# Patient Record
Sex: MALE | Race: WHITE | NOT HISPANIC OR LATINO | Employment: FULL TIME | URBAN - METROPOLITAN AREA
[De-identification: names, ages, dates, MRNs, and addresses within clinical notes are randomized per-mention and may not be internally consistent; named-entity substitution may affect disease eponyms.]

---

## 2016-09-20 LAB
CREAT SERPL-MCNC: 0.9 MG/DL (ref 0.7–1.2)
GFR SERPL CREATININE-BSD FRML MDRD: >60 ML/MIN/1.73M2
GLUCOSE SERPL-MCNC: 80 MG/DL (ref 74–106)
POTASSIUM SERPL-SCNC: 4.1 MMOL/L (ref 3.5–5)

## 2016-10-31 LAB
CREAT SERPL-MCNC: 1.2 MG/DL (ref 0.7–1.2)
GFR SERPL CREATININE-BSD FRML MDRD: >60 ML/MIN/1.73M2
GLUCOSE SERPL-MCNC: 85 MG/DL (ref 74–106)
POTASSIUM SERPL-SCNC: 4.2 MMOL/L (ref 3.5–5)

## 2017-01-29 ENCOUNTER — MYC MEDICAL ADVICE (OUTPATIENT)
Dept: FAMILY MEDICINE | Facility: CLINIC | Age: 67
End: 2017-01-29

## 2017-02-02 ENCOUNTER — MYC MEDICAL ADVICE (OUTPATIENT)
Dept: FAMILY MEDICINE | Facility: CLINIC | Age: 67
End: 2017-02-02

## 2017-02-02 NOTE — TELEPHONE ENCOUNTER
"Routing to Dr. Agustin to please advise.  Patient taking furosemide 20mg, take 1/2 tab once daily and using compression stockings which helps \"a little\"  Patient wondering what else he can do    Ghislaine Foreman RN    "

## 2017-04-07 ENCOUNTER — OFFICE VISIT (OUTPATIENT)
Dept: FAMILY MEDICINE | Facility: CLINIC | Age: 67
End: 2017-04-07
Payer: COMMERCIAL

## 2017-04-07 VITALS
DIASTOLIC BLOOD PRESSURE: 86 MMHG | HEIGHT: 69 IN | TEMPERATURE: 96.9 F | HEART RATE: 76 BPM | BODY MASS INDEX: 46.06 KG/M2 | WEIGHT: 311 LBS | SYSTOLIC BLOOD PRESSURE: 147 MMHG | OXYGEN SATURATION: 88 %

## 2017-04-07 DIAGNOSIS — I10 HYPERTENSION GOAL BP (BLOOD PRESSURE) < 140/90: ICD-10-CM

## 2017-04-07 DIAGNOSIS — J98.4 RESTRICTIVE LUNG DISEASE: ICD-10-CM

## 2017-04-07 DIAGNOSIS — Z23 NEED FOR PROPHYLACTIC VACCINATION AGAINST STREPTOCOCCUS PNEUMONIAE (PNEUMOCOCCUS): Primary | ICD-10-CM

## 2017-04-07 DIAGNOSIS — J44.9 CHRONIC OBSTRUCTIVE PULMONARY DISEASE, UNSPECIFIED COPD TYPE (H): ICD-10-CM

## 2017-04-07 PROCEDURE — 99214 OFFICE O/P EST MOD 30 MIN: CPT | Performed by: INTERNAL MEDICINE

## 2017-04-07 RX ORDER — DILTIAZEM HYDROCHLORIDE 120 MG/1
120 CAPSULE, EXTENDED RELEASE ORAL DAILY
Qty: 90 CAPSULE | Refills: 0 | Status: SHIPPED | OUTPATIENT
Start: 2017-04-07 | End: 2017-04-13

## 2017-04-07 ASSESSMENT — PAIN SCALES - GENERAL: PAINLEVEL: NO PAIN (0)

## 2017-04-07 NOTE — Clinical Note
Do you think weight loss surgery holds any possible role for this tony ? Thought I would bounce this off you !

## 2017-04-07 NOTE — PATIENT INSTRUCTIONS
At 88% O2 Saturation, I would recommend supplemental oxygen therapy during exertion and with sleep -- call if you are interested in this    Follow up with Pulmonary Rehabilitation, [facility]    You can buy a finger O2 Saturation reader to check your oxygen levels    Call if interested in meeting with a Pulmonologist.       [facility]    If you have any questions regarding to your visit please contact your care team:     Team Pink:   Clinic Hours Telephone Number   Internal Medicine:  Dr. Kelly Bahena NP       7am-7pm  Monday - Thursday   7am-5pm  Fridays  [phone]  (Appointment scheduling available 24/7)    Questions about your visit?  Team Line  [phone]   Urgent Care - [facility] - 11am-9pm Monday-Friday Saturday-Sunday- 9am-5pm   [place] - 5pm-9pm Monday-Friday Saturday-Sunday- 9am-5pm  [phone] - [place]   [phone] - [place]       What options do I have for visits at the clinic other than the traditional office visit?  To expand how we care for you, many of our providers are utilizing electronic visits (e-visits) and telephone visits, when medically appropriate, for interactions with their patients rather than a visit in the clinic.   We also offer nurse visits for many medical concerns. Just like any other service, we will bill your insurance company for this type of visit based on time spent on the phone with your provider. Not all insurance companies cover these visits. Please check with your medical insurance if this type of visit is covered. You will be responsible for any charges that are not paid by your insurance.      E-visits via [employer]:  generally incur a $35.00 fee.  Telephone visits:  Time spent on the phone: *charged based on time that is spent on the phone in increments of 10 minutes. Estimated cost:   5-10 mins $30.00   11-20 mins. $59.00   21-30 mins. $85.00   Use  MyChart (secure email communication and access to your chart) to send your primary care provider a message or make an appointment. Ask someone on your Team how to sign up for TeamLINKShart.    For a Price Quote for your services, please call our [employer] Price Line at [phone].    As always, Thank you for trusting us with your health care needs!    Discharged by Stormy SWENSON CMA ([facility])

## 2017-04-07 NOTE — PROGRESS NOTES
SUBJECTIVE:                                                    Boogie Clark is a 66 year old male who presents to clinic today with his wife for the following health issues:    COPD -- Albuterol (2 puffs as needed every 6 hours), Singulair 10 mg, and Symbicort (2 puffs, twice daily). Also Flonase, Claritin, Guaifenesin. Boogie stated that he is not able to walk for longer than ~20 feet without feeling out of breath (Ambulatory SpO2 at 88% today).     He reported that he has quit smoking -- no smoking for 6 months at this time.     Boogie reported that he [facility], and was seen by technicians, who ran him through pulmonary function tests.     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     Nosebleeds -- Boogie reported that he often wakes up in the morning with nosebleeds, and is concerned that his high BP may be causing this.     Sleep Apnea -- Boogie reported that he often will take OTC sleep medication, go to bed at 10 pm, and wakes up around midnight and he can't fall back asleep. He reported that he uses CPAP machine nightly.     HTN -- Metoprolol 100 mg, Diltiazem 120 mg, Lasix. He reported that his peripheral edema is greatly improved. He reported that he watches his diet and has been eating some weight watchers/slim fit meals, decreased salt/sugar, decreased portions, decreased alcohol.     Boogie reported that he has been having lots of headaches. He tried to obtain a home [facility] the [facility], but could not. He checked at the [facility] pharmacy and had 160/??, and felt that he should come in.     He and his wife both reported significant mood swings the las 2-3 months, and believes it might be due to these symptoms. He stated that he is in the process of selling his house and moving to [place], but doesn't feel stressed about this.     BP Readings from Last 3 Encounters:   04/07/17 147/86    12/08/16 148/82   08/22/16 (!) 158/95     Wt Readings from Last 5 Encounters:   04/07/17 (!) 141.1 kg (311 lb)   12/08/16 (!) 143.9 kg (317 lb 3.2 oz)   12/03/15 (!) 137 kg (302 lb)   11/17/15 (!) 136.5 kg (301 lb)   10/20/15 135.6 kg (299 lb)        Problem list and histories reviewed & adjusted, as indicated.  Additional history: as documented    Patient Active Problem List   Diagnosis     Advanced directives, counseling/discussion     Hypertension goal BP (blood pressure) < 140/90     Peripheral vascular disease (H)     Colon polyp     Anal fissure     Kidney stone     Bladder tumor     COCO (obstructive sleep apnea)     Hyperlipidemia with target LDL less than 100     Malignant neoplasm of bladder (H)     Personal history of bladder cancer     Sigmoid diverticulosis     Body mass index (BMI) of 40.0-44.9 in adult (H)     Chronic obstructive pulmonary disease, unspecified COPD type (H)     Benign essential hypertension     History of smoking     Morbid obesity due to excess calories (H)     Past Surgical History:   Procedure Laterality Date     ANGIOPLASTY  2009    angioplasty at abdominla aortic bifurcation in common [facility] bilaterally     ANKLE SURGERY       CYSTOSCOPY      multiple times secondary to dx. of bladder cancer     VASECTOMY         Social History   Substance Use Topics     Smoking status: Former Smoker     Types: Cigarettes     Quit date: 6/1/2016     Smokeless tobacco: Never Used     Alcohol use 0.0 oz/week     0 Standard drinks or equivalent per week      Comment: rarely     Family History   Problem Relation Age of Onset     Breast Cancer Mother      CANCER Maternal Grandmother          Current Outpatient Prescriptions   Medication Sig Dispense Refill     diltiazem (TIAZAC) 120 MG 24 hr ER beaded capsule Take 1 capsule (120 mg) by mouth daily 90 capsule 0     budesonide-formoterol (SYMBICORT) 160-4.5 MCG/ACT Inhaler Inhale 2 puffs into the lungs 2 times daily       guaiFENesin-codeine  (GUAIFENESIN AC) 100-10 MG/5ML SYRP syrup Take 5 mLs by mouth every 4 hours as needed for cough       montelukast (SINGULAIR) 10 MG tablet Take 10 mg by mouth At Bedtime       loratadine (CLARITIN) 10 MG tablet Take 10 mg by mouth daily       FUROSEMIDE PO Take 20 mg by mouth       doxycycline (VIBRAMYCIN) 100 MG capsule Take 100 mg by mouth 2 times daily       METOPROLOL SUCCINATE ER PO Take 100 mg by mouth       lovastatin (MEVACOR) 40 MG tablet Take 1 tablet (40 mg) by mouth At Bedtime 90 tablet 3     albuterol (2.5 MG/3ML) 0.083% nebulizer solution Take 1 vial (2.5 mg) by nebulization daily as needed for shortness of breath / dyspnea or wheezing 30 vial 11     albuterol (VENTOLIN HFA) 108 (90 BASE) MCG/ACT inhaler Inhale 2 puffs into the lungs every 6 hours 1 Inhaler 2     fluticasone (FLONASE) 50 MCG/ACT nasal spray Spray 1-2 sprays into both nostrils daily 1 g 11     IBUPROFEN PO Take 600 mg by mouth       Ginkgo Biloba (GINKOBA) 40 MG TABS Take 2 tablets by mouth daily.       aspirin 81 MG tablet Take  by mouth daily.       Multiple Vitamin (MULTI-VITAMIN) per tablet Take  by mouth daily.       [DISCONTINUED] diltiazem (TIAZAC) 120 MG 24 hr ER beaded capsule Take 1 capsule (120 mg) by mouth daily 90 capsule 0       Reviewed and updated as needed this visit by clinical staff  Tobacco  Allergies  Meds  Med Hx  Surg Hx  Fam Hx  Soc Hx      Reviewed and updated as needed this visit by Provider         ROS:  Constitutional, HEENT, cardiovascular, pulmonary, GI, , musculoskeletal, neuro, skin, endocrine and psych systems are negative, except as otherwise noted.    This document serves as a record of the services and decisions personally performed and made by Shabbir Agustin MD. It was created on their behalf by Dariel Murphy, a trained medical scribe. The creation of this document is based the provider's statements to the medical scribe.  Dariel Murphy April 7, 2017 12:00 PM      OBJECTIVE:                           "                          /86  Pulse 76  Temp 96.9  F (36.1  C) (Oral)  Ht 1.759 m (5' 9.25\")  Wt (!) 141.1 kg (311 lb)  SpO2 (!) 88%  BMI 45.6 kg/m2  Body mass index is 45.6 kg/(m^2).  GENERAL: healthy, alert and no distress, morbidly obese   EYES: Eyes grossly normal to inspection, PERRL and conjunctivae and sclerae normal  RESP: lungs clear to auscultation - no rales, rhonchi or wheezes, some prolongation of the expiratory phase of respiration   CV: regular rate and rhythm, normal S1 S2, no S3 or S4, no murmur, click or rub, no peripheral edema and peripheral pulses strong  SKIN: no suspicious lesions or rashes to visible skin  NEURO: mentation intact and speech normal  PSYCH: mentation appears normal, affect normal/bright    Diagnostic Test Results:  Results for orders placed or performed in visit on 12/08/16   XR Chest 2 Views    Narrative    XR CHEST 2 VW 12/8/2016 11:26 AM    COMPARISON: 6/10/2013    HISTORY: Shortness of breath      Impression    IMPRESSION: Cardiac silhouette and pulmonary vasculature are within  normal limits. No focal airspace disease, pleural effusion or  pneumothorax.    NICKI VELÁSQUEZ        ASSESSMENT/PLAN:                                                      (Z23) Need for prophylactic vaccination against Streptococcus pneumoniae (pneumococcus)  (primary encounter diagnosis)  Comment: already current   Plan: imaging section of Epic electronic medical records updated    (I10) Hypertension goal BP (blood pressure) < 140/90  Comment: controlled within acceptable limits, continue current plan of care      Plan: diltiazem (TIAZAC) 120 MG 24 hr ER beaded         capsule            (J44.9) Chronic obstructive pulmonary disease, unspecified COPD type (H)  Comment: by far we spent the most time talking about this of course., he has easily demonstrated the need for home oxygen.. This would apparently cause him possibly to lose his job as a   Plan: PULMONARY REHAB REFERRAL      "   I share his concerns and yet my job is to recommend that he be started on oxygen therapy as he desaturates with little effort and there's little doubt he is living with substantial chronic hypoxia. I agree with family, that pulmonary rehabilitation should be tried [ at the [facility] as he gets all hospital care there. He does not need oxygen essentially all of the time , just with exercise / prn / and nocturnal oxygen , but he has to accept this need. So far he has not. Also we discussed gastric bypass / weight loss surgeries. At his age the pay off in terms of weight loss amounts, it is far less effective and is generally not done in patients older then about 60 but I will follow up with patient on this point       Patient Instructions   At 88% O2 Saturation, I would recommend supplemental oxygen therapy during exertion and with sleep -- call if you are interested in this    Follow up with Pulmonary Rehabilitation, [facility]    You can buy a finger O2 Saturation reader to check your oxygen levels    Call if interested in meeting with a Pulmonologist.       The information in this document, created by the medical scribe for me, accurately reflects the services I personally performed and the decisions made by me. I have reviewed and approved this document for accuracy.   MD Shabbir Arvizu MD  [facility]

## 2017-04-07 NOTE — NURSING NOTE
Walking O2 stats on Patient done.   Resting O2 was 91 without oxygen  Walking O2 went down to 88-87 at times after 400 feet walking in clinic, Resting after walking dropped to 85 and slowly back up to 90 after resting 2 mins.     Stormy SWENSON CMA ([facility])

## 2017-04-07 NOTE — MR AVS SNAPSHOT
After Visit Summary   4/7/2017    Boogie Clark    MRN: [id number]           Patient Information     Date Of Birth          1950        Visit Information        Provider Department      4/7/2017 11:50 AM Shabbir Agustin MD [facility]        Today's Diagnoses     Need for prophylactic vaccination against Streptococcus pneumoniae (pneumococcus)    -  1    Hypertension goal BP (blood pressure) < 140/90        Chronic obstructive pulmonary disease, unspecified COPD type (H)          Care Instructions    At 88% O2 Saturation, I would recommend supplemental oxygen therapy during exertion and with sleep -- call if you are interested in this    Follow up with Pulmonary Rehabilitation, [facility]    You can buy a finger O2 Saturation reader to check your oxygen levels    Call if interested in meeting with a Pulmonologist.       [facility]    If you have any questions regarding to your visit please contact your care team:     Team Pink:   Clinic Hours Telephone Number   Internal Medicine:  Dr. Kelly Bahena NP       7am-7pm  Monday - Thursday   7am-5pm  Fridays  [phone]  (Appointment scheduling available 24/7)    Questions about your visit?  Team Line  [phone]   Urgent Care - [place] - 11am-9pm Monday-Friday Saturday-Sunday- 9am-5pm   [place] - 5pm-9pm Monday-Friday Saturday-Sunday- 9am-5pm  [phone] - [place]   [phone] - [place]       What options do I have for visits at the clinic other than the traditional office visit?  To expand how we care for you, many of our providers are utilizing electronic visits (e-visits) and telephone visits, when medically appropriate, for interactions with their patients rather than a visit in the clinic.   We also offer nurse visits for many medical concerns. Just like any other service, we will bill your insurance company for this  type of visit based on time spent on the phone with your provider. Not all insurance companies cover these visits. Please check with your medical insurance if this type of visit is covered. You will be responsible for any charges that are not paid by your insurance.      E-visits via [employer]:  generally incur a $35.00 fee.  Telephone visits:  Time spent on the phone: *charged based on time that is spent on the phone in increments of 10 minutes. Estimated cost:   5-10 mins $30.00   11-20 mins. $59.00   21-30 mins. $85.00   Use [employer] (secure email communication and access to your chart) to send your primary care provider a message or make an appointment. Ask someone on your Team how to sign up for [employer].    For a Price Quote for your services, please call our [employer] Price Line at [phone].    As always, Thank you for trusting us with your health care needs!    Discharged by Stormy SWENSON CMA ([facility])          Follow-ups after your visit        Additional Services     PULMONARY REHAB REFERRAL                 Your next 10 appointments already scheduled     Aug 21, 2017  8:00 AM CDT   Return Visit with Hansel Casillas MD, HANNA CYSTO PROC ROOM   [facility]    [address] [place] [id number] [phone]              Future tests that were ordered for you today     Open Future Orders        Priority Expected Expires Ordered    PULMONARY REHAB REFERRAL Routine  4/7/2018 4/7/2017            Who to contact     If you have questions or need follow up information about today's clinic visit or your schedule please contact [facility] directly at [phone].  Normal or non-critical lab and imaging results will be communicated to you by MyChart, letter or phone within 4 business days after the clinic has received the results. If you do not hear from us within 7 days, please contact the clinic through MyChart or phone. If you have a critical or  "abnormal lab result, we will notify you by phone as soon as possible.  Submit refill requests through [employer] or call your pharmacy and they will forward the refill request to us. Please allow 3 business days for your refill to be completed.          Additional Information About Your Visit        N[employer]ohart Information     [employer] gives you secure access to your electronic health record. If you see a primary care provider, you can also send messages to your care team and make appointments. If you have questions, please call your primary care clinic.  If you do not have a primary care provider, please call [phone] and they will assist you.        Care EveryWhere ID     This is your Care EveryWhere ID. This could be used by other organizations to access your [place] medical records  [id number]        Your Vitals Were     Pulse Temperature Height Pulse Oximetry BMI (Body Mass Index)       76 96.9  F (36.1  C) (Oral) 5' 9.25\" (1.759 m) 88% 45.6 kg/m2        Blood Pressure from Last 3 Encounters:   04/07/17 147/86   12/08/16 148/82   08/22/16 (!) 158/95    Weight from Last 3 Encounters:   04/07/17 (!) 311 lb (141.1 kg)   12/08/16 (!) 317 lb 3.2 oz (143.9 kg)   12/03/15 (!) 302 lb (137 kg)                 Where to get your medicines      Some of these will need a paper prescription and others can be bought over the counter.  Ask your nurse if you have questions.     Bring a paper prescription for each of these medications     diltiazem 120 MG 24 hr ER beaded capsule          Primary Care Provider Office Phone # Fax #    Arthur Landis PA-C [phone]       [facility] [address] [facility] [address]        Thank you!     Thank you for choosing [facility]  for your care. Our goal is always to provide you with excellent care. Hearing back from our patients is one way we can continue to improve our services. Please take a few minutes to complete the written survey that " you may receive in the mail after your visit with us. Thank you!             Your Updated Medication List - Protect others around you: Learn how to safely use, store and throw away your medicines at www.Cold Genesysemymeds.org.          This list is accurate as of: 4/7/17 12:46 PM.  Always use your most recent med list.                   Brand Name Dispense Instructions for use    * albuterol 108 (90 BASE) MCG/ACT Inhaler    VENTOLIN HFA    1 Inhaler    Inhale 2 puffs into the lungs every 6 hours       * albuterol (2.5 MG/3ML) 0.083% neb solution     30 vial    Take 1 vial (2.5 mg) by nebulization daily as needed for shortness of breath / dyspnea or wheezing       aspirin 81 MG tablet      Take  by mouth daily.       budesonide-formoterol 160-4.5 MCG/ACT Inhaler    SYMBICORT     Inhale 2 puffs into the lungs 2 times daily       diltiazem 120 MG 24 hr ER beaded capsule    TIAZAC    90 capsule    Take 1 capsule (120 mg) by mouth daily       doxycycline 100 MG capsule    VIBRAMYCIN     Take 100 mg by mouth 2 times daily       fluticasone 50 MCG/ACT spray    FLONASE    1 g    Spray 1-2 sprays into both nostrils daily       FUROSEMIDE PO      Take 20 mg by mouth       GINKOBA 40 MG Tabs   Generic drug:  Ginkgo Biloba      Take 2 tablets by mouth daily.       guaiFENesin-codeine 100-10 MG/5ML Syrp syrup    guaiFENesin AC     Take 5 mLs by mouth every 4 hours as needed for cough       IBUPROFEN PO      Take 600 mg by mouth       loratadine 10 MG tablet    CLARITIN     Take 10 mg by mouth daily       lovastatin 40 MG tablet    MEVACOR    90 tablet    Take 1 tablet (40 mg) by mouth At Bedtime       METOPROLOL SUCCINATE ER PO      Take 100 mg by mouth       montelukast 10 MG tablet    SINGULAIR     Take 10 mg by mouth At Bedtime       Multi-vitamin Tabs tablet   Generic drug:  multivitamin, therapeutic with minerals      Take  by mouth daily.       * Notice:  This list has 2 medication(s) that are the same as other medications  prescribed for you. Read the directions carefully, and ask your doctor or other care provider to review them with you.

## 2017-04-10 ENCOUNTER — TELEPHONE (OUTPATIENT)
Dept: FAMILY MEDICINE | Facility: CLINIC | Age: 67
End: 2017-04-10

## 2017-04-10 DIAGNOSIS — R09.02 HYPOXIA: ICD-10-CM

## 2017-04-10 DIAGNOSIS — I10 HYPERTENSION GOAL BP (BLOOD PRESSURE) < 140/90: ICD-10-CM

## 2017-04-10 DIAGNOSIS — J44.9 CHRONIC OBSTRUCTIVE PULMONARY DISEASE, UNSPECIFIED COPD TYPE (H): Primary | ICD-10-CM

## 2017-04-10 NOTE — TELEPHONE ENCOUNTER
[facility]  Has he had a spirometry tests completed recently?  Can we get the records?  If not, he will need this completed here (ancillary)  This is needed to determine if he qualifies for the Pulmonary Rehab Program      RE: pulmonary rehab referral  Received: Today       Shabbir Agustin MD [place], Breanna K Cc: [facility] Rn Triage Pool                   His testing was done at the [facility] I thought. If this can't be found then patient needs to be scheduled for a repeat set of spirometry here at [facility].     Nir can you help with some follow through on this one ? Thank you so very much !   [ by the way , Can you turn this into a telephone message for the chart ? Thank you ! ]     Shabbir Agustin MD              Previous Messages       ----- Message -----      From: Ella Chiang      Sent: 4/10/2017  10:17 AM        To: Shabbir Agustin MD   Subject: pulmonary rehab referral                         Dr. Agustin     Our Pulmonary Rehab supervisor has reviewed this patient for scheduling, and noticed the patient hasn't had a spirometry done in a couple years. We do need this completed before we can determine if the patient qualifies for our Pulmonary Rehab Program. Once he has a new spirometry done, we can review again and determine scheduling from there     Thank you!   Abigail   Rehab Central Scheduling          Ghislaine Foreman RN

## 2017-04-11 NOTE — TELEPHONE ENCOUNTER
Spoke with Abigail (scheduling).  She will forward this information to her supervisor to review.  Michelle Mcneal,

## 2017-04-11 NOTE — TELEPHONE ENCOUNTER
See [facility] set of records [facility] in [facility].  Patient had PFT done 11-21-16.  Michelle Mcneal,

## 2017-04-12 NOTE — TELEPHONE ENCOUNTER
"Please advise if ok to change Dx on referral to Restrictive Lung Disease or other      RE: pulmonary rehab referral  Received: Today       Ella Chiang Reuben, MD Cc: [facility] Dr. Agustin     I spoke with Charlotte yesterday and she helped me locate the most recent pfts. I sent this to the supervisor for review and this is the response I received:     Oh shoot I missed them since it was the progress note vs the results tab. Is still show a more restrictive type disease (which you can see they highlighted in the PFTs) so we would have to get them to change it to a different diagnosis, even something general like \"Restrictive Lung Disease\".     Would you please enter a new Pulmonary Rehab Referral for him with the updated diagnosis?     Thanks!   [facility] Scheduling       Ghislaine Foreman RN    "

## 2017-04-12 NOTE — TELEPHONE ENCOUNTER
RN checked previous Pulmonary Rehab referral placed by Dr. Agustin and noted that [facility] was to [facility].  Dx of Restrictive Lung Disease added to referral as requested by [facility] scheduler    Dr. Agustin: please clarify, did you want the referral sent to [facility] or ?    Ghislaine Foreman RN

## 2017-04-13 RX ORDER — DILTIAZEM HYDROCHLORIDE 120 MG/1
120 CAPSULE, EXTENDED RELEASE ORAL DAILY
Qty: 90 CAPSULE | Refills: 3 | Status: SHIPPED | OUTPATIENT
Start: 2017-04-13 | End: 2018-06-27

## 2017-04-13 NOTE — TELEPHONE ENCOUNTER
I am fine with all of this but we run into some technical difficulties with implementing these orders at least to some extent .    The prescription is simple and signed    The other orders I am slightly confused; he already saw a specialist with the [facility] but he wants to see further specialists at the [facility] ?  I do understand pulmonary rehabilitation is to be sent to the [facility]     But does he also wish for us to try to send the spirometry and referrals to pulmonologist and/ or allergy specialist  To the [facility] ?     I would prefer he see someone with [place] as I can easily do this and can get him an appointment within a short time.    I will place these orders accordingly for [place] but you have my permission to change all these orders over to the [facility] if patient prefers.    Shabbir Agustin MD

## 2017-04-13 NOTE — TELEPHONE ENCOUNTER
Spoke with patient.  [facility]    Also, he stated that he did not receive the prescription for Diltiazem at [facility].  [facility] is [facility]    Dr. Agustin, please sign orders. Patient is willing to see an allergist or pulmonologist. Whichever specialty Dr. Agustin would refer him to is fine with him.    - [facility] to the [facility]  1. Diltiazem prescription  2. Spirometry order  3. Pulmonary Rehab referral  4. Allergy or Pulmonary medicine referral    Ghislaine Foreman RN

## 2017-04-13 NOTE — TELEPHONE ENCOUNTER
Patient unavailable.  Female voice will have patient return call to RN hotline  Need to discuss having another Spirometry test (ancillary appointment) as Dr. Agustin thinks this would be a good idea.  Will recommend a 2nd opinion with either an allergist OR a Pulmonologist to help determine cause of hypoxia       RE: pulmonary rehab referral  Received: Yesterday       Shabbir Agustin MD  P Fz Rn Triage Pool                   There are several clinical issues here we need help with and by the way, Can you turn this into a telephone message for the chart ? Thank you !     1. The pulmonary rehabilitation was planned to be with the [facility]   2. The fact that this patient has profound oxygen desaturation is not in question and so we will continue to push for him to get oxygen for home therapy , as per the most recent previous office visit with me   3. We can repeat his spirometry which is a good idea   4. It remains not entirely clear to me if the primary disease is pure emphysema or possibility there's a component of obesity hypoventilation syndrome [ he has known obstructive sleep apnea ] . Lets offer a second opinion / follow up with our allergy / asthma specialists or with one of our pulmonologists to further delineate the nature of his hypoxia. As I say I suspect chronic obstructive pulmonary disease / emphysema but there are other diseases to be excluded including possibility pulmonary hypertension and other cardiopulmonary disease processes.     Shabbir Foreman, ROYA

## 2017-04-14 NOTE — TELEPHONE ENCOUNTER
Per patient's [facility] message-    April 13, 2017   Boogie Clark   to Shabbir Agustin MD          10:01 PM   I will check with my insurance tomorrow to make sure both are covered. If so I will let you know and you can set up appointments with [place] doctors.      Ghislaine Foreman RN

## 2017-04-18 ENCOUNTER — MYC MEDICAL ADVICE (OUTPATIENT)
Dept: FAMILY MEDICINE | Facility: CLINIC | Age: 67
End: 2017-04-18

## 2017-04-18 NOTE — TELEPHONE ENCOUNTER
Patient has not responded.    Detailed message left on patient's VM.  Has he checked with his insurance?  Would he like see allergy and pulmonary, and have Spirometry test completed thru [facility] or the [facility]?  (FYI: [facility] have already [facility] for [facility])  Ghislaine Foreman RN

## 2017-04-19 NOTE — TELEPHONE ENCOUNTER
See 4/18/17 [employer] message.  Orders corrected.  Patient will schedule with BRUCE Foreman RN

## 2017-04-20 ENCOUNTER — MYC MEDICAL ADVICE (OUTPATIENT)
Dept: FAMILY MEDICINE | Facility: CLINIC | Age: 67
End: 2017-04-20

## 2017-04-21 NOTE — TELEPHONE ENCOUNTER
Called [facility] ([phone]) and spoke to the switchboard who referred me to [facility] in primary care, (I think).  The switchboard transferred my call to [facility] and I left her a voice mail message to find out where the referrals need to be faxed. Michelle Mcneal,

## 2017-04-21 NOTE — TELEPHONE ENCOUNTER
Please fax previous Spirometry order, allergy referral, and pulmonary referral that were [facility] to [facility]    Then notify patient.  [facility] can [facility] with the [facility] to see if he can get in sooner.    Ghislaine Foreman RN

## 2017-04-21 NOTE — TELEPHONE ENCOUNTER
[facility] returned call.  Referrals and 4-7-17 office note faxed to Jory Dyer NP at [phone].  Patient called and informed. Michelle Mcneal,

## 2017-06-17 ENCOUNTER — RADIANT APPOINTMENT (OUTPATIENT)
Dept: GENERAL RADIOLOGY | Facility: CLINIC | Age: 67
End: 2017-06-17
Attending: FAMILY MEDICINE
Payer: COMMERCIAL

## 2017-06-17 ENCOUNTER — OFFICE VISIT (OUTPATIENT)
Dept: URGENT CARE | Facility: URGENT CARE | Age: 67
End: 2017-06-17
Payer: COMMERCIAL

## 2017-06-17 VITALS
WEIGHT: 315 LBS | DIASTOLIC BLOOD PRESSURE: 85 MMHG | SYSTOLIC BLOOD PRESSURE: 146 MMHG | BODY MASS INDEX: 46.99 KG/M2 | TEMPERATURE: 97.7 F | HEART RATE: 83 BPM | OXYGEN SATURATION: 91 %

## 2017-06-17 DIAGNOSIS — J44.1 COPD EXACERBATION (H): ICD-10-CM

## 2017-06-17 DIAGNOSIS — R05.9 COUGH: ICD-10-CM

## 2017-06-17 DIAGNOSIS — R05.9 COUGH: Primary | ICD-10-CM

## 2017-06-17 PROCEDURE — 99214 OFFICE O/P EST MOD 30 MIN: CPT | Performed by: FAMILY MEDICINE

## 2017-06-17 PROCEDURE — 71020 XR CHEST 2 VW: CPT

## 2017-06-17 RX ORDER — AZITHROMYCIN 250 MG/1
TABLET, FILM COATED ORAL
Qty: 6 TABLET | Refills: 0 | Status: SHIPPED | OUTPATIENT
Start: 2017-06-17 | End: 2017-06-17

## 2017-06-17 RX ORDER — PREDNISONE 20 MG/1
40 TABLET ORAL DAILY
Qty: 10 TABLET | Refills: 0 | Status: SHIPPED | OUTPATIENT
Start: 2017-06-17 | End: 2017-06-22

## 2017-06-17 RX ORDER — AZITHROMYCIN 250 MG/1
TABLET, FILM COATED ORAL
Qty: 6 TABLET | Refills: 0 | Status: SHIPPED | OUTPATIENT
Start: 2017-06-17 | End: 2017-06-22

## 2017-06-17 RX ORDER — PREDNISONE 20 MG/1
40 TABLET ORAL DAILY
Qty: 10 TABLET | Refills: 0 | Status: SHIPPED | OUTPATIENT
Start: 2017-06-17 | End: 2017-06-17

## 2017-06-17 NOTE — NURSING NOTE
"Chief Complaint   Patient presents with     Sick       Initial /85  Pulse 83  Temp 97.7  F (36.5  C) (Tympanic)  Wt (!) 320 lb 8 oz (145.4 kg)  SpO2 91%  BMI 46.99 kg/m2 Estimated body mass index is 46.99 kg/(m^2) as calculated from the following:    Height as of 4/7/17: 5' 9.25\" (1.759 m).    Weight as of this encounter: 320 lb 8 oz (145.4 kg).  Medication Reconciliation: complete       STEVEN Dickerson      "

## 2017-06-17 NOTE — MR AVS SNAPSHOT
After Visit Summary   6/17/2017    Boogie Clark    MRN: [id number]           Patient Information     Date Of Birth          1950        Visit Information        Provider Department      6/17/2017 9:35 AM Parmjit Paul MD [facility]        Today's Diagnoses     Cough    -  1    COPD exacerbation (H)          Care Instructions      COPD Flare    You have had a flare-up of your COPD.  COPD, or chronic obstructive pulmonary disease, is a common lung disease. It causes your airways to become irritated and narrower. This makes it harder for you to breathe. Emphysema and chronic bronchitis are both types of COPD. This is a chronic condition, which means you always have it. Sometimes it gets worse. When this happens, it is called a flare-up.  Symptoms of COPD  People with COPD may have symptoms most of the time. In a flare-up, your symptoms get worse. These symptoms may mean you are having a flare-up:    Shortness of breath, shallow or rapid breathing, or wheezing that gets worse    Lung infection    Cough that gets worse    More mucus, thicker mucus or mucus of a different color    Tiredness, decreased energy, or trouble doing your usual activities    Fever    Chest tightness    Your symptoms don t get better even when you use your usual medicines, inhalers, and nebulizer    Trouble talking    You feel confused  Causes of flare-ups  Unfortunately, a flare-up can happen even though you did everything right, and you followed your doctor s instructions. Some causes of flare-ups are:    Smoking or secondhand smoke    Colds, the flu, or respiratory infections    Air pollution    Sudden change in the weather    Dust, irritating chemicals, or strong fumes    Not taking your medicines as prescribed  Home care  Here are some things you can do at home to treat a flare-up:    Try not to panic. This makes it harder to breathe, and keeps you from doing the right things.    Don t smoke or be around  others who are smoking.    Try to drink more fluids than usual during a flare-up, unless your doctor has told you not to because of heart and kidney problems. More fluids can help loosen the mucus.    Use your inhalers and nebulizer, if you have one, as you have been told to.    If you were given antibiotics, take them until they are used up or your doctor tells you to stop. It s important to finish the antibiotics, even though you feel better. This will make sure the infection has cleared.    If you were given prednisone or another steroid, finish it even if you feel better.  Preventing a flare-up  Even though flare-ups happen, the best way to treat one is to prevent it before it starts. Here are some pointers:    Don t smoke or be around others who are smoking.    Take your medicines as you have been told.    Talk with your doctor about getting a flu shot every year. Also find out if you need a pneumonia shot.    If there is a weather advisory warning to stay indoors, try to stay inside when possible.    Try to eat healthy and get plenty of sleep.    Try to avoid things that usually set you off, like dust, chemical fumes, hairsprays, or strong perfumes.  Follow-up care  Follow up with your healthcare provider, or as advised.  If a culture was done, you will be told if your treatment needs to be changed. You can call as directed for the results.  If X-rays were done, you will be notified of any new findings that may affect your care.  Call 911  Call 911 if any of these occur:    You have trouble breathing    You feel confused or it s difficult to wake you up    You faint or lose consciousness    You have a rapid heart rate    You have new pain in your chest, arm, shoulder, neck or upper back  When to seek medical advice  Call your healthcare provider right away if any of these occur:    Wheezing or shortness of breath gets worse    You need to use your inhalers more often than usual without relief    Fever of  100.4 F (38 C) or higher, or as directed by your healthcare provider    Coughing up lots of dark-colored or bloody mucus (sputum)    Chest pain with each breath    You do not start to get better within 24 hours    Swelling of your ankles gets worse    Dizziness or weakness  Date Last Reviewed: 9/1/2016 2000-2017 [employer]. [address] [place] [address]. All rights reserved. This information is not intended as a substitute for professional medical care. Always follow your healthcare professional's instructions.        Patient Education    Azithromycin Ophthalmic drops, solution    Azithromycin Oral suspension    Azithromycin Oral suspension, extended release    Azithromycin Oral tablet    Azithromycin Solution for injection  Azithromycin Oral tablet  What is this medicine?  AZITHROMYCIN (az ith alphonso MYE sin) is a macrolide antibiotic. It is used to treat or prevent certain kinds of bacterial infections. It will not work for colds, flu, or other viral infections.  This medicine may be used for other purposes; ask your health care provider or pharmacist if you have questions.  What should I tell my health care provider before I take this medicine?  They need to know if you have any of these conditions:    kidney disease    liver disease    irregular heartbeat or heart disease    an unusual or allergic reaction to azithromycin, erythromycin, other macrolide antibiotics, foods, dyes, or preservatives    pregnant or trying to get pregnant    breast-feeding  How should I use this medicine?  Take this medicine by mouth with a full glass of water. Follow the directions on the prescription label. The tablets can be taken with food or on an empty stomach. If the medicine upsets your stomach, take it with food. Take your medicine at regular intervals. Do not take your medicine more often than directed. Take all of your medicine as directed even if you think your are better. Do not skip doses or  stop your medicine early.  Talk to your pediatrician regarding the use of this medicine in children. Special care may be needed.  Overdosage: If you think you have taken too much of this medicine contact a poison control center or emergency room at once.  NOTE: This medicine is only for you. Do not share this medicine with others.  What if I miss a dose?  If you miss a dose, take it as soon as you can. If it is almost time for your next dose, take only that dose. Do not take double or extra doses.  What may interact with this medicine?  Do not take this medicine with any of the following medications:    lincomycin  This medicine may also interact with the following medications:    amiodarone    antacids    birth control pills    cyclosporine    digoxin    magnesium    nelfinavir    phenytoin    warfarin  This list may not describe all possible interactions. Give your health care provider a list of all the medicines, herbs, non-prescription drugs, or dietary supplements you use. Also tell them if you smoke, drink alcohol, or use illegal drugs. Some items may interact with your medicine.  What should I watch for while using this medicine?  Tell your doctor or health care professional if your symptoms do not improve.  Do not treat diarrhea with over the counter products. Contact your doctor if you have diarrhea that lasts more than 2 days or if it is severe and watery.  This medicine can make you more sensitive to the sun. Keep out of the sun. If you cannot avoid being in the sun, wear protective clothing and use sunscreen. Do not use sun lamps or tanning beds/booths.  What side effects may I notice from receiving this medicine?  Side effects that you should report to your doctor or health care professional as soon as possible:    allergic reactions like skin rash, itching or hives, swelling of the face, lips, or tongue    confusion, nightmares or hallucinations    dark urine    difficulty breathing    hearing  loss    irregular heartbeat or chest pain    pain or difficulty passing urine    redness, blistering, peeling or loosening of the skin, including inside the mouth    white patches or sores in the mouth    yellowing of the eyes or skin  Side effects that usually do not require medical attention (report to your doctor or health care professional if they continue or are bothersome):    diarrhea    dizziness, drowsiness    headache    stomach upset or vomiting    tooth discoloration    vaginal irritation  This list may not describe all possible side effects. Call your doctor for medical advice about side effects. You may report side effects to FDA at 0-145-OUS-7603.  Where should I keep my medicine?  Keep out of the reach of children.  Store at room temperature between 15 and 30 degrees C (59 and 86 degrees F). Throw away any unused medicine after the expiration date.  NOTE:This sheet is a summary. It may not cover all possible information. If you have questions about this medicine, talk to your doctor, pharmacist, or health care provider. Copyright  2016 [employer]        Patient Education    Prednisolone Acetate Ophthalmic drops, suspension    Prednisolone Oral solution    Prednisolone Oral syrup    Prednisolone Oral tablet    Prednisolone Sodium Phosphate Ophthalmic drops, solution    Prednisolone Sodium Phosphate Oral solution  Prednisolone Oral tablet  What is this medicine?  PREDNISOLONE (pred NISS oh lone) is a corticosteroid. It is commonly used to treat inflammation of the skin, joints, lungs, and other organs. Common conditions treated include asthma, allergies, and arthritis. It is also used for other conditions, such as blood disorders and diseases of the adrenal glands.  This medicine may be used for other purposes; ask your health care provider or pharmacist if you have questions.  What should I tell my health care provider before I take this medicine?  They need to know if you have any of these  conditions:    Cushing's syndrome    diabetes    glaucoma    heart problems or disease    high blood pressure    infection such as herpes, measles, tuberculosis, or chickenpox    kidney disease    liver disease    mental problems    myasthenia gravis    osteoporosis    seizures    stomach ulcer or intestine disease including colitis and diverticulitis    thyroid problem    an unusual or allergic reaction to lactose, prednisolone, other medicines, foods, dyes, or preservatives    pregnant or trying to get pregnant    breast-feeding  How should I use this medicine?  Take this medicine by mouth with a glass of water. Follow the directions on the prescription label. Take it with food or milk to avoid stomach upset. If you are taking this medicine once a day, take it in the morning. Do not take more medicine than you are told to take. Do not suddenly stop taking your medicine because you may develop a severe reaction. Your doctor will tell you how much medicine to take. If your doctor wants you to stop the medicine, the dose may be slowly lowered over time to avoid any side effects.  Talk to your pediatrician regarding the use of this medicine in children. Special care may be needed.  Overdosage: If you think you have taken too much of this medicine contact a poison control center or emergency room at once.  NOTE: This medicine is only for you. Do not share this medicine with others.  What if I miss a dose?  If you miss a dose, take it as soon as you can. If it is almost time for your next dose, take only that dose. Do not take double or extra doses.  What may interact with this medicine?  Do not take this medicine with any of the following medications:    metyrapone    mifepristone  This medicine may also interact with the following medications:    aminoglutethimide    amphotericin B    aspirin and aspirin-like medicines    barbiturates    certain medicines for diabetes, like glipizide or  glyburide    cholestyramine    cholinesterase inhibitors    cyclosporine    digoxin    diuretics    ephedrine    female hormones, like estrogens and birth control pills    isoniazid    ketoconazole    NSAIDS, medicines for pain and inflammation, like ibuprofen or naproxen    phenytoin    rifampin    toxoids    vaccines    warfarin  This list may not describe all possible interactions. Give your health care provider a list of all the medicines, herbs, non-prescription drugs, or dietary supplements you use. Also tell them if you smoke, drink alcohol, or use illegal drugs. Some items may interact with your medicine.  What should I watch for while using this medicine?  Visit your doctor or health care professional for regular checks on your progress. If you are taking this medicine over a prolonged period, carry an identification card with your name and address, the type and dose of your medicine, and your doctor's name and address.  This medicine may increase your risk of getting an infection. Tell your doctor or health care professional if you are around anyone with measles or chickenpox, or if you develop sores or blisters that do not heal properly.  If you are going to have surgery, tell your doctor or health care professional that you have taken this medicine within the last twelve months.  Ask your doctor or health care professional about your diet. You may need to lower the amount of salt you eat.  This medicine may affect blood sugar levels. If you have diabetes, check with your doctor or health care professional before you change your diet or the dose of your diabetic medicine.  What side effects may I notice from receiving this medicine?  Side effects that you should report to your doctor or health care professional as soon as possible:    allergic reactions like skin rash, itching or hives, swelling of the face, lips, or tongue    changes in emotions or moods    changes in vision    eye pain    signs and  symptoms of high blood sugar such as dizziness; dry mouth; dry skin; fruity breath; nausea; stomach pain; increased hunger or thirst; increased urination    signs and symptoms of infection like fever or chills; cough; sore throat; pain or trouble passing urine    slow growth in children (if used for longer periods of time)    swelling of ankles, feet    trouble sleeping    unusually weak or tired    weak bones (if used for longer periods of time)  Side effects that usually do not require medical attention (Report these to your doctor or health care professional if they continue or are bothersome.):    increased hunger    nausea    skin problems, acne, thin and shiny skin    upset stomach    weight gain  This list may not describe all possible side effects. Call your doctor for medical advice about side effects. You may report side effects to FDA at 2-559-IKO-0723.  Where should I keep my medicine?  Keep out of the reach of children.  Store at room temperature between 15 and 30 degrees C (59 and 86 degrees F). Keep container tightly closed. Throw away any unused medicine after the expiration date.  NOTE:This sheet is a summary. It may not cover all possible information. If you have questions about this medicine, talk to your doctor, pharmacist, or health care provider. Copyright  2016 [employer]                Follow-ups after your visit        Your next 10 appointments already scheduled     Jul 18, 2017  3:00 PM CDT   Office Visit with PFT LAB   [facility])    [address] [place] [id number] [phone]           Bring a current list of meds and any records pertaining to this visit.  For Physicals, please bring immunization records and any forms needing to be filled out.  Please arrive 10 minutes early to complete paperwork.            Jul 18, 2017  4:00 PM CDT   New Visit with June Higginbotham MD   [facility]    [address] [place] [id number]   [phone]            Aug 21, 2017  8:00 AM CDT   Return Visit with Hansel Casillas MD, [facility]   [facility] [place] ([address]  [place] [id number] [phone]              Who to contact     If you have questions or need follow up information about today's clinic visit or your schedule please contact [facility] directly at [phone].  Normal or non-critical lab and imaging results will be communicated to you by [employer], letter or phone within 4 business days after the clinic has received the results. If you do not hear from us within 7 days, please contact the clinic through [employer] or phone. If you have a critical or abnormal lab result, we will notify you by phone as soon as possible.  Submit refill requests through [employer] or call your pharmacy and they will forward the refill request to us. Please allow 3 business days for your refill to be completed.          Additional Information About Your Visit        MyChart Information     [employer] gives you secure access to your electronic health record. If you see a primary care provider, you can also send messages to your care team and make appointments. If you have questions, please call your primary care clinic.  If you do not have a primary care provider, please call [phone] and they will assist you.        Care EveryWhere ID     This is your Care EveryWhere ID. This could be used by other organizations to access your [facility] medical records  [id number]        Your Vitals Were     Pulse Temperature Pulse Oximetry BMI (Body Mass Index)          83 97.7  F (36.5  C) (Tympanic) 91% 46.99 kg/m2         Blood Pressure from Last 3 Encounters:   06/17/17 146/85   04/07/17 147/86   12/08/16 148/82    Weight from Last 3 Encounters:   06/17/17 (!) 320 lb 8 oz (145.4 kg)   04/07/17 (!) 311 lb (141.1 kg)    12/08/16 (!) 317 lb 3.2 oz (143.9 kg)                 Today's Medication Changes          These changes are accurate as of: 6/17/17 10:21 AM.  If you have any questions, ask your nurse or doctor.               Start taking these medicines.        Dose/Directions    azithromycin 250 MG tablet   Commonly known as:  ZITHROMAX   Used for:  COPD exacerbation (H)   Started by:  Parmjit Paul MD        Two tablets first day, then one tablet daily for four days.   Quantity:  6 tablet   Refills:  0       predniSONE 20 MG tablet   Commonly known as:  DELTASONE   Used for:  COPD exacerbation (H)   Started by:  Parmjit Paul MD        Dose:  40 mg   Take 2 tablets (40 mg) by mouth daily for 5 days   Quantity:  10 tablet   Refills:  0         Stop taking these medicines if you haven't already. Please contact your care team if you have questions.     FUROSEMIDE PO   Stopped by:  Parmjit Paul MD                Where to get your medicines      These medications were sent to [address] [address]     Phone:  [phone]     azithromycin 250 MG tablet    predniSONE 20 MG tablet                Primary Care Provider Office Phone # Fax #    Arthur Landis PA-C [phone]       [address] [address]        Thank you!     Thank you for choosing [facility]  for your care. Our goal is always to provide you with excellent care. Hearing back from our patients is one way we can continue to improve our services. Please take a few minutes to complete the written survey that you may receive in the mail after your visit with us. Thank you!             Your Updated Medication List - Protect others around you: Learn how to safely use, store and throw away your medicines at www.disposemymeds.org.          This list is accurate as of: 6/17/17 10:21 AM.  Always use  your most recent med list.                   Brand Name Dispense Instructions for use    * albuterol 108 (90 BASE) MCG/ACT Inhaler    VENTOLIN HFA    1 Inhaler    Inhale 2 puffs into the lungs every 6 hours       * albuterol (2.5 MG/3ML) 0.083% neb solution     30 vial    Take 1 vial (2.5 mg) by nebulization daily as needed for shortness of breath / dyspnea or wheezing       aspirin 81 MG tablet      Take  by mouth daily.       azithromycin 250 MG tablet    ZITHROMAX    6 tablet    Two tablets first day, then one tablet daily for four days.       budesonide-formoterol 160-4.5 MCG/ACT Inhaler    SYMBICORT     Inhale 2 puffs into the lungs 2 times daily       diltiazem 120 MG 24 hr ER beaded capsule    TIAZAC    90 capsule    Take 1 capsule (120 mg) by mouth daily       fluticasone 50 MCG/ACT spray    FLONASE    1 g    Spray 1-2 sprays into both nostrils daily       GINKOBA 40 MG Tabs   Generic drug:  Ginkgo Biloba      Take 2 tablets by mouth daily.       IBUPROFEN PO      Take 600 mg by mouth       loratadine 10 MG tablet    CLARITIN     Take 10 mg by mouth daily       lovastatin 40 MG tablet    MEVACOR    90 tablet    Take 1 tablet (40 mg) by mouth At Bedtime       METOPROLOL SUCCINATE ER PO      Take 100 mg by mouth       montelukast 10 MG tablet    SINGULAIR     Take 10 mg by mouth At Bedtime       Multi-vitamin Tabs tablet   Generic drug:  multivitamin, therapeutic with minerals      Take  by mouth daily.       predniSONE 20 MG tablet    DELTASONE    10 tablet    Take 2 tablets (40 mg) by mouth daily for 5 days       * Notice:  This list has 2 medication(s) that are the same as other medications prescribed for you. Read the directions carefully, and ask your doctor or other care provider to review them with you.

## 2017-06-17 NOTE — PATIENT INSTRUCTIONS
COPD Flare    You have had a flare-up of your COPD.  COPD, or chronic obstructive pulmonary disease, is a common lung disease. It causes your airways to become irritated and narrower. This makes it harder for you to breathe. Emphysema and chronic bronchitis are both types of COPD. This is a chronic condition, which means you always have it. Sometimes it gets worse. When this happens, it is called a flare-up.  Symptoms of COPD  People with COPD may have symptoms most of the time. In a flare-up, your symptoms get worse. These symptoms may mean you are having a flare-up:    Shortness of breath, shallow or rapid breathing, or wheezing that gets worse    Lung infection    Cough that gets worse    More mucus, thicker mucus or mucus of a different color    Tiredness, decreased energy, or trouble doing your usual activities    Fever    Chest tightness    Your symptoms don t get better even when you use your usual medicines, inhalers, and nebulizer    Trouble talking    You feel confused  Causes of flare-ups  Unfortunately, a flare-up can happen even though you did everything right, and you followed your doctor s instructions. Some causes of flare-ups are:    Smoking or secondhand smoke    Colds, the flu, or respiratory infections    Air pollution    Sudden change in the weather    Dust, irritating chemicals, or strong fumes    Not taking your medicines as prescribed  Home care  Here are some things you can do at home to treat a flare-up:    Try not to panic. This makes it harder to breathe, and keeps you from doing the right things.    Don t smoke or be around others who are smoking.    Try to drink more fluids than usual during a flare-up, unless your doctor has told you not to because of heart and kidney problems. More fluids can help loosen the mucus.    Use your inhalers and nebulizer, if you have one, as you have been told to.    If you were given antibiotics, take them until they are used up or your doctor tells you  to stop. It s important to finish the antibiotics, even though you feel better. This will make sure the infection has cleared.    If you were given prednisone or another steroid, finish it even if you feel better.  Preventing a flare-up  Even though flare-ups happen, the best way to treat one is to prevent it before it starts. Here are some pointers:    Don t smoke or be around others who are smoking.    Take your medicines as you have been told.    Talk with your doctor about getting a flu shot every year. Also find out if you need a pneumonia shot.    If there is a weather advisory warning to stay indoors, try to stay inside when possible.    Try to eat healthy and get plenty of sleep.    Try to avoid things that usually set you off, like dust, chemical fumes, hairsprays, or strong perfumes.  Follow-up care  Follow up with your healthcare provider, or as advised.  If a culture was done, you will be told if your treatment needs to be changed. You can call as directed for the results.  If X-rays were done, you will be notified of any new findings that may affect your care.  Call 911  Call 911 if any of these occur:    You have trouble breathing    You feel confused or it s difficult to wake you up    You faint or lose consciousness    You have a rapid heart rate    You have new pain in your chest, arm, shoulder, neck or upper back  When to seek medical advice  Call your healthcare provider right away if any of these occur:    Wheezing or shortness of breath gets worse    You need to use your inhalers more often than usual without relief    Fever of 100.4 F (38 C) or higher, or as directed by your healthcare provider    Coughing up lots of dark-colored or bloody mucus (sputum)    Chest pain with each breath    You do not start to get better within 24 hours    Swelling of your ankles gets worse    Dizziness or weakness  Date Last Reviewed: 9/1/2016 2000-2017 [employer] [address] [facility],  [place] [address]. All rights reserved. This information is not intended as a substitute for professional medical care. Always follow your healthcare professional's instructions.        Patient Education    Azithromycin Ophthalmic drops, solution    Azithromycin Oral suspension    Azithromycin Oral suspension, extended release    Azithromycin Oral tablet    Azithromycin Solution for injection  Azithromycin Oral tablet  What is this medicine?  AZITHROMYCIN (az lacy JO sin) is a macrolide antibiotic. It is used to treat or prevent certain kinds of bacterial infections. It will not work for colds, flu, or other viral infections.  This medicine may be used for other purposes; ask your health care provider or pharmacist if you have questions.  What should I tell my health care provider before I take this medicine?  They need to know if you have any of these conditions:    kidney disease    liver disease    irregular heartbeat or heart disease    an unusual or allergic reaction to azithromycin, erythromycin, other macrolide antibiotics, foods, dyes, or preservatives    pregnant or trying to get pregnant    breast-feeding  How should I use this medicine?  Take this medicine by mouth with a full glass of water. Follow the directions on the prescription label. The tablets can be taken with food or on an empty stomach. If the medicine upsets your stomach, take it with food. Take your medicine at regular intervals. Do not take your medicine more often than directed. Take all of your medicine as directed even if you think your are better. Do not skip doses or stop your medicine early.  Talk to your pediatrician regarding the use of this medicine in children. Special care may be needed.  Overdosage: If you think you have taken too much of this medicine contact a poison control center or emergency room at once.  NOTE: This medicine is only for you. Do not share this medicine with others.  What if I miss a dose?  If you miss a  dose, take it as soon as you can. If it is almost time for your next dose, take only that dose. Do not take double or extra doses.  What may interact with this medicine?  Do not take this medicine with any of the following medications:    lincomycin  This medicine may also interact with the following medications:    amiodarone    antacids    birth control pills    cyclosporine    digoxin    magnesium    nelfinavir    phenytoin    warfarin  This list may not describe all possible interactions. Give your health care provider a list of all the medicines, herbs, non-prescription drugs, or dietary supplements you use. Also tell them if you smoke, drink alcohol, or use illegal drugs. Some items may interact with your medicine.  What should I watch for while using this medicine?  Tell your doctor or health care professional if your symptoms do not improve.  Do not treat diarrhea with over the counter products. Contact your doctor if you have diarrhea that lasts more than 2 days or if it is severe and watery.  This medicine can make you more sensitive to the sun. Keep out of the sun. If you cannot avoid being in the sun, wear protective clothing and use sunscreen. Do not use sun lamps or tanning beds/booths.  What side effects may I notice from receiving this medicine?  Side effects that you should report to your doctor or health care professional as soon as possible:    allergic reactions like skin rash, itching or hives, swelling of the face, lips, or tongue    confusion, nightmares or hallucinations    dark urine    difficulty breathing    hearing loss    irregular heartbeat or chest pain    pain or difficulty passing urine    redness, blistering, peeling or loosening of the skin, including inside the mouth    white patches or sores in the mouth    yellowing of the eyes or skin  Side effects that usually do not require medical attention (report to your doctor or health care professional if they continue or are  bothersome):    diarrhea    dizziness, drowsiness    headache    stomach upset or vomiting    tooth discoloration    vaginal irritation  This list may not describe all possible side effects. Call your doctor for medical advice about side effects. You may report side effects to FDA at 9-798-LXP-6989.  Where should I keep my medicine?  Keep out of the reach of children.  Store at room temperature between 15 and 30 degrees C (59 and 86 degrees F). Throw away any unused medicine after the expiration date.  NOTE:This sheet is a summary. It may not cover all possible information. If you have questions about this medicine, talk to your doctor, pharmacist, or health care provider. Copyright  2016 [employer]        Patient Education    Prednisolone Acetate Ophthalmic drops, suspension    Prednisolone Oral solution    Prednisolone Oral syrup    Prednisolone Oral tablet    Prednisolone Sodium Phosphate Ophthalmic drops, solution    Prednisolone Sodium Phosphate Oral solution  Prednisolone Oral tablet  What is this medicine?  PREDNISOLONE (pred NISS oh lone) is a corticosteroid. It is commonly used to treat inflammation of the skin, joints, lungs, and other organs. Common conditions treated include asthma, allergies, and arthritis. It is also used for other conditions, such as blood disorders and diseases of the adrenal glands.  This medicine may be used for other purposes; ask your health care provider or pharmacist if you have questions.  What should I tell my health care provider before I take this medicine?  They need to know if you have any of these conditions:    Cushing's syndrome    diabetes    glaucoma    heart problems or disease    high blood pressure    infection such as herpes, measles, tuberculosis, or chickenpox    kidney disease    liver disease    mental problems    myasthenia gravis    osteoporosis    seizures    stomach ulcer or intestine disease including colitis and diverticulitis    thyroid  problem    an unusual or allergic reaction to lactose, prednisolone, other medicines, foods, dyes, or preservatives    pregnant or trying to get pregnant    breast-feeding  How should I use this medicine?  Take this medicine by mouth with a glass of water. Follow the directions on the prescription label. Take it with food or milk to avoid stomach upset. If you are taking this medicine once a day, take it in the morning. Do not take more medicine than you are told to take. Do not suddenly stop taking your medicine because you may develop a severe reaction. Your doctor will tell you how much medicine to take. If your doctor wants you to stop the medicine, the dose may be slowly lowered over time to avoid any side effects.  Talk to your pediatrician regarding the use of this medicine in children. Special care may be needed.  Overdosage: If you think you have taken too much of this medicine contact a poison control center or emergency room at once.  NOTE: This medicine is only for you. Do not share this medicine with others.  What if I miss a dose?  If you miss a dose, take it as soon as you can. If it is almost time for your next dose, take only that dose. Do not take double or extra doses.  What may interact with this medicine?  Do not take this medicine with any of the following medications:    metyrapone    mifepristone  This medicine may also interact with the following medications:    aminoglutethimide    amphotericin B    aspirin and aspirin-like medicines    barbiturates    certain medicines for diabetes, like glipizide or glyburide    cholestyramine    cholinesterase inhibitors    cyclosporine    digoxin    diuretics    ephedrine    female hormones, like estrogens and birth control pills    isoniazid    ketoconazole    NSAIDS, medicines for pain and inflammation, like ibuprofen or naproxen    phenytoin    rifampin    toxoids    vaccines    warfarin  This list may not describe all possible interactions. Give  your health care provider a list of all the medicines, herbs, non-prescription drugs, or dietary supplements you use. Also tell them if you smoke, drink alcohol, or use illegal drugs. Some items may interact with your medicine.  What should I watch for while using this medicine?  Visit your doctor or health care professional for regular checks on your progress. If you are taking this medicine over a prolonged period, carry an identification card with your name and address, the type and dose of your medicine, and your doctor's name and address.  This medicine may increase your risk of getting an infection. Tell your doctor or health care professional if you are around anyone with measles or chickenpox, or if you develop sores or blisters that do not heal properly.  If you are going to have surgery, tell your doctor or health care professional that you have taken this medicine within the last twelve months.  Ask your doctor or health care professional about your diet. You may need to lower the amount of salt you eat.  This medicine may affect blood sugar levels. If you have diabetes, check with your doctor or health care professional before you change your diet or the dose of your diabetic medicine.  What side effects may I notice from receiving this medicine?  Side effects that you should report to your doctor or health care professional as soon as possible:    allergic reactions like skin rash, itching or hives, swelling of the face, lips, or tongue    changes in emotions or moods    changes in vision    eye pain    signs and symptoms of high blood sugar such as dizziness; dry mouth; dry skin; fruity breath; nausea; stomach pain; increased hunger or thirst; increased urination    signs and symptoms of infection like fever or chills; cough; sore throat; pain or trouble passing urine    slow growth in children (if used for longer periods of time)    swelling of ankles, feet    trouble sleeping    unusually weak or  tired    weak bones (if used for longer periods of time)  Side effects that usually do not require medical attention (Report these to your doctor or health care professional if they continue or are bothersome.):    increased hunger    nausea    skin problems, acne, thin and shiny skin    upset stomach    weight gain  This list may not describe all possible side effects. Call your doctor for medical advice about side effects. You may report side effects to FDA at 2-564-XMO-7369.  Where should I keep my medicine?  Keep out of the reach of children.  Store at room temperature between 15 and 30 degrees C (59 and 86 degrees F). Keep container tightly closed. Throw away any unused medicine after the expiration date.  NOTE:This sheet is a summary. It may not cover all possible information. If you have questions about this medicine, talk to your doctor, pharmacist, or health care provider. Copyright  2016 [employer]

## 2017-06-17 NOTE — PROGRESS NOTES
SUBJECTIVE:                                                    Boogie Clark is a 66 year old male who presents to clinic today for the following health issues:      RESPIRATORY SYMPTOMS      Duration: 4 days     Description  Deep cough, SOB, coughing up phlegm (clear), congestion     Severity: moderate    Accompanying signs and symptoms: No fever, chills    History (predisposing factors):  none    Precipitating or alleviating factors: worse at night     Therapies tried and outcome:  flonase with little relief     Known to have COPD, CPAP, quit smoking in September last year        Problem list and histories reviewed & adjusted, as indicated.  Additional history: as documented    Patient Active Problem List   Diagnosis     Advanced directives, counseling/discussion     Hypertension goal BP (blood pressure) < 140/90     Peripheral vascular disease (H)     Colon polyp     Anal fissure     Kidney stone     Bladder tumor     COCO (obstructive sleep apnea)     Hyperlipidemia with target LDL less than 100     Malignant neoplasm of bladder (H)     Personal history of bladder cancer     Sigmoid diverticulosis     Body mass index (BMI) of 40.0-44.9 in adult (H)     Chronic obstructive pulmonary disease, unspecified COPD type (H)     Benign essential hypertension     History of smoking     Morbid obesity due to excess calories (H)     Past Surgical History:   Procedure Laterality Date     ANGIOPLASTY  2009    angioplasty at abdominla aortic bifurcation in common illiac bilaterally     ANKLE SURGERY       CYSTOSCOPY      multiple times secondary to dx. of bladder cancer     VASECTOMY         Social History   Substance Use Topics     Smoking status: Former Smoker     Types: Cigarettes     Quit date: 6/1/2016     Smokeless tobacco: Never Used     Alcohol use 0.0 oz/week     0 Standard drinks or equivalent per week      Comment: rarely     Family History   Problem Relation Age of Onset     Breast Cancer Mother      CANCER  Maternal Grandmother          Current Outpatient Prescriptions   Medication Sig Dispense Refill     diltiazem (TIAZAC) 120 MG 24 hr ER beaded capsule Take 1 capsule (120 mg) by mouth daily 90 capsule 3     budesonide-formoterol (SYMBICORT) 160-4.5 MCG/ACT Inhaler Inhale 2 puffs into the lungs 2 times daily       montelukast (SINGULAIR) 10 MG tablet Take 10 mg by mouth At Bedtime       loratadine (CLARITIN) 10 MG tablet Take 10 mg by mouth daily       METOPROLOL SUCCINATE ER PO Take 100 mg by mouth       lovastatin (MEVACOR) 40 MG tablet Take 1 tablet (40 mg) by mouth At Bedtime 90 tablet 3     albuterol (2.5 MG/3ML) 0.083% nebulizer solution Take 1 vial (2.5 mg) by nebulization daily as needed for shortness of breath / dyspnea or wheezing 30 vial 11     albuterol (VENTOLIN HFA) 108 (90 BASE) MCG/ACT inhaler Inhale 2 puffs into the lungs every 6 hours 1 Inhaler 2     fluticasone (FLONASE) 50 MCG/ACT nasal spray Spray 1-2 sprays into both nostrils daily 1 g 11     IBUPROFEN PO Take 600 mg by mouth       Ginkgo Biloba (GINKOBA) 40 MG TABS Take 2 tablets by mouth daily.       aspirin 81 MG tablet Take  by mouth daily.       Multiple Vitamin (MULTI-VITAMIN) per tablet Take  by mouth daily.       Allergies   Allergen Reactions     Penicillins      Recent Labs   Lab Test  09/17/15   1121  01/14/15   1051  08/28/13   0842   LDL  81  94  92   HDL  48  45  37*   TRIG  65  115  75   ALT  34   --   29   CR  1.00  0.96  1.11   GFRESTIMATED  75  79  67   GFRESTBLACK  >90   GFR Calc    >90   GFR Calc    81   POTASSIUM  4.0  4.4  4.5      BP Readings from Last 3 Encounters:   06/17/17 146/85   04/07/17 147/86   12/08/16 148/82    Wt Readings from Last 3 Encounters:   06/17/17 (!) 320 lb 8 oz (145.4 kg)   04/07/17 (!) 311 lb (141.1 kg)   12/08/16 (!) 317 lb 3.2 oz (143.9 kg)                  Labs reviewed in EPIC    ROS:  Constitutional, HEENT, cardiovascular, pulmonary, gi and gu systems are  negative, except as otherwise noted.    OBJECTIVE:                                                    /85  Pulse 83  Temp 97.7  F (36.5  C) (Tympanic)  Wt (!) 320 lb 8 oz (145.4 kg)  SpO2 91%  BMI 46.99 kg/m2  Body mass index is 46.99 kg/(m^2).  GENERAL: alert, no distress and obese  NECK: no adenopathy, no asymmetry, masses, or scars and thyroid normal to palpation  RESP: expiratory wheezes throughout  CV: regular rates and rhythm, normal S1 S2, no S3 or S4 and no murmur, click or rub  ABDOMEN: soft, nontender, no hepatosplenomegaly, no masses and bowel sounds normal  MS: no gross musculoskeletal defects noted, no edema  NEURO: Normal strength and tone, mentation intact and speech normal    XR CHEST 2 VW 6/17/2017 10:24 AM     COMPARISON: 12/8/2016     HISTORY: Shortness of breath and cough.         IMPRESSION: Cardiac silhouette and pulmonary vasculature are within  normal limits. No focal airspace disease, pleural effusion or  pneumothorax.     ASSESSMENT/PLAN:                                                          ICD-10-CM    1. Cough R05 XR Chest 2 Views   2. COPD exacerbation (H) J44.1 azithromycin (ZITHROMAX) 250 MG tablet     predniSONE (DELTASONE) 20 MG tablet     DISCONTINUED: azithromycin (ZITHROMAX) 250 MG tablet     DISCONTINUED: predniSONE (DELTASONE) 20 MG tablet     Discussed in detail differentials and further management. Symptoms are likely secondary to COPD exacerbation. Chest x-ray normal, findings explained. Azithromycin and prednisone prescribed, suggested to continue albuterol and symbicort. Follow up with PCP next week or go ER if symptoms worsen. Recommended well hydration, over-the-counter analgesia and warm fluids. Written instructions/information provided. Patient understood and in agreement with the above plan. All questions are answered.         MEDICATIONS:   Orders Placed This Encounter   Medications     DISCONTD: azithromycin (ZITHROMAX) 250 MG tablet     Sig: Two  tablets first day, then one tablet daily for four days.     Dispense:  6 tablet     Refill:  0     DISCONTD: predniSONE (DELTASONE) 20 MG tablet     Sig: Take 2 tablets (40 mg) by mouth daily for 5 days     Dispense:  10 tablet     Refill:  0     azithromycin (ZITHROMAX) 250 MG tablet     Sig: Two tablets first day, then one tablet daily for four days.     Dispense:  6 tablet     Refill:  0     predniSONE (DELTASONE) 20 MG tablet     Sig: Take 2 tablets (40 mg) by mouth daily     Dispense:  10 tablet     Refill:  0     Patient Instructions     COPD Flare    You have had a flare-up of your COPD.  COPD, or chronic obstructive pulmonary disease, is a common lung disease. It causes your airways to become irritated and narrower. This makes it harder for you to breathe. Emphysema and chronic bronchitis are both types of COPD. This is a chronic condition, which means you always have it. Sometimes it gets worse. When this happens, it is called a flare-up.  Symptoms of COPD  People with COPD may have symptoms most of the time. In a flare-up, your symptoms get worse. These symptoms may mean you are having a flare-up:    Shortness of breath, shallow or rapid breathing, or wheezing that gets worse    Lung infection    Cough that gets worse    More mucus, thicker mucus or mucus of a different color    Tiredness, decreased energy, or trouble doing your usual activities    Fever    Chest tightness    Your symptoms don t get better even when you use your usual medicines, inhalers, and nebulizer    Trouble talking    You feel confused  Causes of flare-ups  Unfortunately, a flare-up can happen even though you did everything right, and you followed your doctor s instructions. Some causes of flare-ups are:    Smoking or secondhand smoke    Colds, the flu, or respiratory infections    Air pollution    Sudden change in the weather    Dust, irritating chemicals, or strong fumes    Not taking your medicines as prescribed  Home care  Here  are some things you can do at home to treat a flare-up:    Try not to panic. This makes it harder to breathe, and keeps you from doing the right things.    Don t smoke or be around others who are smoking.    Try to drink more fluids than usual during a flare-up, unless your doctor has told you not to because of heart and kidney problems. More fluids can help loosen the mucus.    Use your inhalers and nebulizer, if you have one, as you have been told to.    If you were given antibiotics, take them until they are used up or your doctor tells you to stop. It s important to finish the antibiotics, even though you feel better. This will make sure the infection has cleared.    If you were given prednisone or another steroid, finish it even if you feel better.  Preventing a flare-up  Even though flare-ups happen, the best way to treat one is to prevent it before it starts. Here are some pointers:    Don t smoke or be around others who are smoking.    Take your medicines as you have been told.    Talk with your doctor about getting a flu shot every year. Also find out if you need a pneumonia shot.    If there is a weather advisory warning to stay indoors, try to stay inside when possible.    Try to eat healthy and get plenty of sleep.    Try to avoid things that usually set you off, like dust, chemical fumes, hairsprays, or strong perfumes.  Follow-up care  Follow up with your healthcare provider, or as advised.  If a culture was done, you will be told if your treatment needs to be changed. You can call as directed for the results.  If X-rays were done, you will be notified of any new findings that may affect your care.  Call 911  Call 911 if any of these occur:    You have trouble breathing    You feel confused or it s difficult to wake you up    You faint or lose consciousness    You have a rapid heart rate    You have new pain in your chest, arm, shoulder, neck or upper back  When to seek medical advice  Call your  healthcare provider right away if any of these occur:    Wheezing or shortness of breath gets worse    You need to use your inhalers more often than usual without relief    Fever of 100.4 F (38 C) or higher, or as directed by your healthcare provider    Coughing up lots of dark-colored or bloody mucus (sputum)    Chest pain with each breath    You do not start to get better within 24 hours    Swelling of your ankles gets worse    Dizziness or weakness  Date Last Reviewed: 9/1/2016 2000-2017 [employer]. [address] [place] [address]. All rights reserved. This information is not intended as a substitute for professional medical care. Always follow your healthcare professional's instructions.        Patient Education    Azithromycin Ophthalmic drops, solution    Azithromycin Oral suspension    Azithromycin Oral suspension, extended release    Azithromycin Oral tablet    Azithromycin Solution for injection  Azithromycin Oral tablet  What is this medicine?  AZITHROMYCIN (az ith alphonso MYE sin) is a macrolide antibiotic. It is used to treat or prevent certain kinds of bacterial infections. It will not work for colds, flu, or other viral infections.  This medicine may be used for other purposes; ask your health care provider or pharmacist if you have questions.  What should I tell my health care provider before I take this medicine?  They need to know if you have any of these conditions:    kidney disease    liver disease    irregular heartbeat or heart disease    an unusual or allergic reaction to azithromycin, erythromycin, other macrolide antibiotics, foods, dyes, or preservatives    pregnant or trying to get pregnant    breast-feeding  How should I use this medicine?  Take this medicine by mouth with a full glass of water. Follow the directions on the prescription label. The tablets can be taken with food or on an empty stomach. If the medicine upsets your stomach, take it with food. Take  your medicine at regular intervals. Do not take your medicine more often than directed. Take all of your medicine as directed even if you think your are better. Do not skip doses or stop your medicine early.  Talk to your pediatrician regarding the use of this medicine in children. Special care may be needed.  Overdosage: If you think you have taken too much of this medicine contact a poison control center or emergency room at once.  NOTE: This medicine is only for you. Do not share this medicine with others.  What if I miss a dose?  If you miss a dose, take it as soon as you can. If it is almost time for your next dose, take only that dose. Do not take double or extra doses.  What may interact with this medicine?  Do not take this medicine with any of the following medications:    lincomycin  This medicine may also interact with the following medications:    amiodarone    antacids    birth control pills    cyclosporine    digoxin    magnesium    nelfinavir    phenytoin    warfarin  This list may not describe all possible interactions. Give your health care provider a list of all the medicines, herbs, non-prescription drugs, or dietary supplements you use. Also tell them if you smoke, drink alcohol, or use illegal drugs. Some items may interact with your medicine.  What should I watch for while using this medicine?  Tell your doctor or health care professional if your symptoms do not improve.  Do not treat diarrhea with over the counter products. Contact your doctor if you have diarrhea that lasts more than 2 days or if it is severe and watery.  This medicine can make you more sensitive to the sun. Keep out of the sun. If you cannot avoid being in the sun, wear protective clothing and use sunscreen. Do not use sun lamps or tanning beds/booths.  What side effects may I notice from receiving this medicine?  Side effects that you should report to your doctor or health care professional as soon as possible:    allergic  reactions like skin rash, itching or hives, swelling of the face, lips, or tongue    confusion, nightmares or hallucinations    dark urine    difficulty breathing    hearing loss    irregular heartbeat or chest pain    pain or difficulty passing urine    redness, blistering, peeling or loosening of the skin, including inside the mouth    white patches or sores in the mouth    yellowing of the eyes or skin  Side effects that usually do not require medical attention (report to your doctor or health care professional if they continue or are bothersome):    diarrhea    dizziness, drowsiness    headache    stomach upset or vomiting    tooth discoloration    vaginal irritation  This list may not describe all possible side effects. Call your doctor for medical advice about side effects. You may report side effects to FDA at 3-934-FDA-8939.  Where should I keep my medicine?  Keep out of the reach of children.  Store at room temperature between 15 and 30 degrees C (59 and 86 degrees F). Throw away any unused medicine after the expiration date.  NOTE:This sheet is a summary. It may not cover all possible information. If you have questions about this medicine, talk to your doctor, pharmacist, or health care provider. Copyright  2016 [employer]        Patient Education    Prednisolone Acetate Ophthalmic drops, suspension    Prednisolone Oral solution    Prednisolone Oral syrup    Prednisolone Oral tablet    Prednisolone Sodium Phosphate Ophthalmic drops, solution    Prednisolone Sodium Phosphate Oral solution  Prednisolone Oral tablet  What is this medicine?  PREDNISOLONE (pred NISS oh lone) is a corticosteroid. It is commonly used to treat inflammation of the skin, joints, lungs, and other organs. Common conditions treated include asthma, allergies, and arthritis. It is also used for other conditions, such as blood disorders and diseases of the adrenal glands.  This medicine may be used for other purposes; ask your health  care provider or pharmacist if you have questions.  What should I tell my health care provider before I take this medicine?  They need to know if you have any of these conditions:    Cushing's syndrome    diabetes    glaucoma    heart problems or disease    high blood pressure    infection such as herpes, measles, tuberculosis, or chickenpox    kidney disease    liver disease    mental problems    myasthenia gravis    osteoporosis    seizures    stomach ulcer or intestine disease including colitis and diverticulitis    thyroid problem    an unusual or allergic reaction to lactose, prednisolone, other medicines, foods, dyes, or preservatives    pregnant or trying to get pregnant    breast-feeding  How should I use this medicine?  Take this medicine by mouth with a glass of water. Follow the directions on the prescription label. Take it with food or milk to avoid stomach upset. If you are taking this medicine once a day, take it in the morning. Do not take more medicine than you are told to take. Do not suddenly stop taking your medicine because you may develop a severe reaction. Your doctor will tell you how much medicine to take. If your doctor wants you to stop the medicine, the dose may be slowly lowered over time to avoid any side effects.  Talk to your pediatrician regarding the use of this medicine in children. Special care may be needed.  Overdosage: If you think you have taken too much of this medicine contact a poison control center or emergency room at once.  NOTE: This medicine is only for you. Do not share this medicine with others.  What if I miss a dose?  If you miss a dose, take it as soon as you can. If it is almost time for your next dose, take only that dose. Do not take double or extra doses.  What may interact with this medicine?  Do not take this medicine with any of the following medications:    metyrapone    mifepristone  This medicine may also interact with the following  medications:    aminoglutethimide    amphotericin B    aspirin and aspirin-like medicines    barbiturates    certain medicines for diabetes, like glipizide or glyburide    cholestyramine    cholinesterase inhibitors    cyclosporine    digoxin    diuretics    ephedrine    female hormones, like estrogens and birth control pills    isoniazid    ketoconazole    NSAIDS, medicines for pain and inflammation, like ibuprofen or naproxen    phenytoin    rifampin    toxoids    vaccines    warfarin  This list may not describe all possible interactions. Give your health care provider a list of all the medicines, herbs, non-prescription drugs, or dietary supplements you use. Also tell them if you smoke, drink alcohol, or use illegal drugs. Some items may interact with your medicine.  What should I watch for while using this medicine?  Visit your doctor or health care professional for regular checks on your progress. If you are taking this medicine over a prolonged period, carry an identification card with your name and address, the type and dose of your medicine, and your doctor's name and address.  This medicine may increase your risk of getting an infection. Tell your doctor or health care professional if you are around anyone with measles or chickenpox, or if you develop sores or blisters that do not heal properly.  If you are going to have surgery, tell your doctor or health care professional that you have taken this medicine within the last twelve months.  Ask your doctor or health care professional about your diet. You may need to lower the amount of salt you eat.  This medicine may affect blood sugar levels. If you have diabetes, check with your doctor or health care professional before you change your diet or the dose of your diabetic medicine.  What side effects may I notice from receiving this medicine?  Side effects that you should report to your doctor or health care professional as soon as possible:    allergic  reactions like skin rash, itching or hives, swelling of the face, lips, or tongue    changes in emotions or moods    changes in vision    eye pain    signs and symptoms of high blood sugar such as dizziness; dry mouth; dry skin; fruity breath; nausea; stomach pain; increased hunger or thirst; increased urination    signs and symptoms of infection like fever or chills; cough; sore throat; pain or trouble passing urine    slow growth in children (if used for longer periods of time)    swelling of ankles, feet    trouble sleeping    unusually weak or tired    weak bones (if used for longer periods of time)  Side effects that usually do not require medical attention (Report these to your doctor or health care professional if they continue or are bothersome.):    increased hunger    nausea    skin problems, acne, thin and shiny skin    upset stomach    weight gain  This list may not describe all possible side effects. Call your doctor for medical advice about side effects. You may report side effects to FDA at 1-718-JWF-9511.  Where should I keep my medicine?  Keep out of the reach of children.  Store at room temperature between 15 and 30 degrees C (59 and 86 degrees F). Keep container tightly closed. Throw away any unused medicine after the expiration date.  NOTE:This sheet is a summary. It may not cover all possible information. If you have questions about this medicine, talk to your doctor, pharmacist, or health care provider. Copyright  2016 [employer]            Parmjit Paul MD  [facility]

## 2017-06-19 ENCOUNTER — TELEPHONE (OUTPATIENT)
Dept: FAMILY MEDICINE | Facility: CLINIC | Age: 67
End: 2017-06-19

## 2017-06-19 NOTE — TELEPHONE ENCOUNTER
Per patient, his symptoms are the same. He is still on the abx and prednisone.  Advised him to f/u with DR. Agustin and have his symptoms reassessed in clinic this week before further orders are placed  Patient agreed. Appointment made for 6/22/17 with Dr. Agustin    Per  OV notes:    Discussed in detail differentials and further management. Symptoms are likely secondary to COPD exacerbation. Chest x-ray normal, findings explained. Azithromycin and prednisone prescribed, suggested to continue albuterol and symbicort. Follow up with PCP next week or go ER if symptoms worsen. Recommended well hydration, over-the-counter analgesia and warm fluids. Written instructions/information provided. Patient understood and in agreement with the above plan. All questions are answered.     Ghislaine Foreman RN

## 2017-06-19 NOTE — TELEPHONE ENCOUNTER
Reason for Call: Request for an order or referral:    Order or referral being requested: CT scan    Date needed: as soon as possible    Has the patient been seen by the PCP for this problem? NO    Additional comments: Patient was seen at the [facility] in Urgent care by Dr. Kilgore and he wanted him to get a CT scan done and then see his PCP after.    Phone number Patient can be reached at:  Cell number on file:    Telephone Information:   Mobile [phone]       Best Time:  Any    Can we leave a detailed message on this number?  YES    Call taken on 6/19/2017 at 5:34 PM by Shannan Kebede

## 2017-06-20 NOTE — PROGRESS NOTES
SUBJECTIVE:                                                    Boogie Clark is a 66 year old male who presents to clinic today with his wife for the following health issues:       Chronic obstructive pulmonary disease, unspecified COPD type (H)  Hypoxia  Right ventricular hypertrophy  Need for prophylactic vaccination against Streptococcus pneumoniae (pneumococcus)     Note 6/22/2017 --  ED/UC Followup:    Facility: [facility]  Date of visit: 6/17/17  Reason for visit: Cough, COPD  Current Status: Fluctuating course     COPD -- I am following up with this patient regarding his continued significant pulmonary symptoms . he has documented hypoxia to the extent that he needs supplemental oxygen . He has not as of yet had follow through with getting set up for home oxygen. He clearly acknowledges that things are not going well at this point and he can scarcely expend much energy at all. Things aren't really worse but neither are things going well at all.    He is seen today right on the heels of a recent urgent care clinic evaluation for similar symptoms. Patient states he is compliant with Symbicort, Albuterol, and nebulizers. He reports neither prednisone use nor nebulizer treatments have been of benefit. He notes he had a X-ray on 6/17 which showed no significant findings though it was suggested he had possibly pulmonary vascular congestion on chest xray. He notes he has angioplasties in both of his legs. He is curious if that is causing his hypoxia ?. Denies chronic coughing, productive cough, chest pain, or wheezing.    BP Readings from Last 3 Encounters:   06/22/17 160/70   06/17/17 146/85   04/07/17 147/86     Imagining Note 6/17/2017 --  XR CHEST 2 VW 6/17/2017 10:24 AM     COMPARISON: 12/8/2016     HISTORY: Shortness of breath and cough.         IMPRESSION: Cardiac silhouette and pulmonary vasculature are within  normal limits. No focal airspace disease, pleural effusion  or  pneumothorax.     NICKI VELÁSQUEZ    Problem list and histories reviewed & adjusted, as indicated.  Additional history: as documented    Patient Active Problem List   Diagnosis     Advanced directives, counseling/discussion     Hypertension goal BP (blood pressure) < 140/90     Peripheral vascular disease (H)     Colon polyp     Anal fissure     Kidney stone     Bladder tumor     COCO (obstructive sleep apnea)     Hyperlipidemia with target LDL less than 100     Malignant neoplasm of bladder (H)     Personal history of bladder cancer     Sigmoid diverticulosis     Body mass index (BMI) of 40.0-44.9 in adult (H)     Chronic obstructive pulmonary disease, unspecified COPD type (H)     Benign essential hypertension     History of smoking     Morbid obesity due to excess calories (H)     Past Surgical History:   Procedure Laterality Date     ANGIOPLASTY  2009    angioplasty at abdominla aortic bifurcation in common illiac bilaterally     ANKLE SURGERY       BIOPSY  june 2013     COLONOSCOPY  2013     CYSTOSCOPY      multiple times secondary to dx. of bladder cancer     ORTHOPEDIC SURGERY  2012    surgery on right ankle to repair break     VASCULAR SURGERY  2011    angoplasty for pad     VASECTOMY         Social History   Substance Use Topics     Smoking status: Former Smoker     Types: Cigarettes     Start date: 1/1/1964     Quit date: 10/1/2016     Smokeless tobacco: Never Used      Comment: already did     Alcohol use 0.0 oz/week      Comment: moderate once a week couple of beers     Family History   Problem Relation Age of Onset     Breast Cancer Mother      Other Cancer Father      CANCER Maternal Grandmother          Current Outpatient Prescriptions   Medication Sig Dispense Refill     fluticasone (FLONASE) 50 MCG/ACT nasal spray Spray 1-2 sprays into both nostrils daily 1 g 11     Multiple Vitamin (MULTI-VITAMIN) per tablet Take  by mouth daily.       diltiazem (TIAZAC) 120 MG 24 hr ER beaded capsule Take 1  capsule (120 mg) by mouth daily 90 capsule 3     budesonide-formoterol (SYMBICORT) 160-4.5 MCG/ACT Inhaler Inhale 2 puffs into the lungs 2 times daily       montelukast (SINGULAIR) 10 MG tablet Take 10 mg by mouth At Bedtime       loratadine (CLARITIN) 10 MG tablet Take 10 mg by mouth daily       METOPROLOL SUCCINATE ER PO Take 100 mg by mouth       lovastatin (MEVACOR) 40 MG tablet Take 1 tablet (40 mg) by mouth At Bedtime 90 tablet 3     albuterol (2.5 MG/3ML) 0.083% nebulizer solution Take 1 vial (2.5 mg) by nebulization daily as needed for shortness of breath / dyspnea or wheezing 30 vial 11     albuterol (VENTOLIN HFA) 108 (90 BASE) MCG/ACT inhaler Inhale 2 puffs into the lungs every 6 hours 1 Inhaler 2     IBUPROFEN PO Take 600 mg by mouth       Ginkgo Biloba (GINKOBA) 40 MG TABS Take 2 tablets by mouth daily.       aspirin 81 MG tablet Take  by mouth daily.       Labs reviewed in EPIC    Reviewed and updated as needed this visit by clinical staff  Tobacco  Allergies  Meds  Med Hx  Surg Hx  Fam Hx  Soc Hx      Reviewed and updated as needed this visit by Provider         ROS:  Constitutional, HEENT, cardiovascular, pulmonary, GI, , musculoskeletal, neuro, skin, endocrine and psych systems are negative, except as otherwise noted.    This document serves as a record of the services and decisions personally performed and made by Shabbir Agustin MD. It was created on their behalf by Hansel Harris, a trained medical scribe. The creation of this document is based the provider's statements to the medical scribe.  Hansel Harris June 22, 2017 3:37 PM    OBJECTIVE:                                                    /70 (BP Location: Left arm, Cuff Size: Adult Large)  Pulse 76  Temp 96.7  F (35.9  C) (Oral)  Wt (!) 146.1 kg (322 lb)  SpO2 91%  BMI 47.21 kg/m2  Body mass index is 47.21 kg/(m^2).  GENERAL: healthy, alert and no distress. Significant morbid obesity.  EYES: Eyes grossly normal to  inspection, PERRL and conjunctivae and sclerae normal  SKIN: no suspicious lesions or rashes to visible skin  NEURO: mentation intact and speech normal  PSYCH: mentation appears normal, affect normal/bright  Lungs clear to auscultation bilateral ,   Heart -  regular rates and rhythm, normal S1 S2, no S3 or S4 and no cardiac murmur     Diagnostic Test Results:  Results for orders placed or performed in visit on 06/17/17   XR Chest 2 Views    Narrative    XR CHEST 2 VW 6/17/2017 10:24 AM    COMPARISON: 12/8/2016    HISTORY: Shortness of breath and cough.      Impression    IMPRESSION: Cardiac silhouette and pulmonary vasculature are within  normal limits. No focal airspace disease, pleural effusion or  pneumothorax.    NICKI VELÁSQUEZ      Echocardiogram from the [facility] [ see all relevant medical records scanned into Epic electronic medical records  ] do document right ventricular hypertrophy; pulmonary artery pressures could not be assessed from the echocardiogram    ASSESSMENT/PLAN:                                                      (J44.9) Chronic obstructive pulmonary disease, unspecified COPD type (H)  (primary encounter diagnosis)  Comment: I certainly understand this patients complaints ; he acknowledged that his hypoxia is significant and will go along with setting up oxygen but he is absolutely concerned that he has possibly not got the right diagnosis and I am on board with him to absolutely exclude any other possible diagnosis such as Pulmonary artery hypertension or idiopathic interstitial pulmonary fibrosis or other lung disease . He has a pending pulmonary consultation and we are obtaining a chest CT scan   Plan: CARDIOLOGY EVAL ADULT REFERRAL, CT Chest w         Contrast            (R09.02) Hypoxia  Comment: regarding the possibilities of Pulmonary artery hypertension , this is a complicated diagnosis and most of the time if a cardiologist has a serious clinical concern about  excluding this diagnosis, a right sided heart catheterization  Is necessary   Plan: I am not a all sure this is necessary but agree this question needs  Consideration and to be pursued if felt appropriate by cardiology      (I51.7) Right ventricular hypertrophy  Comment: as above   Plan: as above     (Z23) Need for prophylactic vaccination against Streptococcus pneumoniae (pneumococcus)  Comment: need to review [facility] documentation   Plan: as above     Patient Instructions   - Follow up with a cardiologist.    - I will follow up with imaging results.    The information in this document, created by the medical scribe for me, accurately reflects the services I personally performed and the decisions made by me. I have reviewed and approved this document for accuracy.   MD Shabbir Arvizu MD  [facility]

## 2017-06-22 ENCOUNTER — OFFICE VISIT (OUTPATIENT)
Dept: FAMILY MEDICINE | Facility: CLINIC | Age: 67
End: 2017-06-22
Payer: COMMERCIAL

## 2017-06-22 VITALS
SYSTOLIC BLOOD PRESSURE: 160 MMHG | WEIGHT: 315 LBS | TEMPERATURE: 96.7 F | HEART RATE: 76 BPM | DIASTOLIC BLOOD PRESSURE: 70 MMHG | BODY MASS INDEX: 47.21 KG/M2 | OXYGEN SATURATION: 91 %

## 2017-06-22 DIAGNOSIS — R09.02 HYPOXIA: ICD-10-CM

## 2017-06-22 DIAGNOSIS — I51.7 RIGHT VENTRICULAR HYPERTROPHY: ICD-10-CM

## 2017-06-22 DIAGNOSIS — J44.9 CHRONIC OBSTRUCTIVE PULMONARY DISEASE, UNSPECIFIED COPD TYPE (H): Primary | ICD-10-CM

## 2017-06-22 DIAGNOSIS — Z23 NEED FOR PROPHYLACTIC VACCINATION AGAINST STREPTOCOCCUS PNEUMONIAE (PNEUMOCOCCUS): ICD-10-CM

## 2017-06-22 PROCEDURE — 99214 OFFICE O/P EST MOD 30 MIN: CPT | Performed by: INTERNAL MEDICINE

## 2017-06-22 ASSESSMENT — PAIN SCALES - GENERAL: PAINLEVEL: NO PAIN (0)

## 2017-06-22 NOTE — MR AVS SNAPSHOT
After Visit Summary   6/22/2017    Boogie Clark    MRN: [id number]           Patient Information     Date Of Birth          1950        Visit Information        Provider Department      6/22/2017 3:30 PM Shabbir Agustin MD [facility]        Today's Diagnoses     Chronic obstructive pulmonary disease, unspecified COPD type (H)    -  1    Hypoxia        Right ventricular hypertrophy        Need for prophylactic vaccination against Streptococcus pneumoniae (pneumococcus)          Care Instructions    - Follow up with a cardiologist.    - I will follow up with imaging results.          Follow-ups after your visit        Additional Services     CARDIOLOGY EVAL ADULT REFERRAL       Your provider has referred you to:  FM[facility]: [facility] [phone]   [email or web address]    Please be aware that coverage of these services is subject to the terms and limitations of your health insurance plan.  Call member services at your health plan with any benefit or coverage questions.      Type of Referral:  New Cardiology Consult    Timeframe requested:  Within 2 weeks    Please bring the following to your appointment:  >>   Any x-rays, CTs or MRIs which have been performed.  Contact the facility where they were done to arrange for  prior to your scheduled appointment.    >>   List of current medications  >>   This referral request   >>   Any documents/labs given to you for this referral                  Your next 10 appointments already scheduled     Jul 18, 2017  3:00 PM CDT   Office Visit with PFT LAB   [facility] ([facility])    [address] [place] [id number] [phone]           Bring a current list of meds and any records pertaining to this visit.  For Physicals, please bring immunization records and any forms needing to be filled out.  Please arrive 10 minutes early  to complete paperwork.            Jul 18, 2017  4:00 PM CDT   New Visit with June Higginbotham MD   [facility] ([facility]    [address] [place] [id number] [phone]            Aug 21, 2017  8:00 AM CDT   Return Visit with Hansel Casillas MD, IRIS CYSTO PROC ROOM   [facility] ([address] [id number] [phone]              Future tests that were ordered for you today     Open Future Orders        Priority Expected Expires Ordered    CT Chest w Contrast Routine  6/22/2018 6/22/2017            Who to contact     If you have questions or need follow up information about today's clinic visit or your schedule please contact [facility] directly at [phone].  Normal or non-critical lab and imaging results will be communicated to you by MyChart, letter or phone within 4 business days after the clinic has received the results. If you do not hear from us within 7 days, please contact the clinic through [facility] or phone. If you have a critical or abnormal lab result, we will notify you by phone as soon as possible.  Submit refill requests through [employer] or call your pharmacy and they will forward the refill request to us. Please allow 3 business days for your refill to be completed.          Additional Information About Your Visit        [email or web address] Information     [employer] gives you secure access to your electronic health record. If you see a primary care provider, you can also send messages to your care team and make appointments. If you have questions, please call your primary care clinic.  If you do not have a primary care provider, please call [phone] and they will assist you.        Care EveryWhere ID     This is your Care EveryWhere ID. This could be used by other organizations to access your [facility] medical records  [id number]        Your Vitals Were     Pulse  Temperature Pulse Oximetry BMI (Body Mass Index)          76 96.7  F (35.9  C) (Oral) 91% 47.21 kg/m2         Blood Pressure from Last 3 Encounters:   06/22/17 160/70   06/17/17 146/85   04/07/17 147/86    Weight from Last 3 Encounters:   06/22/17 (!) 322 lb (146.1 kg)   06/17/17 (!) 320 lb 8 oz (145.4 kg)   04/07/17 (!) 311 lb (141.1 kg)              We Performed the Following     CARDIOLOGY EVAL ADULT REFERRAL        Primary Care Provider Office Phone # Fax #    Arthur Landis PA-C [phone]       [facility] [address] [facility] [address]        Equal Access to Services     TILA CORONA : Hadii vinod garland hadasho Soomaali, waaxda luqadaha, qaybta kaalmada adeegyada, waxmichael jhain hayashly tuttle . So [facility] [phone].    ATENCIÓN: Si habla español, tiene a landaverde disposición servicios gratuitos de asistencia lingüística. Llame al [phone].    We comply with applicable federal civil rights laws and [place] laws. We do not discriminate on the basis of race, color, national origin, age, disability sex, sexual orientation or gender identity.            Thank you!     Thank you for choosing [facility]  for your care. Our goal is always to provide you with excellent care. Hearing back from our patients is one way we can continue to improve our services. Please take a few minutes to complete the written survey that you may receive in the mail after your visit with us. Thank you!             Your Updated Medication List - Protect others around you: Learn how to safely use, store and throw away your medicines at www.disposemymeds.org.          This list is accurate as of: 6/22/17 11:31 PM.  Always use your most recent med list.                   Brand Name Dispense Instructions for use Diagnosis    * albuterol 108 (90 BASE) MCG/ACT Inhaler    VENTOLIN HFA    1 Inhaler    Inhale 2 puffs into the lungs every 6 hours    Chronic obstructive pulmonary disease,  unspecified COPD type (H)       * albuterol (2.5 MG/3ML) 0.083% neb solution     30 vial    Take 1 vial (2.5 mg) by nebulization daily as needed for shortness of breath / dyspnea or wheezing    Chronic obstructive pulmonary disease, unspecified COPD type (H)       aspirin 81 MG tablet      Take  by mouth daily.    Peripheral vascular disease, unspecified (H)       budesonide-formoterol 160-4.5 MCG/ACT Inhaler    SYMBICORT     Inhale 2 puffs into the lungs 2 times daily        diltiazem 120 MG 24 hr ER beaded capsule    TIAZAC    90 capsule    Take 1 capsule (120 mg) by mouth daily    Hypertension goal BP (blood pressure) < 140/90       fluticasone 50 MCG/ACT spray    FLONASE    1 g    Spray 1-2 sprays into both nostrils daily    Nasal congestion       GINKOBA 40 MG Tabs   Generic drug:  Ginkgo Biloba      Take 2 tablets by mouth daily.        IBUPROFEN PO      Take 600 mg by mouth        loratadine 10 MG tablet    CLARITIN     Take 10 mg by mouth daily        lovastatin 40 MG tablet    MEVACOR    90 tablet    Take 1 tablet (40 mg) by mouth At Bedtime    Hypertension goal BP (blood pressure) < 140/90       METOPROLOL SUCCINATE ER PO      Take 100 mg by mouth        montelukast 10 MG tablet    SINGULAIR     Take 10 mg by mouth At Bedtime        Multi-vitamin Tabs tablet   Generic drug:  multivitamin, therapeutic with minerals      Take  by mouth daily.    Hypertension goal BP (blood pressure) < 140/90       * Notice:  This list has 2 medication(s) that are the same as other medications prescribed for you. Read the directions carefully, and ask your doctor or other care provider to review them with you.

## 2017-06-22 NOTE — NURSING NOTE
"Chief Complaint   Patient presents with     Urgent Care     follow-up. advised to be seen in order to get CT scan.       Initial /70 (BP Location: Left arm, Cuff Size: Adult Large)  Pulse 76  Temp 96.7  F (35.9  C) (Oral)  Wt (!) 322 lb (146.1 kg)  SpO2 91%  BMI 47.21 kg/m2 Estimated body mass index is 47.21 kg/(m^2) as calculated from the following:    Height as of 4/7/17: 5' 9.25\" (1.759 m).    Weight as of this encounter: 322 lb (146.1 kg).  Medication Reconciliation: complete         Dania Salvador CMA      "

## 2017-06-29 ENCOUNTER — RADIANT APPOINTMENT (OUTPATIENT)
Dept: CT IMAGING | Facility: CLINIC | Age: 67
End: 2017-06-29
Attending: INTERNAL MEDICINE
Payer: COMMERCIAL

## 2017-06-29 ENCOUNTER — PRE VISIT (OUTPATIENT)
Dept: PULMONOLOGY | Facility: CLINIC | Age: 67
End: 2017-06-29

## 2017-06-29 DIAGNOSIS — J44.9 CHRONIC OBSTRUCTIVE PULMONARY DISEASE, UNSPECIFIED COPD TYPE (H): ICD-10-CM

## 2017-06-29 PROCEDURE — 71260 CT THORAX DX C+: CPT | Mod: TC

## 2017-06-29 RX ORDER — IOPAMIDOL 755 MG/ML
80 INJECTION, SOLUTION INTRAVASCULAR ONCE
Status: COMPLETED | OUTPATIENT
Start: 2017-06-29 | End: 2017-06-29

## 2017-06-29 RX ADMIN — IOPAMIDOL 80 ML: 755 INJECTION, SOLUTION INTRAVASCULAR at 14:48

## 2017-07-03 NOTE — TELEPHONE ENCOUNTER
PULMONARY NEW PATIENT PRE-VISIT ASSESSMENT    Date Patient Contacted:     Confirmed appointment times and location OR  Message left confirming appointment times and location. Requested patient return call to review medical history and outside records.      1. Patient is scheduled to see Dr Higginbotham on 7/18/2017  2. Reason for visit:   Chronic obstructive pulmonary disease, unspecified COPD type (H) [J44.9]  - Primary       Hypoxia [R09.02]           3. Referring provider Shabbir Agustin MD    4. Has patient seen previous specialist? ???      5. Previous Imaging: In [facility]: Last CXR done: 6/17/2017, Last Chest CT done: 6/29/2017      Outside Imaging: Location/Date/Type/Status (Requested, Received, Patient will hand carry disc, Pushed to i[employer], Disc will be mailed) ???        6.  Pulmonary Function Tests: Scheduled at  7/18/2017       Outside PFT Lab:  Location/Date/Status (Requested, Received, Patient will hand carry) ???     7.  Sleep History (sleep concerns, diagnosed sleep disorders): ???    Prior Sleep Study: ???    CPAP/BIPAP? ???    Oxygen? ???    Will route to care team for follow-up.    Patient Reminders Given:  Please, make sure you bring an updated list of your medications.   If you need to cancel or reschedule, please call [phone].

## 2017-07-10 ENCOUNTER — TRANSFERRED RECORDS (OUTPATIENT)
Dept: HEALTH INFORMATION MANAGEMENT | Facility: CLINIC | Age: 67
End: 2017-07-10

## 2017-07-10 LAB
ALT SERPL-CCNC: 32 U/L (ref 13–61)
AST SERPL-CCNC: 27 U/L (ref 15–37)
CHOLEST SERPL-MCNC: 142 MG/DL (ref 0–200)
CREAT SERPL-MCNC: 1 MG/DL (ref 0.7–1.2)
GFR SERPL CREATININE-BSD FRML MDRD: >60 ML/MIN/1.73M2
GLUCOSE SERPL-MCNC: 80 MG/DL (ref 74–106)
HBA1C MFR BLD: 5.9 % (ref 4–6)
HDLC SERPL-MCNC: 42 MG/DL
NONHDLC SERPL-MCNC: 100 MG/DL
POTASSIUM SERPL-SCNC: 3.8 MMOL/L (ref 3.5–5)
TRIGL SERPL-MCNC: 99 MG/DL (ref 0–150)
TSH SERPL-ACNC: 0.9 UIU/ML (ref 0.3–5)

## 2017-07-11 DIAGNOSIS — E04.1 THYROID NODULE: Primary | ICD-10-CM

## 2017-07-13 ENCOUNTER — PRE VISIT (OUTPATIENT)
Dept: CARDIOLOGY | Facility: CLINIC | Age: 67
End: 2017-07-13

## 2017-07-13 ENCOUNTER — TELEPHONE (OUTPATIENT)
Dept: UROLOGY | Facility: CLINIC | Age: 67
End: 2017-07-13

## 2017-07-13 NOTE — TELEPHONE ENCOUNTER
Left message for patient to call. Need to reschedule his cysto on 8/21 as MD will not be in clinic.  Rosalva Juárez, CMA

## 2017-07-17 ENCOUNTER — RADIANT APPOINTMENT (OUTPATIENT)
Dept: ULTRASOUND IMAGING | Facility: CLINIC | Age: 67
End: 2017-07-17
Attending: INTERNAL MEDICINE
Payer: COMMERCIAL

## 2017-07-17 DIAGNOSIS — E04.1 THYROID NODULE: ICD-10-CM

## 2017-07-17 PROCEDURE — 76536 US EXAM OF HEAD AND NECK: CPT

## 2017-07-18 ENCOUNTER — OFFICE VISIT (OUTPATIENT)
Dept: PULMONOLOGY | Facility: CLINIC | Age: 67
End: 2017-07-18
Attending: INTERNAL MEDICINE
Payer: COMMERCIAL

## 2017-07-18 ENCOUNTER — OFFICE VISIT (OUTPATIENT)
Dept: NURSING | Facility: CLINIC | Age: 67
End: 2017-07-18
Payer: COMMERCIAL

## 2017-07-18 VITALS
SYSTOLIC BLOOD PRESSURE: 153 MMHG | TEMPERATURE: 97.9 F | BODY MASS INDEX: 45.1 KG/M2 | HEART RATE: 79 BPM | OXYGEN SATURATION: 94 % | HEIGHT: 70 IN | DIASTOLIC BLOOD PRESSURE: 78 MMHG | WEIGHT: 315 LBS

## 2017-07-18 DIAGNOSIS — J44.9 COPD (CHRONIC OBSTRUCTIVE PULMONARY DISEASE) (H): Primary | ICD-10-CM

## 2017-07-18 DIAGNOSIS — R60.9 EDEMA, UNSPECIFIED TYPE: ICD-10-CM

## 2017-07-18 DIAGNOSIS — J98.4 RESTRICTIVE LUNG DISEASE: Primary | ICD-10-CM

## 2017-07-18 DIAGNOSIS — G47.33 OSA (OBSTRUCTIVE SLEEP APNEA): ICD-10-CM

## 2017-07-18 PROCEDURE — 94060 EVALUATION OF WHEEZING: CPT | Performed by: INTERNAL MEDICINE

## 2017-07-18 PROCEDURE — 94729 DIFFUSING CAPACITY: CPT | Performed by: INTERNAL MEDICINE

## 2017-07-18 PROCEDURE — 94726 PLETHYSMOGRAPHY LUNG VOLUMES: CPT | Performed by: INTERNAL MEDICINE

## 2017-07-18 PROCEDURE — 99214 OFFICE O/P EST MOD 30 MIN: CPT | Mod: 25 | Performed by: INTERNAL MEDICINE

## 2017-07-18 NOTE — PROGRESS NOTES
Chief complaint: Consultation requested by Dr. Skip Cm further evaluation of shortness of breath    History of present illness: 66-year-old gentleman with history of bladder cancer, and peripheral arterial disease, reports increased shortness of breath in the last year. He recalls the symptoms developing last summer and they were associated with lower extremity edema. He also has had nasal congestion with a lot of drainage that causes him to have increased cough particularly in the morning. He is a long-term CPAP user and continues to use CPAP nightly. He denies any orthopnea or PND. He says sometimes using his CPAP during the day with as well. He had an office spirometry performed at an outside clinic and was thought to have COPD and was initiated on inhalers. He did not have significant improvement in symptoms. He also is given a trial of prednisone on 2 occasions I which did help with his nasal symptoms. He's had other workup including cardiac echo which is significantly abnormal with left ventricular hypertrophy as well as suggestion of right ventricular dilation and thickening. He is a former smoker quit in September of last year.    He denies vision changes, PND, or orthopnea, and no palpitations. The edema has been very bothersome and his legs can be tender. The cough is usually dry but occasionally productive in the morning, no hemoptysis, mucus is usually clear. He on occasion has some wheezing that clears with cough. He's also had a raspy voice.     Past Medical History:   Diagnosis Date     Bladder cancer (H)      COPD (chronic obstructive pulmonary disease) (H)      Hyperlipidemia      Hypertension      COCO (obstructive sleep apnea)    peripheral vascular disease       Allergies   Allergen Reactions     Penicillins      Current Outpatient Prescriptions   Medication     diltiazem (TIAZAC) 120 MG 24 hr ER beaded capsule     budesonide-formoterol (SYMBICORT) 160-4.5 MCG/ACT Inhaler     montelukast  (SINGULAIR) 10 MG tablet     loratadine (CLARITIN) 10 MG tablet     METOPROLOL SUCCINATE ER PO     lovastatin (MEVACOR) 40 MG tablet     albuterol (2.5 MG/3ML) 0.083% nebulizer solution     albuterol (VENTOLIN HFA) 108 (90 BASE) MCG/ACT inhaler     fluticasone (FLONASE) 50 MCG/ACT nasal spray     IBUPROFEN PO     Ginkgo Biloba (GINKOBA) 40 MG TABS     aspirin 81 MG tablet     Multiple Vitamin (MULTI-VITAMIN) per tablet     No current facility-administered medications for this visit.      Social History     Social History     Marital status:      Spouse name: N/A     Number of children: N/A     Years of education: N/A     Occupational History     Not on file.     Social History Main Topics     Smoking status: Former Smoker     Types: Cigarettes     Start date: 1/1/1964     Quit date: 10/1/2016     Smokeless tobacco: Never Used      Comment: already did     Alcohol use 0.0 oz/week      Comment: moderate once a week couple of beers     Drug use: No     Sexual activity: Yes     Partners: Female     Birth control/ protection: Surgical     Other Topics Concern     Parent/Sibling W/ Cabg, Mi Or Angioplasty Before 65f 55m? No      Service No     Blood Transfusions No     Caffeine Concern No     Occupational Exposure No     Hobby Hazards No     Sleep Concern No     Stress Concern No     Weight Concern No     Special Diet No     Back Care No     Exercise No     Bike Helmet No     Seat Belt Yes     Self-Exams Yes     Social History Narrative     Family History   Problem Relation Age of Onset     Breast Cancer Mother      Vascular Disease Father      brain anurysm     CANCER Maternal Grandmother      Breast Cancer Sister      Review of systems: Comprehensive review of systems is obtained and is notable for the items in the history of present illness otherwise negative.He review of systems is also notable for constipation and urinary frequency.    Exam: Pleasant obese gentleman wearing supplemental oxygen here  "with his wife  /78 (BP Location: Left arm, Patient Position: Chair, Cuff Size: Adult Large)  Pulse 79  Temp 97.9  F (36.6  C) (Oral)  Ht 1.778 m (5' 10\")  Wt (!) 145.2 kg (320 lb)  SpO2 94%  BMI 45.92 kg/m2   HEENT: Normocephalic/atraumatic, pupils are equal, reactive to light, no scleral icterus  Oropharynx: Mallampati 4, no ulcers, or thrush  Neck supple no lymphadenopathy,   Chest: Clear with decreased breath sounds, no rales or wheezes  Cardiac: Regular rate and rhythm, S1-S2 normal  Abdomen: Obese, soft and nontender, bowel sounds present  Extremities: Warm, well perfused, tender, with 1+ edema  Psychiatric: Mood and affect appear normal  Neurologic: No gross focal deficits    Pulmonary function testing performed today reveals forced vital capacity of 2.44 L which is 56% predicted  FEV1 of 2 L which is 60% predicted ratio increased at 82 total lung capacity decreased at 64% predicted  Diffusion capacity decreased at 53% predicted after bronchodilator there is no significant response  Findings are consistent with moderate restrictive ventilatory defect and moderate diffusion defect not corrected for hemoglobin    [facility] a [facility] vital capacity of 2.25 L and an FEV1 of 1.72 L. So those values have improved today.    Recent outside labs show normal platelets at 339 reduced hemoglobin at 13 slightly increased white blood cell count 11.4 creatinine 1.0 normal TSH at 0.9 urinary analysis showed no blood protein of 30    Cardiac echo performed August 2016 shows normal left ventricular size and function with ejection fraction estimated at 55%, no regional wall motion abnormalities  Moderate concentric left ventricular hypertrophy  Mild to moderate aortic valve sclerosis without evidence for stenosis  Right ventricle appears mildly dilated per report. Right ventricular hypertrophy present unable to estimate pulmonary artery pressures    CT of the chest " from 6/29/2017 was personally reviewed and reviewed in detail with the patient. No parenchymal and parenchymal abnormalities were identified. No evidence for emphysema or interstitial lung disease. Per my read pulmonary artery does look enlarged. This could suggest pulmonary hypertension.    Assessment: 66-year-old gentleman former smoker, with rapid onset dyspnea and lower extremity edema also associated with upper respiratory tract congestion particularly sinus with mild cough. Pulmonary function testing  suggests ventilatory defect that is restrictive rather than obstructive. He is not getting any reported benefit from the inhalers that he is aware of. PFTs may have improved from last fall. Cardiac echo was concerning for biventricular hypertrophy and chest CT suggests pulmonary hypertension per my read. Obstructive sleep apnea seems well treated per the patient's history. Likely is not contributing to his symptoms.     Plan: At this point I do not recommend a change in his inhaled therapy. We will do recommend strongly that he does complete the cardiology consultation. Ideally he would have cardiac catheterization to further evaluate cardiac function as well as possible pulmonary hypertension. Encouraged him to be regular with his diuretics as this seems to improve his lower extremity edema.    The patient to get a download from his CPAP to me for review to confirm adequacy of treatment. If it looks like the apneas indeed optimally treated, we'll consider getting overnight oximetry on his current settings to confirm oxygenation is adequate. I'll follow the patient in 3 months with repeat testing and a 6 minute walk test consider repeat CT scanning with high resolution cuts in future if indicated.    June Higginbotham M.D.  Pulmonary/Critical Care/Sleep Medicine    The above note was dictated using voice recognition software and may include typographical errors. Please contact the author for any  clarifications.

## 2017-07-18 NOTE — PROGRESS NOTES
PFT Note: Completed SVC, FVC, Pleth and DLCO along with 4 puffs of Albuterol per Dr. Higginbotham.

## 2017-07-18 NOTE — MR AVS SNAPSHOT
After Visit Summary   7/18/2017    Boogie Clark    MRN: [id number]           Patient Information     Date Of Birth          1950        Visit Information        Provider Department      7/18/2017 4:00 PM June Higginbotham MD [facility]        Today's Diagnoses     Restrictive lung disease    -  1    COCO (obstructive sleep apnea)          Care Instructions    Patient to follow up with Primary Care provider regarding elevated blood pressure.            Follow-ups after your visit        Your next 10 appointments already scheduled     Jul 26, 2017  3:00 PM CDT   New Visit with MARISA Garnett MD   [facility] PHYSICIANS HEART AT [facility] ([facility])    [address] [place] [address] 2nd Floor  [facility] [address]   [phone]            Sep 11, 2017  8:30 AM CDT   Return Visit with Hansel Casillas MD, [facility] ROOM   [facility] ([facility])    [address] [place] [id number]   [phone]            Oct 24, 2017  7:30 AM CDT   Office Visit with PFT LAB   [facility] ([facility])    [id number] [place] [id number] [phone]           Bring a current list of meds and any records pertaining to this visit.  For Physicals, please bring immunization records and any forms needing to be filled out.  Please arrive 10 minutes early to complete paperwork.            Oct 24, 2017  9:00 AM CDT   Return Visit with June Higginbotham MD   [facility] ([facility])    [id number] [place] [id number] [phone]              Future tests that were ordered for you today     Open Future Orders        Priority Expected Expires Ordered    General PFT Lab (Please always keep checked) Routine  7/18/2018 7/18/2017    Pulmonary Function Test Routine  7/18/2018 7/18/2017    6 minute walk test Routine  7/18/2018  "7/18/2017            Who to contact     If you have questions or need follow up information about today's clinic visit or your schedule please contact [facility] directly at [phone].  Normal or non-critical lab and imaging results will be communicated to you by MyChart, letter or phone within 4 business days after the clinic has received the results. If you do not hear from us within 7 days, please contact the clinic through MyChart or phone. If you have a critical or abnormal lab result, we will notify you by phone as soon as possible.  Submit refill requests through [employer] or call your pharmacy and they will forward the refill request to us. Please allow 3 business days for your refill to be completed.          Additional Information About Your Visit        [employer] Information     [employer] gives you secure access to your electronic health record. If you see a primary care provider, you can also send messages to your care team and make appointments. If you have questions, please call your primary care clinic.  If you do not have a primary care provider, please call [phone] and they will assist you.      [employer] is an electronic gateway that provides easy, online access to your medical records. With [employer], you can request a clinic appointment, read your test results, renew a prescription or communicate with your care team.     To access your existing account, please contact your [facility] or call [phone] for assistance.        Care EveryWhere ID     This is your Care EveryWhere ID. This could be used by other organizations to access your [place] medical records  [id number]        Your Vitals Were     Pulse Temperature Height Pulse Oximetry BMI (Body Mass Index)       79 97.9  F (36.6  C) (Oral) 1.778 m (5' 10\") 94% 45.92 kg/m2        Blood Pressure from Last 3 Encounters:   07/18/17 153/78   06/22/17 160/70   06/17/17 146/85    Weight from Last 3 " Encounters:   07/18/17 (!) 145.2 kg (320 lb)   06/22/17 (!) 146.1 kg (322 lb)   06/17/17 (!) 145.4 kg (320 lb 8 oz)               Primary Care Provider Office Phone # Fax #    Arthur Luis Daniel Landis PA-C [phone]       [facility] [address] [facility] [address]        Equal Access to Services     TILA CORONA : Hadii aad ku hadasho Soomaali, waaxda luqadaha, qaybta kaalmada adeegyada, waxay idiin hayaan adeeg kharash la'aguston ah. So [facility] [phone].    ATENCIÓN: Si habla español, tiene a landaverde disposición servicios gratuitos de asistencia lingüística. [facility] [phone].    We comply with applicable federal civil rights laws and [place] laws. We do not discriminate on the basis of race, color, national origin, age, disability sex, sexual orientation or gender identity.            Thank you!     Thank you for choosing [facility]  for your care. Our goal is always to provide you with excellent care. Hearing back from our patients is one way we can continue to improve our services. Please take a few minutes to complete the written survey that you may receive in the mail after your visit with us. Thank you!             Your Updated Medication List - Protect others around you: Learn how to safely use, store and throw away your medicines at www.disposemymeds.org.          This list is accurate as of: 7/18/17  4:58 PM.  Always use your most recent med list.                   Brand Name Dispense Instructions for use Diagnosis    * albuterol 108 (90 BASE) MCG/ACT Inhaler    VENTOLIN HFA    1 Inhaler    Inhale 2 puffs into the lungs every 6 hours    Chronic obstructive pulmonary disease, unspecified COPD type (H)       * albuterol (2.5 MG/3ML) 0.083% neb solution     30 vial    Take 1 vial (2.5 mg) by nebulization daily as needed for shortness of breath / dyspnea or wheezing    Chronic obstructive pulmonary disease, unspecified COPD type (H)       aspirin 81 MG tablet       Take  by mouth daily.    Peripheral vascular disease, unspecified (H)       budesonide-formoterol 160-4.5 MCG/ACT Inhaler    SYMBICORT     Inhale 2 puffs into the lungs 2 times daily        diltiazem 120 MG 24 hr ER beaded capsule    TIAZAC    90 capsule    Take 1 capsule (120 mg) by mouth daily    Hypertension goal BP (blood pressure) < 140/90       fluticasone 50 MCG/ACT spray    FLONASE    1 g    Spray 1-2 sprays into both nostrils daily    Nasal congestion       GINKOBA 40 MG Tabs   Generic drug:  Ginkgo Biloba      Take 2 tablets by mouth daily.        IBUPROFEN PO      Take 600 mg by mouth        loratadine 10 MG tablet    CLARITIN     Take 10 mg by mouth daily        lovastatin 40 MG tablet    MEVACOR    90 tablet    Take 1 tablet (40 mg) by mouth At Bedtime    Hypertension goal BP (blood pressure) < 140/90       METOPROLOL SUCCINATE ER PO      Take 100 mg by mouth        montelukast 10 MG tablet    SINGULAIR     Take 10 mg by mouth At Bedtime        Multi-vitamin Tabs tablet   Generic drug:  multivitamin, therapeutic with minerals      Take  by mouth daily.    Hypertension goal BP (blood pressure) < 140/90       * Notice:  This list has 2 medication(s) that are the same as other medications prescribed for you. Read the directions carefully, and ask your doctor or other care provider to review them with you.

## 2017-07-18 NOTE — NURSING NOTE
"Boogie Clark's goals for this visit include: COPD  He requests these members of his care team be copied on today's visit information: yes    PCP: Arthur Landis    Referring Provider:  Shabbir Agustin MD  [address] [place] [address]    Chief Complaint   Patient presents with     Consult       Initial /78 (BP Location: Left arm, Patient Position: Chair, Cuff Size: Adult Large)  Pulse 79  Temp 97.9  F (36.6  C) (Oral)  Ht 1.778 m (5' 10\")  Wt (!) 145.2 kg (320 lb)  SpO2 94%  BMI 45.92 kg/m2 Estimated body mass index is 45.92 kg/(m^2) as calculated from the following:    Height as of this encounter: 1.778 m (5' 10\").    Weight as of this encounter: 145.2 kg (320 lb).  Medication Reconciliation: complete    Do you need any medication refills at today's visit? no    "

## 2017-07-18 NOTE — MR AVS SNAPSHOT
After Visit Summary   7/18/2017    Boogie Clark    MRN: [id number]           Patient Information     Date Of Birth          1950        Visit Information        Provider Department      7/18/2017 3:00 PM PFT LAB [facility]        Today's Diagnoses     COPD (chronic obstructive pulmonary disease) (H)    -  1       Follow-ups after your visit        Your next 10 appointments already scheduled     Jul 18, 2017  4:00 PM CDT   New Visit with June Higginbotham MD   [facility] ([facility])    [address] [address] [place] [address]   [phone]            Jul 26, 2017  3:00 PM CDT   New Visit with MARISA Garnett MD   [facility] PHYSICIANS HEART AT [facility] ([facility])    [address] [place] [address] 2nd Floor  [facility] [address]   [phone]            Sep 11, 2017  8:30 AM CDT   Return Visit with Hansel Casillas MD, [facility] [address] CYSTO PROC ROOM   [facility]    [address] [place] [id number] [phone]              Who to contact     If you have questions or need follow up information about today's clinic visit or your schedule please contact [facility] directly at [phone].  Normal or non-critical lab and imaging results will be communicated to you by MyChart, letter or phone within 4 business days after the clinic has received the results. If you do not hear from us within 7 days, please contact the clinic through MyChart or phone. If you have a critical or abnormal lab result, we will notify you by phone as soon as possible.  Submit refill requests through [employer] or call your pharmacy and they will forward the refill request to us. Please allow 3 business days for your refill to be completed.          Additional Information About Your Visit        [employer] Information     [employer] gives you secure access to your electronic  health record. If you see a primary care provider, you can also send messages to your care team and make appointments. If you have questions, please call your primary care clinic.  If you do not have a primary care provider, please call [phone] and they will assist you.      [employer] is an electronic gateway that provides easy, online access to your medical records. With [employer], you can request a clinic appointment, read your test results, renew a prescription or communicate with your care team.     To access your existing account, please contact your [facility] or call [phone] for assistance.        Care EveryWhere ID     This is your Care EveryWhere ID. This could be used by other organizations to access your [place] medical records  [id number]         Blood Pressure from Last 3 Encounters:   06/22/17 160/70   06/17/17 146/85   04/07/17 147/86    Weight from Last 3 Encounters:   06/22/17 (!) 146.1 kg (322 lb)   06/17/17 (!) 145.4 kg (320 lb 8 oz)   04/07/17 (!) 141.1 kg (311 lb)              We Performed the Following     General PFT Lab (Please always keep checked)     HC DIFFUSING CAPACITY     HC PLETHYSMOGRAPHY LUNG VOLUMES W/WO AIRWAY RESIST     RESPIRATORY FLOW VOLUME LOOP        Primary Care Provider Office Phone # Fax #    Arthur Landis PA-C [phone]       [facility] [address] [facility] [address]        Equal Access to Services     TILA CORONA AH: Hadii vinod ku yarelyo Soalekali, waaxda luqadaha, qaybta kaalmada adeegyada, doroteo knott. So [facility] [phone].    ATENCIÓN: Si habla español, tiene a landaverde disposición servicios gratuitos de asistencia lingüística. Llame al [phone].    We comply with applicable federal civil rights laws and [place] laws. We do not discriminate on the basis of race, color, national origin, age, disability sex, sexual orientation or gender identity.             Thank you!     Thank you for choosing [facility]  for your care. Our goal is always to provide you with excellent care. Hearing back from our patients is one way we can continue to improve our services. Please take a few minutes to complete the written survey that you may receive in the mail after your visit with us. Thank you!             Your Updated Medication List - Protect others around you: Learn how to safely use, store and throw away your medicines at www.disposemymeds.org.          This list is accurate as of: 7/18/17  3:44 PM.  Always use your most recent med list.                   Brand Name Dispense Instructions for use Diagnosis    * albuterol 108 (90 BASE) MCG/ACT Inhaler    VENTOLIN HFA    1 Inhaler    Inhale 2 puffs into the lungs every 6 hours    Chronic obstructive pulmonary disease, unspecified COPD type (H)       * albuterol (2.5 MG/3ML) 0.083% neb solution     30 vial    Take 1 vial (2.5 mg) by nebulization daily as needed for shortness of breath / dyspnea or wheezing    Chronic obstructive pulmonary disease, unspecified COPD type (H)       aspirin 81 MG tablet      Take  by mouth daily.    Peripheral vascular disease, unspecified (H)       budesonide-formoterol 160-4.5 MCG/ACT Inhaler    SYMBICORT     Inhale 2 puffs into the lungs 2 times daily        diltiazem 120 MG 24 hr ER beaded capsule    TIAZAC    90 capsule    Take 1 capsule (120 mg) by mouth daily    Hypertension goal BP (blood pressure) < 140/90       fluticasone 50 MCG/ACT spray    FLONASE    1 g    Spray 1-2 sprays into both nostrils daily    Nasal congestion       GINKOBA 40 MG Tabs   Generic drug:  Ginkgo Biloba      Take 2 tablets by mouth daily.        IBUPROFEN PO      Take 600 mg by mouth        loratadine 10 MG tablet    CLARITIN     Take 10 mg by mouth daily        lovastatin 40 MG tablet    MEVACOR    90 tablet    Take 1 tablet (40 mg) by mouth At Bedtime    Hypertension goal BP (blood pressure) <  140/90       METOPROLOL SUCCINATE ER PO      Take 100 mg by mouth        montelukast 10 MG tablet    SINGULAIR     Take 10 mg by mouth At Bedtime        Multi-vitamin Tabs tablet   Generic drug:  multivitamin, therapeutic with minerals      Take  by mouth daily.    Hypertension goal BP (blood pressure) < 140/90       * Notice:  This list has 2 medication(s) that are the same as other medications prescribed for you. Read the directions carefully, and ask your doctor or other care provider to review them with you.

## 2017-07-21 ENCOUNTER — TELEPHONE (OUTPATIENT)
Dept: FAMILY MEDICINE | Facility: CLINIC | Age: 67
End: 2017-07-21

## 2017-07-21 LAB
DLCOUNC-%PRED-PRE: 53 %
DLCOUNC-PRE: 14.33 ML/MIN/MMHG
DLCOUNC-PRED: 26.69 ML/MIN/MMHG
ERV-%PRED-PRE: 21 %
ERV-PRE: 0.06 L
ERV-PRED: 0.28 L
EXPTIME-PRE: 6.19 SEC
FEF2575-%PRED-POST: 102 %
FEF2575-%PRED-PRE: 78 %
FEF2575-POST: 2.62 L/SEC
FEF2575-PRE: 2.02 L/SEC
FEF2575-PRED: 2.57 L/SEC
FEFMAX-%PRED-PRE: 81 %
FEFMAX-PRE: 6.96 L/SEC
FEFMAX-PRED: 8.59 L/SEC
FEV1-%PRED-PRE: 60 %
FEV1-PRE: 2 L
FEV1FEV6-PRE: 82 %
FEV1FEV6-PRED: 78 %
FEV1FVC-PRE: 82 %
FEV1FVC-PRED: 76 %
FEV1SVC-PRE: 71 %
FEV1SVC-PRED: 68 %
FIFMAX-PRE: 5.96 L/SEC
FRCPLETH-%PRED-PRE: 51 %
FRCPLETH-PRE: 1.89 L
FRCPLETH-PRED: 3.68 L
FVC-%PRED-PRE: 56 %
FVC-PRE: 2.44 L
FVC-PRED: 4.34 L
IC-%PRED-PRE: 60 %
IC-PRE: 2.73 L
IC-PRED: 4.54 L
RVPLETH-%PRED-PRE: 70 %
RVPLETH-PRE: 1.83 L
RVPLETH-PRED: 2.59 L
TLCPLETH-%PRED-PRE: 64 %
TLCPLETH-PRE: 4.62 L
TLCPLETH-PRED: 7.13 L
VA-%PRED-PRE: 60 %
VA-PRE: 4.16 L
VC-%PRED-PRE: 57 %
VC-PRE: 2.79 L
VC-PRED: 4.82 L

## 2017-07-21 NOTE — TELEPHONE ENCOUNTER
Panel Management Review      Patient has the following on his problem list:     Hypertension   Last three blood pressure readings:  BP Readings from Last 3 Encounters:   07/18/17 153/78   06/22/17 160/70   06/17/17 146/85     Blood pressure: FAILED    HTN Guidelines:  Age 18-59 BP range:  Less than 140/90  Age 60-85 with Diabetes:  Less than 140/90  Age 60-85 without Diabetes:  less than 150/90        Composite cancer screening  Chart review shows that this patient is due/due soon for the following Colonoscopy  Summary:    Patient is due/failing the following:   BP CHECK    Action needed:   Patient needs office visit for BP check.    Type of outreach:    None, routed to provider for review.    Questions for provider review:    Patient on HTN fail list. When leah you like patient to come back to have BP checked? Do you want him to have an office visit with you or an ancillary visit? Please advise and route back.                                                                                                                                    Dania Salvador, [facility]       Chart routed to Provider .

## 2017-07-26 ENCOUNTER — OFFICE VISIT (OUTPATIENT)
Dept: CARDIOLOGY | Facility: CLINIC | Age: 67
End: 2017-07-26
Payer: COMMERCIAL

## 2017-07-26 ENCOUNTER — ALLIED HEALTH/NURSE VISIT (OUTPATIENT)
Dept: NURSING | Facility: CLINIC | Age: 67
End: 2017-07-26

## 2017-07-26 VITALS
WEIGHT: 313 LBS | BODY MASS INDEX: 44.91 KG/M2 | SYSTOLIC BLOOD PRESSURE: 111 MMHG | HEART RATE: 70 BPM | OXYGEN SATURATION: 93 % | DIASTOLIC BLOOD PRESSURE: 69 MMHG

## 2017-07-26 VITALS — DIASTOLIC BLOOD PRESSURE: 74 MMHG | HEART RATE: 75 BPM | OXYGEN SATURATION: 92 % | SYSTOLIC BLOOD PRESSURE: 142 MMHG

## 2017-07-26 DIAGNOSIS — I73.9 PAD (PERIPHERAL ARTERY DISEASE) (H): Primary | ICD-10-CM

## 2017-07-26 DIAGNOSIS — I27.20 PULMONARY HYPERTENSION (H): ICD-10-CM

## 2017-07-26 DIAGNOSIS — I10 HYPERTENSION GOAL BP (BLOOD PRESSURE) < 140/90: Primary | ICD-10-CM

## 2017-07-26 PROCEDURE — 99205 OFFICE O/P NEW HI 60 MIN: CPT | Performed by: INTERNAL MEDICINE

## 2017-07-26 RX ORDER — SODIUM CHLORIDE 9 MG/ML
INJECTION, SOLUTION INTRAVENOUS CONTINUOUS
Status: CANCELLED | OUTPATIENT
Start: 2017-07-26

## 2017-07-26 RX ORDER — METOLAZONE 5 MG/1
TABLET ORAL
Qty: 10 TABLET | Refills: 0 | Status: SHIPPED | OUTPATIENT
Start: 2017-07-26 | End: 2018-06-27

## 2017-07-26 RX ORDER — ASPIRIN 81 MG/1
81 TABLET ORAL DAILY
Status: CANCELLED | OUTPATIENT
Start: 2017-07-26

## 2017-07-26 RX ORDER — LIDOCAINE 40 MG/G
CREAM TOPICAL
Status: CANCELLED | OUTPATIENT
Start: 2017-07-26

## 2017-07-26 ASSESSMENT — PAIN SCALES - GENERAL: PAINLEVEL: NO PAIN (0)

## 2017-07-26 NOTE — NURSING NOTE
Cardiac Testing: Patient given instructions regarding  echocardiogram . Discussed purpose, preparation, procedure and when to expect results reported back to the patient. Patient demonstrated understanding of this information and agreed to call with further questions or concerns.  Patient has a diagnosis of PAD-legs are swollen Dr. Hale ordered an MRI of the leg extremities  Med Reconcile: Reviewed and verified all current medications with the patient. The updated medication list was printed and given to the patient.  New Medication: Patient was educated regarding newly prescribed medication, including discussion of  the indication, administration, side effects, and when to report to MD or RN. Patient demonstrated understanding of this information and agreed to call with further questions or concerns.  metolazone 2.5 mg daily as needed (use a 5 mg tablet per Dr. Hale request)  Right Heart Catheterization: Patient was instructed regarding right heart catheterization, including discussion of the procedure, preparation, intra-procedural steps, and recovery at home. Patient demonstrated understanding of this information and agreed to call with further questions or concerns.  Patient stated he understood all health information given and agreed to call with further questions or concerns.    Rocio Jones, ROYA  Care Coordinator  [facility]  [phone]

## 2017-07-26 NOTE — MR AVS SNAPSHOT
After Visit Summary   7/26/2017    Boogie Clark    MRN: [id number]           Patient Information     Date Of Birth          1950        Visit Information        Provider Department      7/26/2017 3:00 PM MARISA Garnett MD [facility] PHYSICIANS HEART AT [facility]        Today's Diagnoses     PAD (peripheral artery disease) (H)    -  1    Pulmonary hypertension (H)          Care Instructions    1.  Dr. Hale has ordered an echo-ultrasound of the heart.  This is scheduled for tomorrow at 3:00 pm at [address] [facility].  Please check in at 2:45 pm 1st floor.  PLEASE BRING YOUR LAB RESULTS.    2. We have scheduled you for a RIGHT HEART CATHETERIZATION at the [facility] on Wednesday, August 2nd at 12:00 PM (please arrive to the [facility] waiting area for check in) with A  as you will have sedation.  Please see your Patient's Guide to Coronary Angiogram and Angioplasty for more details. If you should have any questions or concerns meantime, please contact us.      3.  MRA of the legs to determine blood flow. This is scheduled for Tuesday, August 1st, 2017 at 7:00am. Please arrive [facility] on Wednesday, August 2nd, 2017 at 9:30 AM (please arrive to the [facility] area for check in at 9:15am) with A .      4.  Take metolazone 1/2 tablet as needed for increased water weight.  You have been prescribed a 5 mg tablet of metolazone-this is a strong water diuretic and should only be taken as needed and to only take 1/2 tablet.                [facility] Cardiology - [place] Location    If you have any questions regarding your visit please contact your care team:     Cardiology  Telephone Number   Rocio Jones,   Cardiology RN   Tamika Suarez MA [phone]    After hours: [phone]   For scheduling appts:     [phone] or  [phone]    After hours: [phone]   For the Device Clinic  (Pacemakers and ICD's)  RN's :  Germaine Hickey   During business hours: [phone]  After business hours:  [phone]- select option 4 and ask for job code [id number].     Normal test result notifications will be released via FatSkunkt or mailed within 7 business days.  All other test results, will be communicated via telephone once reviewed by your cardiologist.    If you need a medication refill please contact your pharmacy.  Please allow 3 business days for your refill to be completed.    As always, thank you for trusting us with your health care needs!                Follow-ups after your visit        Your next 10 appointments already scheduled     Jul 27, 2017  2:30 PM CDT   Nurse Only with FK ANCILLARY CARDS   [facility] PHYSICIANS HEART AT [facility] ([facility])    [address] [place] [address]   [phone]            Jul 27, 2017  3:00 PM CDT   Ech Complete with [id number]   [facility] ([facility])    [address] [place] [address]   [phone]           1. Please bring or wear a comfortable two-piece outfit. 2. You may eat, drink and take your normal medicines. 3. For any questions that cannot be answered, please contact the ordering physician            Aug 02, 2017  9:30 AM CDT   MR LEG RUN OFF ANGIOGRAM with UUMR4   [facility] ([facility])    [address] [facility] [address] [phone]           Take your medicines as usual, unless your doctor tells you not to. Bring a list of your current medicines to your exam (including vitamins, minerals and over-the-counter drugs).  You will be given intravenous contrast for this exam. To prepare:   The day before your exam, drink extra fluids at least six 8-ounce glasses (unless your doctor tells you to restrict your fluids).   Have a blood test (creatinine test)  within 30 days of your exam. Go to your clinic or Diagnostic Imaging Department for this test.  The MRI machine uses a strong magnet. Please wear clothes without metal (snaps, zippers). A sweatsuit works well, or we may give you a hospital gown.  Please remove any body piercings and hair extensions before you arrive. You will also remove watches, jewelry, hairpins, wallets, dentures, partial dental plates and hearing aids. You may wear contact lenses, and you may be able to wear your rings. We have a safe place to keep your personal items, but it is safer to leave them at home.   **IMPORTANT** THE INSTRUCTIONS BELOW ARE ONLY FOR THOSE PATIENTS WHO HAVE BEEN TOLD THEY WILL RECEIVE SEDATION OR GENERAL ANESTHESIA DURING THEIR MRI PROCEDURE:  IF YOU WILL RECEIVE SEDATION (take medicine to help you relax during your exam):   You must get the medicine from your doctor before you arrive. Bring the medicine to the exam. Do not take it at home.   Arrive one hour early. Bring someone who can take you home after the test. Your medicine will make you sleepy. After the exam, you may not drive, take a bus or take a taxi by yourself.   No eating 8 hours before your exam. You may have clear liquids up until 4 hours before your exam. (Clear liquids include water, clear tea, black coffee and fruit juice without pulp.)  IF YOU WILL RECEIVE ANESTHESIA (be asleep for your exam):   Arrive 1 1/2 hours early. Bring someone who can take you home after the test. You may not drive, take a bus or take a taxi by yourself.   No eating 8 hours before your exam. You may have clear liquids up until 4 hours before your exam. (Clear liquids include water, clear tea, black coffee and fruit juice without pulp.)  Please call the Imaging Department at your exam site with any questions.            Aug 02, 2017 12:00 PM CDT   Procedure - 2.5 hour with U2A ROOM 3   [address] [facility] ([facility])    [address]  [facility] [address]               Aug 02, 2017  1:00 PM CDT   Heart Cath Right Heart Cath with [id number]   [facility] ([facility])    [address] [facility] [address]   [phone]            Sep 11, 2017  8:30 AM CDT   Return Visit with Hansel Casillas MD, [facility] CYSTO PROC ROOM   [facility]    [address] [place] [address]   [phone]            Oct 24, 2017  7:30 AM CDT   Office Visit with PFT LAB   [facility] ([facility])    [address] [place] [id number] [phone]           Bring a current list of meds and any records pertaining to this visit. For Physicals, please bring immunization records and any forms needing to be filled out. Please arrive 10 minutes early to complete paperwork.            Oct 24, 2017  9:00 AM CDT   Return Visit with June Higginbotham MD   [facility])    [address] [place] [id number] [phone]              Future tests that were ordered for you today     Open Future Orders        Priority Expected Expires Ordered    Outpatient Coronary Angiography Adult Order Routine  10/14/2017 7/26/2017    MRA Leg run off (Abdominal Aorta & LE Run off Evaluation) Routine  7/26/2018 7/26/2017    Heart Cath Right heart cath Routine  7/27/2018 7/26/2017    Echocardiogram Complete Routine  7/26/2018 7/26/2017            Who to contact     If you have questions or need follow up information about today's clinic visit or your schedule please contact [facility] PHYSICIANS HEART AT [facility] directly at [phone].  Normal or non-critical lab and imaging results will be communicated to you by MyChart, letter or phone within 4 business days after the clinic has received the results. If you do not hear from us within 7  days, please contact the clinic through Ph.Creative or phone. If you have a critical or abnormal lab result, we will notify you by phone as soon as possible.  Submit refill requests through Ph.Creative or call your pharmacy and they will forward the refill request to us. Please allow 3 business days for your refill to be completed.          Additional Information About Your Visit        Pin or PegharBioscale Information     Ph.Creative gives you secure access to your electronic health record. If you see a primary care provider, you can also send messages to your care team and make appointments. If you have questions, please call your primary care clinic.  If you do not have a primary care provider, please call [phone] and they will assist you.        Care EveryWhere ID     This is your Care EveryWhere ID. This could be used by other organizations to access your [facility] medical records  [id number]        Your Vitals Were     Pulse Pulse Oximetry BMI (Body Mass Index)             70 93% 44.91 kg/m2          Blood Pressure from Last 3 Encounters:   07/26/17 111/69   07/26/17 142/74   07/18/17 153/78    Weight from Last 3 Encounters:   07/26/17 (!) 142 kg (313 lb)   07/18/17 (!) 145.2 kg (320 lb)   06/22/17 (!) 146.1 kg (322 lb)                 Today's Medication Changes          These changes are accurate as of: 7/26/17 11:59 PM.  If you have any questions, ask your nurse or doctor.               Start taking these medicines.        Dose/Directions    metolazone 5 MG tablet   Commonly known as:  ZAROXOLYN   Used for:  Pulmonary hypertension (H)   Started by:  MARISA Garnett MD        Take 1/2 tablet as needed   Quantity:  10 tablet   Refills:  0            Where to get your medicines      These medications were sent to [facility] [place] - [address]  [address], [place] [address]     Phone:  [phone]     metolazone 5 MG tablet                Primary Care Provider Office  Phone # Fax #    Arthur Landis PA-C [phone]       [facility] [place] [facility] [address] [facility] [address]        Equal Access to Services     TILA CORONA : Hadii vinod ku yarelyo Soomaali, waaxda luqadaha, qaybta kaalmada adeegyada, doroteo chadwick laAdrianashly knott. So [facility] [phone].    ATENCIÓN: Si habla español, tiene a landaverde disposición servicios gratuitos de asistencia lingüística. Llame al [phone].    We comply with applicable federal civil rights laws and [place] laws. We do not discriminate on the basis of race, color, national origin, age, disability sex, sexual orientation or gender identity.            Thank you!     Thank you for choosing [facility] PHYSICIANS HEART AT [facility]  for your care. Our goal is always to provide you with excellent care. Hearing back from our patients is one way we can continue to improve our services. Please take a few minutes to complete the written survey that you may receive in the mail after your visit with us. Thank you!             Your Updated Medication List - Protect others around you: Learn how to safely use, store and throw away your medicines at www.disposemymeds.org.          This list is accurate as of: 7/26/17 11:59 PM.  Always use your most recent med list.                   Brand Name Dispense Instructions for use Diagnosis    * albuterol 108 (90 BASE) MCG/ACT Inhaler    VENTOLIN HFA    1 Inhaler    Inhale 2 puffs into the lungs every 6 hours    Chronic obstructive pulmonary disease, unspecified COPD type (H)       * albuterol (2.5 MG/3ML) 0.083% neb solution     30 vial    Take 1 vial (2.5 mg) by nebulization daily as needed for shortness of breath / dyspnea or wheezing    Chronic obstructive pulmonary disease, unspecified COPD type (H)       aspirin 81 MG tablet      Take  by mouth daily.    Peripheral vascular disease, unspecified (H)       diltiazem 120 MG 24 hr ER beaded capsule    TIAZAC     90 capsule    Take 1 capsule (120 mg) by mouth daily    Hypertension goal BP (blood pressure) < 140/90       fluticasone 50 MCG/ACT spray    FLONASE    1 g    Spray 1-2 sprays into both nostrils daily    Nasal congestion       FUROSEMIDE PO      Take 40 mg by mouth daily        GINKOBA 40 MG Tabs   Generic drug:  Ginkgo Biloba      Take 2 tablets by mouth daily.        IBUPROFEN PO      Take 600 mg by mouth        loratadine 10 MG tablet    CLARITIN     Take 10 mg by mouth daily        lovastatin 40 MG tablet    MEVACOR    90 tablet    Take 1 tablet (40 mg) by mouth At Bedtime    Hypertension goal BP (blood pressure) < 140/90       metolazone 5 MG tablet    ZAROXOLYN    10 tablet    Take 1/2 tablet as needed    Pulmonary hypertension (H)       METOPROLOL SUCCINATE ER PO      Take 100 mg by mouth        montelukast 10 MG tablet    SINGULAIR     Take 10 mg by mouth At Bedtime        Multi-vitamin Tabs tablet   Generic drug:  multivitamin, therapeutic with minerals      Take  by mouth daily.    Hypertension goal BP (blood pressure) < 140/90       * Notice:  This list has 2 medication(s) that are the same as other medications prescribed for you. Read the directions carefully, and ask your doctor or other care provider to review them with you.

## 2017-07-26 NOTE — NURSING NOTE
"Chief Complaint   Patient presents with     Hypertension     BP Check       Initial /74 (Cuff Size: Adult Large)  Pulse 75  SpO2 92% Estimated body mass index is 45.92 kg/(m^2) as calculated from the following:    Height as of 7/18/17: 5' 10\" (1.778 m).    Weight as of 7/18/17: 320 lb (145.2 kg).  Medication Reconciliation: unable or not appropriate to perform   Boogie Clark is a 66 year old male who comes in today for a Blood Pressure check because of elevated BP reading at last appointment.    *Document pulse and BP  *Use new set of vitals button for multiple readings.  *Use extended vitals for orthostatic    Vitals as recorded, a large cuff was used.    Patient is taking medication as prescribed  Patient is tolerating medications well.  Patient is not monitoring Blood Pressure at home.  Average readings if yes are unknown      Current complaints: none    Disposition: results routed to MD/BARBIE PURCELL CMA ([facility])        "

## 2017-07-26 NOTE — MR AVS SNAPSHOT
After Visit Summary   7/26/2017    Boogie Clark    MRN: [id number]           Patient Information     Date Of Birth          1950        Visit Information        Provider Department      7/26/2017 2:00 PM FZ ANCILLARY [facility]        Today's Diagnoses     Hypertension goal BP (blood pressure) < 140/90    -  1       Follow-ups after your visit        Your next 10 appointments already scheduled     Jul 26, 2017  3:00 PM CDT   New Visit with MARISA Garnett MD   [facility] PHYSICIANS HEART AT [facility] ([facility])    [address] [place] [address] 2nd Floor  [facility] [address]   [phone]            Sep 11, 2017  8:30 AM CDT   Return Visit with Hansel Casillas MD, [facility] CYSTO PROC ROOM   [facility])    [address] [place] [address]   [phone]            Oct 24, 2017  7:30 AM CDT   Office Visit with PFT LAB   [facility] ([facility])    [address] [place] [address] [phone]           Bring a current list of meds and any records pertaining to this visit. For Physicals, please bring immunization records and any forms needing to be filled out. Please arrive 10 minutes early to complete paperwork.            Oct 24, 2017  9:00 AM CDT   Return Visit with June Higginbotham MD   [facility] ([facility])    [address] [place] [address] [phone]              Who to contact     If you have questions or need follow up information about today's clinic visit or your schedule please contact [facility] directly at [phone].  Normal or non-critical lab and imaging results will be communicated to you by MyChart, letter or phone within 4 business days after the clinic has received the results. If you do not hear from us within 7 days, please contact the clinic through Cloudpic Globalt  or phone. If you have a critical or abnormal lab result, we will notify you by phone as soon as possible.  Submit refill requests through TerraSky or call your pharmacy and they will forward the refill request to us. Please allow 3 business days for your refill to be completed.          Additional Information About Your Visit        Second Windhart Information     TerraSky gives you secure access to your electronic health record. If you see a primary care provider, you can also send messages to your care team and make appointments. If you have questions, please call your primary care clinic.  If you do not have a primary care provider, please call [phone] and they will assist you.        Care EveryWhere ID     This is your Care EveryWhere ID. This could be used by other organizations to access your [facility] medical records  [id number]        Your Vitals Were     Pulse Pulse Oximetry                75 92%           Blood Pressure from Last 3 Encounters:   07/26/17 142/74   07/18/17 153/78   06/22/17 160/70    Weight from Last 3 Encounters:   07/18/17 (!) 320 lb (145.2 kg)   06/22/17 (!) 322 lb (146.1 kg)   06/17/17 (!) 320 lb 8 oz (145.4 kg)              Today, you had the following     No orders found for display       Primary Care Provider Office Phone # Fax #    Arthur Landis PA-C [phone]       [facility] [address] [facility] [address]        Equal Access to Services     TILA CORONA : Hadii aad ku hadasho Soomaali, waaxda luqadaha, qaybta kaalmada adeegyada, doroteo tuttle . So [facility] [phone].    ATENCIÓN: Si habla español, tiene a landaverde disposición servicios gratuitos de asistencia lingüística. Llame al [phone].    We comply with applicable federal civil rights laws and [place] laws. We do not discriminate on the basis of race, color, national origin, age, disability sex, sexual orientation or gender identity.            Thank you!      Thank you for choosing [facility]  for your care. Our goal is always to provide you with excellent care. Hearing back from our patients is one way we can continue to improve our services. Please take a few minutes to complete the written survey that you may receive in the mail after your visit with us. Thank you!             Your Updated Medication List - Protect others around you: Learn how to safely use, store and throw away your medicines at www.disposemymeds.org.          This list is accurate as of: 7/26/17  2:37 PM.  Always use your most recent med list.                   Brand Name Dispense Instructions for use Diagnosis    * albuterol 108 (90 BASE) MCG/ACT Inhaler    VENTOLIN HFA    1 Inhaler    Inhale 2 puffs into the lungs every 6 hours    Chronic obstructive pulmonary disease, unspecified COPD type (H)       * albuterol (2.5 MG/3ML) 0.083% neb solution     30 vial    Take 1 vial (2.5 mg) by nebulization daily as needed for shortness of breath / dyspnea or wheezing    Chronic obstructive pulmonary disease, unspecified COPD type (H)       aspirin 81 MG tablet      Take  by mouth daily.    Peripheral vascular disease, unspecified (H)       budesonide-formoterol 160-4.5 MCG/ACT Inhaler    SYMBICORT     Inhale 2 puffs into the lungs 2 times daily        diltiazem 120 MG 24 hr ER beaded capsule    TIAZAC    90 capsule    Take 1 capsule (120 mg) by mouth daily    Hypertension goal BP (blood pressure) < 140/90       fluticasone 50 MCG/ACT spray    FLONASE    1 g    Spray 1-2 sprays into both nostrils daily    Nasal congestion       GINKOBA 40 MG Tabs   Generic drug:  Ginkgo Biloba      Take 2 tablets by mouth daily.        IBUPROFEN PO      Take 600 mg by mouth        loratadine 10 MG tablet    CLARITIN     Take 10 mg by mouth daily        lovastatin 40 MG tablet    MEVACOR    90 tablet    Take 1 tablet (40 mg) by mouth At Bedtime    Hypertension goal BP (blood pressure) < 140/90       METOPROLOL  SUCCINATE ER PO      Take 100 mg by mouth        montelukast 10 MG tablet    SINGULAIR     Take 10 mg by mouth At Bedtime        Multi-vitamin Tabs tablet   Generic drug:  multivitamin, therapeutic with minerals      Take  by mouth daily.    Hypertension goal BP (blood pressure) < 140/90       * Notice:  This list has 2 medication(s) that are the same as other medications prescribed for you. Read the directions carefully, and ask your doctor or other care provider to review them with you.

## 2017-07-26 NOTE — LETTER
7/26/2017      RE: Boogie Clark  [address] [address]       Dear Colleague,    Thank you for the opportunity to participate in the care of your patient, Boogie Clark, at the [facility] HEART AT [facility]. Please see a copy of my visit note below.       SUBJECTIVE:  Boogie Clark is a 66 year old male who presents for evaluation of SOB/desaturation and gross leg edema.  Retired . Ex-smoker.Morbid obesity. Sleep apnea for 25 yrs,using CPAP.HTN+. HLD.No DM.  Doagnosis of Bladder tumor.   PAD. S/p POBA twice of lower extremity for PAD.  Now progressive SOB and gross swelling of legs.Now has claudication.Intermittent blue color of toes.  [facility] care is fragmented as [facility].No documents available.    Patient Active Problem List    Diagnosis Date Noted     History of smoking 12/08/2016     Priority: Medium     Morbid obesity due to excess calories (H) 12/08/2016     Priority: Medium     Benign essential hypertension 12/03/2015     Priority: Medium     Chronic obstructive pulmonary disease, unspecified COPD type (H) 11/12/2015     Priority: Medium     Body mass index (BMI) of 40.0-44.9 in adult (H) 10/20/2015     Priority: Medium     Sigmoid diverticulosis 08/05/2014     Priority: Medium     Due again in 2022 for colonoscopy        Personal history of bladder cancer 06/30/2014     Priority: Medium     Malignant neoplasm of bladder (H) 05/20/2013     Priority: Medium     Problem list name updated by automated process. Provider to review       Bladder tumor 05/10/2013     Priority: Medium     COCO (obstructive sleep apnea) 05/10/2013     Priority: Medium     Uses cpap machine and mask religiously        Hyperlipidemia with target LDL less than 100 05/10/2013     Priority: Medium     Diagnosis updated by automated process. Provider to review and confirm.       Kidney stone 04/17/2013     Priority: Medium      Anal fissure 08/17/2012     Priority: Medium     Causing rectal bleeding       Advanced directives, counseling/discussion 07/19/2012     Priority: Medium     Patient states has Advance Directive and will bring in a copy to clinic. 7/19/2012          Hypertension goal BP (blood pressure) < 140/90 07/19/2012     Priority: Medium     Peripheral vascular disease (H) 07/19/2012     Priority: Medium     Had angioplasty  Done at [facility]   Problem list name updated by automated process. Provider to review       Colon polyp 07/19/2012     Priority: Medium     Colonoscopy at 50 yo  Repeat at 65 yo  Had at 55yo      .  Current Outpatient Prescriptions   Medication Sig     FUROSEMIDE PO Take 40 mg by mouth daily     metolazone (ZAROXOLYN) 5 MG tablet Take 1/2 tablet as needed     diltiazem (TIAZAC) 120 MG 24 hr ER beaded capsule Take 1 capsule (120 mg) by mouth daily     montelukast (SINGULAIR) 10 MG tablet Take 10 mg by mouth At Bedtime     loratadine (CLARITIN) 10 MG tablet Take 10 mg by mouth daily     METOPROLOL SUCCINATE ER PO Take 100 mg by mouth     lovastatin (MEVACOR) 40 MG tablet Take 1 tablet (40 mg) by mouth At Bedtime     albuterol (2.5 MG/3ML) 0.083% nebulizer solution Take 1 vial (2.5 mg) by nebulization daily as needed for shortness of breath / dyspnea or wheezing     albuterol (VENTOLIN HFA) 108 (90 BASE) MCG/ACT inhaler Inhale 2 puffs into the lungs every 6 hours     fluticasone (FLONASE) 50 MCG/ACT nasal spray Spray 1-2 sprays into both nostrils daily     IBUPROFEN PO Take 600 mg by mouth     Ginkgo Biloba (GINKOBA) 40 MG TABS Take 2 tablets by mouth daily.     aspirin 81 MG tablet Take  by mouth daily.     Multiple Vitamin (MULTI-VITAMIN) per tablet Take  by mouth daily.     No current facility-administered medications for this visit.      Past Medical History:   Diagnosis Date     Bladder cancer (H)      COPD (chronic obstructive pulmonary disease) (H)      Hyperlipidemia      Hypertension       COCO (obstructive sleep apnea)      Past Surgical History:   Procedure Laterality Date     ANGIOPLASTY  2009    angioplasty at abdominla aortic bifurcation in common [facility] bilaterally     ANKLE SURGERY       BIOPSY  june 2013     COLONOSCOPY  2013     CYSTOSCOPY      multiple times secondary to dx. of bladder cancer     ORTHOPEDIC SURGERY  2012    surgery on right ankle to repair break     VASCULAR SURGERY  2011    angoplasty for pad     VASECTOMY       Allergies   Allergen Reactions     Penicillins      Social History     Social History     Marital status:      Spouse name: N/A     Number of children: N/A     Years of education: N/A     Occupational History     Not on file.     Social History Main Topics     Smoking status: Former Smoker     Types: Cigarettes     Start date: 1/1/1964     Quit date: 10/1/2016     Smokeless tobacco: Never Used      Comment: already did     Alcohol use 0.0 oz/week      Comment: moderate once a week couple of beers     Drug use: No     Sexual activity: Yes     Partners: Female     Birth control/ protection: Surgical     Other Topics Concern     Parent/Sibling W/ Cabg, Mi Or Angioplasty Before 65f 55m? No      Service No     Blood Transfusions No     Caffeine Concern No     Occupational Exposure No     Hobby Hazards No     Sleep Concern No     Stress Concern No     Weight Concern No     Special Diet No     Back Care No     Exercise No     Bike Helmet No     Seat Belt Yes     Self-Exams Yes     Social History Narrative     Family History   Problem Relation Age of Onset     Breast Cancer Mother      Vascular Disease Father      brain anurysm     CANCER Maternal Grandmother      Breast Cancer Sister           REVIEW OF SYSTEMS:  General: negative, fever, chills, night sweats  Skin: negative, acne, rash and scaling  Eyes: negative, double vision, eye pain and photophobia  Ears/Nose/Throat: negative, nasal congestion and purulent rhinorrhea  Respiratory: No cough, No  hemoptysis and negative  Cardiovascular: negative, palpitations, tachycardia, irregular heart beat, chest pain, exertional chest pain or pressure, paroxysmal nocturnal dyspnea and orthopnea  Gastrointestinal: negative, dysphagia, nausea and vomiting  Genitourinary: negative, nocturia, dysuria and frequency  Musculoskeletal: negative, fracture, back pain and neck pain  Neurologic: negative, headaches, syncope, stroke, seizures and paralysis  Psychiatric: negative, nervous breakdown, thoughts of self-harm and thoughts of hurting someone else  Hematologic/Lymphatic/Immunologic: negative, bleeding disorder, chills and fever  Endocrine: negative, cold intolerance, heat intolerance and hot flashes       OBJECTIVE:  Blood pressure 111/69, pulse 70, weight (!) 142 kg (313 lb), SpO2 93 %.  General Appearance: alert and no distress  Head: Normocephalic. No masses, lesions, tenderness or abnormalities  Eyes: conjuctiva clear, PERRL, EOM intact  Ears: External ears normal. Canals clear. TM's normal.  Nose: Nares normal  Mouth: normal  Neck: Supple, no cervical adenopathy, no thyromegaly  Lungs: clear to auscultation  Cardiac: regular rate and rhythm, normal S1 and S2, no murmur  Abdomen: Soft, nontender.  Normal bowel sounds.  No hepatosplenomegaly or abnormal masses  Extremities: gross edema.  Musculoskeletal: negative  Neurological: Cranial nerves 2-12 intact, motor strength intact       ASSESSMENT/PLAN:     yr old male with SOB/edema/PAD symptoms.  Patient very frustrated as nobody is helping him and symptoms are worseneng.  As per patient,pulmonary evaluation is normal and he is on O2 now.  Need extensive evaluation.  Need re-assessment of COCO.  Will check an echo  Will check a Rt heart cath as Pulmonary HTN is a reason for referrel and couldnot estimate with echo  Will check a BPM.  Also will check an MRA of legs for PAD as non invasive tests cannot be done due to gross edema.  If bardiac evaluation is negative, will plan  for a CT Abdomen and Pelvis to R/O Lymphatic/Venous obstruction with H/O Bladder tumor.  For edema will add Metolozone 2.5mg prior to lasix for few days.  Per orders.   Return to Clinic PRN.    Please do not hesitate to contact me if you have any questions/concerns.     Sincerely,     MARISA Garnett MD

## 2017-07-26 NOTE — NURSING NOTE
"Chief Complaint   Patient presents with     Heart Problem     Right ventricular hypertrophy, hypoxia.  look at catheterization to see if pulmonary artery hypertension is an issue, PFTs completed. States pulmonologist said he does not have COPD. States he is having a hard time breathing, o2  doesn't help.  Needs two naps a day. Bilat foot/and leg ankle swelling.  No chest pain. NO syncope.        Initial /69 (BP Location: Right arm, Patient Position: Chair, Cuff Size: Adult Large)  Pulse 70  Wt (!) 313 lb (142 kg)  SpO2 93%  BMI 44.91 kg/m2 Estimated body mass index is 44.91 kg/(m^2) as calculated from the following:    Height as of 7/18/17: 5' 10\" (1.778 m).    Weight as of this encounter: 313 lb (142 kg)..  BP completed using cuff size: large    Brandi Smith CMA    "

## 2017-07-26 NOTE — PATIENT INSTRUCTIONS
1.  Dr. Hale has ordered an echo-ultrasound of the heart.  This is scheduled for tomorrow at 3:00 pm at [address] [facility].  Please check in at 2:45 pm 1st floor.  PLEASE BRING YOUR LAB RESULTS.    2. We have scheduled you for a RIGHT HEART CATHETERIZATION at the [facility] on Wednesday, August 2nd at 12:00 PM (please arrive to the [facility] waiting area for check in) with A  as you will have sedation.  Please see your Patient's Guide to Coronary Angiogram and Angioplasty for more details. If you should have any questions or concerns meantime, please contact us.      3.  MRA of the legs to determine blood flow. This is scheduled for Tuesday, August 1st, 2017 at 7:00am. Please arrive [facility] on Wednesday, August 2nd, 2017 at 9:30 AM (please arrive to the [facility] area for check in at 9:15am) with A .      4.  Take metolazone 1/2 tablet as needed for increased water weight.  You have been prescribed a 5 mg tablet of metolazone-this is a strong water diuretic and should only be taken as needed and to only take 1/2 tablet.                [facility] Card[facility] - [place] Location    If you have any questions regarding your visit please contact your care team:     Cardiology  Telephone Number   Rocio Jones,   Cardiology RN   Tamika Suarez MA [phone]    After hours: [phone]   For scheduling appts:     [phone] or  [phone]    After hours: [phone]   For the Device Clinic (Pacemakers and ICD's)  RN's :  Germaine Hickey   During business hours: [phone]  After business hours:  [phone]- select option 4 and ask for job code [id number].     Normal test result notifications will be released via [employer] or mailed within 7 business days.  All other test results, will be communicated via telephone once reviewed by your cardiologist.    If you need a medication refill please contact your pharmacy.  Please allow 3  business days for your refill to be completed.    As always, thank you for trusting us with your health care needs!

## 2017-07-27 ENCOUNTER — RADIANT APPOINTMENT (OUTPATIENT)
Dept: CARDIOLOGY | Facility: CLINIC | Age: 67
End: 2017-07-27
Attending: INTERNAL MEDICINE
Payer: COMMERCIAL

## 2017-07-27 ENCOUNTER — ALLIED HEALTH/NURSE VISIT (OUTPATIENT)
Dept: CARDIOLOGY | Facility: CLINIC | Age: 67
End: 2017-07-27

## 2017-07-27 DIAGNOSIS — I27.20 PULMONARY HYPERTENSION (H): ICD-10-CM

## 2017-07-27 DIAGNOSIS — I73.9 PERIPHERAL VASCULAR DISEASE (H): Primary | ICD-10-CM

## 2017-07-27 PROCEDURE — 93306 TTE W/DOPPLER COMPLETE: CPT | Performed by: INTERNAL MEDICINE

## 2017-07-27 PROCEDURE — 40000264 ZZHC STATISTIC IV PUSH SINGLE INITIAL SUBSTANCE: Performed by: INTERNAL MEDICINE

## 2017-07-27 RX ADMIN — Medication 3 ML: at 15:45

## 2017-07-27 NOTE — MR AVS SNAPSHOT
After Visit Summary   7/27/2017    Boogie Clark    MRN: [id number]           Patient Information     Date Of Birth          1950        Visit Information        Provider Department      7/27/2017 2:30 PM FK ANCILLARY CARDS [facility] PHYSICIANS HEART AT [facility]        Today's Diagnoses     Peripheral vascular disease (H)    -  1       Follow-ups after your visit        Your next 10 appointments already scheduled     Aug 02, 2017  9:30 AM CDT   MR LEG RUN OFF ANGIOGRAM with UUMR4   [facility], [place], MRI ([facility], [facility])    [address] [facility] [address]   [phone]           Take your medicines as usual, unless your doctor tells you not to. Bring a list of your current medicines to your exam (including vitamins, minerals and over-the-counter drugs).  You will be given intravenous contrast for this exam. To prepare:   The day before your exam, drink extra fluids at least six 8-ounce glasses (unless your doctor tells you to restrict your fluids).   Have a blood test (creatinine test) within 30 days of your exam. Go to your clinic or Diagnostic Imaging Department for this test.  The MRI machine uses a strong magnet. Please wear clothes without metal (snaps, zippers). A sweatsuit works well, or we may give you a hospital gown.  Please remove any body piercings and hair extensions before you arrive. You will also remove watches, jewelry, hairpins, wallets, dentures, partial dental plates and hearing aids. You may wear contact lenses, and you may be able to wear your rings. We have a safe place to keep your personal items, but it is safer to leave them at home.   **IMPORTANT** THE INSTRUCTIONS BELOW ARE ONLY FOR THOSE PATIENTS WHO HAVE BEEN TOLD THEY WILL RECEIVE SEDATION OR GENERAL ANESTHESIA DURING THEIR MRI PROCEDURE:  IF YOU WILL RECEIVE SEDATION (take medicine to help you relax during your exam):   You must get the  medicine from your doctor before you arrive. Bring the medicine to the exam. Do not take it at home.   Arrive one hour early. Bring someone who can take you home after the test. Your medicine will make you sleepy. After the exam, you may not drive, take a bus or take a taxi by yourself.   No eating 8 hours before your exam. You may have clear liquids up until 4 hours before your exam. (Clear liquids include water, clear tea, black coffee and fruit juice without pulp.)  IF YOU WILL RECEIVE ANESTHESIA (be asleep for your exam):   Arrive 1 1/2 hours early. Bring someone who can take you home after the test. You may not drive, take a bus or take a taxi by yourself.   No eating 8 hours before your exam. You may have clear liquids up until 4 hours before your exam. (Clear liquids include water, clear tea, black coffee and fruit juice without pulp.)  Please call the Imaging Department at your exam site with any questions.            Aug 02, 2017 12:00 PM CDT   Procedure - 2.5 hour with U2A ROOM 3   [address] [facility] ([facility])    [address] [facility] [address]               Aug 02, 2017  1:00 PM CDT   Heart Cath Right Heart Cath with [id number]   [facility])    [address] [address]   [phone]            Sep 11, 2017  8:30 AM CDT   Return Visit with Hansel Casillas MD, IRIS CYSTO PROC ROOM   [facility]    [address] [place] [address]   [phone]            Oct 24, 2017  7:30 AM CDT   Office Visit with PFT LAB   [facility]    [address] [place] [address] [phone]           Bring a current list of meds and any records pertaining to this visit. For Physicals, please bring immunization records and any forms needing to be filled out. Please  arrive 10 minutes early to complete paperwork.            Oct 24, 2017  9:00 AM CDT   Return Visit with June Higginbotham MD   [facility] ([facility])    [address] [place] [address] [phone]              Future tests that were ordered for you today     Open Future Orders        Priority Expected Expires Ordered    Outpatient Coronary Angiography Adult Order Routine  10/14/2017 7/26/2017    MRA Leg run off (Abdominal Aorta & LE Run off Evaluation) Routine  7/26/2018 7/26/2017    Heart Cath Right heart cath Routine  7/27/2018 7/26/2017            Who to contact     If you have questions or need follow up information about today's clinic visit or your schedule please contact [facility] AT [facility] directly at [phone].  Normal or non-critical lab and imaging results will be communicated to you by MyChart, letter or phone within 4 business days after the clinic has received the results. If you do not hear from us within 7 days, please contact the clinic through [employer] or phone. If you have a critical or abnormal lab result, we will notify you by phone as soon as possible.  Submit refill requests through [email or web address] or call your pharmacy and they will forward the refill request to us. Please allow 3 business days for your refill to be completed.          Additional Information About Your Visit        MyChart Information     [email or web address] gives you secure access to your electronic health record. If you see a primary care provider, you can also send messages to your care team and make appointments. If you have questions, please call your primary care clinic.  If you do not have a primary care provider, please call [phone] and they will assist you.        Care EveryWhere ID     This is your Care EveryWhere ID. This could be used by other organizations to access your [place] medical records  [id number]         Blood  Pressure from Last 3 Encounters:   07/26/17 111/69   07/26/17 142/74   07/18/17 153/78    Weight from Last 3 Encounters:   07/26/17 (!) 142 kg (313 lb)   07/18/17 (!) 145.2 kg (320 lb)   06/22/17 (!) 146.1 kg (322 lb)              Today, you had the following     No orders found for display       Primary Care Provider Office Phone # Fax #    Arthur Landis PA-C [phone]       [facility] [address] [facility] [address]        Equal Access to Services     [facility] CenterRENETTA : Hadii aad ku hadasho Soamina, waaxda luqadaha, qaybta kaalmada adeegyada, doroteo tuttle . So [facility] [phone].    ATENCIÓN: Si habla español, tiene a landaverde disposición servicios gratuitos de asistencia lingüística. Llame al [phone].    We comply with applicable federal civil rights laws and [place] laws. We do not discriminate on the basis of race, color, national origin, age, disability sex, sexual orientation or gender identity.            Thank you!     Thank you for choosing [facility] PHYSICIANS HEART AT [facility]  for your care. Our goal is always to provide you with excellent care. Hearing back from our patients is one way we can continue to improve our services. Please take a few minutes to complete the written survey that you may receive in the mail after your visit with us. Thank you!             Your Updated Medication List - Protect others around you: Learn how to safely use, store and throw away your medicines at www.disposemymeds.org.          This list is accurate as of: 7/27/17  3:08 PM.  Always use your most recent med list.                   Brand Name Dispense Instructions for use Diagnosis    * albuterol 108 (90 BASE) MCG/ACT Inhaler    VENTOLIN HFA    1 Inhaler    Inhale 2 puffs into the lungs every 6 hours    Chronic obstructive pulmonary disease, unspecified COPD type (H)       * albuterol (2.5 MG/3ML) 0.083% neb solution     30 vial     Take 1 vial (2.5 mg) by nebulization daily as needed for shortness of breath / dyspnea or wheezing    Chronic obstructive pulmonary disease, unspecified COPD type (H)       aspirin 81 MG tablet      Take  by mouth daily.    Peripheral vascular disease, unspecified (H)       ATORVASTATIN CALCIUM PO      Take 40 mg by mouth daily        diltiazem 120 MG 24 hr ER beaded capsule    TIAZAC    90 capsule    Take 1 capsule (120 mg) by mouth daily    Hypertension goal BP (blood pressure) < 140/90       fluticasone 50 MCG/ACT spray    FLONASE    1 g    Spray 1-2 sprays into both nostrils daily    Nasal congestion       FUROSEMIDE PO      Take 40 mg by mouth daily        GINKOBA 40 MG Tabs   Generic drug:  Ginkgo Biloba      Take 2 tablets by mouth daily.        IBUPROFEN PO      Take 600 mg by mouth        loratadine 10 MG tablet    CLARITIN     Take 10 mg by mouth daily        lovastatin 40 MG tablet    MEVACOR    90 tablet    Take 1 tablet (40 mg) by mouth At Bedtime    Hypertension goal BP (blood pressure) < 140/90       metolazone 5 MG tablet    ZAROXOLYN    10 tablet    Take 1/2 tablet as needed    Pulmonary hypertension (H)       METOPROLOL SUCCINATE ER PO      Take 50 mg by mouth daily        montelukast 10 MG tablet    SINGULAIR     Take 10 mg by mouth At Bedtime        Multi-vitamin Tabs tablet   Generic drug:  multivitamin, therapeutic with minerals      Take  by mouth daily.    Hypertension goal BP (blood pressure) < 140/90       * Notice:  This list has 2 medication(s) that are the same as other medications prescribed for you. Read the directions carefully, and ask your doctor or other care provider to review them with you.

## 2017-07-27 NOTE — NURSING NOTE
Patient presents today for resting echo ordered by MD.     Echo Tech provided patient education about resting echo. IV started in left hand.   IV start documented in  [employer].  Echo technician completed resting echo. Patient monitored according to Optison protocol. Data transferred to [facility] for final interpretation through [employer].     Optison medication:  Amount used 3ml Optison mixed with 6ml Saline - [id number].  6ml Wasted.   After completion of resting echo, IV removed.    Patient education provided about cardiology interpretation and primary provider will be notified of results.    Rebecca Calixto RDCS

## 2017-07-27 NOTE — PROGRESS NOTES
SUBJECTIVE:  Boogie Clark is a 66 year old male who presents for evaluation of SOB/desaturation and gross leg edema.  Retired . Ex-smoker.Morbid obesity. Sleep apnea for 25 yrs,using CPAP.HTN+. HLD.No DM.  Doagnosis of Bladder tumor.   PAD. S/p POBA twice of lower extremity for PAD.  Now progressive SOB and gross swelling of legs.Now has claudication.Intermittent blue color of toes.  His care is fragmented as [facility].No documents available.    Patient Active Problem List    Diagnosis Date Noted     History of smoking 12/08/2016     Priority: Medium     Morbid obesity due to excess calories (H) 12/08/2016     Priority: Medium     Benign essential hypertension 12/03/2015     Priority: Medium     Chronic obstructive pulmonary disease, unspecified COPD type (H) 11/12/2015     Priority: Medium     Body mass index (BMI) of 40.0-44.9 in adult (H) 10/20/2015     Priority: Medium     Sigmoid diverticulosis 08/05/2014     Priority: Medium     Due again in 2022 for colonoscopy        Personal history of bladder cancer 06/30/2014     Priority: Medium     Malignant neoplasm of bladder (H) 05/20/2013     Priority: Medium     Problem list name updated by automated process. Provider to review       Bladder tumor 05/10/2013     Priority: Medium     COCO (obstructive sleep apnea) 05/10/2013     Priority: Medium     Uses cpap machine and mask religiously        Hyperlipidemia with target LDL less than 100 05/10/2013     Priority: Medium     Diagnosis updated by automated process. Provider to review and confirm.       Kidney stone 04/17/2013     Priority: Medium     Anal fissure 08/17/2012     Priority: Medium     Causing rectal bleeding       Advanced directives, counseling/discussion 07/19/2012     Priority: Medium     Patient states has Advance Directive and will bring in a copy to clinic. 7/19/2012          Hypertension goal BP (blood pressure) < 140/90 07/19/2012     Priority: Medium      Peripheral vascular disease (H) 07/19/2012     Priority: Medium     Had angioplasty  Done at [facility]   Problem list name updated by automated process. Provider to review       Colon polyp 07/19/2012     Priority: Medium     Colonoscopy at 50 yo  Repeat at 63 yo  Had at 55yo      .  Current Outpatient Prescriptions   Medication Sig     FUROSEMIDE PO Take 40 mg by mouth daily     metolazone (ZAROXOLYN) 5 MG tablet Take 1/2 tablet as needed     diltiazem (TIAZAC) 120 MG 24 hr ER beaded capsule Take 1 capsule (120 mg) by mouth daily     montelukast (SINGULAIR) 10 MG tablet Take 10 mg by mouth At Bedtime     loratadine (CLARITIN) 10 MG tablet Take 10 mg by mouth daily     METOPROLOL SUCCINATE ER PO Take 100 mg by mouth     lovastatin (MEVACOR) 40 MG tablet Take 1 tablet (40 mg) by mouth At Bedtime     albuterol (2.5 MG/3ML) 0.083% nebulizer solution Take 1 vial (2.5 mg) by nebulization daily as needed for shortness of breath / dyspnea or wheezing     albuterol (VENTOLIN HFA) 108 (90 BASE) MCG/ACT inhaler Inhale 2 puffs into the lungs every 6 hours     fluticasone (FLONASE) 50 MCG/ACT nasal spray Spray 1-2 sprays into both nostrils daily     IBUPROFEN PO Take 600 mg by mouth     Ginkgo Biloba (GINKOBA) 40 MG TABS Take 2 tablets by mouth daily.     aspirin 81 MG tablet Take  by mouth daily.     Multiple Vitamin (MULTI-VITAMIN) per tablet Take  by mouth daily.     No current facility-administered medications for this visit.      Past Medical History:   Diagnosis Date     Bladder cancer (H)      COPD (chronic obstructive pulmonary disease) (H)      Hyperlipidemia      Hypertension      COCO (obstructive sleep apnea)      Past Surgical History:   Procedure Laterality Date     ANGIOPLASTY  2009    angioplasty at abdominla aortic bifurcation in common illiac bilaterally     ANKLE SURGERY       BIOPSY  june 2013     COLONOSCOPY  2013     CYSTOSCOPY      multiple times secondary to dx. of bladder cancer     ORTHOPEDIC  SURGERY  2012    surgery on right ankle to repair break     VASCULAR SURGERY  2011    angoplasty for pad     VASECTOMY       Allergies   Allergen Reactions     Penicillins      Social History     Social History     Marital status:      Spouse name: N/A     Number of children: N/A     Years of education: N/A     Occupational History     Not on file.     Social History Main Topics     Smoking status: Former Smoker     Types: Cigarettes     Start date: 1/1/1964     Quit date: 10/1/2016     Smokeless tobacco: Never Used      Comment: already did     Alcohol use 0.0 oz/week      Comment: moderate once a week couple of beers     Drug use: No     Sexual activity: Yes     Partners: Female     Birth control/ protection: Surgical     Other Topics Concern     Parent/Sibling W/ Cabg, Mi Or Angioplasty Before 65f 55m? No      Service No     Blood Transfusions No     Caffeine Concern No     Occupational Exposure No     Hobby Hazards No     Sleep Concern No     Stress Concern No     Weight Concern No     Special Diet No     Back Care No     Exercise No     Bike Helmet No     Seat Belt Yes     Self-Exams Yes     Social History Narrative     Family History   Problem Relation Age of Onset     Breast Cancer Mother      Vascular Disease Father      brain anurysm     CANCER Maternal Grandmother      Breast Cancer Sister           REVIEW OF SYSTEMS:  General: negative, fever, chills, night sweats  Skin: negative, acne, rash and scaling  Eyes: negative, double vision, eye pain and photophobia  Ears/Nose/Throat: negative, nasal congestion and purulent rhinorrhea  Respiratory: No cough, No hemoptysis and negative  Cardiovascular: negative, palpitations, tachycardia, irregular heart beat, chest pain, exertional chest pain or pressure, paroxysmal nocturnal dyspnea and orthopnea  Gastrointestinal: negative, dysphagia, nausea and vomiting  Genitourinary: negative, nocturia, dysuria and frequency  Musculoskeletal: negative,  fracture, back pain and neck pain  Neurologic: negative, headaches, syncope, stroke, seizures and paralysis  Psychiatric: negative, nervous breakdown, thoughts of self-harm and thoughts of hurting someone else  Hematologic/Lymphatic/Immunologic: negative, bleeding disorder, chills and fever  Endocrine: negative, cold intolerance, heat intolerance and hot flashes       OBJECTIVE:  Blood pressure 111/69, pulse 70, weight (!) 142 kg (313 lb), SpO2 93 %.  General Appearance: alert and no distress  Head: Normocephalic. No masses, lesions, tenderness or abnormalities  Eyes: conjuctiva clear, PERRL, EOM intact  Ears: External ears normal. Canals clear. TM's normal.  Nose: Nares normal  Mouth: normal  Neck: Supple, no cervical adenopathy, no thyromegaly  Lungs: clear to auscultation  Cardiac: regular rate and rhythm, normal S1 and S2, no murmur  Abdomen: Soft, nontender.  Normal bowel sounds.  No hepatosplenomegaly or abnormal masses  Extremities: gross edema.  Musculoskeletal: negative  Neurological: Cranial nerves 2-12 intact, motor strength intact       ASSESSMENT/PLAN:     yr old male with SOB/edema/PAD symptoms.  Patient very frustrated as nobody is helping him and symptoms are worseneng.  As per patient,pulmonary evaluation is normal and he is on O2 now.  Need extensive evaluation.  Need re-assessment of COCO.  Will check an echo  Will check a Rt heart cath as Pulmonary HTN is a reason for referrel and couldnot estimate with echo  Will check a BPM.  Also will check an MRA of legs for PAD as non invasive tests cannot be done due to gross edema.  If bardiac evaluation is negative, will plan for a CT Abdomen and Pelvis to R/O Lymphatic/Venous obstruction with H/O Bladder tumor.  For edema will add Metolozone 2.5mg prior to lasix for few days.  Per orders.   Return to Clinic PRN.

## 2017-07-28 ENCOUNTER — MYC MEDICAL ADVICE (OUTPATIENT)
Dept: FAMILY MEDICINE | Facility: CLINIC | Age: 67
End: 2017-07-28

## 2017-07-28 RX ORDER — ASPIRIN 81 MG/1
81 TABLET ORAL DAILY
Status: CANCELLED | OUTPATIENT
Start: 2017-07-28

## 2017-07-28 RX ORDER — SODIUM CHLORIDE 9 MG/ML
INJECTION, SOLUTION INTRAVENOUS CONTINUOUS
Status: CANCELLED | OUTPATIENT
Start: 2017-07-28

## 2017-07-28 RX ORDER — LIDOCAINE 40 MG/G
CREAM TOPICAL
Status: CANCELLED | OUTPATIENT
Start: 2017-07-28

## 2017-07-28 NOTE — PATIENT INSTRUCTIONS
1.  Dr. Hale has ordered an echo-ultrasound of the heart.  This is scheduled for tomorrow at 3:00 pm at [address] [facility].  Please check in at 2:45 pm 1st floor.  PLEASE BRING YOUR LAB RESULTS.     2. We have scheduled you for a RIGHT HEART CATHETERIZATION at the [facility] on Wednesday, August 2nd at 12:00 PM (please arrive to the [facility] waiting area for check in) with A  as you will have sedation.  Please see your Patient's Guide to Coronary Angiogram and Angioplasty for more details. If you should have any questions or concerns meantime, please contact us.        3.  MRA of the legs to determine blood flow. This is scheduled for Tuesday, August 1st, 2017 at 7:00am. Please arrive [facility] on Wednesday, August 2nd, 2017 at 9:30 AM (please arrive to the [facility] area for check in at 9:15am) with A .       4.  Take metolazone 1/2 tablet as needed for increased water weight.  You have been prescribed a 5 mg tablet of metolazone-this is a strong water diuretic and should only be taken as needed and to only take 1/2 tablet.                     [facility] - [place] Location     If you have any questions regarding your visit please contact your care team:      Cardiology                        Telephone Number   Rocio Jones,   Cardiology RN   Tamika Suarez MA [phone]     After hours: [phone]   For scheduling appts:    [phone] or  [phone]     After hours: [phone]   For the Device Clinic (Pacemakers and ICD's)  RN's :  Germaine Hickey During business hours: [phone]  After business hours:  [phone]- select option 4 and ask for job code [id number].      Normal test result notifications will be released via "[employer]" or mailed within 7 business days.  All other test results, will be communicated via telephone once reviewed by your cardiologist.     If you need a medication refill please  contact your pharmacy.  Please allow 3 business days for your refill to be completed.     As always, thank you for trusting us with your health care needs!

## 2017-07-28 NOTE — NURSING NOTE
Reviewed and updated medication list.    [facility], reviewed with pt and sent to abstracting for upload into chart.    Reviewed schedule for leg MRA and Right Heart Cath including sedation, prep, recovery, and check in. Pt verbalized understanding.    Main Lucio RN

## 2017-07-28 NOTE — TELEPHONE ENCOUNTER
Left detailed message for pt explaining that the echo, per Dr. Hale, is normal and what he expected. Pt to continue with plan of care to have MRA of legs and Right Heart Cath completed Wednesday. Pt to call clinic back if he has further questions.    Main Lucio RN

## 2017-08-02 ENCOUNTER — APPOINTMENT (OUTPATIENT)
Dept: MEDSURG UNIT | Facility: CLINIC | Age: 67
End: 2017-08-02
Attending: INTERNAL MEDICINE
Payer: MEDICARE

## 2017-08-02 ENCOUNTER — APPOINTMENT (OUTPATIENT)
Dept: CARDIOLOGY | Facility: CLINIC | Age: 67
End: 2017-08-02
Attending: INTERNAL MEDICINE
Payer: MEDICARE

## 2017-08-02 ENCOUNTER — HOSPITAL ENCOUNTER (OUTPATIENT)
Dept: MRI IMAGING | Facility: CLINIC | Age: 67
End: 2017-08-02
Attending: INTERNAL MEDICINE
Payer: MEDICARE

## 2017-08-02 ENCOUNTER — HOSPITAL ENCOUNTER (OUTPATIENT)
Facility: CLINIC | Age: 67
Discharge: HOME | End: 2017-08-02
Attending: INTERNAL MEDICINE | Admitting: INTERNAL MEDICINE
Payer: MEDICARE

## 2017-08-02 VITALS
WEIGHT: 305 LBS | HEIGHT: 71 IN | BODY MASS INDEX: 42.7 KG/M2 | HEART RATE: 82 BPM | TEMPERATURE: 97.8 F | DIASTOLIC BLOOD PRESSURE: 86 MMHG | RESPIRATION RATE: 20 BRPM | SYSTOLIC BLOOD PRESSURE: 164 MMHG | OXYGEN SATURATION: 96 %

## 2017-08-02 DIAGNOSIS — I27.20 PULMONARY HYPERTENSION (H): ICD-10-CM

## 2017-08-02 DIAGNOSIS — I73.9 PAD (PERIPHERAL ARTERY DISEASE) (H): ICD-10-CM

## 2017-08-02 DIAGNOSIS — I10 HYPERTENSION GOAL BP (BLOOD PRESSURE) < 140/90: Primary | ICD-10-CM

## 2017-08-02 DIAGNOSIS — I10 BENIGN ESSENTIAL HYPERTENSION: ICD-10-CM

## 2017-08-02 PROCEDURE — 27210982 ZZH KIT RT HC TOTES DISP CR7

## 2017-08-02 PROCEDURE — 93451 RIGHT HEART CATH: CPT | Mod: 26 | Performed by: INTERNAL MEDICINE

## 2017-08-02 PROCEDURE — 40000166 ZZH STATISTIC PP CARE STAGE 1

## 2017-08-02 PROCEDURE — 25000128 H RX IP 250 OP 636: Performed by: INTERNAL MEDICINE

## 2017-08-02 PROCEDURE — 93463 DRUG ADMIN & HEMODYNMIC MEAS: CPT

## 2017-08-02 PROCEDURE — 27210788 ZZH MANIFOLD CR3

## 2017-08-02 PROCEDURE — 74185 MRA ABD W OR W/O CNTRST: CPT

## 2017-08-02 PROCEDURE — 3E083KZ INTRODUCTION OF OTHER DIAGNOSTIC SUBSTANCE INTO HEART, PERCUTANEOUS APPROACH: ICD-10-PCS | Performed by: INTERNAL MEDICINE

## 2017-08-02 PROCEDURE — 4A023N6 MEASUREMENT OF CARDIAC SAMPLING AND PRESSURE, RIGHT HEART, PERCUTANEOUS APPROACH: ICD-10-PCS | Performed by: INTERNAL MEDICINE

## 2017-08-02 PROCEDURE — 25500064 ZZH RX 255 OP 636: Performed by: INTERNAL MEDICINE

## 2017-08-02 PROCEDURE — 27210806 ZZH SHEATH CR5

## 2017-08-02 PROCEDURE — 99153 MOD SED SAME PHYS/QHP EA: CPT

## 2017-08-02 PROCEDURE — 93451 RIGHT HEART CATH: CPT

## 2017-08-02 PROCEDURE — A9577 INJ MULTIHANCE: HCPCS | Performed by: INTERNAL MEDICINE

## 2017-08-02 PROCEDURE — 99152 MOD SED SAME PHYS/QHP 5/>YRS: CPT

## 2017-08-02 PROCEDURE — 27211181 ZZH BALLOON TIP PRESSURE CR5

## 2017-08-02 RX ORDER — PROMETHAZINE HYDROCHLORIDE 25 MG/ML
6.25-25 INJECTION, SOLUTION INTRAMUSCULAR; INTRAVENOUS EVERY 4 HOURS PRN
Status: DISCONTINUED | OUTPATIENT
Start: 2017-08-02 | End: 2017-08-02 | Stop reason: HOSPADM

## 2017-08-02 RX ORDER — VERAPAMIL HYDROCHLORIDE 2.5 MG/ML
1-5 INJECTION, SOLUTION INTRAVENOUS
Status: DISCONTINUED | OUTPATIENT
Start: 2017-08-02 | End: 2017-08-02 | Stop reason: HOSPADM

## 2017-08-02 RX ORDER — LORAZEPAM 2 MG/ML
.5-2 INJECTION INTRAMUSCULAR EVERY 4 HOURS PRN
Status: DISCONTINUED | OUTPATIENT
Start: 2017-08-02 | End: 2017-08-02 | Stop reason: HOSPADM

## 2017-08-02 RX ORDER — NIFEDIPINE 10 MG/1
10 CAPSULE ORAL
Status: DISCONTINUED | OUTPATIENT
Start: 2017-08-02 | End: 2017-08-02 | Stop reason: HOSPADM

## 2017-08-02 RX ORDER — MAGNESIUM HYDROXIDE 1200 MG/15ML
1000 LIQUID ORAL CONTINUOUS PRN
Status: DISCONTINUED | OUTPATIENT
Start: 2017-08-02 | End: 2017-08-02 | Stop reason: HOSPADM

## 2017-08-02 RX ORDER — POTASSIUM CHLORIDE 29.8 MG/ML
20 INJECTION INTRAVENOUS
Status: DISCONTINUED | OUTPATIENT
Start: 2017-08-02 | End: 2017-08-02 | Stop reason: HOSPADM

## 2017-08-02 RX ORDER — PRASUGREL 10 MG/1
10-60 TABLET, FILM COATED ORAL
Status: DISCONTINUED | OUTPATIENT
Start: 2017-08-02 | End: 2017-08-02 | Stop reason: HOSPADM

## 2017-08-02 RX ORDER — NITROGLYCERIN 20 MG/100ML
.07-1.45 INJECTION INTRAVENOUS CONTINUOUS PRN
Status: DISCONTINUED | OUTPATIENT
Start: 2017-08-02 | End: 2017-08-02 | Stop reason: HOSPADM

## 2017-08-02 RX ORDER — POTASSIUM CHLORIDE 7.45 MG/ML
10 INJECTION INTRAVENOUS
Status: DISCONTINUED | OUTPATIENT
Start: 2017-08-02 | End: 2017-08-02 | Stop reason: HOSPADM

## 2017-08-02 RX ORDER — CLOPIDOGREL BISULFATE 75 MG/1
75 TABLET ORAL
Status: DISCONTINUED | OUTPATIENT
Start: 2017-08-02 | End: 2017-08-02 | Stop reason: HOSPADM

## 2017-08-02 RX ORDER — ONDANSETRON 2 MG/ML
4 INJECTION INTRAMUSCULAR; INTRAVENOUS EVERY 4 HOURS PRN
Status: DISCONTINUED | OUTPATIENT
Start: 2017-08-02 | End: 2017-08-02 | Stop reason: HOSPADM

## 2017-08-02 RX ORDER — EPTIFIBATIDE 2 MG/ML
1 INJECTION, SOLUTION INTRAVENOUS CONTINUOUS PRN
Status: DISCONTINUED | OUTPATIENT
Start: 2017-08-02 | End: 2017-08-02 | Stop reason: HOSPADM

## 2017-08-02 RX ORDER — EPTIFIBATIDE 2 MG/ML
180 INJECTION, SOLUTION INTRAVENOUS EVERY 10 MIN PRN
Status: DISCONTINUED | OUTPATIENT
Start: 2017-08-02 | End: 2017-08-02 | Stop reason: HOSPADM

## 2017-08-02 RX ORDER — NALOXONE HYDROCHLORIDE 0.4 MG/ML
0.4 INJECTION, SOLUTION INTRAMUSCULAR; INTRAVENOUS; SUBCUTANEOUS EVERY 5 MIN PRN
Status: DISCONTINUED | OUTPATIENT
Start: 2017-08-02 | End: 2017-08-02 | Stop reason: HOSPADM

## 2017-08-02 RX ORDER — NICARDIPINE HYDROCHLORIDE 2.5 MG/ML
100 INJECTION INTRAVENOUS
Status: DISCONTINUED | OUTPATIENT
Start: 2017-08-02 | End: 2017-08-02 | Stop reason: HOSPADM

## 2017-08-02 RX ORDER — PHENYLEPHRINE HCL IN 0.9% NACL 1 MG/10 ML
20-100 SYRINGE (ML) INTRAVENOUS
Status: DISCONTINUED | OUTPATIENT
Start: 2017-08-02 | End: 2017-08-02 | Stop reason: HOSPADM

## 2017-08-02 RX ORDER — ATROPINE SULFATE 0.1 MG/ML
.5-1 INJECTION INTRAVENOUS
Status: DISCONTINUED | OUTPATIENT
Start: 2017-08-02 | End: 2017-08-02 | Stop reason: HOSPADM

## 2017-08-02 RX ORDER — NITROGLYCERIN 5 MG/ML
100-500 VIAL (ML) INTRAVENOUS
Status: DISCONTINUED | OUTPATIENT
Start: 2017-08-02 | End: 2017-08-02 | Stop reason: HOSPADM

## 2017-08-02 RX ORDER — DOBUTAMINE HYDROCHLORIDE 200 MG/100ML
2-20 INJECTION INTRAVENOUS CONTINUOUS PRN
Status: DISCONTINUED | OUTPATIENT
Start: 2017-08-02 | End: 2017-08-02 | Stop reason: HOSPADM

## 2017-08-02 RX ORDER — DEXTROSE MONOHYDRATE 25 G/50ML
12.5-5 INJECTION, SOLUTION INTRAVENOUS EVERY 30 MIN PRN
Status: DISCONTINUED | OUTPATIENT
Start: 2017-08-02 | End: 2017-08-02 | Stop reason: HOSPADM

## 2017-08-02 RX ORDER — LIDOCAINE HYDROCHLORIDE 10 MG/ML
30 INJECTION, SOLUTION EPIDURAL; INFILTRATION; INTRACAUDAL; PERINEURAL
Status: DISCONTINUED | OUTPATIENT
Start: 2017-08-02 | End: 2017-08-02 | Stop reason: HOSPADM

## 2017-08-02 RX ORDER — FENTANYL CITRATE 50 UG/ML
25-50 INJECTION, SOLUTION INTRAMUSCULAR; INTRAVENOUS
Status: DISCONTINUED | OUTPATIENT
Start: 2017-08-02 | End: 2017-08-02 | Stop reason: HOSPADM

## 2017-08-02 RX ORDER — LIDOCAINE 40 MG/G
CREAM TOPICAL
Status: DISCONTINUED | OUTPATIENT
Start: 2017-08-02 | End: 2017-08-02 | Stop reason: HOSPADM

## 2017-08-02 RX ORDER — PROTAMINE SULFATE 10 MG/ML
1-5 INJECTION, SOLUTION INTRAVENOUS
Status: DISCONTINUED | OUTPATIENT
Start: 2017-08-02 | End: 2017-08-02 | Stop reason: HOSPADM

## 2017-08-02 RX ORDER — HYDRALAZINE HYDROCHLORIDE 20 MG/ML
10-20 INJECTION INTRAMUSCULAR; INTRAVENOUS
Status: DISCONTINUED | OUTPATIENT
Start: 2017-08-02 | End: 2017-08-02 | Stop reason: HOSPADM

## 2017-08-02 RX ORDER — NITROGLYCERIN 5 MG/ML
100-200 VIAL (ML) INTRAVENOUS
Status: DISCONTINUED | OUTPATIENT
Start: 2017-08-02 | End: 2017-08-02 | Stop reason: HOSPADM

## 2017-08-02 RX ORDER — HEPARIN SODIUM 1000 [USP'U]/ML
1000-10000 INJECTION, SOLUTION INTRAVENOUS; SUBCUTANEOUS EVERY 5 MIN PRN
Status: DISCONTINUED | OUTPATIENT
Start: 2017-08-02 | End: 2017-08-02 | Stop reason: HOSPADM

## 2017-08-02 RX ORDER — SODIUM NITROPRUSSIDE 25 MG/ML
100-200 INJECTION INTRAVENOUS
Status: DISCONTINUED | OUTPATIENT
Start: 2017-08-02 | End: 2017-08-02 | Stop reason: HOSPADM

## 2017-08-02 RX ORDER — ADENOSINE 3 MG/ML
12-12000 INJECTION, SOLUTION INTRAVENOUS
Status: DISCONTINUED | OUTPATIENT
Start: 2017-08-02 | End: 2017-08-02 | Stop reason: HOSPADM

## 2017-08-02 RX ORDER — SODIUM CHLORIDE 9 MG/ML
INJECTION, SOLUTION INTRAVENOUS CONTINUOUS
Status: DISCONTINUED | OUTPATIENT
Start: 2017-08-02 | End: 2017-08-02 | Stop reason: HOSPADM

## 2017-08-02 RX ORDER — NITROGLYCERIN 0.4 MG/1
0.4 TABLET SUBLINGUAL EVERY 5 MIN PRN
Status: DISCONTINUED | OUTPATIENT
Start: 2017-08-02 | End: 2017-08-02 | Stop reason: HOSPADM

## 2017-08-02 RX ORDER — ARGATROBAN 1 MG/ML
150 INJECTION, SOLUTION INTRAVENOUS
Status: DISCONTINUED | OUTPATIENT
Start: 2017-08-02 | End: 2017-08-02 | Stop reason: HOSPADM

## 2017-08-02 RX ORDER — FLUMAZENIL 0.1 MG/ML
0.2 INJECTION, SOLUTION INTRAVENOUS
Status: DISCONTINUED | OUTPATIENT
Start: 2017-08-02 | End: 2017-08-02 | Stop reason: HOSPADM

## 2017-08-02 RX ORDER — METOPROLOL TARTRATE 1 MG/ML
5 INJECTION, SOLUTION INTRAVENOUS EVERY 5 MIN PRN
Status: DISCONTINUED | OUTPATIENT
Start: 2017-08-02 | End: 2017-08-02 | Stop reason: HOSPADM

## 2017-08-02 RX ORDER — DIPHENHYDRAMINE HYDROCHLORIDE 50 MG/ML
25-50 INJECTION INTRAMUSCULAR; INTRAVENOUS
Status: DISCONTINUED | OUTPATIENT
Start: 2017-08-02 | End: 2017-08-02 | Stop reason: HOSPADM

## 2017-08-02 RX ORDER — CLOPIDOGREL BISULFATE 75 MG/1
300-600 TABLET ORAL
Status: DISCONTINUED | OUTPATIENT
Start: 2017-08-02 | End: 2017-08-02 | Stop reason: HOSPADM

## 2017-08-02 RX ORDER — ASPIRIN 81 MG/1
81-324 TABLET, CHEWABLE ORAL
Status: DISCONTINUED | OUTPATIENT
Start: 2017-08-02 | End: 2017-08-02 | Stop reason: HOSPADM

## 2017-08-02 RX ORDER — DOPAMINE HYDROCHLORIDE 160 MG/100ML
2-20 INJECTION, SOLUTION INTRAVENOUS CONTINUOUS PRN
Status: DISCONTINUED | OUTPATIENT
Start: 2017-08-02 | End: 2017-08-02 | Stop reason: HOSPADM

## 2017-08-02 RX ORDER — FUROSEMIDE 10 MG/ML
20-100 INJECTION INTRAMUSCULAR; INTRAVENOUS
Status: DISCONTINUED | OUTPATIENT
Start: 2017-08-02 | End: 2017-08-02 | Stop reason: HOSPADM

## 2017-08-02 RX ORDER — PROTAMINE SULFATE 10 MG/ML
25-100 INJECTION, SOLUTION INTRAVENOUS EVERY 5 MIN PRN
Status: DISCONTINUED | OUTPATIENT
Start: 2017-08-02 | End: 2017-08-02 | Stop reason: HOSPADM

## 2017-08-02 RX ORDER — ARGATROBAN 1 MG/ML
350 INJECTION, SOLUTION INTRAVENOUS
Status: DISCONTINUED | OUTPATIENT
Start: 2017-08-02 | End: 2017-08-02 | Stop reason: HOSPADM

## 2017-08-02 RX ORDER — ENALAPRILAT 1.25 MG/ML
1.25-2.5 INJECTION INTRAVENOUS
Status: DISCONTINUED | OUTPATIENT
Start: 2017-08-02 | End: 2017-08-02 | Stop reason: HOSPADM

## 2017-08-02 RX ORDER — METHYLPREDNISOLONE SODIUM SUCCINATE 125 MG/2ML
125 INJECTION, POWDER, LYOPHILIZED, FOR SOLUTION INTRAMUSCULAR; INTRAVENOUS
Status: DISCONTINUED | OUTPATIENT
Start: 2017-08-02 | End: 2017-08-02 | Stop reason: HOSPADM

## 2017-08-02 RX ADMIN — SODIUM CHLORIDE: 9 INJECTION, SOLUTION INTRAVENOUS at 11:22

## 2017-08-02 RX ADMIN — GADOBENATE DIMEGLUMINE 15 ML: 529 INJECTION, SOLUTION INTRAVENOUS at 10:54

## 2017-08-02 RX ADMIN — MIDAZOLAM HYDROCHLORIDE 0.5 MG: 1 INJECTION, SOLUTION INTRAMUSCULAR; INTRAVENOUS at 13:04

## 2017-08-02 RX ADMIN — LIDOCAINE: 40 CREAM TOPICAL at 11:22

## 2017-08-02 NOTE — PROGRESS NOTES
1355  Returned to 2A from CCL per WC accompanied by staff.  S/P Right heart cath.  Site is FDI.  No hematoma noted.  Denies any pain.  1410  Drinking juice and waiting to speak to Dr. Mix.  Wife is here at bedside.  1445  Dr. Mix here to speak to patient.  1500  Discharge instructions reviewed with pt.  Verbally demonstrates understanding of instructions.  DC to care of wife per ambulation.  CTRN

## 2017-08-02 NOTE — DISCHARGE INSTRUCTIONS
[facility]                        Interventional Cardiology  Discharge Instructions   Post Right Heart Cath      AFTER YOU GO HOME:    DO drink plenty of fluids    DO resume your regular diet and medications unless otherwise instructed by your Primary Physician    Do Not scrub the procedure site vigorously    No lotion or powder to the puncture site for 3 days    CALL YOUR PRIMARY PHYSICIAN IF: You may resume all normal activity.  Monitor neck site for bleeding, swelling, or voice changes. If you notice bleeding or swelling immediately apply pressure to the site and call number below to speak with Cardiology Fellow.  If you experience any changes in your breathing you should call your doctor immediately or come to the closest Emergency Department.  Do not drive yourself.    ADDITIONAL INSTRUCTIONS: Medications: You are to resume all home medications including anticoagulation therapy unless otherwise advised by your primary cardiologist or nurse coordinator.    Follow Up: Per your primary cardiology team    If you have any questions or concerns regarding your procedure site please call [phone] at anytime and ask for Cardiology Fellow on call.  They are available 24 hours a day.  You may also contact the Cardiology Clinic after hours number at [phone].                                                       Telephone Numbers [phone] Monday-Friday 8:00 am to 4:30 pm    [phone] After 4:30 pm Monday-Friday, Weekends & Holidays  Ask for Interventional Cardiologist on call. Someone is on call 24 hours/day   [facility] toll free number [phone] Monday-Friday 8:00 am to 4:30 pm   [facility] Emergency Dept [phone]

## 2017-08-02 NOTE — IP AVS SNAPSHOT
[address] [address]                                       After Visit Summary   8/2/2017    Boogie Clark    MRN: [id number]           After Visit Summary Signature Page     I have received my discharge instructions, and my questions have been answered. I have discussed any challenges I see with this plan with the nurse or doctor.    ..........................................................................................................................................  Patient/Patient Representative Signature      ..........................................................................................................................................  Patient Representative Print Name and Relationship to Patient    ..................................................               ................................................  Date                                            Time    ..........................................................................................................................................  Reviewed by Signature/Title    ...................................................              ..............................................  Date                                                            Time

## 2017-08-02 NOTE — PROCEDURES
PRELIMINARY CARDIAC CATH REPORT:     PROCEDURES PERFORMED:   Right Heart Catheterization    PHYSICIANS:  Attending Physician: Dominguez Mix MD  Cardiology Fellow: Rowdy Hernández MD, PhD    INDICATION:  Boogie Clark is a 66 year old male with history of COPD, COCO, and chronic respiratory failure who presents for evaluation for possible pulmonary hypertension.    DESCRIPTION:  1. Consent obtained with discussion of risks.  All questions were answered.  2. Sterile prep and procedure.  3. Location with Sheaths:   Rt IJ  7 Fr 10 cm [short]  4. Access: Local anesthetic with lidocaine.  A micropuncture 21 guage needle with ultrasound guidance was used to establish vascular access using a modified Seldinger technique.  5. Diagnostic Catheters:   7 Fr  [place] Angel[place]  6. Guiding Catheters:  None  6. Estimated blood loss: < 5 ml    MEDICATIONS:  The procedure was performed under conscious sedation for 38 minutes from 1304 to 1342.  The patient was assessed immediately before the first sedation medication was administered.  Midazolam 0.5 mg and Fentanyl 0 mcg were administered.  Heart rate, BP, respiration, oxygen saturation and patient responses were monitored throughout the procedure with the assistance of the RN under my supervision.    Procedures:    HEMODYNAMICS:  RA 7  RV 52/8  PA 50/24 (32)   PCW 13   PA sat 68.7%   Taryn CO/CI: 5.7/2.3  TD CO/CI: 6.4/2.5  PVR: 3.0    Inhaled Nitric Oxide  PA: 40/20 (27)  PCWP: 17  CO/CI (TD): 5.9/2.4  PVR: 1.7      Sheath Removal:  The 7Fr sheath was manually removed in the cardiac catheterization laboratory.    Contrast: Isovue, 0 ml     Fluoroscopy Time: 1.2 min    COMPLICATIONS:  1. None    SUMMARY:   Mild pulmonary hypertension  Normal cardiac output  Normal filling pressures    PLAN:   >> Continued medical management and lifestyle modification for cardiovascular risk factor optimization.   >>. Discharge today per protocol    The attending interventional cardiologist was present and  supervised all critical aspects the procedure.    Findings discussed with Dr. Hale.    See CVIS report for final draft.    Rowdy Hernández MD, PhD  Cardiology Fellow      Dominguez Mix MD   Cardiology Staff

## 2017-08-03 ENCOUNTER — MYC MEDICAL ADVICE (OUTPATIENT)
Dept: INTERNAL MEDICINE | Facility: CLINIC | Age: 67
End: 2017-08-03

## 2017-08-04 NOTE — TELEPHONE ENCOUNTER
Reviewed MRA leg results and cath lab results with pt. Verbalized understanding but is frustrated that he doesn't know what is wrong with him because his legs are blue and states that his SOB and edema are the same - very little improvement noted, and only if he doesn't do much on them.     Also states [facility]. He had information downloaded regarding the pressures and settings he used and will fax the results to us to review.    Will discuss with Dr. Hale and get back to pt with next steps.    Main Lucio RN

## 2017-08-04 NOTE — TELEPHONE ENCOUNTER
"Reviewed results with Dr. Hale.    Per Dr. Hale, \"send him to sleep clinic. Had mild to moderate pulmonary HTN due to sleep apnea. MRA OK.   How is he doing with his edema and SOB.\"    Left message for pt to call clinic back to discuss.    Main Lucio RN    "

## 2017-08-04 NOTE — TELEPHONE ENCOUNTER
This was ordered by cardiology.  Please notify patient and ask cardiology to discuss results with him.

## 2017-08-09 ENCOUNTER — TELEPHONE (OUTPATIENT)
Dept: CARDIOLOGY | Facility: CLINIC | Age: 67
End: 2017-08-09

## 2017-08-09 NOTE — TELEPHONE ENCOUNTER
Right heart cath completed.  Mild Pulmonary Hypertension was found.  Per Dr. streeter this is not causing his symptoms.  Orders to follow by Dr. Streeter:    1.  See vascular clinic (even though MRA leg run off was normal)    2.  Start CPAP- see if any improvement over next 2 weeks.    3.  Take metolazone 2.5 mg daily for 2-3 days to see if any improvement.    4.  If the metolazone is not showing any improvement patient can increase metolazone to 5 mg daily and increase lasix to 80 mg daily for 3 days.    Will determine next step (CT Abdomen and Pelvis to R/O Lymphatic/Venous obstruction with H/O Bladder tumor) if symptoms continue.  Message left to discuss orders.      Rocio Jones, RN  Care Coordinator  [facility] [place] [facility]  [phone]

## 2017-08-10 NOTE — TELEPHONE ENCOUNTER
Reviewed information with pt. Verbalized understanding.    1. Pt scheduled to see Dr. Brooks for vascular issues in [place] on 8/14 at 2:30pm.     2. States he has been using CPAP religiously and has seen no improvements since [facility] today.    3. Will start the Metolazone 2.5mg/day for Friday, Saturday and Sunday. Writer will contact pt on Monday 8/14 to see how he is doing and if he notices any changes.    4. Pt aware of next plans to increase dose of Metolazone and Lasix - will decide on Monday 8/14/17.    Pt aware of plan to have CT scan if none of the above steps help. Pt verbalized understanding.    Will f/u with pt on Monday.    Main Lucio RN

## 2017-08-14 ENCOUNTER — OFFICE VISIT (OUTPATIENT)
Dept: SURGERY | Facility: CLINIC | Age: 67
End: 2017-08-14
Payer: COMMERCIAL

## 2017-08-14 VITALS
HEIGHT: 71 IN | WEIGHT: 305 LBS | DIASTOLIC BLOOD PRESSURE: 71 MMHG | OXYGEN SATURATION: 94 % | HEART RATE: 84 BPM | SYSTOLIC BLOOD PRESSURE: 110 MMHG | RESPIRATION RATE: 16 BRPM | BODY MASS INDEX: 42.7 KG/M2

## 2017-08-14 DIAGNOSIS — E66.2 MORBID OBESITY WITH ALVEOLAR HYPOVENTILATION (H): ICD-10-CM

## 2017-08-14 DIAGNOSIS — I89.0 LYMPHEDEMA OF BOTH LOWER EXTREMITIES: Primary | ICD-10-CM

## 2017-08-14 PROCEDURE — 99204 OFFICE O/P NEW MOD 45 MIN: CPT | Performed by: SURGERY

## 2017-08-14 NOTE — NURSING NOTE
"Chief Complaint   Patient presents with     Consult     referred by Dr. Hale       Initial /71  Pulse 84  Resp 16  Ht 1.803 m (5' 11\")  Wt (!) 138.3 kg (305 lb)  SpO2 94%  BMI 42.54 kg/m2 Estimated body mass index is 42.54 kg/(m^2) as calculated from the following:    Height as of this encounter: 1.803 m (5' 11\").    Weight as of this encounter: 138.3 kg (305 lb).  Medication Reconciliation: complete     Raghu Rubi, CMA      "

## 2017-08-14 NOTE — MR AVS SNAPSHOT
After Visit Summary   8/14/2017    Boogie Clark    MRN: [id number]           Patient Information     Date Of Birth          1950        Visit Information        Provider Department      8/14/2017 2:30 PM Gabriel Brooks MD [facility]        Today's Diagnoses     Lymphedema of both lower extremities    -  1    Morbid obesity with alveolar hypoventilation (H)           Follow-ups after your visit        Additional Services     BARIATRIC ADULT REFERRAL       Your provider has referred you to: [facility]: [facility] [place] [facility] Solomon W[place]ya [phone]   [email or web address]    Please be aware that coverage of these services is subject to the terms and limitations of your health insurance plan.  Call member services at your health plan with any benefit or coverage questions.      Please bring the following with you to your appointment:      (1) List of current medications   (2) This referral request   (3) Any documents/labs given to you for this referral                  Your next 10 appointments already scheduled     Sep 11, 2017  8:30 AM CDT   Return Visit with Hansel Casillas MD, [facility] CYSTO PROC ROOM   [facility] ([address] [id number] [phone]            Oct 24, 2017  7:30 AM CDT   Office Visit with PFT LAB   [facility])    [address] [place] [id number] [phone]           Bring a current list of meds and any records pertaining to this visit. For Physicals, please bring immunization records and any forms needing to be filled out. Please arrive 10 minutes early to complete paperwork.            Oct 24, 2017  9:00 AM CDT   Return Visit with June Higginbotham MD   [facility])    [address] [place] [address]  "[id number] [phone]              Who to contact     If you have questions or need follow up information about today's clinic visit or your schedule please contact [facility] directly at [phone].  Normal or non-critical lab and imaging results will be communicated to you by MyChart, letter or phone within 4 business days after the clinic has received the results. If you do not hear from us within 7 days, please contact the clinic through [employer] or phone. If you have a critical or abnormal lab result, we will notify you by phone as soon as possible.  Submit refill requests through [employer] or call your pharmacy and they will forward the refill request to us. Please allow 3 business days for your refill to be completed.          Additional Information About Your Visit        LEAFER Information     [employer] gives you secure access to your electronic health record. If you see a primary care provider, you can also send messages to your care team and make appointments. If you have questions, please call your primary care clinic.  If you do not have a primary care provider, please call [phone] and they will assist you.        Care EveryWhere ID     This is your Care EveryWhere ID. This could be used by other organizations to access your [place] medical records  [id number]        Your Vitals Were     Pulse Respirations Height Pulse Oximetry BMI (Body Mass Index)       84 16 5' 11\" (1.803 m) 94% 42.54 kg/m2        Blood Pressure from Last 3 Encounters:   08/14/17 110/71   08/02/17 164/86   07/26/17 111/69    Weight from Last 3 Encounters:   08/14/17 (!) 305 lb (138.3 kg)   08/02/17 (!) 305 lb (138.3 kg)   07/26/17 (!) 313 lb (142 kg)              We Performed the Following     BARIATRIC ADULT REFERRAL        Primary Care Provider Office Phone # Fax #    Arthur Landis PA-C [phone] [address]  [facility] [address]        Equal Access to Services     TILA CORONA " AH: Hadii vinod nickdarellerlinda Louie, waaxda luqadaha, qaybta kajohn acosta, doroteo abhijeetin hayaapranay collierharjeet chadwick parag knott. So [facility] [phone].    ATENCIÓN: Si habla jovani, tiene a landaverde disposición servicios gratuitos de asistencia lingüística. Llame al [phone].    We comply with applicable federal civil rights laws and [place] laws. We do not discriminate on the basis of race, color, national origin, age, disability sex, sexual orientation or gender identity.            Thank you!     Thank you for choosing [facility]  for your care. Our goal is always to provide you with excellent care. Hearing back from our patients is one way we can continue to improve our services. Please take a few minutes to complete the written survey that you may receive in the mail after your visit with us. Thank you!             Your Updated Medication List - Protect others around you: Learn how to safely use, store and throw away your medicines at www.disposemymeds.org.          This list is accurate as of: 8/14/17  3:40 PM.  Always use your most recent med list.                   Brand Name Dispense Instructions for use Diagnosis    * albuterol 108 (90 BASE) MCG/ACT Inhaler    VENTOLIN HFA    1 Inhaler    Inhale 2 puffs into the lungs every 6 hours    Chronic obstructive pulmonary disease, unspecified COPD type (H)       * albuterol (2.5 MG/3ML) 0.083% neb solution     30 vial    Take 1 vial (2.5 mg) by nebulization daily as needed for shortness of breath / dyspnea or wheezing    Chronic obstructive pulmonary disease, unspecified COPD type (H)       aspirin 81 MG tablet      Take  by mouth daily.    Peripheral vascular disease, unspecified (H)       ATORVASTATIN CALCIUM PO      Take 40 mg by mouth daily        diltiazem 120 MG 24 hr ER beaded capsule    TIAZAC    90 capsule    Take 1 capsule (120 mg) by mouth daily    Hypertension goal BP (blood pressure) < 140/90       fluticasone 50 MCG/ACT spray    FLONASE    1 g     Spray 1-2 sprays into both nostrils daily    Nasal congestion       FUROSEMIDE PO      Take 40 mg by mouth daily        GINKOBA 40 MG Tabs   Generic drug:  Ginkgo Biloba      Take 2 tablets by mouth daily.        IBUPROFEN PO      Take 600 mg by mouth        loratadine 10 MG tablet    CLARITIN     Take 10 mg by mouth daily        lovastatin 40 MG tablet    MEVACOR    90 tablet    Take 1 tablet (40 mg) by mouth At Bedtime    Hypertension goal BP (blood pressure) < 140/90       metolazone 5 MG tablet    ZAROXOLYN    10 tablet    Take 1/2 tablet as needed    Pulmonary hypertension (H)       METOPROLOL SUCCINATE ER PO      Take 50 mg by mouth daily        montelukast 10 MG tablet    SINGULAIR     Take 10 mg by mouth At Bedtime        Multi-vitamin Tabs tablet   Generic drug:  multivitamin, therapeutic with minerals      Take  by mouth daily.    Hypertension goal BP (blood pressure) < 140/90       * Notice:  This list has 2 medication(s) that are the same as other medications prescribed for you. Read the directions carefully, and ask your doctor or other care provider to review them with you.

## 2017-08-14 NOTE — LETTER
[facility] [address]  [place] [address]  Phone: [phone]  Fax: [phone]      2017       CONSULTATION       Re:  Boogie Clark,  1950       PRESENTING COMPLAINT:  Lower extremity edema.       HISTORY OF PRESENTING ILLNESS:  Mr. Clark is a 66-year-old male whom we have been asked to see for evaluation of bilateral lower extremity edema.  He has undergone an extensive workup for the same.  He tells me that this has been worsening over the last 1 year.  It is quite debilitating to him as he likes to be active.  Soon after waking up and starting to ambulate he starts accumulating fluid in his feet, ankles and then in his legs, which makes his legs heavy and difficult for him to ambulate.  He describes some hip area pain when he is starting to get up, but the major complaint he has is that of lower extremity edema.  He has been placed on increasing doses of diuretics by his cardiologist as well [facility]  However, the relief is limited.  He also complains of significant shortness of breath.  His a recent workup included right heart catheterization, echocardiography, blood work, as well as an MRA of his abdomen and pelvis with lower extremity runoff.       PAST MEDICAL HISTORY:   1.  Obstructive sleep apnea.   2.  Hypertension.   3.  Hyperlipidemia.   4.  History of bladder cancer.       PAST SURGICAL HISTORY:   1.  Vasectomy.   2.  Bilateral iliac artery angioplasty.   3.  Cystoscopy with transurethral resection of bladder tumor.       SOCIAL HISTORY:  He does not drink.  He quit smoking in 10/2016.  He occasionally consumes alcohol.       FAMILY HISTORY:  His mother  of breast cancer and his father had a brain aneurysm.  He used to be a , a job he recently retired from.       REVIEW OF SYSTEMS:  Positive for shortness of breath and dizziness on exertion, positive for morbid obesity, positive for  arthritis around his hip joint.  Positive for lower extremity edema.  Positive for deconditioning.  Positive for easy bruisability.  Positive for stress and anxiety.  Otherwise, review of systems is negative.       PHYSICAL EXAMINATION:   GENERAL:  He appears comfortable.  He is in no acute distress.         VITAL SIGNS:  Reviewed.   HEENT:  Head is atraumatic and normocephalic.  Mucosa are pink.   EYES:  Extraocular motions are intact.  Sclerae are anicteric.   MENTAL:  Alert and oriented.  Judgment and insight are good.   LYMPHATIC:  No supraclavicular or cervical adenopathy noted.   CARDIOVASCULAR:  Regular rate and rhythm.  S1 plus S2+0.   ABDOMEN:  Soft, obese, nontender, no hepatosplenomegaly noted.   EXTREMITIES:  Significant lymphedema starting from the toes up to his mid thighs.   VASCULAR:  No carotid bruits.  3+ bilateral radial pulses.  Dorsalis pedis and posterior tibial are triphasic on the right and triphasic on the left.  I could not palpate them due to the significant edema in his lower extremities, left worse than the right.       IMAGING DATA:  I reviewed the MRA that was recently performed which shows there is no radiographic evidence of any significant peripheral arterial disease.  Also, there is no evidence of visceral or aortoiliac disease and there is no evidence of pelvic or abdominal adenopathy.  His echo showed that his left ventricular ejection fraction is 65%.  There is mild pulmonary hypertension and his right heart catheterization revealed mild pulmonary hypertension, normal left and right-sided filling pressures and cardiac output.       DIAGNOSIS:  Bilateral lower extremity lymphedema.       PLAN:  I explained to him that he has bilateral lower extremity lymphedema and there is fortunately no cardiac-, renal- or liver-related cause from his workup.  I will start him on lymphedema therapy and I will order a lymphedema pump for him.  His long-term goal is that of weight loss.  He has  multiple complications of morbid obesity.  He has tried multiple avenues including Weight Watchers and self-directed diets to lose weight.  His initial success is met with long-term failure.  That is not only affecting his psyche but also complicating his medical problems including lymphedema, obstructive sleep apnea, as well as arthritis and hypertension. I would certainly like to have him evaluated for bariatric surgery and referral will be made for the same.  We will also be referring him to a lymphedema therapist.               MD MARK Black/[facility]       Dictation #[id number]  D: 08/14/2017  T: 08/15/2017

## 2017-08-15 DIAGNOSIS — J98.4 RESTRICTIVE LUNG DISEASE: Primary | ICD-10-CM

## 2017-08-15 DIAGNOSIS — R06.09 DOE (DYSPNEA ON EXERTION): ICD-10-CM

## 2017-08-16 NOTE — PROGRESS NOTES
2017      CONSULTATION      Re:  Boogie Clark,  1950      PRESENTING COMPLAINT:  Lower extremity edema.      HISTORY OF PRESENTING ILLNESS:  Mr. Clark is a 66-year-old male whom we have been asked to see for evaluation of bilateral lower extremity edema.  He has undergone an extensive workup for the same.  He tells me that this has been worsening over the last 1 year.  It is quite debilitating to him as he likes to be active.  Soon after waking up and starting to ambulate he starts accumulating fluid in his feet, ankles and then in his legs, which makes his legs heavy and difficult for him to ambulate.  He describes some hip area pain when he is starting to get up, but the major complaint he has is that of lower extremity edema.  He has been placed on increasing doses of diuretics by his cardiologist as well [facility].  However, the relief is limited.  He also complains of significant shortness of breath.  His a recent workup included right heart catheterization, echocardiography, blood work, as well as an MRA of his abdomen and pelvis with lower extremity runoff.      PAST MEDICAL HISTORY:   1.  Obstructive sleep apnea.   2.  Hypertension.   3.  Hyperlipidemia.   4.  History of bladder cancer.      PAST SURGICAL HISTORY:   1.  Vasectomy.   2.  Bilateral iliac artery angioplasty.   3.  Cystoscopy with transurethral resection of bladder tumor.      SOCIAL HISTORY:  He does not drink.  He quit smoking in 10/2016.  He occasionally consumes alcohol.      FAMILY HISTORY:  His mother  of breast cancer and his father had a brain aneurysm.  He used to be a , a job he recently retired from.      REVIEW OF SYSTEMS:  Positive for shortness of breath and dizziness on exertion, positive for morbid obesity, positive for arthritis around his hip joint.  Positive for lower extremity edema.  Positive for deconditioning.  Positive for easy bruisability.  Positive for  stress and anxiety.  Otherwise, review of systems is negative.      PHYSICAL EXAMINATION:   GENERAL:  He appears comfortable.  He is in no acute distress.   Re:  Boogie Davison, page 2      VITAL SIGNS:  Reviewed.   HEENT:  Head is atraumatic and normocephalic.  Mucosa are pink.   EYES:  Extraocular motions are intact.  Sclerae are anicteric.   MENTAL:  Alert and oriented.  Judgment and insight are good.   LYMPHATIC:  No supraclavicular or cervical adenopathy noted.   CARDIOVASCULAR:  Regular rate and rhythm.  S1 plus S2+0.   ABDOMEN:  Soft, obese, nontender, no hepatosplenomegaly noted.   EXTREMITIES:  Significant lymphedema starting from the toes up to his mid thighs.   VASCULAR:  No carotid bruits.  3+ bilateral radial pulses.  Dorsalis pedis and posterior tibial are triphasic on the right and triphasic on the left.  I could not palpate them due to the significant edema in his lower extremities, left worse than the right.      IMAGING DATA:  I reviewed the MRA that was recently performed which shows there is no radiographic evidence of any significant peripheral arterial disease.  Also, there is no evidence of visceral or aortoiliac disease and there is no evidence of pelvic or abdominal adenopathy.  His echo showed that his left ventricular ejection fraction is 65%.  There is mild pulmonary hypertension and his right heart catheterization revealed mild pulmonary hypertension, normal left and right-sided filling pressures and cardiac output.      DIAGNOSIS:  Bilateral lower extremity lymphedema.      PLAN:  I explained to him that he has bilateral lower extremity lymphedema and there is fortunately no cardiac-, renal- or liver-related cause from his workup.  I will start him on lymphedema therapy and I will order a lymphedema pump for him.  His long-term goal is that of weight loss.  He has multiple complications of morbid obesity.  He has tried multiple avenues including Weight Watchers and self-directed diets to  lose weight.  His initial success is met with long-term failure.  That is not only affecting his psyche but also complicating his medical problems including lymphedema, obstructive sleep apnea, as well as arthritis and hypertension.  I would certainly like to have him evaluated for bariatric surgery and referral will be made for the same.  We will also be referring him to a lymphedema therapist.            MD MARK Black/[facility]an      Dictation #[id number]  D: 2017  T: 08/15/2017         RAINA SOOD MD             D: 2017 15:35   T: 08/15/2017 19:04   MT: suman      Name:     CLARITZA PAL   MRN:      [id number]        Account:      [id number]   :      1950           Service Date: 2017      Document: [id number]

## 2017-08-17 NOTE — TELEPHONE ENCOUNTER
"Spoke to pt. States that he had a good visit with the vascular doctor and is feeling much better. States that he is going to a weight loss clinic and has lymphedema so will be doing cup therapy to get all the fluid moved around. Pt is waiting to hear back from the vascular specialist on all the scheduling of these appointments.     Pt is still taking the lasix as prescribed but is wondering when he can stop it as he is frustrated \"because he always has to go to the bathroom\". Will discuss this with Dr. Hale and get back to pt. Pt verbalized understanding.    Main Lucio RN    "

## 2017-08-19 ENCOUNTER — MYC MEDICAL ADVICE (OUTPATIENT)
Dept: PULMONOLOGY | Facility: CLINIC | Age: 67
End: 2017-08-19

## 2017-08-21 NOTE — TELEPHONE ENCOUNTER
Message  Received: 6 days ago       June Higginbotham MD  P Mg  Pulm Pt Care                   Please coordinate high resolution CT chest with next appointment thanks   TC

## 2017-08-21 NOTE — TELEPHONE ENCOUNTER
Upon review of chart, last Chest CT done 6/29/17. Dr. Higginbotham recommended CT chest with follow up appt in October (per message below). [employer] message sent to pt with this information.    Nivia CUNNINGHAM RN, BSN  Pulmonary Care Coordinator

## 2017-08-22 NOTE — TELEPHONE ENCOUNTER
Unable to determine purpose of follow up CT from review of chart. Dr. Higginbotham unavailable this week, but message sent to clarify for pt purpose of follow up CT. MyChart response sent to pt advising him of this and expected delay due to Dr. Higginbotham being out.    Nivia CUNNINGHAM RN, BSN  Pulmonary Care Coordinator

## 2017-08-23 NOTE — TELEPHONE ENCOUNTER
Per Dr. Hale, it would be preferred that patient continues lasix at this time.  Left message for patient to call clinic.  Roico Jones RN

## 2017-08-24 NOTE — TELEPHONE ENCOUNTER
Spoke to Patient, he will comply with Dr. RAKEL Hale recommendation and continue taking the lasix as directed.    Patient also requested TATO sheets be mailed to him so that his records may be [facility]. TATO's mailed to Patient today.  Patient also states he has moved and gave new address: [address] [place] [address], [id number].  New address updated in demographics.    Brandi Smith CMA.

## 2017-09-06 ENCOUNTER — MEDICAL CORRESPONDENCE (OUTPATIENT)
Dept: HEALTH INFORMATION MANAGEMENT | Facility: CLINIC | Age: 67
End: 2017-09-06

## 2017-09-07 NOTE — PROGRESS NOTES
Due to his significant pain he will not be able to tolerate the basic pump for his significant and debilitating lower extremity lymphedema. He will need a specialty pump.

## 2017-09-11 ENCOUNTER — OFFICE VISIT (OUTPATIENT)
Dept: UROLOGY | Facility: CLINIC | Age: 67
End: 2017-09-11
Payer: COMMERCIAL

## 2017-09-11 VITALS — SYSTOLIC BLOOD PRESSURE: 138 MMHG | DIASTOLIC BLOOD PRESSURE: 76 MMHG | OXYGEN SATURATION: 91 % | HEART RATE: 74 BPM

## 2017-09-11 DIAGNOSIS — Z85.51 PERSONAL HISTORY OF BLADDER CANCER: Primary | ICD-10-CM

## 2017-09-11 PROCEDURE — 99207 ZZC DROP WITH A PROCEDURE: CPT | Mod: 25 | Performed by: UROLOGY

## 2017-09-11 PROCEDURE — 52000 CYSTOURETHROSCOPY: CPT | Performed by: UROLOGY

## 2017-09-11 NOTE — PROGRESS NOTES
S: Boogie Clark is a 66 year old male returns for bladder cancer surveillance.    he has history of bladder cancer Grade 3 non-invasive, Stage ta, s/p turbt on 5/ 13.     He received 2 courses of BCG therapy.    Patient is draped and prepped.  Flexible cystoscopy placed under direct vision.    Cysto:  The anterior urethra is normal   The prostatic urethra showed bilateral lobe enlargement.     The length is 2cm,  the coaptation is 2 cm.     In the bladder there is no tumor/stone    Assessment/Plan:  V10.51 Personal history of malignant neoplasm of bladder  (primary encounter diagnosis)  Comment: no evidence of recurrence  Plan: BTR in  12 months

## 2017-09-11 NOTE — MR AVS SNAPSHOT
After Visit Summary   9/11/2017    Boogie Clark    MRN: [id number]           Patient Information     Date Of Birth          1950        Visit Information        Provider Department      9/11/2017 8:30 AM Hansel Casillas MD; [facility]\"" CYSTO PROC ROOM [facility]        Today's Diagnoses     Personal history of bladder cancer    -  1      Care Instructions    Your next cystoscopy is scheduled 9/10/2018 @ 8:30am. Please call  if you need to reschedule this appointment.            Follow-ups after your visit        Your next 10 appointments already scheduled     Oct 24, 2017  7:30 AM CDT   Office Visit with PFT LAB   [facility] ([facility])    [address] [place] [id number] [phone]           Bring a current list of meds and any records pertaining to this visit. For Physicals, please bring immunization records and any forms needing to be filled out. Please arrive 10 minutes early to complete paperwork.            Oct 24, 2017  9:00 AM CDT   Return Visit with Juen Higginbotham MD   [facility])    [address] [place] [id number] [phone]            Sep 10, 2018  8:30 AM CDT   Return Visit with Hansel Casillas MD, [facility]\"" CYSTO PROC ROOM   [facility] ([address] [id number] [phone]              Who to contact     If you have questions or need follow up information about today's clinic visit or your schedule please contact [facility] at [phone].  Normal or non-critical lab and imaging results will be communicated to you by MyChart, letter or phone within 4 business days after the clinic has received the results. If you do not hear from us within 7 days, please contact the clinic through MyChart or phone. If you have a critical or abnormal lab  result, we will notify you by phone as soon as possible.  Submit refill requests through [employer] or call your pharmacy and they will forward the refill request to us. Please allow 3 business days for your refill to be completed.          Additional Information About Your Visit        Universal [employer]art Information     [employer] gives you secure access to your electronic health record. If you see a primary care provider, you can also send messages to your care team and make appointments. If you have questions, please call your primary care clinic.  If you do not have a primary care provider, please call [phone] and they will assist you.        Care EveryWhere ID     This is your Care EveryWhere ID. This could be used by other organizations to access your [facility] medical records  [id number]        Your Vitals Were     Pulse Pulse Oximetry                74 91%           Blood Pressure from Last 3 Encounters:   09/11/17 138/76   08/14/17 110/71   08/02/17 164/86    Weight from Last 3 Encounters:   08/14/17 (!) 138.3 kg (305 lb)   08/02/17 (!) 138.3 kg (305 lb)   07/26/17 (!) 142 kg (313 lb)              We Performed the Following     CYSTOURETHROSCOPY        Primary Care Provider Office Phone # Fax #    Arthur Landis PA-C [phone] [address]  [facility] [address]        Equal Access to Services     TILA CORONA : Hadii aad ku hadasho Soomaali, waaxda luqadaha, qaybta kaalmada adeegyada, doroteo tuttle . So [facility] [phone].    ATENCIÓN: Si habla español, tiene a landaverde disposición servicios gratuitos de asistencia lingüística. Ct al [phone].    We comply with applicable federal civil rights laws and [place] laws. We do not discriminate on the basis of race, color, national origin, age, disability sex, sexual orientation or gender identity.            Thank you!     Thank you for choosing [facility]  for your care. Our goal is always to provide  you with excellent care. Hearing back from our patients is one way we can continue to improve our services. Please take a few minutes to complete the written survey that you may receive in the mail after your visit with us. Thank you!             Your Updated Medication List - Protect others around you: Learn how to safely use, store and throw away your medicines at www.disposemymeds.org.          This list is accurate as of: 9/11/17  8:41 AM.  Always use your most recent med list.                   Brand Name Dispense Instructions for use Diagnosis    * albuterol 108 (90 BASE) MCG/ACT Inhaler    VENTOLIN HFA    1 Inhaler    Inhale 2 puffs into the lungs every 6 hours    Chronic obstructive pulmonary disease, unspecified COPD type (H)       * albuterol (2.5 MG/3ML) 0.083% neb solution     30 vial    Take 1 vial (2.5 mg) by nebulization daily as needed for shortness of breath / dyspnea or wheezing    Chronic obstructive pulmonary disease, unspecified COPD type (H)       aspirin 81 MG tablet      Take  by mouth daily.    Peripheral vascular disease, unspecified (H)       ATORVASTATIN CALCIUM PO      Take 40 mg by mouth daily        diltiazem 120 MG 24 hr ER beaded capsule    TIAZAC    90 capsule    Take 1 capsule (120 mg) by mouth daily    Hypertension goal BP (blood pressure) < 140/90       fluticasone 50 MCG/ACT spray    FLONASE    1 g    Spray 1-2 sprays into both nostrils daily    Nasal congestion       FUROSEMIDE PO      Take 40 mg by mouth daily        GINKOBA 40 MG Tabs   Generic drug:  Ginkgo Biloba      Take 2 tablets by mouth daily.        IBUPROFEN PO      Take 600 mg by mouth        loratadine 10 MG tablet    CLARITIN     Take 10 mg by mouth daily        lovastatin 40 MG tablet    MEVACOR    90 tablet    Take 1 tablet (40 mg) by mouth At Bedtime    Hypertension goal BP (blood pressure) < 140/90       metolazone 5 MG tablet    ZAROXOLYN    10 tablet    Take 1/2 tablet as needed    Pulmonary hypertension (H)        METOPROLOL SUCCINATE ER PO      Take 50 mg by mouth daily        montelukast 10 MG tablet    SINGULAIR     Take 10 mg by mouth At Bedtime        Multi-vitamin Tabs tablet   Generic drug:  multivitamin, therapeutic with minerals      Take  by mouth daily.    Hypertension goal BP (blood pressure) < 140/90       * Notice:  This list has 2 medication(s) that are the same as other medications prescribed for you. Read the directions carefully, and ask your doctor or other care provider to review them with you.

## 2017-09-11 NOTE — NURSING NOTE
"Chief Complaint   Patient presents with     Cystoscopy       Initial /76 (BP Location: Right arm, Patient Position: Chair, Cuff Size: Adult Large)  Pulse 74  SpO2 91% Estimated body mass index is 42.54 kg/(m^2) as calculated from the following:    Height as of 8/14/17: 1.803 m (5' 11\").    Weight as of 8/14/17: 138.3 kg (305 lb).  Medication Reconciliation: complete   Rosalva Juárez CMA      "

## 2017-09-11 NOTE — PATIENT INSTRUCTIONS
Your next cystoscopy is scheduled 9/10/2018 @ 8:30am. Please call  if you need to reschedule this appointment.

## 2017-09-13 ENCOUNTER — TELEPHONE (OUTPATIENT)
Dept: OTHER | Facility: CLINIC | Age: 67
End: 2017-09-13

## 2017-09-13 NOTE — TELEPHONE ENCOUNTER
"Per my chart message 9-13-17, I called pt to discuss pt concerns re lymphedema therapy, bariatric referral and shortness of breath.     Pt notes his bilat Lower extremity swelling has increased in the last 24 hours and increased shortness of breath, pt is taking Furosemide as prescribed by his cardiologist. I explained he should go to urgent care or ER. Pt notes denial stating \"what are they going to do for me\", pt also notes his PCP and cardiologist \"they aren't doing anything for me\". I explained reasoning to go to urgent care/ER, pt notes non agreement.     I explained I will f/u with lymphedema therapist and bariatric staff to obtain status of referrals. Pt notes understanding.     Leatha Guerrero, RN, BSN.     "

## 2017-09-13 NOTE — TELEPHONE ENCOUNTER
Coordinator from weight loss clinic/bariatric sx called noting pt will be called to discuss program.     I called pt and explained both lymphedema rep and weight loss coordinator will be calling him today or tomorrow. Pt notes understanding. Pt notes he will be out of town next week.     Leatha Guerrero, RN, BSN

## 2017-09-13 NOTE — TELEPHONE ENCOUNTER
I called out to bariatric weight loss referral coordinator, left message.     I discussed lymphedema therapy with [employer] medical rep Lina who notes pt was seen and was told it would take a few weeks for pump to arrive. Lina will call out to pt today and f/u with him.     Leatha Guerrero, RN, BSN.

## 2017-09-26 ENCOUNTER — TELEPHONE (OUTPATIENT)
Dept: PULMONOLOGY | Facility: CLINIC | Age: 67
End: 2017-09-26

## 2017-09-26 NOTE — TELEPHONE ENCOUNTER
I left a message for patient to call back and reschedule  Long (90) pft and Dr Higginbotham appointment    Unfortunately, Dr Higginbotham has to take an unexpected Leave of Absence. And will be returning December 2017 /January 2018.  If you could please call [phone] to reschedule your appointment that would be greatly appreciated.    Thanks  Ariana PETERS CMA  [facility]  Pulmonology  Phone [phone]  Fax [phone]

## 2017-09-29 ENCOUNTER — TELEPHONE (OUTPATIENT)
Dept: OTHER | Facility: CLINIC | Age: 67
End: 2017-09-29

## 2017-09-29 NOTE — TELEPHONE ENCOUNTER
"Pt stated that he was told that he cannot have a gastric bypass surgery unless he fixes his teeth. He has 4 teeth missing and he doesn't want to fix them because he doesn't have dental insurance. Pt wanted to know what else can he do.      Dr. Brooks notes on 8/14/2017 \"IMAGING DATA:  I reviewed the MRA that was recently performed which shows there is no radiographic evidence of any significant peripheral arterial disease.  Also, there is no evidence of visceral or aortoiliac disease and there is no evidence of pelvic or abdominal adenopathy.\" He referred him to lymphedema therapist.     Pt was told to contact his PCP to find out other ways of losing weight if he doesn't wish to have a gastric bypass. Pt stated understanding.     Marya Donnelly RN, BSN.    "

## 2017-10-03 ENCOUNTER — OFFICE VISIT (OUTPATIENT)
Dept: NURSING | Facility: CLINIC | Age: 67
End: 2017-10-03
Payer: COMMERCIAL

## 2017-10-03 DIAGNOSIS — J98.4 RESTRICTIVE LUNG DISEASE: ICD-10-CM

## 2017-10-03 DIAGNOSIS — I27.20 PULMONARY HYPERTENSION (H): ICD-10-CM

## 2017-10-03 LAB
6 MIN WALK (FT): NORMAL FT
6 MIN WALK (M): NORMAL M

## 2017-10-03 PROCEDURE — 94375 RESPIRATORY FLOW VOLUME LOOP: CPT | Performed by: INTERNAL MEDICINE

## 2017-10-03 PROCEDURE — 94620 HC PULMONARY STRESS TEST, SIMPLE: CPT | Performed by: INTERNAL MEDICINE

## 2017-10-03 PROCEDURE — 94729 DIFFUSING CAPACITY: CPT | Performed by: INTERNAL MEDICINE

## 2017-10-03 PROCEDURE — 99207 ZZC DROP WITH A PROCEDURE: CPT | Performed by: INTERNAL MEDICINE

## 2017-10-03 NOTE — MR AVS SNAPSHOT
After Visit Summary   10/3/2017    Boogie Clark    MRN: [id number]           Patient Information     Date Of Birth          1950        Visit Information        Provider Department      10/3/2017 7:30 AM PFT LAB [facility]        Today's Diagnoses     Restrictive lung disease        Pulmonary hypertension           Follow-ups after your visit        Your next 10 appointments already scheduled     Sep 10, 2018  8:30 AM CDT   Return Visit with Hansel Casillas MD, [facility] CYSTO PROC ROOM   [facility] ([address] [phone]              Who to contact     If you have questions or need follow up information about today's clinic visit or your schedule please contact [facility] directly at [phone].  Normal or non-critical lab and imaging results will be communicated to you by Geosophichart, letter or phone within 4 business days after the clinic has received the results. If you do not hear from us within 7 days, please contact the clinic through Geosophichart or phone. If you have a critical or abnormal lab result, we will notify you by phone as soon as possible.  Submit refill requests through [email or web address] or call your pharmacy and they will forward the refill request to us. Please allow 3 business days for your refill to be completed.          Additional Information About Your Visit        Geosophichar[employer]te Information     [email or web address] gives you secure access to your electronic health record. If you see a primary care provider, you can also send messages to your care team and make appointments. If you have questions, please call your primary care clinic.  If you do not have a primary care provider, please call [phone] and they will assist you.      [email or web address] is an electronic gateway that provides easy, online access to your medical records. With [email or web address], you can request a clinic appointment, read your test  results, renew a prescription or communicate with your care team.     To access your existing account, please contact your [facility] or call [phone] for assistance.        Care EveryWhere ID     This is your Care EveryWhere ID. This could be used by other organizations to access your [facility] medical records  [id number]         Blood Pressure from Last 3 Encounters:   09/11/17 138/76   08/14/17 110/71   08/02/17 164/86    Weight from Last 3 Encounters:   08/14/17 (!) 138.3 kg (305 lb)   08/02/17 (!) 138.3 kg (305 lb)   07/26/17 (!) 142 kg (313 lb)              We Performed the Following     6 minute walk test     General PFT Lab (Please always keep checked)     Outpatient Coronary Angiography Adult Order     Pulmonary Function Test        Primary Care Provider Office Phone # Fax #    Arthur Landis PA-C [phone] [address] [facility] [address]        Equal Access to Services     TILA CORONA : Hadii aad ku hadasho Soomaali, waaxda luqadaha, qaybta kaalmada adeegyada, waxay idiin hayaan adeeg khararoma tuttle . So [facility] [phone].    ATENCIÓN: Si habla español, tiene a landaverde disposición servicios gratuitos de asistencia lingüística. Llame al [phone].    We comply with applicable federal civil rights laws and [place] laws. We do not discriminate on the basis of race, color, national origin, age, disability, sex, sexual orientation, or gender identity.            Thank you!     Thank you [facility]  [facility] your care. Our goal is always to provide you with excellent care. Hearing back from our patients is one way we can continue to improve our services. Please take a few minutes to complete the written survey that you may receive in the mail after your visit with us. Thank you!             Your Updated Medication List - Protect others around you: Learn how to safely use, store and throw away your medicines at  www.disposemymeds.org.          This list is accurate as of: 10/3/17 10:17 AM.  Always use your most recent med list.                   Brand Name Dispense Instructions for use Diagnosis    * albuterol 108 (90 BASE) MCG/ACT Inhaler    VENTOLIN HFA    1 Inhaler    Inhale 2 puffs into the lungs every 6 hours    Chronic obstructive pulmonary disease, unspecified COPD type (H)       * albuterol (2.5 MG/3ML) 0.083% neb solution     30 vial    Take 1 vial (2.5 mg) by nebulization daily as needed for shortness of breath / dyspnea or wheezing    Chronic obstructive pulmonary disease, unspecified COPD type (H)       aspirin 81 MG tablet      Take  by mouth daily.    Peripheral vascular disease, unspecified       ATORVASTATIN CALCIUM PO      Take 40 mg by mouth daily        diltiazem 120 MG 24 hr ER beaded capsule    TIAZAC    90 capsule    Take 1 capsule (120 mg) by mouth daily    Hypertension goal BP (blood pressure) < 140/90       fluticasone 50 MCG/ACT spray    FLONASE    1 g    Spray 1-2 sprays into both nostrils daily    Nasal congestion       FUROSEMIDE PO      Take 40 mg by mouth daily        GINKOBA 40 MG Tabs   Generic drug:  Ginkgo Biloba      Take 2 tablets by mouth daily.        IBUPROFEN PO      Take 600 mg by mouth        loratadine 10 MG tablet    CLARITIN     Take 10 mg by mouth daily        lovastatin 40 MG tablet    MEVACOR    90 tablet    Take 1 tablet (40 mg) by mouth At Bedtime    Hypertension goal BP (blood pressure) < 140/90       metolazone 5 MG tablet    ZAROXOLYN    10 tablet    Take 1/2 tablet as needed    Pulmonary hypertension       METOPROLOL SUCCINATE ER PO      Take 50 mg by mouth daily        montelukast 10 MG tablet    SINGULAIR     Take 10 mg by mouth At Bedtime        Multi-vitamin Tabs tablet   Generic drug:  multivitamin, therapeutic with minerals      Take  by mouth daily.    Hypertension goal BP (blood pressure) < 140/90       * Notice:  This list has 2 medication(s) that are the same  as other medications prescribed for you. Read the directions carefully, and ask your doctor or other care provider to review them with you.

## 2017-10-03 NOTE — PROGRESS NOTES
6 MWT:  Completed walk on RA and 6 MWT on 3 LPM continuous oxygen. (Patient was reluctant to use oxygen fearing that he would become dependent on it. Spent 10 minutes explaining importance of keeping his organs oxygenated.  He agreed to try the walk with 3 LPM and using his portable oxygen at home.)  Completed FVC and DLCO per Dr. Higginbotham.

## 2017-10-03 NOTE — Clinical Note
Patient requested that you try to discuss results of his lung assessment with Dr Brooks, Vascular surgeon for diagnosis and treatment.

## 2017-10-04 LAB
DLCOUNC-%PRED-PRE: 45 %
DLCOUNC-PRE: 12.23 ML/MIN/MMHG
DLCOUNC-PRED: 26.64 ML/MIN/MMHG
ERV-%PRED-PRE: 155 %
ERV-PRE: 0.42 L
ERV-PRED: 0.27 L
EXPTIME-PRE: 5.72 SEC
FEF2575-%PRED-PRE: 99 %
FEF2575-PRE: 2.54 L/SEC
FEF2575-PRED: 2.56 L/SEC
FEFMAX-%PRED-PRE: 74 %
FEFMAX-PRE: 6.35 L/SEC
FEFMAX-PRED: 8.57 L/SEC
FEV1-%PRED-PRE: 66 %
FEV1-PRE: 2.2 L
FEV1FEV6-PRE: 84 %
FEV1FEV6-PRED: 78 %
FEV1FVC-PRE: 84 %
FEV1FVC-PRED: 76 %
FEV1SVC-PRE: 82 %
FEV1SVC-PRED: 69 %
FIFMAX-PRE: 4.16 L/SEC
FVC-%PRED-PRE: 60 %
FVC-PRE: 2.62 L
FVC-PRED: 4.33 L
IC-%PRED-PRE: 49 %
IC-PRE: 2.24 L
IC-PRED: 4.54 L
VA-%PRED-PRE: 59 %
VA-PRE: 4.08 L
VC-%PRED-PRE: 55 %
VC-PRE: 2.66 L
VC-PRED: 4.81 L

## 2017-10-13 DIAGNOSIS — I89.0 LYMPHEDEMA: Primary | ICD-10-CM

## 2017-10-22 ENCOUNTER — HEALTH MAINTENANCE LETTER (OUTPATIENT)
Age: 67
End: 2017-10-22

## 2017-11-01 ENCOUNTER — TELEPHONE (OUTPATIENT)
Dept: OTHER | Facility: CLINIC | Age: 67
End: 2017-11-01

## 2017-11-01 NOTE — TELEPHONE ENCOUNTER
I called Mr. Clark to check on him. His condition is still quite bothersome and debilitating. He has been regularly elevating his leg and the relief is only temporary as long his legs are elevated. Then he started using ace wrap bandaging to further reduce the edema but the results have been marginal. His lymphedema is quite difficult to control and is really becoming lifestyle limiting. This is also interfering with his desires and efforts to be more active to work towards a healthier lifestyle and lose some of his excess weight. We will try and arranger for a lymphedema pump as I believe it will benefit him significantly.

## 2018-01-29 ENCOUNTER — MYC MEDICAL ADVICE (OUTPATIENT)
Dept: SURGERY | Facility: CLINIC | Age: 68
End: 2018-01-29

## 2018-01-29 NOTE — TELEPHONE ENCOUNTER
Discussed with Dr. Brooks who states pt would need to obtain referral from PCP if PCP recommends an ultrasound. From vascular surgery standpoint no ultrasound indicated. As far as lymphedema therapy pt could find a lymphedema clinic in [place] if pt wishes to continue with therapy.     I called and discussed with pt. Pt notes understanding.     Leatha Guerrero, RN, BSN

## 2018-02-11 ENCOUNTER — TRANSFERRED RECORDS (OUTPATIENT)
Dept: HEALTH INFORMATION MANAGEMENT | Facility: CLINIC | Age: 68
End: 2018-02-11

## 2018-03-05 ENCOUNTER — TRANSFERRED RECORDS (OUTPATIENT)
Dept: HEALTH INFORMATION MANAGEMENT | Facility: CLINIC | Age: 68
End: 2018-03-05

## 2018-03-05 LAB
ALT SERPL-CCNC: 15 U/L (ref 5–50)
AST SERPL-CCNC: 22 U/L (ref 9–50)
CHOLEST SERPL-MCNC: 165 MG/DL
CREAT SERPL-MCNC: 1.18 MG/DL (ref 0.8–1.4)
GFR SERPL CREATININE-BSD FRML MDRD: 63 ML/MIN/1.73M2
GLUCOSE SERPL-MCNC: 110 MG/DL (ref 70–99)
HDLC SERPL-MCNC: 41 MG/DL
LDLC SERPL CALC-MCNC: 104 MG/DL
POTASSIUM SERPL-SCNC: 3.9 MMOL/L (ref 3.5–5.4)
TRIGL SERPL-MCNC: 100 MG/DL

## 2018-04-23 ENCOUNTER — MYC MEDICAL ADVICE (OUTPATIENT)
Dept: SURGERY | Facility: CLINIC | Age: 68
End: 2018-04-23

## 2018-04-24 NOTE — TELEPHONE ENCOUNTER
I called pt to discuss, pt requested a new referral to bariatric surgery for second opinion, I recommended pt call PCP for further referrals. Pt notes understanding.     KASSIDY AlbaN, RN

## 2018-05-01 ENCOUNTER — OFFICE VISIT (OUTPATIENT)
Dept: FAMILY MEDICINE | Facility: CLINIC | Age: 68
End: 2018-05-01
Payer: COMMERCIAL

## 2018-05-01 VITALS
OXYGEN SATURATION: 88 % | SYSTOLIC BLOOD PRESSURE: 153 MMHG | BODY MASS INDEX: 44.67 KG/M2 | HEIGHT: 70 IN | DIASTOLIC BLOOD PRESSURE: 84 MMHG | RESPIRATION RATE: 18 BRPM | WEIGHT: 312 LBS | HEART RATE: 86 BPM | TEMPERATURE: 97.1 F

## 2018-05-01 DIAGNOSIS — I27.20 PULMONARY HYPERTENSION (H): ICD-10-CM

## 2018-05-01 DIAGNOSIS — Z85.51 PERSONAL HISTORY OF BLADDER CANCER: ICD-10-CM

## 2018-05-01 DIAGNOSIS — I73.9 PERIPHERAL VASCULAR DISEASE (H): ICD-10-CM

## 2018-05-01 DIAGNOSIS — K08.109 TEETH MISSING: ICD-10-CM

## 2018-05-01 DIAGNOSIS — R60.0 BILATERAL LOWER EXTREMITY EDEMA: ICD-10-CM

## 2018-05-01 DIAGNOSIS — Z99.81 OXYGEN DEPENDENT: ICD-10-CM

## 2018-05-01 PROCEDURE — 99214 OFFICE O/P EST MOD 30 MIN: CPT | Performed by: INTERNAL MEDICINE

## 2018-05-01 NOTE — Clinical Note
Office visit notes and prescription for durable medical equipment or supplies is tweaked exactly as per your instructions

## 2018-05-01 NOTE — MR AVS SNAPSHOT
After Visit Summary   5/1/2018    Boogie Clark    MRN: [id number]           Patient Information     Date Of Birth          1950        Visit Information        Provider Department      5/1/2018 3:10 PM Shabbir Agustin MD [facility]        Today's Diagnoses     Body mass index (BMI) of 40.0-44.9 in adult (H)    -  1    Teeth missing        Personal history of bladder cancer        Oxygen dependent        Peripheral vascular disease (H)        Bilateral lower extremity edema          Care Instructions    [facility]    If you have any questions regarding to your visit please contact your care team:     Team Pink:   Clinic Hours Telephone Number   Internal Medicine:  Dr. Kelly Bahena NP       7am-7pm  Monday - Thursday   7am-5pm  Fridays  [phone]  (Appointment scheduling available 24/7)    Questions about your recent visit?  Team Line  [phone]   Urgent Care - [place] [facility] - 11am-9pm Monday-Friday Saturday-Sunday- 9am-5pm   [place] - 5pm-9pm Monday-Friday Saturday-Sunday- 9am-5pm  [phone] - [place]  [phone] - [place]       What options do I have for a visit other than an office visit? We offer electronic visits (e-visits) and telephone visits, when medically appropriate.  Please check with your medical insurance to see if these types of visits are covered, as you will be responsible for any charges that are not paid by your insurance.      You can use [employer] (secure electronic communication) to access to your chart, send your primary care provider a message, or make an appointment. Ask a team member how to get started.     For a price quote for your services, please call our Consumer Price Line at [phone] or our Imaging Cost estimation line at [phone] (for imaging tests).  Stormy SWENSON CMA ([facility])            Follow-ups after your visit        Your next 10 appointments already  "scheduled     Sep 10, 2018  8:30 AM CDT   Return Visit with Hansel Casillas MD, [facility] PROC ROOM   [facility] ([address]  [place] [address] [phone]              Who to contact     If you have questions or need follow up information about today's clinic visit or your schedule please contact [facility] directly at [phone].  Normal or non-critical lab and imaging results will be communicated to you by [employer], letter or phone within 4 business days after the clinic has received the results. If you do not hear from us within 7 days, please contact the clinic through [employer] or phone. If you have a critical or abnormal lab result, we will notify you by phone as soon as possible.  Submit refill requests through [employer] or call your pharmacy and they will forward the refill request to us. Please allow 3 business days for your refill to be completed.          Additional Information About Your Visit        [employer] Information     [employer] gives you secure access to your electronic health record. If you see a primary care provider, you can also send messages to your care team and make appointments. If you have questions, please call your primary care clinic.  If you do not have a primary care provider, please call [phone] and they will assist you.        Care EveryWhere ID     This is your Care EveryWhere ID. This could be used by other organizations to access your [place] medical records  [id number]        Your Vitals Were     Pulse Temperature Respirations Height Pulse Oximetry BMI (Body Mass Index)    86 97.1  F (36.2  C) (Oral) 18 5' 9.5\" (1.765 m) 88% 45.41 kg/m2       Blood Pressure from Last 3 Encounters:   05/01/18 153/84   09/11/17 138/76   08/14/17 110/71    Weight from Last 3 Encounters:   05/01/18 312 lb (141.5 kg)   08/14/17 (!) 305 lb (138.3 kg)   08/02/17 (!) 305 lb (138.3 kg)              Today, you had the " following     No orders found for display         Today's Medication Changes          These changes are accurate as of 5/1/18  4:12 PM.  If you have any questions, ask your nurse or doctor.               Start taking these medicines.        Dose/Directions    order for DME   Used for:  Oxygen dependent   Started by:  Shabbir Agustin MD        Equipment being ordered: oxygen concentrator - [facility] home oxygen   Quantity:  1 Device   Refills:  0            Where to get your medicines      Some of these will need a paper prescription and others can be bought over the counter.  Ask your nurse if you have questions.     Bring a paper prescription for each of these medications     order for DME                Primary Care Provider Office Phone # Fax #    Arthur Luis Daniel LINDA Landis [phone] [address]  [facility] [address]        Equal Access to Services     TILA CORONA : Claudia garland haddarello Soomaali, waaxda luqadaha, qaybta kaalmada adeegyada, doroteo pascal hayashly tuttle . So [facility] [phone].    ATENCIÓN: Si habla español, tiene a landaverde disposición servicios gratuitos de asistencia lingüística. Llame al [phone].    We comply with applicable federal civil rights laws and [place] laws. We do not discriminate on the basis of race, color, national origin, age, disability, sex, sexual orientation, or gender identity.            Thank you!     Thank you for choosing [facility]  for your care. Our goal is always to provide you with excellent care. Hearing back from our patients is one way we can continue to improve our services. Please take a few minutes to complete the written survey that you may receive in the mail after your visit with us. Thank you!             Your Updated Medication List - Protect others around you: Learn how to safely use, store and throw away your medicines at www.disposemymeds.org.          This list is accurate as of 5/1/18  4:12 PM.  Always  use your most recent med list.                   Brand Name Dispense Instructions for use Diagnosis    * albuterol 108 (90 Base) MCG/ACT Inhaler    VENTOLIN HFA    1 Inhaler    Inhale 2 puffs into the lungs every 6 hours    Chronic obstructive pulmonary disease, unspecified COPD type (H)       * albuterol (2.5 MG/3ML) 0.083% neb solution     30 vial    Take 1 vial (2.5 mg) by nebulization daily as needed for shortness of breath / dyspnea or wheezing    Chronic obstructive pulmonary disease, unspecified COPD type (H)       aspirin 81 MG tablet      Take  by mouth daily.    Peripheral vascular disease, unspecified       ATORVASTATIN CALCIUM PO      Take 40 mg by mouth daily        diltiazem 120 MG 24 hr ER beaded capsule    TIAZAC    90 capsule    Take 1 capsule (120 mg) by mouth daily    Hypertension goal BP (blood pressure) < 140/90       fluticasone 50 MCG/ACT spray    FLONASE    1 g    Spray 1-2 sprays into both nostrils daily    Nasal congestion       FUROSEMIDE PO      Take 40 mg by mouth daily        GINKOBA 40 MG Tabs   Generic drug:  Ginkgo Biloba      Take 2 tablets by mouth daily.        IBUPROFEN PO      Take 600 mg by mouth        loratadine 10 MG tablet    CLARITIN     Take 10 mg by mouth daily        lovastatin 40 MG tablet    MEVACOR    90 tablet    Take 1 tablet (40 mg) by mouth At Bedtime    Hypertension goal BP (blood pressure) < 140/90       metolazone 5 MG tablet    ZAROXOLYN    10 tablet    Take 1/2 tablet as needed    Pulmonary hypertension       METOPROLOL SUCCINATE ER PO      Take 50 mg by mouth daily        montelukast 10 MG tablet    SINGULAIR     Take 10 mg by mouth At Bedtime        Multi-vitamin Tabs tablet   Generic drug:  multivitamin, therapeutic with minerals      Take  by mouth daily.    Hypertension goal BP (blood pressure) < 140/90       order for DME     1 Device    Equipment being ordered: oxygen concentrator - [facility] home oxygen    Oxygen dependent       * Notice:  This list has  2 medication(s) that are the same as other medications prescribed for you. Read the directions carefully, and ask your doctor or other care provider to review them with you.

## 2018-05-01 NOTE — PATIENT INSTRUCTIONS
[facility]    If you have any questions regarding to your visit please contact your care team:     Team Pink:   Clinic Hours Telephone Number   Internal Medicine:  Dr. Kelly Bahena NP       7am-7pm  Monday - Thursday   7am-5pm  Fridays  [phone]  (Appointment scheduling available 24/7)    Questions about your recent visit?  Team Line  [phone]   [facility] - [place] [facility] - 11am-9pm Monday-Friday Saturday-Sunday- 9am-5pm   [place] - 5pm-9pm Monday-Friday Saturday-Sunday- 9am-5pm  [phone] - [place]  [phone] - [place]       What options do I have for a visit other than an office visit? We offer electronic visits (e-visits) and telephone visits, when medically appropriate.  Please check with your medical insurance to see if these types of visits are covered, as you will be responsible for any charges that are not paid by your insurance.      You can use [employer] (secure electronic communication) to access to your chart, send your primary care provider a message, or make an appointment. Ask a team member how to get started.     For a price quote for your services, please call our Consumer Price Line at [phone] or our Imaging Cost estimation line at [phone] (for imaging tests).  Stormy SWENSON CMA ([facility])

## 2018-05-01 NOTE — Clinical Note
Please contact patient and let him know that I did contact Dr. Rae Higginbotham with [facility] and she's recommended for patient to HAVE the chest CT scan that she had recommended - high resolution CT with inspiratory and expiratory cuts to evaluate his restrictive PFTs, if you need help ordering this exactly , let me know as soon as possible !!

## 2018-05-01 NOTE — Clinical Note
Can we please have a discussion about this patient ? I am wondering if I should arrange a new cardiology consult for this patient.

## 2018-05-01 NOTE — PROGRESS NOTES
1 week in February, [place] double pneumonia . Patient brought up paper work with him. Saw Dr. Brooks, who felt patient had lymphedema, overweight and flex-cup therapy . Using support stockings and moving and so his lower extremity edema is improved.    Having a lot of thigh pain after sitting for awhile. Year or more. Going to see a chiropractor tomorrow and get a consultation. Chiropractor says it's sciatica     Continue severe shortness of breath problems . Treated with pulmonary rehabilitation , going two times a week. Doing ergodyne 10 minutes and treadmill 10 minutes, 88-92% after treadmill. After 3 weeks he had improved exercise capabilities with longer time periods and less oxygen desaturation . Walking is what he has troubles with. Going to go to [facility] fitness [facility] now    2 concerns     1. Oxygen concentrator is needed  2. Gastric bypass while down at [place] but no ok until 2 days prior to leaving [place]   3.   Wt Readings from Last 5 Encounters:   05/01/18 312 lb (141.5 kg)   08/14/17 (!) 305 lb (138.3 kg)   08/02/17 (!) 305 lb (138.3 kg)   07/26/17 (!) 313 lb (142 kg)   07/18/17 (!) 320 lb (145.2 kg)

## 2018-05-01 NOTE — NURSING NOTE
Patient resting at room air without oxygen is 91%, after walking about 50 feet patients oxygen dropped down to 88%. After sitting down dropped even lower to 87%.   Patient resting with oxygen on 3L was 97%, walking with oxygen on 3L was  97%.       Stormy SWENSON CMA ([facility])

## 2018-05-01 NOTE — PROGRESS NOTES
SUBJECTIVE:   Boogie Clark is a 67 year old male who presents to clinic today for the following health issues:       Body mass index (BMI) of 40.0-44.9 in adult (H)  Teeth missing  Personal history of bladder cancer  Oxygen dependent  Peripheral vascular disease (H)  Bilateral lower extremity edema     I am seeing back today , Boogie Clark , a very pleasant elderly gentleman who's dealing with an oppressive and severe oxygen desaturation .      We have already established with no question that this patient has such severe oxygen desaturation with exercise that he has had oxygen therapy set up for him for many months now. He's gotten his oxygen from the [facility] which is certainly good for patients costs but not so good for flexibility. Patient has been told the [facility] doesn't cover a portable oxygen tank / oxygen concentrator and all he has is a large tank at home which he uses at night.    A fundamental serious problem is that we have apparently still not completely established without a shadow of a doubt that he DOES NOT have chronic obstructive pulmonary disease. This is all well established history and we have appropriate testing within [facility] and within Epic electronic medical records to acknowledge that chronic obstructive pulmonary disease is not the right diagnosis. What is most likely is this is pulmonary hypertension. I explained to patient and his wife in greater detail that simply being heavy by itself is going to cause potential a great deal of physical deconditioning and tendency to be easily out of breath but it's not going to push his oxygen down to 85%.     His chest CT scan does not show any explanation for his hypoxia.    CT CHEST WITH CONTRAST   6/29/2017 2:50 PM      HISTORY: Shortness of breath.     TECHNIQUE: 80 mL Isovue-370 IV were administered. After contrast  administration, volumetric helical sections were acquired from  "the  lung bases to the ischial tuberosities. Coronal images were also  reconstructed. Radiation dose for this scan was reduced using  automated exposure control, adjustment of the mA and/or kV according  to patient size, or iterative reconstruction technique.     COMPARISON: Chest CT performed 1/23/2015.      FINDINGS: A 0.4 cm pulmonary nodule in the right lower lobe  posteriorly and medially (series 3 image 46) is unchanged dating back  to 1/23/2015. Given greater than two years of stability, this is  highly likely of benign etiology. The lungs are otherwise clear. No  other pulmonary nodules are identified. No pleural or pericardial  effusions. Atherosclerotic calcification of the thoracic aorta and  coronary arteries. No enlarged lymph nodes are identified in the  chest. Indeterminate left thyroid nodule measures 1.7 cm, not seen on  the previous exam, possibly related to differences in technique.  Limited views of the upper abdomen are unremarkable. Degenerative  changes are again noted throughout the thoracic spine. Old right ninth  rib fracture.         IMPRESSION:   1. No acute abnormality in the chest. No cause for shortness of breath  is identified.  2. Indeterminate left thyroid nodule measures 1.7 cm. Thyroid  ultrasound may be helpful for further evaluation.     JÚNIOR GUILLAUME MD    There are helpful consults with Dr. Rae Higginbotham with [facility] and  MARISA Garnett MD , cardiologist with [facility]. This workup for his hypoxia took place around July to September 2017. After that he went to [place] for the winter and is recently returned.     He brought medical records ; he spent 1week in February, at a hospital in [place] due to \"double pneumonia\" . Patient brought up paper work with him. Saw prior Dr. Brooks, vascular surgeon who felt patient had lymphedema, was overweight and needed flex-cup therapy [ ? Form of lymphedema therapy ] . Now he's " using support stockings and moving better and so his lower extremity edema is improved.    Having a lot of thigh pain after sitting for awhile. Year or more. Going to see a chiropractor tomorrow and get a consultation. Chiropractor says it's sciatica.      Continued severe shortness of breath problems . Treated with pulmonary rehabilitation while in [place] , going two times a week. Doing ergodyne 10 minutes and treadmill 10 minutes, 88-92% after treadmill. After 3 weeks he had improved exercise capabilities with longer time periods and less oxygen desaturation . Walking is what he has troubles with. Going to [facility] [employer] [facility] now    2 major concerns    1. Oxygen concentrator is needed. He's ready to ditch [facility] and go with [facility] , see redo oxygen levels today   2. He was purportedly approved for a gastric bypass procedure while down at [place] but no ok until 2 days prior to leaving [place] . He was declined for this by bariatric surgeon associated with [place] , reportedly because he was missing some teeth ? See as detailed below with the assessment and plan section      Problem list and histories reviewed & adjusted, as indicated.  Additional history: as documented    Patient Active Problem List   Diagnosis     Advanced directives, counseling/discussion     Hypertension goal BP (blood pressure) < 140/90     Peripheral vascular disease (H)     Colon polyp     Anal fissure     Kidney stone     Bladder tumor     COCO (obstructive sleep apnea)     Hyperlipidemia with target LDL less than 100     Malignant neoplasm of bladder (H)     Personal history of bladder cancer     Sigmoid diverticulosis     Body mass index (BMI) of 40.0-44.9 in adult (H)     Chronic obstructive pulmonary disease, unspecified COPD type (H)     Benign essential hypertension     History of smoking     Morbid obesity due to excess calories (H)     Oxygen dependent     Bilateral lower extremity edema      Past Surgical History:   Procedure Laterality Date     ANGIOPLASTY  2009    angioplasty at abdominla aortic bifurcation in common illiac bilaterally     ANKLE SURGERY       BIOPSY  june 2013     COLONOSCOPY  2013     CYSTOSCOPY      multiple times secondary to dx. of bladder cancer     ORTHOPEDIC SURGERY  2012    surgery on right ankle to repair break     VASCULAR SURGERY  2011    angoplasty for pad     VASECTOMY         Social History   Substance Use Topics     Smoking status: Former Smoker     Types: Cigarettes     Start date: 1/1/1964     Quit date: 10/1/2016     Smokeless tobacco: Never Used      Comment: already did     Alcohol use 0.0 oz/week      Comment: moderate once a week couple of beers     Family History   Problem Relation Age of Onset     Breast Cancer Mother      Vascular Disease Father      brain anurysm     CANCER Maternal Grandmother      Breast Cancer Sister          Current Outpatient Prescriptions   Medication Sig Dispense Refill     albuterol (2.5 MG/3ML) 0.083% nebulizer solution Take 1 vial (2.5 mg) by nebulization daily as needed for shortness of breath / dyspnea or wheezing 30 vial 11     albuterol (VENTOLIN HFA) 108 (90 BASE) MCG/ACT inhaler Inhale 2 puffs into the lungs every 6 hours 1 Inhaler 2     aspirin 81 MG tablet Take  by mouth daily.       ATORVASTATIN CALCIUM PO Take 40 mg by mouth daily       diltiazem (TIAZAC) 120 MG 24 hr ER beaded capsule Take 1 capsule (120 mg) by mouth daily 90 capsule 3     fluticasone (FLONASE) 50 MCG/ACT nasal spray Spray 1-2 sprays into both nostrils daily 1 g 11     FUROSEMIDE PO Take 40 mg by mouth daily       Ginkgo Biloba (GINKOBA) 40 MG TABS Take 2 tablets by mouth daily.       IBUPROFEN PO Take 600 mg by mouth       loratadine (CLARITIN) 10 MG tablet Take 10 mg by mouth daily       lovastatin (MEVACOR) 40 MG tablet Take 1 tablet (40 mg) by mouth At Bedtime 90 tablet 3     metolazone (ZAROXOLYN) 5 MG tablet Take 1/2 tablet as needed 10 tablet 0  "    METOPROLOL SUCCINATE ER PO Take 50 mg by mouth daily        montelukast (SINGULAIR) 10 MG tablet Take 10 mg by mouth At Bedtime       Multiple Vitamin (MULTI-VITAMIN) per tablet Take  by mouth daily.       order for DME Equipment being ordered: oxygen concentrator - [facility] 1 Device 0     Allergies   Allergen Reactions     Penicillins      Recent Labs   Lab Test 07/10/17 10/31/16   09/17/15   1121  01/14/15   1051  08/28/13   0842   A1C  5.9   --    --    --    --    --    LDL   --    --    --   81  94  92   HDL  42   --    --   48  45  37*   TRIG  99   --    --   65  115  75   ALT  32   --    --   34   --   29   CR  1.0  1.2   < >  1.00  0.96  1.11   GFRESTIMATED  >60  >60   < >  75  79  67   GFRESTBLACK   --    --    --   >90   GFR Calc    >90   GFR Calc    81   POTASSIUM  3.8  4.2   < >  4.0  4.4  4.5   TSH  0.90   --    --    --    --    --     < > = values in this interval not displayed.      BP Readings from Last 3 Encounters:   05/01/18 153/84   09/11/17 138/76   08/14/17 110/71    Wt Readings from Last 3 Encounters:   05/01/18 312 lb (141.5 kg)   08/14/17 (!) 305 lb (138.3 kg)   08/02/17 (!) 305 lb (138.3 kg)                  Labs reviewed in EPIC    Reviewed and updated as needed this visit by clinical staff  Tobacco  Allergies  Meds  Med Hx  Surg Hx  Fam Hx  Soc Hx      Reviewed and updated as needed this visit by Provider         ROS:  Constitutional, neuro, ENT, endocrine, pulmonary, cardiac, gastrointestinal, genitourinary, musculoskeletal, integument and psychiatric systems are negative, except as otherwise noted.    OBJECTIVE:                                                    /84  Pulse 86  Temp 97.1  F (36.2  C) (Oral)  Resp 18  Ht 5' 9.5\" (1.765 m)  Wt 312 lb (141.5 kg)  SpO2 (!) 88%  BMI 45.41 kg/m2  Body mass index is 45.41 kg/(m^2).  GENERAL APPEARANCE: healthy, alert and no distress  EYES: Eyes grossly normal to inspection, PERRL " and conjunctivae and sclerae normal  MS: he's wearing support stockings and has relatively large legs with obvious lymphedema / bilateral lower extremity edema   NEURO: Normal strength and tone, mentation intact and speech normal  PSYCH: mentation appears normal and affect normal/bright    Diagnostic test results:  Diagnostic Test Results:  No orders of the defined types were placed in this encounter.         ASSESSMENT/PLAN:                                                    1. Body mass index (BMI) of 40.0-44.9 in adult (H)  This patient has a whole series of problems and this appointment stretched out towards past 30 minutes and we needed to end the visit without completely reviewing everything. But regarding his weight I think patient may be deluded on a few key points, understandable however. 1. He is of the opinion that if he successfully had gastric bypass and lost lets 50 or more pounds, that this would dramatically improve his breathing and possibly remove his need for oxygen therapy. 2. He believes the reason he was declined for gastric bypass with [facility] is that he's missing some teeth. I personally had a review of his case with a board certified obesity medicine specialist MD and the facts were outlined differently.  A] he was probably declined for surgery because he would be a high risk patient with his hypoxia  B] it's unlikely that his hypoxia would resolve with gastric bypass anyway    Nevertheless if patient is interested we can start up again his evaluation with the bariatric surgeon     2. Teeth missing  Most likely a non-issue as far as gastric bypass goes    3. Personal history of bladder cancer  On a program of surveillance cystoscopy and no evidence of recurrence in 2 years     4. Oxygen dependent  We changed his oxygen ordering over to [facility]. A new prescription for durable medical equipment or supplies is generated with today's oxygen level data . See prescription for durable  medical equipment or supplies . More importantly , I am not able to really tell if this patient is a high likely candidate for pulmonary hypertension or not. I tried to review his workup with  MARIAS Garnett MD cardiologist with the [facility] and can't clearly see that this is ruled out or not. I am going to need to discuss this case with Dr. Higginbotham again as well as possibly have patient seen by Dr. Duane Velasquez, cardiologist with the [facility] who's the best sub-specialist  for pulmonary hypertension I know !  - order for DME; Equipment being ordered: oxygen concentrator - [facility]  Dispense: 1 Device; Refill: 0    Oxygen at 3 liters while at rest is at 97 %.  Oxygen at 3 liters walking is at 97 %.     Without oxygen at rest is at 91 %.  Without oxygen while walking is at 87 %.    Patient is an ambulatory as long as he has his oxygen   Patient was in a chronic stable state today   There are no alternative choices for therapy as patient wishes to maintain ambulatory status and this device is necessary for said activity; the heavy and tall home oxygen tank is not appropriate or feasible for this patient . The believed diagnosis is pulmonary hypertension but the complete workup is ongoing . Patient does NOT have chronic obstructive pulmonary disease . Based on the facts of this case, I believe this patient does have a diagnosis of pulmonary hypertension and will add to the problem list .    This patient has tried albuterol , both the metered dose inhaler and the nebulizer treatments, he's tried inhaled corticosteroids such as Advair [ fluticasone / serevent ] without any significant benefit whatsoever. He's tried fluticasone (FLONASE) 50 MCG/ACT nasal spray and Claritin (Loratadine) and MONTELUKAST [ Singulair ]  , none of which has helped his oxygen dependency and easy oxygen desaturations      5. Peripheral vascular disease (H)  To the extent that this patient  does have peripheral vascular disease, this is a non-issue in my opinion as far as lower leg swelling goes    6. Bilateral lower extremity edema  He has lymphedema and the problem with lymphedema is that it is a stubborn problem and a different to treat condition . He should be considered for a repeat evaluation with a lymphedema clinic .      Follow up with Provider - not yet determined      Shabbir Agustin MD  [facility]

## 2018-05-07 ENCOUNTER — TELEPHONE (OUTPATIENT)
Dept: INTERNAL MEDICINE | Facility: CLINIC | Age: 68
End: 2018-05-07

## 2018-05-07 DIAGNOSIS — J44.9 CHRONIC OBSTRUCTIVE PULMONARY DISEASE, UNSPECIFIED COPD TYPE (H): Primary | ICD-10-CM

## 2018-05-07 DIAGNOSIS — Z99.81 OXYGEN DEPENDENT: ICD-10-CM

## 2018-05-07 NOTE — TELEPHONE ENCOUNTER
Please help place order listed below, then RN will call patient      Message  Received: 3 days ago       Shabbir Agustin MD  P Fz Rn Triage Pool                   Please contact patient and let him know that I did contact Dr. Rae Higginbotham with [facility] and she's recommended for patient to HAVE the chest CT scan that she had recommended - high resolution CT with inspiratory and expiratory cuts to evaluate his restrictive PFTs, if you need help ordering this exactly , let me know as soon as possible !!       Ghislaine Foreman RN

## 2018-05-07 NOTE — TELEPHONE ENCOUNTER
1. At our appointment I placed a referral to a bariatric surgeon. He needs to schedule the appointment by calling. I had offered to use an outside healthcare system but he preferred to stay with [facility] so that's where I referred him. Did he not know that he has to call to schedule the appointment ? We have quite a few  Different choices for bariatric surgeons including Dr. Jai Oakley, bariatric surgeon and others    2. I did discuss this case with my partner who is a board certified obesity medicine specialist MD and she'd never heard of missing teeth as a reason for not doing a gastric bypass. She commented that in her opinion a decline to pursue a gastric bypass was most likely secondary to concerns about Boogie's viability as a surgical candidate secondary to his physical health problems. But just to play it safe and complete a reevaluation of this patient, this is why we are recommending a second opinion / follow up bariatric surgical consultation     Does this answer the questions ? If not I will call patient to further review this matter.    Shabbir Agustin MD

## 2018-05-07 NOTE — TELEPHONE ENCOUNTER
Called patient and left VM to call clinic. [place] team number left.  Rosalva PURCELL CMA ([facility])

## 2018-05-07 NOTE — TELEPHONE ENCOUNTER
No bariatric referrals noted in chart from Dr. Agustin. Most recent one was from another provider in Aug 2017    Referral placed.  Please notify patient of #2 below from Dr. Agustin's message.  He will need to call and get in for a consult for a 2nd opinion with a bariatric surgeon    Comments      Your provider has referred you to: G: [facility] [phone]  [email or web address]  [facility] [phone]. [email or web address]       Ghislaine Foreman RN

## 2018-05-07 NOTE — TELEPHONE ENCOUNTER
"Patient notified of Provider's message as written.  Imaging number given  Patient verbalized understanding.    Patient had another question-  Patient stated that while in [place], he was going to have bariatric surgery but they were not going to to do this until a couple of days prior to him coming back to [place] so he did not get a chance to get this done  He stated that [facility] provider \"rejected me because I have more than 4 teeth missing. [facility] in [place] had never heard of such a thing.\"  He stated that Dr. Agustin was going to help him find a Bariatric provider?      Ghislaine Foreman, RN    "

## 2018-05-08 ENCOUNTER — RADIANT APPOINTMENT (OUTPATIENT)
Dept: CT IMAGING | Facility: CLINIC | Age: 68
End: 2018-05-08
Attending: INTERNAL MEDICINE
Payer: COMMERCIAL

## 2018-05-08 DIAGNOSIS — Z99.81 OXYGEN DEPENDENT: ICD-10-CM

## 2018-05-08 DIAGNOSIS — J44.9 CHRONIC OBSTRUCTIVE PULMONARY DISEASE, UNSPECIFIED COPD TYPE (H): ICD-10-CM

## 2018-05-08 PROCEDURE — 71250 CT THORAX DX C-: CPT | Mod: TC

## 2018-05-09 ENCOUNTER — TELEPHONE (OUTPATIENT)
Dept: FAMILY MEDICINE | Facility: CLINIC | Age: 68
End: 2018-05-09

## 2018-05-09 NOTE — TELEPHONE ENCOUNTER
Reason for Call:  Other call back    Detailed comments: Needs additional information from chart/orders for oxygen.    Phone Number Patient can be reached at: Other phone number:  [phone] Laurie    Best Time: ASAP    Can we leave a detailed message on this number? YES    Call taken on 5/9/2018 at 3:49 PM by Raysa Donovan

## 2018-05-10 NOTE — TELEPHONE ENCOUNTER
Per Laurie at [facility] they will need a new DME written for oxygen with the following:    Patient Name  Date of the order  Detailed description of the item:  Ex: oxygen concentrator & portability  Dosage of concentration:  Ex: 3 LPM  Route of administration:  Ex: nasal cannula  Frequency of use (do not use PRN or to keep sats greater than 90%) continuous  Duration of need:  Ex: lifetime or the number of months  Provider's name - printed  The signature of the ordering provider  Signature date  Ordering provider's national provider indentifier (NPI)    Also office visit from 5-1-18, needs to be addended to have:    The patient's ambulatory status  That they were in a chronic stable state  Alternative treatments tried and  Clinically ineffective and specific diagnosis    Routing to Dr. Agustin to addend office visit and to write new DME. Michelle Mcneal,

## 2018-05-10 NOTE — TELEPHONE ENCOUNTER
Reason for Call:  Other call back    Detailed comments: Laurie from [facility] calling back regarding patient. Please call    Phone Number Patient can be reached at: Other phone number:  [phone]    Best Time: Any    Can we leave a detailed message on this number? YES    Call taken on 5/10/2018 at 12:29 PM by Nhi Moe

## 2018-05-11 ENCOUNTER — TELEPHONE (OUTPATIENT)
Dept: INTERNAL MEDICINE | Facility: CLINIC | Age: 68
End: 2018-05-11

## 2018-05-11 NOTE — TELEPHONE ENCOUNTER
See    1. New prescription for durable medical equipment or supplies   2. Amended office visit notation    Shabbir Agustin MD

## 2018-05-11 NOTE — TELEPHONE ENCOUNTER
Per Laurie at [facility] one other thing is still needed.  Need to document what other treatment(s) patient has tried such as inhalers and nebs.  Again, this is per Medicare guidelines.  Michelle Mcneal,

## 2018-05-11 NOTE — TELEPHONE ENCOUNTER
Wherever this diagnosis is listed please change to ; patient has pulmonary hypertension     Shabbir Agustin MD

## 2018-05-11 NOTE — TELEPHONE ENCOUNTER
"Received call back from Laurie.  Office notes states \"what is most likely is this is pulmonary hypertension\".  Per Medicare guidelines the notes must state that there needs to be a pulmonary diagnosis and notes cannot say probably or most likely.  They need a definite diagnosis such as pulmonary hypertension.    Routing to Dr. Agustin to addend office note if this is okay.  Michelle Mcneal,         "

## 2018-05-11 NOTE — TELEPHONE ENCOUNTER
Reason for Call:  Other call back    Detailed comments: Laurie calling needs to know what alternative treatments have been tried prior to oxygen? Inhaler etc. Please call    Phone Number Patient can be reached at: Other phone number: [phone]     Best Time: ASAP    Can we leave a detailed message on this number? YES    Call taken on 5/11/2018 at 1:02 PM by Nhi Moe

## 2018-05-11 NOTE — TELEPHONE ENCOUNTER
Dr. Agutsin you will need to addend the note to state patient has pulmonary hypertension.  Medicare requires that this is documented in the office note.  Michelle Mcneal,

## 2018-05-13 ENCOUNTER — MYC MEDICAL ADVICE (OUTPATIENT)
Dept: FAMILY MEDICINE | Facility: CLINIC | Age: 68
End: 2018-05-13

## 2018-05-14 NOTE — TELEPHONE ENCOUNTER
These are both small and felt to be harmless by the official overread of the film per radiology.     1.7 cm exophytic  cyst in the upper pole of the left kidney     Specifically , an exophytic cyst means kind of like a bubble off of the kidney.    subcentimeter  low-attenuation lesion in the upper pole of the right kidney that is  too small to characterize.    This refers to a nonspecific finding , kind of like a blip on a radar screen. What's most important here is, we listen eagerly to what the radiologist tells us. In your case, these findings did not trigger any worry on the part of the radiologist that these are dangerous findings, and had no features to suggest cancer. No further follow up on these findings were suggested.       Shabbir Agustin MD

## 2018-05-15 ENCOUNTER — OFFICE VISIT (OUTPATIENT)
Dept: CARDIOLOGY | Facility: CLINIC | Age: 68
End: 2018-05-15
Payer: COMMERCIAL

## 2018-05-15 VITALS — OXYGEN SATURATION: 93 % | HEART RATE: 78 BPM | DIASTOLIC BLOOD PRESSURE: 91 MMHG | SYSTOLIC BLOOD PRESSURE: 134 MMHG

## 2018-05-15 DIAGNOSIS — I49.1 PAC (PREMATURE ATRIAL CONTRACTION): ICD-10-CM

## 2018-05-15 DIAGNOSIS — G47.33 OSA (OBSTRUCTIVE SLEEP APNEA): ICD-10-CM

## 2018-05-15 DIAGNOSIS — R06.09 DOE (DYSPNEA ON EXERTION): Primary | ICD-10-CM

## 2018-05-15 DIAGNOSIS — I73.9 PAD (PERIPHERAL ARTERY DISEASE) (H): ICD-10-CM

## 2018-05-15 DIAGNOSIS — E66.01 MORBID OBESITY (H): ICD-10-CM

## 2018-05-15 PROCEDURE — 0296T ZZHC  EXT ECG > 48HR TO 21 DAY RCRD W/CONECT INTL RCRD: CPT | Performed by: INTERNAL MEDICINE

## 2018-05-15 PROCEDURE — 0298T ZZC EXT ECG > 48HR TO 21 DAY REVIEW AND INTERPRETATN: CPT | Performed by: INTERNAL MEDICINE

## 2018-05-15 PROCEDURE — 99215 OFFICE O/P EST HI 40 MIN: CPT | Performed by: INTERNAL MEDICINE

## 2018-05-15 NOTE — PATIENT INSTRUCTIONS
Thank you for coming to the [facility] @ [place]; please note the following instructions:    1.  We have applied a holter (heart) monitor for you to wear for 7 days.  You may remove the heart monitor on 5/22/18 at 10:00 AM.  Please see the enclosed instructions for further information, as well as instructions for removal of the heart monitor and return mailing directions.  If you should have questions regarding your monitor, please call [employer] at [phone].  The Cardiology Nurse will contact you with your results (please see result notification details at bottom of summary).    *PLEASE DO NOT SHOWER OR INDUCE EXCESSIVE SWEATING IN THE FIRST 24 HOURS OF WEARING*      2.  Dr. Portillo Can would like you to return for a cardiac follow up in 1 year  (May 2019).  We will contact you regarding your appointment when the time draws closer or you may call [phone] to arrange an appointment.  Mean while, if you should have any questions or concerns regarding your heart health, please contact us.  Thank you for choosing [facility] for your care.          If you have any questions regarding your visit please contact your care team:     Cardiology  Telephone Number   Rocio SHIN, RN  Main NORRIS, RN   Tamika ABBOTT, STVEEN HOWE, FRANCI GORDON MA   [phone]    *After hours: [phone]   For scheduling appts:     [phone] or    [phone] (select option 1)    *After hours: [phone]     For the Device Clinic (Pacemakers and ICD's)  RN's :  Germaine Hickey   During business hours: [phone]    *After business hours:  [phone] (select option 4)      Normal test result notifications will be released via [facility] or mailed within 7 business days.  All other test results, will be communicated via telephone once reviewed by your cardiologist.    If you need a medication refill please contact your pharmacy.  Please allow 3 business days for your refill  to be completed.    As always, thank you for trusting us with your health care needs!

## 2018-05-15 NOTE — LETTER
5/15/2018      RE: Boogie Clark  [address] [place] [id number]       Dear Colleague,    Thank you for the opportunity to participate in the care of your patient, Boogie Clark, at the [facility] HEART AT [facility] at [facility]. Please see a copy of my visit note below.    Chief complaint: shortness of breath.    HPI: Mr. Boogie Clark is a 67 year old  male with PMH significant for COPD, COCO on CPAP, bladder cancer, PAD, morbid obesity, HTN and HLD.    He was recently hospitalized in [place] on 2/11/2018 for bilateral pneumonia and COPD exacerbation. He was treated by IV antibiotics. He is here for cardiology follow-up. He was last seen in cardiology in 7/2017 for leg edema and SOB/desaturation. He underwent RHC which showed mild pulmonary HTN (50/24/32), normal filling pressures and normal cardiac output. MRA of the lower extremities showed patent bilateral arterial system. ECHO from 7/2017 showed normal biventricular function with PAP 39+RAP.    He is referred by  for STAN[place]ORD. The patient denies a history of chest discomfort but feels SOB on exertion which is not new to him. His physical activity is very limited. His BMI is 45.5 and he is planning to undergo gastric sleeve procedure. He is on home oxygen that he uses only at night with CPAP. He denies chest pain, PND, orthopnea, pedal edema, palpitations, lightheadedness, and syncope.  He is on ASA, atorvastatin 40 mg, metoprolol 50 mg qday, diltiazem 180 mg qday and furosemide 20 mg qday.    He has a hx of HTN now well controlled. He has a hx of smoking for 40 years, quit last year. No hx of DM or CAD.    Medications, personal, family, and social history reviewed with patient and revised.    PAST MEDICAL HISTORY:  Bladder Cancer  COPD  HLD  HTN  Morbid obesity  COCO  PAD s/p POBA    CURRENT MEDICATIONS:  Current Outpatient Prescriptions   Medication Sig Dispense Refill     albuterol (2.5 MG/3ML)  0.083% nebulizer solution Take 1 vial (2.5 mg) by nebulization daily as needed for shortness of breath / dyspnea or wheezing 30 vial 11     albuterol (VENTOLIN HFA) 108 (90 BASE) MCG/ACT inhaler Inhale 2 puffs into the lungs every 6 hours 1 Inhaler 2     aspirin 81 MG tablet Take  by mouth daily.       ATORVASTATIN CALCIUM PO Take 40 mg by mouth daily       diltiazem (TIAZAC) 120 MG 24 hr ER beaded capsule Take 1 capsule (120 mg) by mouth daily 90 capsule 3     fluticasone (FLONASE) 50 MCG/ACT nasal spray Spray 1-2 sprays into both nostrils daily 1 g 11     FUROSEMIDE PO Take 40 mg by mouth daily       Ginkgo Biloba (GINKOBA) 40 MG TABS Take 2 tablets by mouth daily.       IBUPROFEN PO Take 600 mg by mouth       loratadine (CLARITIN) 10 MG tablet Take 10 mg by mouth daily       lovastatin (MEVACOR) 40 MG tablet Take 1 tablet (40 mg) by mouth At Bedtime 90 tablet 3     metolazone (ZAROXOLYN) 5 MG tablet Take 1/2 tablet as needed 10 tablet 0     METOPROLOL SUCCINATE ER PO Take 50 mg by mouth daily        montelukast (SINGULAIR) 10 MG tablet Take 10 mg by mouth At Bedtime       Multiple Vitamin (MULTI-VITAMIN) per tablet Take  by mouth daily.       order for DME Equipment being ordered: oxygen concentrator - [facility] home oxygen 1 Device 0     order for DME Equipment being ordered: oxygen concentrator     Oxygen at 3 liters while at rest is at 97 %.  Oxygen at 3 liters walking is at 97 %.     Without oxygen at rest is at 91 %.  Without oxygen while walking is at 87 %.    From today's office visit 1 Device 0     order for DME Equipment being ordered: Oxygen concentrator and portability  Date of order 05/10/18  Dosage of concentration - 2 liters per minute   Route of administration - Nasal cannula  Frequency of use - continuously  Duration of need - lifetime   Shabbir Agustin MD is the health care provider     _________________________________________  npi [id number] 05/10/18 1 Device 0       PAST SURGICAL HISTORY:  Past  Surgical History:   Procedure Laterality Date     ANGIOPLASTY  2009    angioplasty at abdominla aortic bifurcation in common illiac bilaterally     ANKLE SURGERY       BIOPSY  june 2013     COLONOSCOPY  2013     CYSTOSCOPY      multiple times secondary to dx. of bladder cancer     ORTHOPEDIC SURGERY  2012    surgery on right ankle to repair break     VASCULAR SURGERY  2011    angoplasty for pad     VASECTOMY         ALLERGIES:     Allergies   Allergen Reactions     Penicillins        FAMILY HISTORY:  Family History   Problem Relation Age of Onset     Breast Cancer Mother      Vascular Disease Father      brain anurysm     CANCER Maternal Grandmother      Breast Cancer Sister          SOCIAL HISTORY:  Social History   Substance Use Topics     Smoking status: Former Smoker     Types: Cigarettes     Start date: 1/1/1964     Quit date: 10/1/2016     Smokeless tobacco: Never Used      Comment: already did     Alcohol use 0.0 oz/week      Comment: moderate once a week couple of beers       ROS:   Constitutional: No fever, chills, or sweats. Weight stable.   ENT: No visual disturbance, ear ache, epistaxis, sore throat.   Cardiovascular: As per HPI.   Respiratory: No cough, hemoptysis.    GI: No nausea, vomiting, hematemesis, melena, or hematochezia.   : No hematuria.   Integument: Negative.   Psychiatric: Negative.   Hematologic:  Easy bruising, no easy bleeding.  Neuro: Negative.   Endocrinology: No significant heat or cold intolerance   Musculoskeletal: No myalgia.    Exam:  BP (!) 134/91 (BP Location: Other (Comment), Patient Position: Chair, Cuff Size: Adult Regular)  Pulse 78  SpO2 93%  GENERAL APPEARANCE: healthy, alert and no distress  HEENT: no icterus, no central cyanosis  LYMPH/NECK: no adenopathy, no asymmetry, JVP not elevated, no carotid bruits.  RESPIRATORY: lungs clear to auscultation - no rales, rhonchi or wheezes, no use of accessory muscles, no retractions, respirations are unlabored, normal  respiratory rate  CARDIOVASCULAR: regular rhythm, normal S1, S2, no S3 or S4 and no murmur, click or rub, precordium quiet with normal PMI.  GI: soft, non tender  EXTREMITIES: peripheral pulses normal, no edema  NEURO: alert and oriented to person/place/time, normal speech,and affect  VASC: Radial, dorsalis pedis and posterior tibialis pulses are normal in volumes and symmetric bilaterally.   SKIN: no ecchymoses, no rashes     I have reviewed the labs and personally reviewed the imaging below and made my comment in the assessment and plan.    Labs:  CBC RESULTS:   Lab Results   Component Value Date    WBC 11.1 (H) 01/14/2015    RBC 4.62 01/14/2015    HGB 14.1 01/14/2015    HCT 41.3 01/14/2015    MCV 89 01/14/2015    MCH 30.5 01/14/2015    MCHC 34.1 01/14/2015    RDW 13.8 01/14/2015     01/14/2015       BMP RESULTS:  Lab Results   Component Value Date     09/17/2015    POTASSIUM 3.8 07/10/2017    CHLORIDE 110 (H) 09/17/2015    CO2 24 09/17/2015    ANIONGAP 8 09/17/2015    GLC 80 07/10/2017    BUN 15 09/17/2015    CR 1.0 07/10/2017    GFRESTIMATED >60 07/10/2017    GFRESTBLACK >90   GFR Calc   09/17/2015    GRACIELA 8.9 09/17/2015      CT Chest 5/8/2018  1. No evidence of active pulmonary disease.  2. No convincing CT evidence of interstitial lung disease.  3. Minimal bronchiectasis scattered within the lungs.    RHC 8/2/2017  Mild pulmonary hypertension  Normal cardiac output  Normal filling pressures    Echocardiogram 7/27/2017  Global and regional left ventricular function is normal with an EF of 60-65%.  Mild to moderate right ventricular dilation is present.  Global right ventricular function is normal.  Right ventricular systolic pressure is 39mmHg above the right atrial pressure.  Dilation of the inferior vena cava is present with normal respiratory  variation in diameter.  No pericardial effusion is present    EKG 5/15/2018 SR, RBBB, frequent PACs    Assessment and Plan:   Mr. Hyman  Eduardo is a 67 year old  male with PMH significant for PAD, COPD, COCO on CPAP, morbid obesity, HTN and HLD. His vital signs are normal and he is euvolemic.    1. Hx of COPD and COCO: He is on CPAP for the last 20 years and home oxygen at night. No change in severity of STANFORD over the last few years. Likely multifactorial; obesity, COPD and mild pulmonary HTN. His pulmonary HTN is likely secondary to COCO and COPD.    2. Frequent PACs: Asymptomatic from PACs. He was told he might have afib through pulse check. I ordered Zio for one week.    3. Morbid obesity: He is planning to undergo gastric sleeve.     4. PAD: Patent lower extremity arteries by MRA in 2017.     5. Mild pulmonary HTN with normal filling pressures: Likely due to COCO and COPD.    6.HTN/HLD: Well controlled.    A/P  -Continue current medications  -Zio-Patch for one week (silent afib ?)  -Recommended to discontinue regular use of ibuprofen 800 mg everyday  -Follow-up in one year.    A total of 60 minutes spent face-to-face with greater than 50% of the time spent in counseling and coordinating cares of the issues above.   Please donot hesitate to contact me if you have any questions or concerns. Again, thank you for allowing me to participate in the care of your patient.    Bandar MITCHELL MD  [facility] Division of Cardiology  Pager [phone]    cc Jeramie Agustin MD.

## 2018-05-15 NOTE — PROGRESS NOTES
Chief complaint: shortness of breath.    HPI: Mr. Boogie Clark is a 67 year old  male with PMH significant for COPD, COCO on CPAP, bladder cancer, PAD, morbid obesity, HTN and HLD.    He was recently hospitalized in [place] on 2/11/2018 for bilateral pneumonia and COPD exacerbation. He was treated by IV antibiotics. He is here for cardiology follow-up. He was last seen in cardiology in 7/2017 for leg edema and SOB/desaturation. He underwent RHC which showed mild pulmonary HTN (50/24/32), normal filling pressures and normal cardiac output. MRA of the lower extremities showed patent bilateral arterial system. ECHO from 7/2017 showed normal biventricular function with PAP 39+RAP.    He is referred by  for [place]. The patient denies a history of chest discomfort but feels SOB on exertion which is not new to him. His physical activity is very limited. His BMI is 45.5 and he is planning to undergo gastric sleeve procedure. He is on home oxygen that he uses only at night with CPAP. He denies chest pain, PND, orthopnea, pedal edema, palpitations, lightheadedness, and syncope.  He is on ASA, atorvastatin 40 mg, metoprolol 50 mg qday, diltiazem 180 mg qday and furosemide 20 mg qday.    He has a hx of HTN now well controlled. He has a hx of smoking for 40 years, quit last year. No hx of DM or CAD.    Medications, personal, family, and social history reviewed with patient and revised.    PAST MEDICAL HISTORY:  Bladder Cancer  COPD  HLD  HTN  Morbid obesity  COCO  PAD s/p POBA    CURRENT MEDICATIONS:  Current Outpatient Prescriptions   Medication Sig Dispense Refill     albuterol (2.5 MG/3ML) 0.083% nebulizer solution Take 1 vial (2.5 mg) by nebulization daily as needed for shortness of breath / dyspnea or wheezing 30 vial 11     albuterol (VENTOLIN HFA) 108 (90 BASE) MCG/ACT inhaler Inhale 2 puffs into the lungs every 6 hours 1 Inhaler 2     aspirin 81 MG tablet Take  by mouth daily.       ATORVASTATIN CALCIUM PO Take 40 mg by  mouth daily       diltiazem (TIAZAC) 120 MG 24 hr ER beaded capsule Take 1 capsule (120 mg) by mouth daily 90 capsule 3     fluticasone (FLONASE) 50 MCG/ACT nasal spray Spray 1-2 sprays into both nostrils daily 1 g 11     FUROSEMIDE PO Take 40 mg by mouth daily       Ginkgo Biloba (GINKOBA) 40 MG TABS Take 2 tablets by mouth daily.       IBUPROFEN PO Take 600 mg by mouth       loratadine (CLARITIN) 10 MG tablet Take 10 mg by mouth daily       lovastatin (MEVACOR) 40 MG tablet Take 1 tablet (40 mg) by mouth At Bedtime 90 tablet 3     metolazone (ZAROXOLYN) 5 MG tablet Take 1/2 tablet as needed 10 tablet 0     METOPROLOL SUCCINATE ER PO Take 50 mg by mouth daily        montelukast (SINGULAIR) 10 MG tablet Take 10 mg by mouth At Bedtime       Multiple Vitamin (MULTI-VITAMIN) per tablet Take  by mouth daily.       order for DME Equipment being ordered: oxygen concentrator - [facility] home oxygen 1 Device 0     order for DME Equipment being ordered: oxygen concentrator     Oxygen at 3 liters while at rest is at 97 %.  Oxygen at 3 liters walking is at 97 %.     Without oxygen at rest is at 91 %.  Without oxygen while walking is at 87 %.    From today's office visit 1 Device 0     order for DME Equipment being ordered: Oxygen concentrator and portability  Date of order 05/10/18  Dosage of concentration - 2 liters per minute   Route of administration - Nasal cannula  Frequency of use - continuously  Duration of need - lifetime   Shabbir Agustin MD is the health care provider     _________________________________________  np [id number] 05/10/18 1 Device 0       PAST SURGICAL HISTORY:  Past Surgical History:   Procedure Laterality Date     ANGIOPLASTY  2009    angioplasty at abdominla aortic bifurcation in common illiac bilaterally     ANKLE SURGERY       BIOPSY  june 2013     COLONOSCOPY  2013     CYSTOSCOPY      multiple times secondary to dx. of bladder cancer     ORTHOPEDIC SURGERY  2012    surgery on right ankle to repair  break     VASCULAR SURGERY  2011    angoplasty for pad     VASECTOMY         ALLERGIES:     Allergies   Allergen Reactions     Penicillins        FAMILY HISTORY:  Family History   Problem Relation Age of Onset     Breast Cancer Mother      Vascular Disease Father      brain anurysm     CANCER Maternal Grandmother      Breast Cancer Sister          SOCIAL HISTORY:  Social History   Substance Use Topics     Smoking status: Former Smoker     Types: Cigarettes     Start date: 1/1/1964     Quit date: 10/1/2016     Smokeless tobacco: Never Used      Comment: already did     Alcohol use 0.0 oz/week      Comment: moderate once a week couple of beers       ROS:   Constitutional: No fever, chills, or sweats. Weight stable.   ENT: No visual disturbance, ear ache, epistaxis, sore throat.   Cardiovascular: As per HPI.   Respiratory: No cough, hemoptysis.    GI: No nausea, vomiting, hematemesis, melena, or hematochezia.   : No hematuria.   Integument: Negative.   Psychiatric: Negative.   Hematologic:  Easy bruising, no easy bleeding.  Neuro: Negative.   Endocrinology: No significant heat or cold intolerance   Musculoskeletal: No myalgia.    Exam:  BP (!) 134/91 (BP Location: Other (Comment), Patient Position: Chair, Cuff Size: Adult Regular)  Pulse 78  SpO2 93%  GENERAL APPEARANCE: healthy, alert and no distress  HEENT: no icterus, no central cyanosis  LYMPH/NECK: no adenopathy, no asymmetry, JVP not elevated, no carotid bruits.  RESPIRATORY: lungs clear to auscultation - no rales, rhonchi or wheezes, no use of accessory muscles, no retractions, respirations are unlabored, normal respiratory rate  CARDIOVASCULAR: regular rhythm, normal S1, S2, no S3 or S4 and no murmur, click or rub, precordium quiet with normal PMI.  GI: soft, non tender  EXTREMITIES: peripheral pulses normal, no edema  NEURO: alert and oriented to person/place/time, normal speech,and affect  VASC: Radial, dorsalis pedis and posterior tibialis pulses are  normal in volumes and symmetric bilaterally.   SKIN: no ecchymoses, no rashes     I have reviewed the labs and personally reviewed the imaging below and made my comment in the assessment and plan.    Labs:  CBC RESULTS:   Lab Results   Component Value Date    WBC 11.1 (H) 01/14/2015    RBC 4.62 01/14/2015    HGB 14.1 01/14/2015    HCT 41.3 01/14/2015    MCV 89 01/14/2015    MCH 30.5 01/14/2015    MCHC 34.1 01/14/2015    RDW 13.8 01/14/2015     01/14/2015       BMP RESULTS:  Lab Results   Component Value Date     09/17/2015    POTASSIUM 3.8 07/10/2017    CHLORIDE 110 (H) 09/17/2015    CO2 24 09/17/2015    ANIONGAP 8 09/17/2015    GLC 80 07/10/2017    BUN 15 09/17/2015    CR 1.0 07/10/2017    GFRESTIMATED >60 07/10/2017    GFRESTBLACK >90   GFR Calc   09/17/2015    GRACIELA 8.9 09/17/2015      CT Chest 5/8/2018  1. No evidence of active pulmonary disease.  2. No convincing CT evidence of interstitial lung disease.  3. Minimal bronchiectasis scattered within the lungs.    RHC 8/2/2017  Mild pulmonary hypertension  Normal cardiac output  Normal filling pressures    Echocardiogram 7/27/2017  Global and regional left ventricular function is normal with an EF of 60-65%.  Mild to moderate right ventricular dilation is present.  Global right ventricular function is normal.  Right ventricular systolic pressure is 39mmHg above the right atrial pressure.  Dilation of the inferior vena cava is present with normal respiratory  variation in diameter.  No pericardial effusion is present    EKG 5/15/2018 SR, RBBB, frequent PACs    Assessment and Plan:   Mr. Boogie Clark is a 67 year old  male with PMH significant for PAD, COPD, COCO on CPAP, morbid obesity, HTN and HLD. His vital signs are normal and he is euvolemic.    1. Hx of COPD and COCO: He is on CPAP for the last 20 years and home oxygen at night. No change in severity of STANFORD over the last few years. Likely multifactorial; obesity, COPD and mild pulmonary  HTN. His pulmonary HTN is likely secondary to COCO and COPD.    2. Frequent PACs: Asymptomatic from PACs. He was told he might have afib through pulse check. I ordered Zio for one week.    3. Morbid obesity: He is planning to undergo gastric sleeve.     4. PAD: Patent lower extremity arteries by MRA in 2017.     5. Mild pulmonary HTN with normal filling pressures: Likely due to COCO and COPD.    6.HTN/HLD: Well controlled.    A/P  -Continue current medications  -Zio-Patch for one week (silent afib ?)  -Recommended to discontinue regular use of ibuprofen 800 mg everyday  -Follow-up in one year.    A total of 60 minutes spent face-to-face with greater than 50% of the time spent in counseling and coordinating cares of the issues above.   Please donot hesitate to contact me if you have any questions or concerns. Again, thank you for allowing me to participate in the care of your patient.    Bandar MITCHELL MD  [facility] Division of Cardiology  Pager [phone]    cc Jeramie Agustin MD.

## 2018-05-15 NOTE — NURSING NOTE
1 week ZioXT applied to patient today. Instructions on use and removal given and mail back instructions discussed. All questions answered. Consent form signed. Device registered. Form faxed to [employer].  Device number: [id number].    EKG was done.  Lyly Bernard MA

## 2018-05-15 NOTE — MR AVS SNAPSHOT
After Visit Summary   5/15/2018    Boogie Clark    MRN: [id number]           Patient Information     Date Of Birth          1950        Visit Information        Provider Department      5/15/2018 8:30 AM Bandar Mejía MD [facility] AT [facility]        Today's Diagnoses     STANFORD (dyspnea on exertion)    -  1    PAC (premature atrial contraction)          Care Instructions    Thank you for coming to the [facility] @ [facility]; please note the following instructions:    1.  We have applied a holter (heart) monitor for you to wear for 7 days.  You may remove the heart monitor on 5/22/18 at 10:00 AM.  Please see the enclosed instructions for further information, as well as instructions for removal of the heart monitor and return mailing directions.  If you should have questions regarding your monitor, please call [facility]. at [phone].  The Cardiology Nurse will contact you with your results (please see result notification details at bottom of summary).    *PLEASE DO NOT SHOWER OR INDUCE EXCESSIVE SWEATING IN THE FIRST 24 HOURS OF WEARING*      2.  Dr. Bandar Mejía would like you to return for a cardiac follow up in 1 year  (May 2019).  We will contact you regarding your appointment when the time draws closer or you may call [phone] to arrange an appointment.  Mean while, if you should have any questions or concerns regarding your heart health, please contact us.  Thank you for choosing [facility] for your care.          If you have any questions regarding your visit please contact your care team:     Cardiology  Telephone Number   Rocio SHIN, ROYA NORRIS, RN   Tamika ABBOTT, STEVEN HOWE, FRANCI GORDON, MA   [phone]    *After hours: [phone]   For scheduling appts:     [phone] or    [phone] (select option 1)    *After hours: [phone]     For the Device Clinic (Pacemakers and  ICD's)  RN's :  Germaine Hickey   During business hours: [phone]    *After business hours:  [phone] (select option 4)      Normal test result notifications will be released via Hipbonet or mailed within 7 business days.  All other test results, will be communicated via telephone once reviewed by your cardiologist.    If you need a medication refill please contact your pharmacy.  Please allow 3 business days for your refill to be completed.    As always, thank you for trusting us with your health care needs!            Follow-ups after your visit        Additional Services     Follow-Up with Cardiologist                 Your next 10 appointments already scheduled     May 30, 2018  2:00 PM CDT   Evaluation with Kitty jN PA-C   [facility] ([facility])    [address] [place] [id number] [phone]            May 30, 2018  3:00 PM CDT   Evaluation with John Isbell Diet 1, RD   [facility] ([facility])    [address] [place] [id number]   [phone]            Sep 10, 2018  8:30 AM CDT   Return Visit with Hansel Casillas MD, IRIS TAY PROC ROOM   [facility] ([facility])    [address] [place] [id number] [phone]              Future tests that were ordered for you today     Open Future Orders        Priority Expected Expires Ordered    Follow-Up with Cardiologist Routine 5/15/2019 5/16/2019 5/15/2018    Zio Patch Holter Routine 5/15/2018 7/14/2018 5/15/2018    EKG 12-lead complete w/read - Clinics Routine 5/15/2018 5/15/2018 5/15/2018            Who to contact     If you have questions or need follow up information about today's clinic visit or your schedule please contact [facility] PHYSICIANS HE[facility] AT [facility] directly at [phone].  Normal or  non-critical lab and imaging results will be communicated to you by MyChart, letter or phone within 4 business days after the clinic has received the results. If you do not hear from us within 7 days, please contact the clinic through Online Deal[employer] or phone. If you have a critical or abnormal lab result, we will notify you by phone as soon as possible.  Submit refill requests through Velocify or call your pharmacy and they will forward the refill request to us. Please allow 3 business days for your refill to be completed.          Additional Information About Your Visit        Velocify Information     [employer] gives you secure access to your electronic health record. If you see a primary care provider, you can also send messages to your care team and make appointments. If you have questions, please call your primary care clinic.  If you do not have a primary care provider, please call [phone] and they will assist you.        Care EveryWhere ID     This is your Care EveryWhere ID. This could be used by other organizations to access your [place] medical records  [id number]        Your Vitals Were     Pulse Pulse Oximetry                78 93%           Blood Pressure from Last 3 Encounters:   05/15/18 (!) 134/91   05/01/18 153/84   09/11/17 138/76    Weight from Last 3 Encounters:   05/01/18 141.5 kg (312 lb)   08/14/17 (!) 138.3 kg (305 lb)   08/02/17 (!) 138.3 kg (305 lb)               Primary Care Provider Office Phone # Fax #    Arthur Landis PA-C [phone] [address] [facility] [address]        Equal Access to Services     [facility]: Hadii aad ku hadasho Soomaali, waaxda luqadaha, qaybta kaalmada adeeglatosha, doroteo tuttle . So [facility] [phone].    ATENCIÓN: Si habla español, tiene a landaverde disposición servicios gratuitos de asistencia lingüística. Llame al [phone].    We comply with applicable federal civil rights laws and [place] laws. We do not  discriminate on the basis of race, color, national origin, age, disability, sex, sexual orientation, or gender identity.            Thank you!     Thank you for choosing [facility] PHYSICIANS HEART AT [facility]  for your care. Our goal is always to provide you with excellent care. Hearing back from our patients is one way we can continue to improve our services. Please take a few minutes to complete the written survey that you may receive in the mail after your visit with us. Thank you!             Your Updated Medication List - Protect others around you: Learn how to safely use, store and throw away your medicines at www.disposemymeds.org.          This list is accurate as of 5/15/18  9:52 AM.  Always use your most recent med list.                   Brand Name Dispense Instructions for use Diagnosis    * albuterol 108 (90 Base) MCG/ACT Inhaler    VENTOLIN HFA    1 Inhaler    Inhale 2 puffs into the lungs every 6 hours    Chronic obstructive pulmonary disease, unspecified COPD type (H)       * albuterol (2.5 MG/3ML) 0.083% neb solution     30 vial    Take 1 vial (2.5 mg) by nebulization daily as needed for shortness of breath / dyspnea or wheezing    Chronic obstructive pulmonary disease, unspecified COPD type (H)       aspirin 81 MG tablet      Take  by mouth daily.    Peripheral vascular disease, unspecified       ATORVASTATIN CALCIUM PO      Take 40 mg by mouth daily        diltiazem 120 MG 24 hr ER beaded capsule    TIAZAC    90 capsule    Take 1 capsule (120 mg) by mouth daily    Hypertension goal BP (blood pressure) < 140/90       fluticasone 50 MCG/ACT spray    FLONASE    1 g    Spray 1-2 sprays into both nostrils daily    Nasal congestion       FUROSEMIDE PO      Take 40 mg by mouth daily        GINKOBA 40 MG Tabs   Generic drug:  Ginkgo Biloba      Take 2 tablets by mouth daily.        IBUPROFEN PO      Take 600 mg by mouth        loratadine 10 MG tablet    CLARITIN     Take 10 mg by mouth  daily        lovastatin 40 MG tablet    MEVACOR    90 tablet    Take 1 tablet (40 mg) by mouth At Bedtime    Hypertension goal BP (blood pressure) < 140/90       metolazone 5 MG tablet    ZAROXOLYN    10 tablet    Take 1/2 tablet as needed    Pulmonary hypertension       METOPROLOL SUCCINATE ER PO      Take 50 mg by mouth daily        montelukast 10 MG tablet    SINGULAIR     Take 10 mg by mouth At Bedtime        Multi-vitamin Tabs tablet   Generic drug:  multivitamin, therapeutic with minerals      Take  by mouth daily.    Hypertension goal BP (blood pressure) < 140/90       order for DME     1 Device    Equipment being ordered: oxygen concentrator - [facility] home oxygen    Oxygen dependent       order for DME     1 Device    Equipment being ordered: oxygen concentrator   Oxygen at 3 liters while at rest is at 97 %. Oxygen at 3 liters walking is at 97 %. ? Without oxygen at rest is at 91 %. Without oxygen while walking is at 87 %.  From today's office visit    Oxygen dependent       order for DME     1 Device    Equipment being ordered: Oxygen concentrator and portability Date of order 05/10/18 Dosage of concentration - 2 liters per minute  Route of administration - Nasal cannula Frequency of use - continuously Duration of need - lifetime  Shabbir Agustin MD is the health care provider   _________________________________________ npi [id number] 05/10/18    Oxygen dependent       * Notice:  This list has 2 medication(s) that are the same as other medications prescribed for you. Read the directions carefully, and ask your doctor or other care provider to review them with you.

## 2018-05-15 NOTE — NURSING NOTE
Cardiac Monitors: Patient was instructed regarding the indication, function, care and prompt return of a 7 day zio holter  monitor. The monitor was placed on the patient with instructions regarding care of the skin electrodes and monitor, as well as documentation in the patient diary. Patient demonstrated understanding of this information and agreed to call with further questions or concerns.    Med Reconcile: Reviewed and verified all current medications with the patient. The updated medication list was printed and given to the patient.    Return Appointment: Patient given instructions regarding scheduling next clinic visit, in 1 year (May 2019). Patient demonstrated understanding of this information and agreed to call with further questions or concerns.    Patient stated he understood all health information given and agreed to call with further questions or concerns.    Main Lucio RN

## 2018-05-30 ENCOUNTER — OFFICE VISIT (OUTPATIENT)
Dept: SURGERY | Facility: CLINIC | Age: 68
End: 2018-05-30
Payer: COMMERCIAL

## 2018-05-30 VITALS
HEIGHT: 70 IN | SYSTOLIC BLOOD PRESSURE: 135 MMHG | TEMPERATURE: 98 F | OXYGEN SATURATION: 97 % | RESPIRATION RATE: 22 BRPM | DIASTOLIC BLOOD PRESSURE: 74 MMHG | BODY MASS INDEX: 44.5 KG/M2 | HEART RATE: 89 BPM | WEIGHT: 310.8 LBS

## 2018-05-30 DIAGNOSIS — E78.5 HYPERLIPIDEMIA WITH TARGET LDL LESS THAN 100: ICD-10-CM

## 2018-05-30 DIAGNOSIS — I10 BENIGN ESSENTIAL HYPERTENSION: ICD-10-CM

## 2018-05-30 DIAGNOSIS — J44.9 CHRONIC OBSTRUCTIVE PULMONARY DISEASE, UNSPECIFIED COPD TYPE (H): ICD-10-CM

## 2018-05-30 DIAGNOSIS — E66.01 MORBID OBESITY WITH BMI OF 45.0-49.9, ADULT (H): Primary | ICD-10-CM

## 2018-05-30 DIAGNOSIS — R60.0 BILATERAL LOWER EXTREMITY EDEMA: ICD-10-CM

## 2018-05-30 DIAGNOSIS — G47.33 OSA (OBSTRUCTIVE SLEEP APNEA): ICD-10-CM

## 2018-05-30 PROCEDURE — 99214 OFFICE O/P EST MOD 30 MIN: CPT | Performed by: PHYSICIAN ASSISTANT

## 2018-05-30 NOTE — MR AVS SNAPSHOT
After Visit Summary   5/30/2018    Boogie Clark    MRN: [id number]           Patient Information     Date Of Birth          1950        Visit Information        Provider Department      5/30/2018 2:00 PM Kitty Nj PA-C [facility]      Today's Diagnoses     Morbid obesity with BMI of 45.0-49.9, adult (H)    -  1    COCO (obstructive sleep apnea)        Hyperlipidemia with target LDL less than 100        Bilateral lower extremity edema        Benign essential hypertension        Chronic obstructive pulmonary disease, unspecified COPD type (H)          Care Instructions    Patient to follow up with Primary Care provider regarding elevated blood pressure.            Follow-ups after your visit        Your next 10 appointments already scheduled     Sep 10, 2018  8:30 AM CDT   [facility]   [facility] ([address] [id number] [phone]              Who to contact     If you have questions or need follow up information about today's clinic visit or your schedule please contact [place] [facility] directly at [phone].  Normal or non-critical lab and imaging results will be communicated to you by MyChart, letter or phone within 4 business days after the clinic has received the results. If you do not hear from us within 7 days, please contact the clinic through MyChart or phone. If you have a critical or abnormal lab result, we will notify you by phone as soon as possible.  Submit refill requests through [employer] or call your pharmacy and they will forward the refill request to us. Please allow 3 business days for your refill to be completed.          Additional Information About Your Visit        BioCisionhart Information     [employer] gives you secure access to your electronic  "health record. If you see a primary care provider, you can also send messages to your care team and make appointments. If you have questions, please call your primary care clinic.  If you do not have a primary care provider, please call [phone] and they will assist you.        Care EveryWhere ID     This is your Care EveryWhere ID. This could be used by other organizations to access your [place] medical records  [id number]        Your Vitals Were     Pulse Temperature Respirations Height Pulse Oximetry BMI (Body Mass Index)    89 98  F (36.7  C) 22 5' 9.5\" (1.765 m) 97% 45.24 kg/m2       Blood Pressure from Last 3 Encounters:   05/30/18 135/74   05/15/18 (!) 134/91   05/01/18 153/84    Weight from Last 3 Encounters:   05/30/18 310 lb 12.8 oz (141 kg)   05/01/18 312 lb (141.5 kg)   08/14/17 (!) 305 lb (138.3 kg)              Today, you had the following     No orders found for display         Today's Medication Changes          These changes are accurate as of 5/30/18  3:21 PM.  If you have any questions, ask your nurse or doctor.               These medicines have changed or have updated prescriptions.        Dose/Directions    diltiazem 120 MG 24 hr ER beaded capsule   Commonly known as:  TIAZAC   This may have changed:  how much to take   Used for:  Hypertension goal BP (blood pressure) < 140/90        Dose:  120 mg   Take 1 capsule (120 mg) by mouth daily   Quantity:  90 capsule   Refills:  3                Primary Care Provider Office Phone # Fax #    Shabbir Agustin [phone] [address] [facility] [address]        Equal Access to Services     [facility]: Hadii vinod Palma, russellda luzaira, qaybta kaalmadoroteo ayala. So [facility] [phone].    ATENCIÓN: Si habla español, tiene a landaverde disposición servicios gratuitos de asistencia lingüística. Llame al [phone].    We comply with applicable federal civil rights laws " and [place] laws. We do not discriminate on the basis of race, color, national origin, age, disability, sex, sexual orientation, or gender identity.            Thank you!     Thank you for choosing [place] [facility]  for your care. Our goal is always to provide you with excellent care. Hearing back from our patients is one way we can continue to improve our services. Please take a few minutes to complete the written survey that you may receive in the mail after your visit with us. Thank you!             Your Updated Medication List - Protect others around you: Learn how to safely use, store and throw away your medicines at www.disposemymeds.org.          This list is accurate as of 5/30/18  3:21 PM.  Always use your most recent med list.                   Brand Name Dispense Instructions for use Diagnosis    * albuterol 108 (90 Base) MCG/ACT Inhaler    VENTOLIN HFA    1 Inhaler    Inhale 2 puffs into the lungs every 6 hours    Chronic obstructive pulmonary disease, unspecified COPD type (H)       * albuterol (2.5 MG/3ML) 0.083% neb solution     30 vial    Take 1 vial (2.5 mg) by nebulization daily as needed for shortness of breath / dyspnea or wheezing    Chronic obstructive pulmonary disease, unspecified COPD type (H)       aspirin 81 MG tablet      Take  by mouth daily.    Peripheral vascular disease, unspecified       ATORVASTATIN CALCIUM PO      Take 40 mg by mouth daily        diltiazem 120 MG 24 hr ER beaded capsule    TIAZAC    90 capsule    Take 1 capsule (120 mg) by mouth daily    Hypertension goal BP (blood pressure) < 140/90       fluticasone 50 MCG/ACT spray    FLONASE    1 g    Spray 1-2 sprays into both nostrils daily    Nasal congestion       FUROSEMIDE PO      Take 20 mg by mouth daily        GINKOBA 40 MG Tabs   Generic drug:  Ginkgo Biloba      Take 2 tablets by mouth daily.        IBUPROFEN PO      Take 600 mg by mouth        loratadine 10 MG tablet    CLARITIN      Take 10 mg by mouth daily        lovastatin 40 MG tablet    MEVACOR    90 tablet    Take 1 tablet (40 mg) by mouth At Bedtime    Hypertension goal BP (blood pressure) < 140/90       metolazone 5 MG tablet    ZAROXOLYN    10 tablet    Take 1/2 tablet as needed    Pulmonary hypertension       METOPROLOL SUCCINATE ER PO      Take 50 mg by mouth daily        montelukast 10 MG tablet    SINGULAIR     Take 10 mg by mouth At Bedtime        Multi-vitamin Tabs tablet   Generic drug:  multivitamin, therapeutic with minerals      Take  by mouth daily.    Hypertension goal BP (blood pressure) < 140/90       order for DME     1 Device    Equipment being ordered: oxygen concentrator - [facility] home oxygen    Oxygen dependent       order for DME     1 Device    Equipment being ordered: oxygen concentrator   Oxygen at 3 liters while at rest is at 97 %. Oxygen at 3 liters walking is at 97 %. ? Without oxygen at rest is at 91 %. Without oxygen while walking is at 87 %.  From today's office visit    Oxygen dependent       order for DME     1 Device    Equipment being ordered: Oxygen concentrator and portability Date of order 05/10/18 Dosage of concentration - 2 liters per minute  Route of administration - Nasal cannula Frequency of use - continuously Duration of need - lifetime  Shabbir Agustin MD is the health care provider   _________________________________________ npi [id number] 05/10/18    Oxygen dependent       * Notice:  This list has 2 medication(s) that are the same as other medications prescribed for you. Read the directions carefully, and ask your doctor or other care provider to review them with you.

## 2018-05-30 NOTE — Clinical Note
Thank you for your referral.  If you have any questions please contact our office.  [phone].  Thank you Kitty MCKEON

## 2018-05-30 NOTE — PROGRESS NOTES
"    New Bariatric Surgery Consultation Note    RE: Boogie Clark  MR#: [id number]  : 1950      Referring provider:       2018   Who referred you? dr. soliman and partner flaco schaefer       Chief Complaint/Reason for visit: evaluation for possible weight loss surgery    Dear Shabbir Soliman MD (General),    I had the pleasure of seeing your patient, Boogie Clark, to evaluate his obesity and consider him for possible weight loss surgery. As you know, Boogie Clark is 67 year old.  He has a height of 5' 9.5\", a weight of 310 lbs 12.8 oz, and calculated Body mass index is 45.24 kg/(m^2).        HISTORY OF PRESENT ILLNESS:  Weight Loss History Reviewed with Patient 2018   How long have you been overweight? Since puberty   What is the most that you have ever weighed? 312   What is the most weight you have lost? 85   I have tried the following methods to lose weight Weight Watchers   Have you ever had weight loss surgery? No     No history of weight loss meds    CO-MORBIDITIES OF OBESITY INCLUDE:     2018   I have the following co-morbidities associated with obesity: High Blood Pressure, High Cholesterol, Sleep Apnea, Lower Extremity Edema   Do you use a CPAP? Yes     High blood pressure for years.  On meds.  High cholesterol - on meds.    COCO for 25 years.  Uses CPAP everyday. [facility]    PAST MEDICAL HISTORY:  Past Medical History:   Diagnosis Date     Bladder cancer (H)      COPD (chronic obstructive pulmonary disease) (H)      Hyperlipidemia      Hypertension      COCO (obstructive sleep apnea)       Bladder cancer.  Had chemo.  Goes in once every year.  Has been cancer free for many years.  Dr Casillas at [facility] does annual visits.     PAST SURGICAL HISTORY:  Past Surgical History:   Procedure Laterality Date     ANGIOPLASTY  2009    angioplasty at abdominla aortic bifurcation in common illiac bilaterally     ANKLE SURGERY       BIOPSY  2013     COLONOSCOPY       CYSTOSCOPY  "     multiple times secondary to dx. of bladder cancer     ORTHOPEDIC SURGERY  2012    surgery on right ankle to repair break     VASCULAR SURGERY  2011    angoplasty for pad     VASECTOMY       No personal or family history of blood clots or anesthesia concerns      FAMILY HISTORY:   Family History   Problem Relation Age of Onset     Breast Cancer Mother      Vascular Disease Father      brain anurysm     CANCER Maternal Grandmother        SOCIAL HISTORY:   Social History Questions Reviewed With Patient 5/29/2018   Which best describes your employment status (select all that apply) I am retired   Which best describes your marital status:    Do you have children? Yes   Who do you have in your support network that can be available to help you for the first 2 weeks after surgery? wife   Who can you count on for support throughout your weight loss surgery journey? family   Can you afford 3 meals a day?  Yes   Can you afford 50-60 dollars a month for vitamins? Yes       HABITS:     5/29/2018   How often do you drink alcohol? 2-4 times a month   If you do drink alcohol, how many drinks might you have in a day? (one drink = 5 oz. wine, 1 can/bottle of beer, 1 shot liquor) 1 or 2   Do you currently use any of the following Nicotine products? No   Have you ever used any of the following nicotine products? Cigarettes   If you previously used any of these products, what year did you quit? 2016   Have you or are you currently using street drugs or prescription strength medication for which you do not have a prescription for? No   Do you have a history of chemical dependency (alcohol or drug abuse)? No     Variety of alcohol.  Drinks no more than 1-2 at a time 2-4 times per month.     PSYCHOLOGICAL HISTORY:   Psychological History Reviewed With Patient 5/29/2018   Have you ever attempted suicide? Never.   Have you had thoughts of suicide in the past year? No   Have you ever been hospitalized for mental illness or a  suicide attempt? Never.   Do you have a history of chronic pain? No   Have you ever been diagnosed with fibromyalgia? No   Are you currently being treated for any of the following? (select all that apply) I do not have a mental illness       ROS:     5/29/2018   Skin:  Leg swelling   HEENT: Missing teeth   If you answered yes to missing teeth, please indicate how many: 2   Musculoskeletal: Swelling of legs   Cardiovascular: Shortness of breath with activity   Pulmonary: Shortness of breath with activity   Gastrointestinal: None of the above   Genitourinary: None of the above   Hematological: None of the above   Neurological: None of the above       EATING BEHAVIORS:     5/29/2018   Have you or anyone else thought that you had an eating disorder? No   Do you currently binge eat (eat a large amount of food in a short time)? No   Are you an emotional eater? No   Do you get up to eat after falling asleep? No       EXERCISE:     5/29/2018   How often do you exercise? Less than 1 time per week   What is the duration of your exercise (in minutes)? 10 Minutes   What types of exercise do you do? other   What keeps you from being more active?  Pain, Shortness of breath       MEDICATIONS:  Current Outpatient Prescriptions   Medication     albuterol (VENTOLIN HFA) 108 (90 BASE) MCG/ACT inhaler     aspirin 81 MG tablet     ATORVASTATIN CALCIUM PO     diltiazem (TIAZAC) 120 MG 24 hr ER beaded capsule     fluticasone (FLONASE) 50 MCG/ACT nasal spray     FUROSEMIDE PO     Ginkgo Biloba (GINKOBA) 40 MG TABS     IBUPROFEN PO     loratadine (CLARITIN) 10 MG tablet     METOPROLOL SUCCINATE ER PO     Multiple Vitamin (MULTI-VITAMIN) per tablet     order for DME     order for DME     order for DME     albuterol (2.5 MG/3ML) 0.083% nebulizer solution     lovastatin (MEVACOR) 40 MG tablet     metolazone (ZAROXOLYN) 5 MG tablet     montelukast (SINGULAIR) 10 MG tablet     No current facility-administered medications for this visit.   "      ALLERGIES:  Allergies   Allergen Reactions     Penicillins        LABS/IMAGING/MEDICAL RECORDS REVIEW: recent clinic notes    PHYSICAL EXAM:  /74  Pulse 89  Temp 98  F (36.7  C)  Resp 22  Ht 5' 9.5\" (1.765 m)  Wt 310 lb 12.8 oz (141 kg)  SpO2 97%  BMI 45.24 kg/m2     GENERAL:  Good development and normal affect in no acute distress. Patient is alert and orientated x 3 and answers all questions appropriately.  HEENT: Head is normocephalic, nontraumatic. Pupils equal and round without conjunctival injection. Neck is supple without lymphadenopathy, thyroidmegaly, or mass. Trachea midline. Dentition WNL. - missing 1 tooth HUSSEIN 1 tooth LLS and 3 teeth RLS      After reviewing most of the patients medical history a discussion regarding his missing teeth ensued.  Patient apparently was aware of the policy at [facility] that patients can not miss more than 2 teeth as he mentioned it first in clinic.  He became upset stating he can chew just fine and is not going to replace any teeth.  He is fine if he has to blend food later. Several minutes was spent explaining to patient the reason for this policy.  A list of low cost dentists was offered to patient and he declined.  Advised patient if his specialists would like to speak with the medical director regarding this matter to definitely contact him.  Patient then left the clinic.        Sincerely,    Kitty Nj PA-C       I spent a total of 26 minutes face to face with Boogie during today's office visit. Over 50% of this time was spent counseling the patient and/or coordinating care.          "

## 2018-05-31 ENCOUNTER — MYC MEDICAL ADVICE (OUTPATIENT)
Dept: FAMILY MEDICINE | Facility: CLINIC | Age: 68
End: 2018-05-31

## 2018-06-01 DIAGNOSIS — R06.09 DOE (DYSPNEA ON EXERTION): ICD-10-CM

## 2018-06-01 DIAGNOSIS — I49.1 PAC (PREMATURE ATRIAL CONTRACTION): ICD-10-CM

## 2018-06-14 ENCOUNTER — MEDICAL CORRESPONDENCE (OUTPATIENT)
Dept: HEALTH INFORMATION MANAGEMENT | Facility: CLINIC | Age: 68
End: 2018-06-14

## 2018-06-15 ENCOUNTER — TELEPHONE (OUTPATIENT)
Dept: INTERNAL MEDICINE | Facility: CLINIC | Age: 68
End: 2018-06-15

## 2018-06-15 NOTE — TELEPHONE ENCOUNTER
"Returned call to Laurie.  See form scanned in Epic (Conserving Device Titration Assessment).  Also see 6-8-18, telephone encounter regarding oxygen.  Per Laurie before they can do a titration, the patient must accept delivery of [facility]'s oxygen.  He would start with the tank before he could be tested for a portable concentrator.  Laurie believes he wants a \"simply go mini\".      I tried to call patient at [phone], and phone rang several times and then squealed like a fax machine.  I will try to reach him again later to inform him of message above (however, I think he was already informed of this).  Laurie would like a call back at [phone], to let her know what patient decides so she can close the file. Michelle Mcneal,     "

## 2018-06-15 NOTE — TELEPHONE ENCOUNTER
Reason for Call: Request for an order or referral:    Order or referral being requested: oxygen. Please call to discuss the form sent.     Date needed: as soon as possible    Has the patient been seen by the PCP for this problem? YES    Additional comments: na    Phone number Patient can be reached at:  Other phone number:  none    Best Time:  Any   Can we leave a detailed message on this number?  YES    Call taken on 6/15/2018 at 11:46 AM by Marjorie Kovacs

## 2018-06-18 NOTE — TELEPHONE ENCOUNTER
Correct phone number for patient is [phone], and I have updated this in his file.  I called and spoke to Tiara (patient's wife) and she stated that he has tried different types of oxygen already.  I left message with Laurie at [facility] asking if she could call his wife back and explain to him her what patient needs to do. Michelle Mcneal,

## 2018-06-27 ENCOUNTER — OFFICE VISIT (OUTPATIENT)
Dept: FAMILY MEDICINE | Facility: CLINIC | Age: 68
End: 2018-06-27
Payer: COMMERCIAL

## 2018-06-27 VITALS
BODY MASS INDEX: 44.39 KG/M2 | RESPIRATION RATE: 20 BRPM | HEART RATE: 75 BPM | OXYGEN SATURATION: 91 % | TEMPERATURE: 96.9 F | SYSTOLIC BLOOD PRESSURE: 158 MMHG | WEIGHT: 305 LBS | DIASTOLIC BLOOD PRESSURE: 99 MMHG

## 2018-06-27 DIAGNOSIS — I10 HYPERTENSION GOAL BP (BLOOD PRESSURE) < 140/90: ICD-10-CM

## 2018-06-27 DIAGNOSIS — E66.01 MORBID OBESITY WITH BMI OF 45.0-49.9, ADULT (H): ICD-10-CM

## 2018-06-27 DIAGNOSIS — R05.9 COUGH: Primary | ICD-10-CM

## 2018-06-27 DIAGNOSIS — I27.20 PULMONARY HYPERTENSION (H): ICD-10-CM

## 2018-06-27 PROCEDURE — 99214 OFFICE O/P EST MOD 30 MIN: CPT | Performed by: FAMILY MEDICINE

## 2018-06-27 RX ORDER — BUDESONIDE AND FORMOTEROL FUMARATE DIHYDRATE 80; 4.5 UG/1; UG/1
2 AEROSOL RESPIRATORY (INHALATION) 2 TIMES DAILY
COMMUNITY
Start: 2018-06-27 | End: 2018-11-28

## 2018-06-27 RX ORDER — DILTIAZEM HYDROCHLORIDE 180 MG/1
180 CAPSULE, EXTENDED RELEASE ORAL DAILY
Qty: 30 CAPSULE | Refills: 1 | COMMUNITY
Start: 2018-06-27 | End: 2019-01-08

## 2018-06-27 RX ORDER — AZITHROMYCIN 500 MG/1
500 TABLET, FILM COATED ORAL DAILY
Qty: 5 TABLET | Refills: 0 | Status: SHIPPED | OUTPATIENT
Start: 2018-06-27 | End: 2018-07-02

## 2018-06-27 ASSESSMENT — PAIN SCALES - GENERAL: PAINLEVEL: NO PAIN (0)

## 2018-06-27 NOTE — MR AVS SNAPSHOT
After Visit Summary   6/27/2018    Boogie Clark    MRN: [id number]           Patient Information     Date Of Birth          1950        Visit Information        Provider Department      6/27/2018 9:00 AM Navneet Grant MD [facility]        Today's Diagnoses     Cough    -  1    Pulmonary hypertension        Hypertension goal BP (blood pressure) < 140/90          Care Instructions      Take prescribed medication as directed.              Follow-ups after your visit        Your next 10 appointments already scheduled     Sep 10, 2018  8:30 AM CDT   Return Visit with Hansel Casillas MD, [place] [facility] PROC ROOM   [facility] ([address] [address] [phone]              Who to contact     If you have questions or need follow up information about today's clinic visit or your schedule please contact [facility] directly at [phone].  Normal or non-critical lab and imaging results will be communicated to you by MyChart, letter or phone within 4 business days after the clinic has received the results. If you do not hear from us within 7 days, please contact the clinic through [employer] or phone. If you have a critical or abnormal lab result, we will notify you by phone as soon as possible.  Submit refill requests through [employer] or call your pharmacy and they will forward the refill request to us. Please allow 3 business days for your refill to be completed.          Additional Information About Your Visit        MyChart Information     [employer] gives you secure access to your electronic health record. If you see a primary care provider, you can also send messages to your care team and make appointments. If you have questions, please call your primary care clinic.  If you do not have a primary care provider, please call [phone] and they will assist you.        Care EveryWhere ID     This is your  Care EveryWhere ID. This could be used by other organizations to access your [facility] medical records  [id number]        Your Vitals Were     Pulse Temperature Respirations Pulse Oximetry BMI (Body Mass Index)       75 96.9  F (36.1  C) (Oral) 20 91% 44.39 kg/m2        Blood Pressure from Last 3 Encounters:   06/27/18 (!) 158/99   05/30/18 135/74   05/15/18 (!) 134/91    Weight from Last 3 Encounters:   06/27/18 305 lb (138.3 kg)   05/30/18 310 lb 12.8 oz (141 kg)   05/01/18 312 lb (141.5 kg)              Today, you had the following     No orders found for display         Today's Medication Changes          These changes are accurate as of 6/27/18  9:15 AM.  If you have any questions, ask your nurse or doctor.               Start taking these medicines.        Dose/Directions    azithromycin 500 MG tablet   Commonly known as:  ZITHROMAX   Used for:  Cough   Started by:  Navneet Grant MD        Dose:  500 mg   Take 1 tablet (500 mg) by mouth daily for 5 days   Quantity:  5 tablet   Refills:  0       diltiazem 180 MG 24 hr capsule   Commonly known as:  DILACOR XR   Used for:  Hypertension goal BP (blood pressure) < 140/90   Replaces:  diltiazem 120 MG 24 hr ER beaded capsule   Started by:  Navneet Grant MD        Dose:  180 mg   Take 1 capsule (180 mg) by mouth daily   Quantity:  30 capsule   Refills:  1         Stop taking these medicines if you haven't already. Please contact your care team if you have questions.     diltiazem 120 MG 24 hr ER beaded capsule   Commonly known as:  TIAZAC   Replaced by:  diltiazem 180 MG 24 hr capsule   Stopped by:  Navneet Grant MD                Where to get your medicines      These medications were sent to [id number] - [place], [place] - [address] [place] [address] AT [facility] & [address]  [address] [place] [address], [place] [address]     Phone:  [phone]     azithromycin 500 MG tablet                Primary Care Provider Office  Phone # Fax #    Shabbir Agustin [phone]       [address] [facility]  [place] [address]        Equal Access to Services     BRYSAMMI CHLOE : Claudia vinod garland alycia Soamina, waaxda luqadaha, qaybta kapaulda dave, doroteo chadwick laAdrianashly knott. So [facility] [phone].    ATENCIÓN: Si habla español, tiene a landaverde disposición servicios gratuitos de asistencia lingüística. Llame al [phone].    We comply with applicable federal civil rights laws and [place] laws. We do not discriminate on the basis of race, color, national origin, age, disability, sex, sexual orientation, or gender identity.            Thank you!     Thank you for choosing [facility]  for your care. Our goal is always to provide you with excellent care. Hearing back from our patients is one way we can continue to improve our services. Please take a few minutes to complete the written survey that you may receive in the mail after your visit with us. Thank you!             Your Updated Medication List - Protect others around you: Learn how to safely use, store and throw away your medicines at www.disposemymeds.org.          This list is accurate as of 6/27/18  9:15 AM.  Always use your most recent med list.                   Brand Name Dispense Instructions for use Diagnosis    * albuterol 108 (90 Base) MCG/ACT Inhaler    VENTOLIN HFA    1 Inhaler    Inhale 2 puffs into the lungs every 6 hours    Chronic obstructive pulmonary disease, unspecified COPD type (H)       * albuterol (2.5 MG/3ML) 0.083% neb solution     30 vial    Take 1 vial (2.5 mg) by nebulization daily as needed for shortness of breath / dyspnea or wheezing    Chronic obstructive pulmonary disease, unspecified COPD type (H)       aspirin 81 MG tablet      Take  by mouth daily.    Peripheral vascular disease, unspecified       ATORVASTATIN CALCIUM PO      Take 40 mg by mouth daily        azithromycin 500 MG tablet    ZITHROMAX    5 tablet     Take 1 tablet (500 mg) by mouth daily for 5 days    Cough       diltiazem 180 MG 24 hr capsule    DILACOR XR    30 capsule    Take 1 capsule (180 mg) by mouth daily    Hypertension goal BP (blood pressure) < 140/90       fluticasone 50 MCG/ACT spray    FLONASE    1 g    Spray 1-2 sprays into both nostrils daily    Nasal congestion       FUROSEMIDE PO      Take 20 mg by mouth daily        GINKOBA 40 MG Tabs   Generic drug:  Ginkgo Biloba      Take 2 tablets by mouth daily.        IBUPROFEN PO      Take 600 mg by mouth        lovastatin 40 MG tablet    MEVACOR    90 tablet    Take 1 tablet (40 mg) by mouth At Bedtime    Hypertension goal BP (blood pressure) < 140/90       METOPROLOL SUCCINATE ER PO      Take 50 mg by mouth daily        montelukast 10 MG tablet    SINGULAIR     Take 10 mg by mouth At Bedtime        Multi-vitamin Tabs tablet   Generic drug:  multivitamin, therapeutic with minerals      Take  by mouth daily.    Hypertension goal BP (blood pressure) < 140/90       order for DME     1 Device    Equipment being ordered: oxygen concentrator - [facility] home oxygen    Oxygen dependent       order for DME     1 Device    Equipment being ordered: oxygen concentrator   Oxygen at 3 liters while at rest is at 97 %. Oxygen at 3 liters walking is at 97 %. ? Without oxygen at rest is at 91 %. Without oxygen while walking is at 87 %.  From today's office visit    Oxygen dependent       order for DME     1 Device    Equipment being ordered: Oxygen concentrator and portability Date of order 05/10/18 Dosage of concentration - 2 liters per minute  Route of administration - Nasal cannula Frequency of use - continuously Duration of need - lifetime  Shabbir Agustin MD is the health care provider   _________________________________________ npi [id number] 05/10/18    Oxygen dependent       SYMBICORT 80-4.5 MCG/ACT Inhaler   Generic drug:  budesonide-formoterol      Inhale 2 puffs into the lungs 2 times daily    Cough        tiotropium 2.5 MCG/ACT inhalation aerosol    SPIRIVA RESPIMAT    4 g    Inhale 1 puff into the lungs daily        * Notice:  This list has 2 medication(s) that are the same as other medications prescribed for you. Read the directions carefully, and ask your doctor or other care provider to review them with you.

## 2018-06-27 NOTE — PROGRESS NOTES
CHIEF COMPLAINT    Congestion and cough.      HISTORY    Boogie has a 1-1/2 week history of head congestion, postnasal drainage and cough.  He is getting some clear sputum production.  No fever.  He is concerned because of his underlying respiratory problems and a past bout with pneumonia in February 2018.    It is not totally clear if he has COPD.  He does have some evidence of pulmonary hypertension and he has morbid obesity.  He uses oxygen at night.  He wears a CPAP.    He spends time in [place].  He lives here.  [facility]  We did review and update his medications.    Patient Active Problem List   Diagnosis     Advanced directives, counseling/discussion     Hypertension goal BP (blood pressure) < 140/90     Peripheral vascular disease (H)     Colon polyp     Anal fissure     Kidney stone     Bladder tumor     COCO (obstructive sleep apnea)     Hyperlipidemia with target LDL less than 100     Malignant neoplasm of bladder (H)     Personal history of bladder cancer     Sigmoid diverticulosis     Chronic obstructive pulmonary disease, unspecified COPD type (H)     Benign essential hypertension     History of smoking     Oxygen dependent     Bilateral lower extremity edema     Morbid obesity with BMI of 45.0-49.9, adult (H)     Pulmonary hypertension     Current Outpatient Prescriptions   Medication Sig Dispense Refill     albuterol (2.5 MG/3ML) 0.083% nebulizer solution Take 1 vial (2.5 mg) by nebulization daily as needed for shortness of breath / dyspnea or wheezing 30 vial 11     albuterol (VENTOLIN HFA) 108 (90 BASE) MCG/ACT inhaler Inhale 2 puffs into the lungs every 6 hours 1 Inhaler 2     aspirin 81 MG tablet Take  by mouth daily.       ATORVASTATIN CALCIUM PO Take 40 mg by mouth daily       azithromycin (ZITHROMAX) 500 MG tablet Take 1 tablet (500 mg) by mouth daily for 5 days 5 tablet 0     budesonide-formoterol (SYMBICORT) 80-4.5 MCG/ACT Inhaler Inhale 2 puffs into the lungs  2 times daily       diltiazem (DILACOR XR) 180 MG 24 hr capsule Take 1 capsule (180 mg) by mouth daily 30 capsule 1     fluticasone (FLONASE) 50 MCG/ACT nasal spray Spray 1-2 sprays into both nostrils daily 1 g 11     FUROSEMIDE PO Take 20 mg by mouth daily        Ginkgo Biloba (GINKOBA) 40 MG TABS Take 2 tablets by mouth daily.       IBUPROFEN PO Take 600 mg by mouth       lovastatin (MEVACOR) 40 MG tablet Take 1 tablet (40 mg) by mouth At Bedtime 90 tablet 3     METOPROLOL SUCCINATE ER PO Take 50 mg by mouth daily        montelukast (SINGULAIR) 10 MG tablet Take 10 mg by mouth At Bedtime       Multiple Vitamin (MULTI-VITAMIN) per tablet Take  by mouth daily.       order for DME Equipment being ordered: Oxygen concentrator and portability  Date of order 05/10/18  Dosage of concentration - 2 liters per minute   Route of administration - Nasal cannula  Frequency of use - continuously  Duration of need - lifetime   Shabbir Agustin MD is the health care provider     _________________________________________  npi [id number] 05/10/18 1 Device 0     order for DME Equipment being ordered: oxygen concentrator - [facility] home oxygen 1 Device 0     order for DME Equipment being ordered: oxygen concentrator     Oxygen at 3 liters while at rest is at 97 %.  Oxygen at 3 liters walking is at 97 %.     Without oxygen at rest is at 91 %.  Without oxygen while walking is at 87 %.    From today's office visit 1 Device 0     tiotropium (SPIRIVA RESPIMAT) 2.5 MCG/ACT inhalation aerosol Inhale 1 puff into the lungs daily 4 g 1       REVIEW OF SYSTEMS    No fever or chills.  No chest pain.  No nausea or vomiting.  No focal weakness.  Some lower extremity swelling.      Past Medical History:   Diagnosis Date     Bladder cancer (H)      COPD (chronic obstructive pulmonary disease) (H)      Hyperlipidemia      Hypertension      COCO (obstructive sleep apnea)        EXAM  BP (!) 158/99  Pulse 75  Temp 96.9  F (36.1  C) (Oral)  Resp 20  Wt 305  lb (138.3 kg)  SpO2 91%  BMI 44.39 kg/m2    No distress.  Normal O2 saturation on room air.  Pharynx not inflamed.  Neck without adenopathy or thyromegaly.  Chest.  A few scattered wheezes noted.  No rales or consolidation.  Cardiac rhythm regular.  No murmurs.  Abdomen is obese.  Nontender.  Lower extremities modest amount of edema/lymphedema.  No calf tenderness.      (R05) Cough  (primary encounter diagnosis)  Comment:   Plan: budesonide-formoterol (SYMBICORT) 80-4.5         MCG/ACT Inhaler, azithromycin (ZITHROMAX) 500         MG tablet            (I27.20) Pulmonary hypertension  Comment: Discussed with patient.  Plan: Probably best to manage through his cardiologist.  He also has pulmonary consultants.     (I10) Hypertension goal BP (blood pressure) < 140/90  Comment: Med change noted.  Plan: diltiazem (DILACOR XR) 180 MG 24 hr capsule            (E66.01,  Z68.42) Morbid obesity with BMI of 45.0-49.9, adult (H)  Comment: A primary underlying problem.  Plan: He is planning bariatric surgery consult in the [facility] system.  I encouraged him to pursue this.

## 2018-07-03 ENCOUNTER — TELEPHONE (OUTPATIENT)
Dept: FAMILY MEDICINE | Facility: CLINIC | Age: 68
End: 2018-07-03

## 2018-07-03 NOTE — TELEPHONE ENCOUNTER
Panel Management Review      Patient has the following on his problem list:     Hypertension   Last three blood pressure readings:  BP Readings from Last 3 Encounters:   06/27/18 (!) 158/99   05/30/18 135/74   05/15/18 (!) 134/91     Blood pressure: FAILED    HTN Guidelines:  Age 18-59 BP range:  Less than 140/90  Age 60-85 with Diabetes:  Less than 140/90  Age 60-85 without Diabetes:  less than 150/90      Composite cancer screening  Chart review shows that this patient is due/due soon for the following Colonoscopy  Summary:    Patient is due/failing the following:   COLONOSCOPY    Action needed:   Patient needs office visit for blood pressure follow up on 09/27/2018.    Type of outreach:    none    Questions for provider review:    None                                                                                                                                    Stormy SWENSON CMA (AAMA)       Chart routed to none .

## 2018-07-20 ENCOUNTER — AMBULATORY - HEALTHEAST (OUTPATIENT)
Dept: SURGERY | Facility: CLINIC | Age: 68
End: 2018-07-20

## 2018-08-03 ENCOUNTER — AMBULATORY - HEALTHEAST (OUTPATIENT)
Dept: LAB | Facility: CLINIC | Age: 68
End: 2018-08-03

## 2018-08-03 ENCOUNTER — COMMUNICATION - HEALTHEAST (OUTPATIENT)
Dept: TELEHEALTH | Facility: CLINIC | Age: 68
End: 2018-08-03

## 2018-08-03 ENCOUNTER — OFFICE VISIT - HEALTHEAST (OUTPATIENT)
Dept: SURGERY | Facility: CLINIC | Age: 68
End: 2018-08-03

## 2018-08-03 DIAGNOSIS — Z86.14 HISTORY OF MRSA INFECTION: ICD-10-CM

## 2018-08-03 DIAGNOSIS — E78.5 DYSLIPIDEMIA: ICD-10-CM

## 2018-08-03 DIAGNOSIS — E88.810 METABOLIC SYNDROME: ICD-10-CM

## 2018-08-03 DIAGNOSIS — Z87.442 HISTORY OF KIDNEY STONES: ICD-10-CM

## 2018-08-03 DIAGNOSIS — M25.569 KNEE PAIN: ICD-10-CM

## 2018-08-03 DIAGNOSIS — I10 HYPERTENSION: ICD-10-CM

## 2018-08-03 DIAGNOSIS — R60.0 LOWER LEG EDEMA: ICD-10-CM

## 2018-08-03 DIAGNOSIS — M19.90 ARTHRITIS: ICD-10-CM

## 2018-08-03 DIAGNOSIS — E66.01 MORBID OBESITY WITH BMI OF 50.0-59.9, ADULT (H): ICD-10-CM

## 2018-08-03 DIAGNOSIS — I70.209 PERIPHERAL ARTERIOSCLEROSIS (H): ICD-10-CM

## 2018-08-03 DIAGNOSIS — M50.30 DDD (DEGENERATIVE DISC DISEASE), CERVICAL: ICD-10-CM

## 2018-08-03 DIAGNOSIS — M25.579 ANKLE PAIN: ICD-10-CM

## 2018-08-03 DIAGNOSIS — Z87.891 HISTORY OF TOBACCO ABUSE: ICD-10-CM

## 2018-08-03 DIAGNOSIS — G47.30 SLEEP APNEA: ICD-10-CM

## 2018-08-03 LAB
25(OH)D3 SERPL-MCNC: 31.3 NG/ML (ref 30–80)
ALBUMIN SERPL-MCNC: 3.5 G/DL (ref 3.5–5)
ALP SERPL-CCNC: 81 U/L (ref 45–120)
ALT SERPL W P-5'-P-CCNC: 27 U/L (ref 0–45)
ANION GAP SERPL CALCULATED.3IONS-SCNC: 9 MMOL/L (ref 5–18)
AST SERPL W P-5'-P-CCNC: 17 U/L (ref 0–40)
BILIRUB SERPL-MCNC: 0.5 MG/DL (ref 0–1)
BUN SERPL-MCNC: 27 MG/DL (ref 8–22)
CALCIUM SERPL-MCNC: 9.3 MG/DL (ref 8.5–10.5)
CHLORIDE BLD-SCNC: 109 MMOL/L (ref 98–107)
CO2 SERPL-SCNC: 23 MMOL/L (ref 22–31)
CREAT SERPL-MCNC: 1.08 MG/DL (ref 0.7–1.3)
ERYTHROCYTE [DISTWIDTH] IN BLOOD BY AUTOMATED COUNT: 15.1 % (ref 11–14.5)
FERRITIN SERPL-MCNC: 64 NG/ML (ref 27–300)
FOLATE SERPL-MCNC: 18.5 NG/ML
GFR SERPL CREATININE-BSD FRML MDRD: >60 ML/MIN/1.73M2
GLUCOSE BLD-MCNC: 111 MG/DL (ref 70–125)
HBA1C MFR BLD: 5.9 % (ref 4.2–6.1)
HCT VFR BLD AUTO: 43.2 % (ref 40–54)
HGB BLD-MCNC: 14.5 G/DL (ref 14–18)
MCH RBC QN AUTO: 29.8 PG (ref 27–34)
MCHC RBC AUTO-ENTMCNC: 33.6 G/DL (ref 32–36)
MCV RBC AUTO: 89 FL (ref 80–100)
PLATELET # BLD AUTO: 359 THOU/UL (ref 140–440)
PMV BLD AUTO: 9 FL (ref 8.5–12.5)
POTASSIUM BLD-SCNC: 4.4 MMOL/L (ref 3.5–5)
PROT SERPL-MCNC: 6.5 G/DL (ref 6–8)
PTH-INTACT SERPL-MCNC: 138 PG/ML (ref 10–86)
RBC # BLD AUTO: 4.87 MILL/UL (ref 4.4–6.2)
SODIUM SERPL-SCNC: 141 MMOL/L (ref 136–145)
TSH SERPL DL<=0.005 MIU/L-ACNC: 0.31 UIU/ML (ref 0.3–5)
VIT B12 SERPL-MCNC: 539 PG/ML (ref 213–816)
WBC: 13.8 THOU/UL (ref 4–11)

## 2018-08-03 ASSESSMENT — MIFFLIN-ST. JEOR: SCORE: 2211.63

## 2018-08-04 LAB — BACTERIA SPEC CULT: NORMAL

## 2018-08-05 LAB — ZINC SERPL-MCNC: 84 UG/DL (ref 60–120)

## 2018-08-06 ENCOUNTER — OFFICE VISIT - HEALTHEAST (OUTPATIENT)
Dept: SURGERY | Facility: CLINIC | Age: 68
End: 2018-08-06

## 2018-08-06 DIAGNOSIS — E66.01 MORBID OBESITY (H): ICD-10-CM

## 2018-08-07 LAB
ANNOTATION COMMENT IMP: NORMAL
SHBG SERPL-SCNC: 22 NMOL/L (ref 11–80)
TESTOST FREE SERPL-MCNC: 3.36 NG/DL (ref 4.7–24.4)
TESTOST SERPL-MCNC: 145 NG/DL (ref 240–950)
VIT A SERPL-MCNC: 0.84 MG/L (ref 0.3–1.2)
VITAMIN A (RETINYL PALMITATE): 0.03 MG/L (ref 0–0.1)

## 2018-08-09 LAB — VIT B1 PYROPHOSHATE BLD-SCNC: 148 NMOL/L (ref 70–180)

## 2018-08-17 ENCOUNTER — AMBULATORY - HEALTHEAST (OUTPATIENT)
Dept: VASCULAR SURGERY | Facility: CLINIC | Age: 68
End: 2018-08-17

## 2018-08-20 ENCOUNTER — OFFICE VISIT - HEALTHEAST (OUTPATIENT)
Dept: VASCULAR SURGERY | Facility: CLINIC | Age: 68
End: 2018-08-20

## 2018-08-20 DIAGNOSIS — E66.01 MORBID OBESITY WITH BMI OF 40.0-44.9, ADULT (H): ICD-10-CM

## 2018-08-20 DIAGNOSIS — I73.9 PERIPHERAL VASCULAR DISEASE (H): ICD-10-CM

## 2018-08-20 DIAGNOSIS — G47.30 SLEEP APNEA: ICD-10-CM

## 2018-08-20 DIAGNOSIS — M79.89 LEG SWELLING: ICD-10-CM

## 2018-08-20 DIAGNOSIS — I87.2 VENOUS INSUFFICIENCY OF BOTH LOWER EXTREMITIES: ICD-10-CM

## 2018-08-20 DIAGNOSIS — Z87.891 HISTORY OF TOBACCO ABUSE: ICD-10-CM

## 2018-08-20 DIAGNOSIS — C67.9 MALIGNANT NEOPLASM OF BLADDER (H): ICD-10-CM

## 2018-08-20 DIAGNOSIS — I89.0 ACQUIRED LYMPHEDEMA OF LEG: ICD-10-CM

## 2018-08-20 ASSESSMENT — MIFFLIN-ST. JEOR: SCORE: 2181.47

## 2018-08-21 ENCOUNTER — AMBULATORY - HEALTHEAST (OUTPATIENT)
Dept: SURGERY | Facility: CLINIC | Age: 68
End: 2018-08-21

## 2018-08-23 ENCOUNTER — RECORDS - HEALTHEAST (OUTPATIENT)
Dept: VASCULAR ULTRASOUND | Facility: CLINIC | Age: 68
End: 2018-08-23

## 2018-08-23 DIAGNOSIS — C67.9 MALIGNANT NEOPLASM OF BLADDER, UNSPECIFIED (H): ICD-10-CM

## 2018-08-23 DIAGNOSIS — Z87.891 PERSONAL HISTORY OF NICOTINE DEPENDENCE: ICD-10-CM

## 2018-08-23 DIAGNOSIS — I87.2 VENOUS INSUFFICIENCY (CHRONIC) (PERIPHERAL): ICD-10-CM

## 2018-08-23 DIAGNOSIS — M79.89 OTHER SPECIFIED SOFT TISSUE DISORDERS: ICD-10-CM

## 2018-08-23 DIAGNOSIS — G47.30 SLEEP APNEA, UNSPECIFIED: ICD-10-CM

## 2018-08-23 DIAGNOSIS — I73.9 PERIPHERAL VASCULAR DISEASE, UNSPECIFIED (H): ICD-10-CM

## 2018-08-23 DIAGNOSIS — E66.01 MORBID (SEVERE) OBESITY DUE TO EXCESS CALORIES (H): ICD-10-CM

## 2018-08-23 DIAGNOSIS — I89.0 LYMPHEDEMA, NOT ELSEWHERE CLASSIFIED: ICD-10-CM

## 2018-08-24 ENCOUNTER — COMMUNICATION - HEALTHEAST (OUTPATIENT)
Dept: VASCULAR SURGERY | Facility: CLINIC | Age: 68
End: 2018-08-24

## 2018-08-28 ENCOUNTER — OFFICE VISIT - HEALTHEAST (OUTPATIENT)
Dept: SURGERY | Facility: CLINIC | Age: 68
End: 2018-08-28

## 2018-08-29 ENCOUNTER — OFFICE VISIT - HEALTHEAST (OUTPATIENT)
Dept: PHYSICAL THERAPY | Facility: REHABILITATION | Age: 68
End: 2018-08-29

## 2018-08-29 DIAGNOSIS — I89.0 LYMPHEDEMA: ICD-10-CM

## 2018-08-30 ENCOUNTER — OFFICE VISIT - HEALTHEAST (OUTPATIENT)
Dept: PHYSICAL THERAPY | Facility: REHABILITATION | Age: 68
End: 2018-08-30

## 2018-08-30 DIAGNOSIS — M79.89 LEG SWELLING: ICD-10-CM

## 2018-08-30 DIAGNOSIS — E66.01 MORBID OBESITY WITH BMI OF 40.0-44.9, ADULT (H): ICD-10-CM

## 2018-08-30 DIAGNOSIS — I89.0 ACQUIRED LYMPHEDEMA OF LEG: ICD-10-CM

## 2018-08-30 DIAGNOSIS — I87.2 VENOUS INSUFFICIENCY OF BOTH LOWER EXTREMITIES: ICD-10-CM

## 2018-08-31 ENCOUNTER — OFFICE VISIT - HEALTHEAST (OUTPATIENT)
Dept: SURGERY | Facility: CLINIC | Age: 68
End: 2018-08-31

## 2018-08-31 DIAGNOSIS — G47.33 OSA (OBSTRUCTIVE SLEEP APNEA): ICD-10-CM

## 2018-08-31 DIAGNOSIS — E88.810 METABOLIC SYNDROME: ICD-10-CM

## 2018-08-31 DIAGNOSIS — E78.5 HYPERLIPIDEMIA, UNSPECIFIED HYPERLIPIDEMIA TYPE: ICD-10-CM

## 2018-08-31 DIAGNOSIS — Z71.3 DIETARY COUNSELING: ICD-10-CM

## 2018-08-31 DIAGNOSIS — I10 HYPERTENSION, UNSPECIFIED TYPE: ICD-10-CM

## 2018-08-31 DIAGNOSIS — E66.01 OBESITY, MORBID, BMI 50 OR HIGHER (H): ICD-10-CM

## 2018-08-31 ASSESSMENT — MIFFLIN-ST. JEOR: SCORE: 2211.63

## 2018-09-05 ENCOUNTER — OFFICE VISIT - HEALTHEAST (OUTPATIENT)
Dept: PHYSICAL THERAPY | Facility: REHABILITATION | Age: 68
End: 2018-09-05

## 2018-09-05 ENCOUNTER — AMBULATORY - HEALTHEAST (OUTPATIENT)
Dept: VASCULAR SURGERY | Facility: CLINIC | Age: 68
End: 2018-09-05

## 2018-09-05 DIAGNOSIS — C67.9 MALIGNANT NEOPLASM OF BLADDER (H): ICD-10-CM

## 2018-09-05 DIAGNOSIS — I89.0 ACQUIRED LYMPHEDEMA OF LEG: ICD-10-CM

## 2018-09-05 DIAGNOSIS — I87.2 VENOUS INSUFFICIENCY OF BOTH LOWER EXTREMITIES: ICD-10-CM

## 2018-09-05 DIAGNOSIS — E66.01 MORBID OBESITY WITH BMI OF 40.0-44.9, ADULT (H): ICD-10-CM

## 2018-09-05 DIAGNOSIS — R60.0 BILATERAL LOWER EXTREMITY EDEMA: ICD-10-CM

## 2018-09-05 DIAGNOSIS — M79.89 LEG SWELLING: ICD-10-CM

## 2018-09-06 ENCOUNTER — COMMUNICATION - HEALTHEAST (OUTPATIENT)
Dept: VASCULAR SURGERY | Facility: CLINIC | Age: 68
End: 2018-09-06

## 2018-09-10 ENCOUNTER — OFFICE VISIT - HEALTHEAST (OUTPATIENT)
Dept: PHYSICAL THERAPY | Facility: REHABILITATION | Age: 68
End: 2018-09-10

## 2018-09-10 DIAGNOSIS — M79.89 LEG SWELLING: ICD-10-CM

## 2018-09-10 DIAGNOSIS — I89.0 LYMPHEDEMA: ICD-10-CM

## 2018-09-10 DIAGNOSIS — E66.01 MORBID OBESITY WITH BMI OF 40.0-44.9, ADULT (H): ICD-10-CM

## 2018-09-10 DIAGNOSIS — I87.2 VENOUS INSUFFICIENCY OF BOTH LOWER EXTREMITIES: ICD-10-CM

## 2018-09-10 DIAGNOSIS — I89.0 ACQUIRED LYMPHEDEMA OF LEG: ICD-10-CM

## 2018-09-12 ENCOUNTER — OFFICE VISIT - HEALTHEAST (OUTPATIENT)
Dept: PHYSICAL THERAPY | Facility: REHABILITATION | Age: 68
End: 2018-09-12

## 2018-09-12 DIAGNOSIS — I89.0 ACQUIRED LYMPHEDEMA OF LEG: ICD-10-CM

## 2018-09-12 DIAGNOSIS — I87.2 VENOUS INSUFFICIENCY OF BOTH LOWER EXTREMITIES: ICD-10-CM

## 2018-09-12 DIAGNOSIS — M79.89 LEG SWELLING: ICD-10-CM

## 2018-09-17 ENCOUNTER — OFFICE VISIT - HEALTHEAST (OUTPATIENT)
Dept: SURGERY | Facility: CLINIC | Age: 68
End: 2018-09-17

## 2018-09-17 DIAGNOSIS — E66.01 MORBID OBESITY (H): ICD-10-CM

## 2018-09-19 ENCOUNTER — OFFICE VISIT - HEALTHEAST (OUTPATIENT)
Dept: PHYSICAL THERAPY | Facility: REHABILITATION | Age: 68
End: 2018-09-19

## 2018-09-19 DIAGNOSIS — M79.89 LEG SWELLING: ICD-10-CM

## 2018-09-19 DIAGNOSIS — I87.2 VENOUS INSUFFICIENCY OF BOTH LOWER EXTREMITIES: ICD-10-CM

## 2018-09-19 DIAGNOSIS — I89.0 ACQUIRED LYMPHEDEMA OF LEG: ICD-10-CM

## 2018-09-20 ENCOUNTER — AMBULATORY - HEALTHEAST (OUTPATIENT)
Dept: SURGERY | Facility: CLINIC | Age: 68
End: 2018-09-20

## 2018-09-21 ENCOUNTER — OFFICE VISIT - HEALTHEAST (OUTPATIENT)
Dept: SURGERY | Facility: CLINIC | Age: 68
End: 2018-09-21

## 2018-09-21 DIAGNOSIS — G47.33 OSA (OBSTRUCTIVE SLEEP APNEA): ICD-10-CM

## 2018-09-21 DIAGNOSIS — E78.5 DYSLIPIDEMIA: ICD-10-CM

## 2018-09-21 DIAGNOSIS — E66.01 OBESITY, MORBID, BMI 40.0-49.9 (H): ICD-10-CM

## 2018-09-21 DIAGNOSIS — Z71.3 DIETARY COUNSELING: ICD-10-CM

## 2018-09-21 DIAGNOSIS — E88.810 METABOLIC SYNDROME: ICD-10-CM

## 2018-09-21 ASSESSMENT — MIFFLIN-ST. JEOR: SCORE: 2157.2

## 2018-09-26 ENCOUNTER — OFFICE VISIT - HEALTHEAST (OUTPATIENT)
Dept: PHYSICAL THERAPY | Facility: REHABILITATION | Age: 68
End: 2018-09-26

## 2018-09-26 DIAGNOSIS — M79.89 LEG SWELLING: ICD-10-CM

## 2018-09-26 DIAGNOSIS — E66.01 MORBID OBESITY WITH BMI OF 40.0-44.9, ADULT (H): ICD-10-CM

## 2018-09-26 DIAGNOSIS — I89.0 ACQUIRED LYMPHEDEMA OF LEG: ICD-10-CM

## 2018-09-26 DIAGNOSIS — I87.2 VENOUS INSUFFICIENCY OF BOTH LOWER EXTREMITIES: ICD-10-CM

## 2018-10-10 ENCOUNTER — OFFICE VISIT - HEALTHEAST (OUTPATIENT)
Dept: PHYSICAL THERAPY | Facility: REHABILITATION | Age: 68
End: 2018-10-10

## 2018-10-10 DIAGNOSIS — I87.2 VENOUS INSUFFICIENCY OF BOTH LOWER EXTREMITIES: ICD-10-CM

## 2018-10-10 DIAGNOSIS — M79.89 LEG SWELLING: ICD-10-CM

## 2018-10-10 DIAGNOSIS — I89.0 ACQUIRED LYMPHEDEMA OF LEG: ICD-10-CM

## 2018-10-17 ENCOUNTER — OFFICE VISIT - HEALTHEAST (OUTPATIENT)
Dept: PHYSICAL THERAPY | Facility: REHABILITATION | Age: 68
End: 2018-10-17

## 2018-10-17 ENCOUNTER — MYC MEDICAL ADVICE (OUTPATIENT)
Dept: INTERNAL MEDICINE | Facility: CLINIC | Age: 68
End: 2018-10-17

## 2018-10-17 DIAGNOSIS — I89.0 ACQUIRED LYMPHEDEMA OF LEG: ICD-10-CM

## 2018-10-17 DIAGNOSIS — M79.89 LEG SWELLING: ICD-10-CM

## 2018-10-17 DIAGNOSIS — I89.0 LYMPHEDEMA: ICD-10-CM

## 2018-10-17 DIAGNOSIS — I87.2 VENOUS INSUFFICIENCY OF BOTH LOWER EXTREMITIES: ICD-10-CM

## 2018-10-17 DIAGNOSIS — E66.01 MORBID OBESITY WITH BMI OF 40.0-44.9, ADULT (H): ICD-10-CM

## 2018-10-17 NOTE — TELEPHONE ENCOUNTER
Panel Management Review      Patient has the following on his problem list:     Hypertension   Last three blood pressure readings:  BP Readings from Last 3 Encounters:   06/27/18 (!) 158/99   05/30/18 135/74   05/15/18 (!) 134/91     Blood pressure: FAILED    HTN Guidelines:  Age 18-59 BP range:  Less than 140/90  Age 60-85 with Diabetes:  Less than 140/90  Age 60-85 without Diabetes:  less than 150/90      Composite cancer screening  Chart review shows that this patient is due/due soon for the following Colonoscopy  Summary:    Patient is due/failing the following:   Hypertension  and COLONOSCOPY    Action needed:   Patient needs office visit for Hypertension.    Type of outreach:    Sent [place] message.    Questions for provider review:    None                                                                                                                                    TAMMI Shields       Chart routed to none.

## 2018-10-24 ENCOUNTER — OFFICE VISIT - HEALTHEAST (OUTPATIENT)
Dept: PHYSICAL THERAPY | Facility: REHABILITATION | Age: 68
End: 2018-10-24

## 2018-10-24 DIAGNOSIS — I87.2 VENOUS INSUFFICIENCY OF BOTH LOWER EXTREMITIES: ICD-10-CM

## 2018-10-24 DIAGNOSIS — I89.0 ACQUIRED LYMPHEDEMA OF LEG: ICD-10-CM

## 2018-10-24 DIAGNOSIS — M79.89 LEG SWELLING: ICD-10-CM

## 2018-10-24 DIAGNOSIS — E66.01 MORBID OBESITY WITH BMI OF 40.0-44.9, ADULT (H): ICD-10-CM

## 2018-10-24 DIAGNOSIS — I89.0 LYMPHEDEMA: ICD-10-CM

## 2018-10-31 ENCOUNTER — OFFICE VISIT - HEALTHEAST (OUTPATIENT)
Dept: PHYSICAL THERAPY | Facility: REHABILITATION | Age: 68
End: 2018-10-31

## 2018-10-31 ENCOUNTER — MYC MEDICAL ADVICE (OUTPATIENT)
Dept: FAMILY MEDICINE | Facility: CLINIC | Age: 68
End: 2018-10-31

## 2018-10-31 DIAGNOSIS — I87.2 VENOUS INSUFFICIENCY OF BOTH LOWER EXTREMITIES: ICD-10-CM

## 2018-10-31 DIAGNOSIS — I89.0 ACQUIRED LYMPHEDEMA OF LEG: ICD-10-CM

## 2018-10-31 DIAGNOSIS — M79.89 LEG SWELLING: ICD-10-CM

## 2018-11-01 NOTE — TELEPHONE ENCOUNTER
Called patient and left VM to call clinic to set up appointment for BP check with provider. Please help schedule appointment if patient returns call.  Rosalva PURCELL CMA ([facility])

## 2018-11-02 NOTE — TELEPHONE ENCOUNTER
Spoke to patient and informed him he is due for appointment. Appointment scheduled for 11/9/2018. MYchart message was also addressed.  Rosalva PURCELL CMA ([facility])

## 2018-11-06 ENCOUNTER — OFFICE VISIT - HEALTHEAST (OUTPATIENT)
Dept: SURGERY | Facility: CLINIC | Age: 68
End: 2018-11-06

## 2018-11-06 DIAGNOSIS — G47.33 OSA (OBSTRUCTIVE SLEEP APNEA): ICD-10-CM

## 2018-11-06 DIAGNOSIS — R60.0 BILATERAL LOWER EXTREMITY EDEMA: ICD-10-CM

## 2018-11-06 DIAGNOSIS — I27.20 PULMONARY HYPERTENSION (H): ICD-10-CM

## 2018-11-06 DIAGNOSIS — M50.30 DDD (DEGENERATIVE DISC DISEASE), CERVICAL: ICD-10-CM

## 2018-11-06 DIAGNOSIS — I10 BENIGN ESSENTIAL HYPERTENSION: ICD-10-CM

## 2018-11-06 DIAGNOSIS — E66.01 MORBID OBESITY WITH BMI OF 40.0-44.9, ADULT (H): ICD-10-CM

## 2018-11-06 DIAGNOSIS — E88.810 METABOLIC SYNDROME: ICD-10-CM

## 2018-11-06 ASSESSMENT — MIFFLIN-ST. JEOR: SCORE: 2152.66

## 2018-11-06 NOTE — PROGRESS NOTES
SUBJECTIVE:   Boogie Clark is a 67 year old male who presents to clinic today for the following health issues:       Screen for colon cancer  Need for prophylactic vaccination against Streptococcus pneumoniae (pneumococcus)  Personal history of bladder cancer  Sigmoid diverticulosis  Morbid obesity with BMI of 45.0-49.9, adult (H)  Oxygen dependent     A charming and absolutely fascinating patient . He's got a significant hypoxemia which is apparently not fitting a diagnosis of either chronic obstructive pulmonary disease or pulmonary hypertension , he does have obstructive sleep apnea and obesity and so the diagnosis of obesity hypoventilation syndrome is likely correct , it's just that the degree of hypoxia we see with this patient seems out of character for this diagnosis . He's now completed a significant amount of treatment for his lymphedema and is wearing velcro styled lymphedema garments and has noted significant decreased lower leg swelling and he's on deck for an weight loss surgery before the end of the year . Will expect to see him back for a recheck regarding preoperative history and physical examination . Meanwhile we are seeing him for a few other things    Amy  Boogie follows with a lympedema therapist and is currently uses lymphedema garments. He says he's had about 10 lymphedema treatments at the clinic. He has noted a great deal of improvement with these treatments. His next follow up will e next week he says.     Weight and Breathing   He plans to follow up with a gastric bypass surgery and will be scheduling this shortly. He continues to experience issues with breathing however is expecting improvement with bariatric surgery. He says that he's followed with psychologist and nutritionist. He reports no improvement in his breathing with albuterol inhaler. He was originally diagnosed with COPD, but he is concerned that he might have pulmonary hypertension. He reports a reading of 25mmHg during  a heart catheterization within the last 2 years and the diagnosis he was given was mild pulmonary hypertension.    Hypertension   [facility] and he has not been taking his metoprolol.   BP Readings from Last 6 Encounters:   11/08/18 126/70   06/27/18 (!) 158/99   05/30/18 135/74   05/15/18 (!) 134/91   05/01/18 153/84   09/11/17 138/76     Additional Information   His last colonoscopy was in 2012 and he believes he was told to be due in 10 years, but the letter he was sent states to follow up in 5 years. He also plans to follow with a cystoscopy with Dr. Casillas as he has a history of bladder cancer .    Problem list and histories reviewed & adjusted, as indicated.  Additional history: as documented    Patient Active Problem List   Diagnosis     Advanced directives, counseling/discussion     Hypertension goal BP (blood pressure) < 140/90     Peripheral vascular disease (H)     Colon polyp     Anal fissure     Kidney stone     Bladder tumor     COCO (obstructive sleep apnea)     Hyperlipidemia with target LDL less than 100     Malignant neoplasm of bladder (H)     Personal history of bladder cancer     Sigmoid diverticulosis     Chronic obstructive pulmonary disease, unspecified COPD type (H)     Benign essential hypertension     History of smoking     Oxygen dependent     Bilateral lower extremity edema     Morbid obesity with BMI of 45.0-49.9, adult (H)     Pulmonary hypertension (H)     Past Surgical History:   Procedure Laterality Date     ANGIOPLASTY  2009    angioplasty at abdominla aortic bifurcation in common illiac bilaterally     ANKLE SURGERY       BIOPSY  june 2013     COLONOSCOPY  2013     CYSTOSCOPY      multiple times secondary to dx. of bladder cancer     ORTHOPEDIC SURGERY  2012    surgery on right ankle to repair break     VASCULAR SURGERY  2011    angoplasty for pad     VASECTOMY         Social History   Substance Use Topics     Smoking status: Former Smoker      "Types: Cigarettes     Start date: 1/1/1964     Quit date: 10/1/2016     Smokeless tobacco: Never Used      Comment: already did     Alcohol use 0.0 oz/week      Comment: moderate once a week couple of beers     Family History   Problem Relation Age of Onset     Breast Cancer Mother      Vascular Disease Father      brain anurysm     Cancer Maternal Grandmother          BP Readings from Last 3 Encounters:   11/08/18 126/70   06/27/18 (!) 158/99   05/30/18 135/74    Wt Readings from Last 3 Encounters:   11/08/18 139.5 kg (307 lb 9.6 oz)   06/27/18 138.3 kg (305 lb)   05/30/18 141 kg (310 lb 12.8 oz)        Reviewed and updated as needed this visit by clinical staff       Reviewed and updated as needed this visit by Provider       ROS:  Constitutional, HEENT, cardiovascular, pulmonary, GI, , musculoskeletal, neuro, skin, endocrine and psych systems are negative, except as otherwise noted.    This document serves as a record of the services and decisions personally performed and made by Shabbir Agustin MD. It was created on his behalf by Mark Steve, a trained medical scribe. The creation of this document is based on the provider's statements to the medical scribe.  Mark Steve 12:00 PM November 8, 2018    OBJECTIVE:     /70  Pulse 81  Temp 97  F (36.1  C) (Oral)  Resp 26  Ht 1.765 m (5' 9.5\")  Wt 139.5 kg (307 lb 9.6 oz)  SpO2 90%  BMI 44.77 kg/m2  Body mass index is 44.77 kg/(m^2).  GENERAL: healthy, alert and no distress  EYES: Eyes grossly normal to inspection, PERRL and conjunctivae and sclerae normal  SKIN: no suspicious lesions or rashes to visible skin   NEURO: Normal strength and tone, mentation intact and speech normal  PSYCH: mentation appears normal, affect normal/bright    Diagnostic Test Results:  No results found for this or any previous visit (from the past 24 hour(s)).    ASSESSMENT/PLAN:     (Z12.11) Screen for colon cancer  (primary encounter diagnosis)  Comment: ordered  Plan: " GASTROENTEROLOGY ADULT REF PROCEDURE ONLY            (Z23) Need for prophylactic vaccination against Streptococcus pneumoniae (pneumococcus)  Comment: declines   Plan:     (Z85.51) Personal history of bladder cancer  Comment: seeing Dr. Casillas in planned follow up   Plan:     (K57.30) Sigmoid diverticulosis  Comment: noted  Plan: colonoscopy     (E66.01,  Z68.42) Morbid obesity with BMI of 45.0-49.9, adult (H)  Comment: after some careful thought and reviewing pulmonary hypertension pathophysiology with patient, I concur that we can't explain this patients hypoxia by diagnoses of chronic obstructive pulmonary disease or pulmonary hypertension   Plan: weight loss surgery upcoming     See Patient Instructions    The information in this document, created by the medical scribe for me, accurately reflects the services I personally performed and the decisions made by me. I have reviewed and approved this document for accuracy prior to leaving the patient care area.  November 8, 2018 12:00 PM    Shabbir Agustin MD  [facility]

## 2018-11-08 ENCOUNTER — OFFICE VISIT (OUTPATIENT)
Dept: FAMILY MEDICINE | Facility: CLINIC | Age: 68
End: 2018-11-08
Payer: COMMERCIAL

## 2018-11-08 VITALS
OXYGEN SATURATION: 90 % | HEIGHT: 70 IN | RESPIRATION RATE: 26 BRPM | BODY MASS INDEX: 44.03 KG/M2 | HEART RATE: 81 BPM | SYSTOLIC BLOOD PRESSURE: 126 MMHG | DIASTOLIC BLOOD PRESSURE: 70 MMHG | WEIGHT: 307.6 LBS | TEMPERATURE: 97 F

## 2018-11-08 DIAGNOSIS — Z12.11 SCREEN FOR COLON CANCER: Primary | ICD-10-CM

## 2018-11-08 DIAGNOSIS — Z99.81 OXYGEN DEPENDENT: ICD-10-CM

## 2018-11-08 DIAGNOSIS — Z23 NEED FOR PROPHYLACTIC VACCINATION AGAINST STREPTOCOCCUS PNEUMONIAE (PNEUMOCOCCUS): ICD-10-CM

## 2018-11-08 DIAGNOSIS — E66.01 MORBID OBESITY WITH BMI OF 45.0-49.9, ADULT (H): ICD-10-CM

## 2018-11-08 DIAGNOSIS — Z85.51 PERSONAL HISTORY OF BLADDER CANCER: ICD-10-CM

## 2018-11-08 DIAGNOSIS — K57.30 SIGMOID DIVERTICULOSIS: ICD-10-CM

## 2018-11-08 PROCEDURE — 99214 OFFICE O/P EST MOD 30 MIN: CPT | Performed by: INTERNAL MEDICINE

## 2018-11-08 NOTE — MR AVS SNAPSHOT
After Visit Summary   11/8/2018    Boogie Clark    MRN: [id number]           Patient Information     Date Of Birth          1950        Visit Information        Provider Department      11/8/2018 12:10 PM Shabbir Agustin MD [facility]        Today's Diagnoses     Screen for colon cancer    -  1    Need for prophylactic vaccination against Streptococcus pneumoniae (pneumococcus)        Personal history of bladder cancer        Sigmoid diverticulosis        Morbid obesity with BMI of 45.0-49.9, adult (H)          Care Instructions    [facility]    If you have any questions regarding to your visit please contact your care team:     Team Pink:   Clinic Hours Telephone Number   Internal Medicine:  Dr. Kelly Bahena, NP 7am-7pm  Monday - Thursday   7am-5pm  Fridays  [phone]  (Appointment scheduling available 24/7)   Urgent Care - [place] - 11am-9pm Monday-Friday Saturday-Sunday- 9am-5pm   [place] - 5pm-9pm Monday-Friday Saturday-Sunday- 9am-5pm  [phone] - [place]  [phone] - [place]       What options do I have for a visit other than an office visit? We offer electronic visits (e-visits) and telephone visits, when medically appropriate.  Please check with your medical insurance to see if these types of visits are covered, as you will be responsible for any charges that are not paid by your insurance.      You can use [employer] (secure electronic communication) to access to your chart, send your primary care provider a message, or make an appointment. Ask a team member how to get started.     For a price quote for your services, please call our Consumer Price Line at [phone] or our Imaging Cost estimation line at [phone] (for imaging tests).  Discharged By: An            Follow-ups after your visit        Additional Services     GASTROENTEROLOGY ADULT REF PROCEDURE ONLY       Last  Lab Result: Creatinine (mg/dL)       Date                     Value                 03/05/2018               1.18             ----------  There is no height or weight on file to calculate BMI.     Needed:  No  Language:  English    Patient will be contacted to schedule procedure.     Please be aware that coverage of these services is subject to the terms and limitations of your health insurance plan.  Call member services at your health plan with any benefit or coverage questions.  Any procedures must be performed at a [place] facility OR coordinated by your clinic's referral office.    Please bring the following with you to your appointment:    (1) Any X-Rays, CTs or MRIs which have been performed.  Contact the facility where they were done to arrange for  prior to your scheduled appointment.    (2) List of current medications   (3) This referral request   (4) Any documents/labs given to you for this referral                  Who to contact     If you have questions or need follow up information about today's clinic visit or your schedule please contact [facility] directly at [phone].  Normal or non-critical lab and imaging results will be communicated to you by [employer] [place], letter or phone within 4 business days after the clinic has received the results. If you do not hear from us within 7 days, please contact the clinic through [employer] [place] or phone. If you have a critical or abnormal lab result, we will notify you by phone as soon as possible.  Submit refill requests through [employer] or call your pharmacy and they will forward the refill request to us. Please allow 3 business days for your refill to be completed.          Additional Information About Your Visit        SETVI Information     [employer] gives you secure access to your electronic health record. If you see a primary care provider, you can also send messages to your care team and make appointments. If you have questions,  "please call your primary care clinic.  If you do not have a primary care provider, please call [phone] and they will assist you.        Care EveryWhere ID     This is your Care EveryWhere ID. This could be used by other organizations to access your [facility] medical records  [id number]        Your Vitals Were     Pulse Temperature Respirations Height Pulse Oximetry BMI (Body Mass Index)    81 97  F (36.1  C) (Oral) 26 5' 9.5\" (1.765 m) 90% 44.77 kg/m2       Blood Pressure from Last 3 Encounters:   11/08/18 126/70   06/27/18 (!) 158/99   05/30/18 135/74    Weight from Last 3 Encounters:   11/08/18 307 lb 9.6 oz (139.5 kg)   06/27/18 305 lb (138.3 kg)   05/30/18 310 lb 12.8 oz (141 kg)              We Performed the Following     GASTROENTEROLOGY ADULT REF PROCEDURE ONLY        Primary Care Provider Office Phone # Fax #    Shabbir Agustin [phone]       [address] [facility] [address]        Equal Access to Services     [facility]: Hadii aad ku hadasho Soomaali, waaxda luqadaha, qaybta kaalmada adeegyada, doroteo tuttle . So [facility] [phone].    ATENCIÓN: Si habla español, tiene a landaverde disposición servicios gratuitos de asistencia lingüística. [facility] [phone].    We comply with applicable federal civil rights laws and [place] laws. We do not discriminate on the basis of race, color, national origin, age, disability, sex, sexual orientation, or gender identity.            Thank you!     Thank you for choosing [facility]  for your care. Our goal is always to provide you with excellent care. Hearing back from our patients is one way we can continue to improve our services. Please take a few minutes to complete the written survey that you may receive in the mail after your visit with us. Thank you!             Your Updated Medication List - Protect others around you: Learn how to safely use, store and throw away your medicines at " www.disposemymeds.org.          This list is accurate as of 11/8/18 12:20 PM.  Always use your most recent med list.                   Brand Name Dispense Instructions for use Diagnosis    * albuterol 108 (90 Base) MCG/ACT inhaler    VENTOLIN HFA    1 Inhaler    Inhale 2 puffs into the lungs every 6 hours    Chronic obstructive pulmonary disease, unspecified COPD type (H)       * albuterol (2.5 MG/3ML) 0.083% neb solution     30 vial    Take 1 vial (2.5 mg) by nebulization daily as needed for shortness of breath / dyspnea or wheezing    Chronic obstructive pulmonary disease, unspecified COPD type (H)       aspirin 81 MG tablet      Take  by mouth daily.    Peripheral vascular disease, unspecified (H)       ATORVASTATIN CALCIUM PO      Take 40 mg by mouth daily        diltiazem 180 MG 24 hr capsule    DILACOR XR    30 capsule    Take 1 capsule (180 mg) by mouth daily    Hypertension goal BP (blood pressure) < 140/90       fluticasone 50 MCG/ACT spray    FLONASE    1 g    Spray 1-2 sprays into both nostrils daily    Nasal congestion       FUROSEMIDE PO      Take 20 mg by mouth daily        GINKOBA 40 MG Tabs   Generic drug:  Ginkgo Biloba      Take 2 tablets by mouth daily.        IBUPROFEN PO      Take 600 mg by mouth        lovastatin 40 MG tablet    MEVACOR    90 tablet    Take 1 tablet (40 mg) by mouth At Bedtime    Hypertension goal BP (blood pressure) < 140/90       METOPROLOL SUCCINATE ER PO      Take 50 mg by mouth daily        montelukast 10 MG tablet    SINGULAIR     Take 10 mg by mouth At Bedtime        Multi-vitamin Tabs tablet   Generic drug:  multivitamin, therapeutic with minerals      Take  by mouth daily.    Hypertension goal BP (blood pressure) < 140/90       order for DME     1 Device    Equipment being ordered: oxygen concentrator - [facility] home oxygen    Oxygen dependent       order for DME     1 Device    Equipment being ordered: oxygen concentrator   Oxygen at 3 liters while at rest is at 97 %.  Oxygen at 3 liters walking is at 97 %. ? Without oxygen at rest is at 91 %. Without oxygen while walking is at 87 %.  From today's office visit    Oxygen dependent       order for DME     1 Device    Equipment being ordered: Oxygen concentrator and portability Date of order 05/10/18 Dosage of concentration - 2 liters per minute  Route of administration - Nasal cannula Frequency of use - continuously Duration of need - lifetime  Shabbir Agustin MD is the health care provider   _________________________________________ npi [id number] 05/10/18    Oxygen dependent       SYMBICORT 80-4.5 MCG/ACT Inhaler   Generic drug:  budesonide-formoterol      Inhale 2 puffs into the lungs 2 times daily    Cough       tiotropium 2.5 MCG/ACT inhalation aerosol    SPIRIVA RESPIMAT    4 g    Inhale 1 puff into the lungs daily        * Notice:  This list has 2 medication(s) that are the same as other medications prescribed for you. Read the directions carefully, and ask your doctor or other care provider to review them with you.

## 2018-11-08 NOTE — PATIENT INSTRUCTIONS
[facility]    If you have any questions regarding to your visit please contact your care team:     Team Pink:   Clinic Hours Telephone Number   Internal Medicine:  Dr. Kelly Bahena NP 7am-7pm  Monday - Thursday   7am-5pm  Fridays  [phone]  (Appointment scheduling available 24/7)   Urgent Care - [place] and [facility] - 11am-9pm Monday-Friday Saturday-Sunday- 9am-5pm   [place] - 5pm-9pm Monday-Friday Saturday-Sunday- 9am-5pm  [phone] - [place]  [phone] - [place]       What options do I have for a visit other than an office visit? We offer electronic visits (e-visits) and telephone visits, when medically appropriate.  Please check with your medical insurance to see if these types of visits are covered, as you will be responsible for any charges that are not paid by your insurance.      You can use [employer]¢ (secure electronic communication) to access to your chart, send your primary care provider a message, or make an appointment. Ask a team member how to get started.     For a price quote for your services, please call our Consumer Price Line at [phone] or our Imaging Cost estimation line at [phone] (for imaging tests).  Discharged By: An

## 2018-11-14 ENCOUNTER — OFFICE VISIT - HEALTHEAST (OUTPATIENT)
Dept: PHYSICAL THERAPY | Facility: REHABILITATION | Age: 68
End: 2018-11-14

## 2018-11-14 DIAGNOSIS — I89.0 ACQUIRED LYMPHEDEMA OF LEG: ICD-10-CM

## 2018-11-14 DIAGNOSIS — I87.2 VENOUS INSUFFICIENCY OF BOTH LOWER EXTREMITIES: ICD-10-CM

## 2018-11-14 DIAGNOSIS — M79.89 LEG SWELLING: ICD-10-CM

## 2018-11-15 ENCOUNTER — AMBULATORY - HEALTHEAST (OUTPATIENT)
Dept: SURGERY | Facility: CLINIC | Age: 68
End: 2018-11-15

## 2018-11-15 ENCOUNTER — TELEPHONE (OUTPATIENT)
Dept: UROLOGY | Facility: CLINIC | Age: 68
End: 2018-11-15

## 2018-11-15 NOTE — TELEPHONE ENCOUNTER
Called and spoke to patient's wife.  Scheduled cysto for 12/11/18.    Zion Guerrier RN....11/15/2018 3:37 PM

## 2018-11-19 ENCOUNTER — OFFICE VISIT - HEALTHEAST (OUTPATIENT)
Dept: VASCULAR SURGERY | Facility: CLINIC | Age: 68
End: 2018-11-19

## 2018-11-19 DIAGNOSIS — M79.89 LEG SWELLING: ICD-10-CM

## 2018-11-19 DIAGNOSIS — C67.9 MALIGNANT NEOPLASM OF URINARY BLADDER, UNSPECIFIED SITE (H): ICD-10-CM

## 2018-11-19 DIAGNOSIS — I89.0 ACQUIRED LYMPHEDEMA OF LEG: ICD-10-CM

## 2018-11-19 DIAGNOSIS — R60.0 BILATERAL LOWER EXTREMITY EDEMA: ICD-10-CM

## 2018-11-19 DIAGNOSIS — I87.2 VENOUS INSUFFICIENCY OF BOTH LOWER EXTREMITIES: ICD-10-CM

## 2018-11-19 DIAGNOSIS — E66.01 MORBID OBESITY WITH BMI OF 40.0-44.9, ADULT (H): ICD-10-CM

## 2018-11-19 ASSESSMENT — MIFFLIN-ST. JEOR: SCORE: 2108.44

## 2018-11-26 ENCOUNTER — PATIENT OUTREACH (OUTPATIENT)
Dept: CARE COORDINATION | Facility: CLINIC | Age: 68
End: 2018-11-26

## 2018-11-27 NOTE — PROGRESS NOTES
Maurilio Velasquez M.D.  Cardiovascular Medicine    I personally saw and examined this patient, discussed care with housestaff and other consultants, reviewed current laboratories and imaging studies, and conveyed impression and diagnostic/therapeutic plan to patient.    Skip Agustin M.D.    Marcos Hoffman M.D.    Problem List  1. Pulmonary hypertension  2. Hypertension  3. COLD  4. History of bladder tumor  5. COCO  6. Elevated body mass index  7. Nephrolithiasis  8. Diverticulosis  9. Hyperlipidemia  10. ASPVD with balloon stenting  11. History of ankle fracture with immolization  12. Lymphedema  13. ? History of DVT  14. Coronary calcification  15. Steroid dependence    History    Thank you very much for allowing us to see Mr. Clark.  As you know he has exertional shortness of breath of uncertain etiology.  He has known ASPVD with bilateral iliofemoral balloon angioplasty.  There is a questionable history of DVT associated with immobilization associated with ankle fracture.  He has exertional hypoxia which he says is not corrected with supplemental oxygen.  He has no history of collagen vascular disease, myeloproliferative syndrome, methamphetamine, cocaine usage.  He has multiple risk factors for CAD but no actual cardiac events.  He has COCO, uses machine and supplemental oxygen but has not had a repeat sleep study.  He has a remote history of smoking.  He has no history ETOH abuse.      Answers for HPI/ROS submitted by the patient on 11/28/2018   General Symptoms: No  HENT Symptoms: No  EYE SYMPTOMS: Yes  HEART SYMPTOMS: No  INTESTINAL SYMPTOMS: No  URINARY SYMPTOMS: No  REPRODUCTIVE SYMPTOMS: No  SKELETAL SYMPTOMS: No  BLOOD SYMPTOMS: No  NERVOUS SYSTEM SYMPTOMS: No  MENTAL HEALTH SYMPTOMS: No    Objective  Constitutional: alert, oriented, normal gait and station, normal mentation.  Oral: moist mucous membranes  Lymph: without pathologic adenopathy  Chest: clear to ausculation and percussion  Cor: No evidence of  left or right ventricular activity.  Rhythm is regular.  S1 normal, S2 split physiologically. Murmurs are not present  Abdomen: without tenderness, rebound, guarding, masses, ascites  Extremities: Edema not present  Neuro: no focal defects, normal mentation  Skin: without open lesions  Psych: oriented, verbal, mental status in tact    Wt Readings from Last 5 Encounters:   11/08/18 139.5 kg (307 lb 9.6 oz)   06/27/18 138.3 kg (305 lb)   05/30/18 141 kg (310 lb 12.8 oz)   05/01/18 141.5 kg (312 lb)   08/14/17 (!) 138.3 kg (305 lb)       Meds  Current Outpatient Prescriptions   Medication     albuterol (2.5 MG/3ML) 0.083% nebulizer solution     albuterol (VENTOLIN HFA) 108 (90 BASE) MCG/ACT inhaler     aspirin 81 MG tablet     ATORVASTATIN CALCIUM PO     budesonide-formoterol (SYMBICORT) 80-4.5 MCG/ACT Inhaler     diltiazem (DILACOR XR) 180 MG 24 hr capsule     fluticasone (FLONASE) 50 MCG/ACT nasal spray     FUROSEMIDE PO     Ginkgo Biloba (GINKOBA) 40 MG TABS     IBUPROFEN PO     lovastatin (MEVACOR) 40 MG tablet     METOPROLOL SUCCINATE ER PO     montelukast (SINGULAIR) 10 MG tablet     Multiple Vitamin (MULTI-VITAMIN) per tablet     order for DME     order for DME     order for DME     tiotropium (SPIRIVA RESPIMAT) 2.5 MCG/ACT inhalation aerosol     No current facility-administered medications for this visit.      Labs: pending  .  Imaging     CT CHEST WITHOUT CONTRAST May 8, 2018 8:33 AM      HISTORY: Chronic obstructive pulmonary disease. Oxygen dependent.     COMPARISON: 6/29/2017.     TECHNIQUE: Without intravenous contrast, helical sections were  acquired through the lungs. Thin axial reconstructions were generated  at regular intervals through the lungs during inspiration and  expiration. Coronal reconstructions were generated. (High-resolution  protocol) Radiation dose for this scan was reduced using automated  exposure control, adjustment of the mA and/or kV according to the  patient's size, or iterative  reconstruction technique.     FINDINGS: A few linear opacities scattered within the lungs likely  represent scarring. The lungs are otherwise clear. Minimal  bronchiectasis scattered within the lungs. No consolidation,  ground-glass opacities, or honeycombing. No air trapping on expiratory  images. No pleural or pericardial effusion. No enlarged lymph nodes in  the chest. A few nonspecific nonenlarged mediastinal lymph nodes are  present. Atherosclerotic calcification in the thoracic aorta and  coronary arteries. The central pulmonary arteries are mildly large in  caliber.     Scan through the upper abdomen is significant for a 1.7 cm exophytic  cyst in the upper pole of the left kidney and a subcentimeter  low-attenuation lesion in the upper pole of the right kidney that is  too small to characterize.         IMPRESSION:   1. No evidence of active pulmonary disease.  2. No convincing CT evidence of interstitial lung disease.  3. Minimal bronchiectasis scattered within the lungs.     Name: CLARITZA PAL  MRN: [id number]  : 1950  Study Date: 2017 02:55 PM  Age: 66 yrs  Gender: Male  Patient Location: [facility]  Reason For Study: secondary pulmonary hypertension  Ordering Physician: MARISA LEYVA  Referring Physician: MARISA LEYVA  Performed By: Rebecca Calixto RDCS     BSA: 2.5 m2  Height: 70 in  Weight: 313 lb  BP: 111/69 mmHg  _____________________________________________________________________________  __        Procedure  Echocardiogram with two-dimensional, color and spectral Doppler performed.  Optison (NDC #2375-8388-73) given intravenously. Patient was given 3.0 ml  mixture of 3 ml Optison and 6 ml saline. 6.0 ml wasted. IV start location L  Hand .  _____________________________________________________________________________  __        Interpretation Summary     Global and regional left ventricular function is normal with an EF of 60-65%.  Mild to moderate right ventricular dilation is  present.  Global right ventricular function is normal.  Right ventricular systolic pressure is 39mmHg above the right atrial pressure.  Dilation of the inferior vena cava is present with normal respiratory  variation in diameter.  No pericardial effusion is present.  _____________________________________________________________________________  __        Left Ventricle  Global and regional left ventricular function is normal with an EF of 60-65%.  Left ventricular size is normal. Borderline concentric wall thickening  consistent with left ventricular hypertrophy is present. Normal left  ventricular filling for age. No regional wall motion abnormalities are seen.     Right Ventricle  Global right ventricular function is normal. Mild to moderate right  ventricular dilation is present.     Atria  Moderate biatrial enlargement is present.     Mitral Valve  The mitral valve is normal.        Aortic Valve  Aortic valve is normal in structure and function.     Tricuspid Valve  The tricuspid valve is normal. Trace tricuspid insufficiency is present. Right  ventricular systolic pressure is 39mmHg above the right atrial pressure.     Pulmonic Valve  The pulmonic valve is normal. Trace pulmonic insufficiency is present.     Vessels  The aorta root cannot be assessed. The pulmonary artery cannot be assessed.  Dilation of the inferior vena cava is present with normal respiratory  variation in diameter.     Pericardium  No pericardial effusion is present.     _____________________________________________________________________________  __  MMode/2D Measurements & Calculations  IVSd: 1.1 cm  LVIDd: 4.5 cm  LVIDs: 3.4 cm  LVPWd: 1.0 cm  FS: 24.9 %  EDV(Teich): 94.4 ml  ESV(Teich): 47.8 ml     LV mass(C)d: 166.3 grams  LV mass(C)dI: 65.9 grams/m2  Ao root diam: 3.4 cm  LA dimension: 3.8 cm  asc Aorta Diam: 3.6 cm  LA/Ao: 1.1  LVOT diam: 2.4 cm  LVOT area: 4.5 cm2  LA Volume (BP): 111.0 ml  LA Volume Index (BP): 44.0  ml/m2        Doppler Measurements & Calculations  MV E max juliano: 62.8 cm/sec  MV A max juliano: 70.6 cm/sec  MV E/A: 0.89  MV dec time: 0.26 sec     TR max juliano: 313.0 cm/sec  TR max P.2 mmHg  Lateral E/e': 7.6  Medial E/e': 13.1     HEMODYNAMICS:  2017  RA 7  RV 52/8  PA 50/24 (32)   PCW 13   PA sat 68.7%   Taryn CO/CI: 5.7/2.3  TD CO/CI: 6.4/2.5  PVR: 3.0     Inhaled Nitric Oxide  PA: 40/20 (27)  PCWP: 17  CO/CI (TD): 5.9/2.4  PVR: 1.7    Assessment/Plan   1. VQ lung scan  2. Right heart catherization  3. Arterial blood gases  4. Overnight recording oximetry  5. CBC with platelets and differential, CMP, iron studies, MADI, CRPinf, lipid   6. Non exercise stress test for coronary disease  7. Bubble study    The etiology of symptoms is not clear though assume in part secondary to alveolar hypoventilation imposed by weight.    He needs further evaluation to delineate his problem, before he would be satisfactory candidate for bariatric surgery with abdominal insufflation.    Previous resting right heart catherization without exercise, but should have some exercise with this study.    I walked the patient in the castillo and desaturates to 85% at heart rate of 100.      Consider pulmonary rehabilitation    Maurilio Velasquez

## 2018-11-28 ENCOUNTER — OFFICE VISIT (OUTPATIENT)
Dept: CARDIOLOGY | Facility: CLINIC | Age: 68
End: 2018-11-28
Attending: INTERNAL MEDICINE
Payer: MEDICARE

## 2018-11-28 VITALS
HEART RATE: 75 BPM | SYSTOLIC BLOOD PRESSURE: 152 MMHG | OXYGEN SATURATION: 91 % | WEIGHT: 314 LBS | HEIGHT: 71 IN | BODY MASS INDEX: 43.96 KG/M2 | DIASTOLIC BLOOD PRESSURE: 85 MMHG

## 2018-11-28 DIAGNOSIS — I27.20 PULMONARY HYPERTENSION (H): ICD-10-CM

## 2018-11-28 DIAGNOSIS — J44.9 CHRONIC OBSTRUCTIVE PULMONARY DISEASE, UNSPECIFIED COPD TYPE (H): ICD-10-CM

## 2018-11-28 DIAGNOSIS — R06.09 DYSPNEA ON EXERTION: ICD-10-CM

## 2018-11-28 DIAGNOSIS — I27.20 PULMONARY HYPERTENSION (H): Primary | ICD-10-CM

## 2018-11-28 LAB
ALBUMIN SERPL-MCNC: 3.3 G/DL (ref 3.4–5)
ALP SERPL-CCNC: 83 U/L (ref 40–150)
ALT SERPL W P-5'-P-CCNC: 27 U/L (ref 0–70)
ANION GAP SERPL CALCULATED.3IONS-SCNC: 8 MMOL/L (ref 3–14)
AST SERPL W P-5'-P-CCNC: 16 U/L (ref 0–45)
BASE EXCESS BLDA CALC-SCNC: 0 MMOL/L
BILIRUB SERPL-MCNC: 0.4 MG/DL (ref 0.2–1.3)
BUN SERPL-MCNC: 12 MG/DL (ref 7–30)
CALCIUM SERPL-MCNC: 8.8 MG/DL (ref 8.5–10.1)
CHLORIDE SERPL-SCNC: 110 MMOL/L (ref 94–109)
CO2 SERPL-SCNC: 25 MMOL/L (ref 20–32)
COHGB MFR BLD: 2.2 % (ref 0–2)
CREAT SERPL-MCNC: 0.91 MG/DL (ref 0.66–1.25)
CRP SERPL-MCNC: 20.3 MG/L (ref 0–8)
ERYTHROCYTE [DISTWIDTH] IN BLOOD BY AUTOMATED COUNT: 13.3 % (ref 10–15)
FERRITIN SERPL-MCNC: 59 NG/ML (ref 26–388)
GFR SERPL CREATININE-BSD FRML MDRD: 82 ML/MIN/1.7M2
GLUCOSE SERPL-MCNC: 106 MG/DL (ref 70–99)
HCO3 BLD-SCNC: 23 MMOL/L (ref 21–28)
HCT VFR BLD AUTO: 40.4 % (ref 40–53)
HGB BLD-MCNC: 13.4 G/DL (ref 13.3–17.7)
HGB BLD-MCNC: 14.1 G/DL (ref 13.3–17.7)
IRON SATN MFR SERPL: 12 % (ref 15–46)
IRON SERPL-MCNC: 40 UG/DL (ref 35–180)
MCH RBC QN AUTO: 30.3 PG (ref 26.5–33)
MCHC RBC AUTO-ENTMCNC: 33.2 G/DL (ref 31.5–36.5)
MCV RBC AUTO: 91 FL (ref 78–100)
METHGB MFR BLD: 0.1 % (ref 0–3)
O2/TOTAL GAS SETTING VFR VENT: 21 %
OXYHGB MFR BLD: 88 % (ref 92–100)
PCO2 BLD: 32 MM HG (ref 35–45)
PH BLD: 7.46 PH (ref 7.35–7.45)
PLATELET # BLD AUTO: 355 10E9/L (ref 150–450)
PO2 BLD: 57 MM HG (ref 80–105)
POTASSIUM SERPL-SCNC: 3.9 MMOL/L (ref 3.4–5.3)
PROT SERPL-MCNC: 7.5 G/DL (ref 6.8–8.8)
RBC # BLD AUTO: 4.42 10E12/L (ref 4.4–5.9)
SODIUM SERPL-SCNC: 142 MMOL/L (ref 133–144)
TIBC SERPL-MCNC: 329 UG/DL (ref 240–430)
WBC # BLD AUTO: 10.3 10E9/L (ref 4–11)

## 2018-11-28 PROCEDURE — 86038 ANTINUCLEAR ANTIBODIES: CPT | Performed by: INTERNAL MEDICINE

## 2018-11-28 PROCEDURE — 84238 ASSAY NONENDOCRINE RECEPTOR: CPT | Performed by: INTERNAL MEDICINE

## 2018-11-28 PROCEDURE — 83550 IRON BINDING TEST: CPT | Performed by: INTERNAL MEDICINE

## 2018-11-28 PROCEDURE — 82728 ASSAY OF FERRITIN: CPT | Performed by: INTERNAL MEDICINE

## 2018-11-28 PROCEDURE — 80053 COMPREHEN METABOLIC PANEL: CPT | Performed by: INTERNAL MEDICINE

## 2018-11-28 PROCEDURE — 85027 COMPLETE CBC AUTOMATED: CPT | Performed by: INTERNAL MEDICINE

## 2018-11-28 PROCEDURE — 99214 OFFICE O/P EST MOD 30 MIN: CPT | Mod: ZP | Performed by: INTERNAL MEDICINE

## 2018-11-28 PROCEDURE — 86140 C-REACTIVE PROTEIN: CPT | Performed by: INTERNAL MEDICINE

## 2018-11-28 PROCEDURE — 83540 ASSAY OF IRON: CPT | Performed by: INTERNAL MEDICINE

## 2018-11-28 PROCEDURE — G0463 HOSPITAL OUTPT CLINIC VISIT: HCPCS | Mod: ZF

## 2018-11-28 RX ORDER — LIDOCAINE 40 MG/G
CREAM TOPICAL
Status: CANCELLED | OUTPATIENT
Start: 2018-11-28

## 2018-11-28 RX ORDER — DIPHENOXYLATE HYDROCHLORIDE AND ATROPINE SULFATE 2.5; .025 MG/1; MG/1
1 TABLET ORAL
COMMUNITY
End: 2018-11-28

## 2018-11-28 ASSESSMENT — PAIN SCALES - GENERAL: PAINLEVEL: NO PAIN (0)

## 2018-11-28 NOTE — NURSING NOTE
Chief Complaint   Patient presents with     New Patient     New PH referral from Dr. Hale      Vitals were taken and medications were reconciled.     Tamra Mcfarlane RMA  12:54 PM

## 2018-11-28 NOTE — MR AVS SNAPSHOT
After Visit Summary   11/28/2018    Boogie Clark    MRN: [id number]           Patient Information     Date Of Birth          1950        Visit Information        Provider Department      11/28/2018 1:00 PM Maurilio Velasquez MD [facility]        Today's Diagnoses     Pulmonary hypertension (H)    -  1    Dyspnea on exertion          Care Instructions    Patient Instructions:  1. Continue staying active and eat a heart healthy diet.    2. Please keep current list of medications with you at all times.    3. Remember to weigh yourself daily after voiding and before you consume any food or beverages and log the numbers.  If you have gained/lost 2 pounds overnight or 5 pounds in a week contact us immediately for medication adjustments or further instructions.    4. **Please call us immediately if you have any syncope (fainting or passing out), chest pain, edema (swelling or weight gain), or decline in your functional status (general decline in how you are feeling).    Check-In  Time Check-In Location Estimated Length Procedure   Name        [facility]   waiting room 60 minutes VQ/Lung Perfusion Scan**   Procedure Preparations & Instructions     This is a non-invasive procedure and does NOT require any preparation         Check-In  Time Check-In Location Estimated Length Procedure   Name        [facility]  waiting room 60-90 minutes Right Heart Catheterization w/Exercise**     Procedure Preparations & Instructions     This is an invasive procedure that DOES require preparation:    - Nothing to eat for 6 hours   - You may have clear liquids up until the time of your procedure  - A ride should be arranged for you in the instance you are unable to drive home, however you should be able to function as you normally would after the procedure        Location Estimated Length Procedure   Name        Patient Home 12 hours Overnight Oximetry     Procedure Preparations & Instructions     This is not an invasive  procedure that DOES require preparation:    - Remove nail polish on your fingers.  - No caffeine, alcohol or sedative medication for 6 hours before going to sleep.  - Sleep in a quite, comfortable environment for at least 6 hours  If you need to go to the bathroom  - DO NOT turn the machine off for any reason.  Take the probe off and go the to the bathroom, replace and go back to sleep.     Additional Instructions: ? Test Should be completed on:    CPAP with oxygen at 2 lpm         Check-In  Time Check-In Location Estimated Length Procedure   Name         60 minutes Echocardiogram (Echo) with Bubble study**   Procedure Preparations & Instructions     This is a non-invasive procedure and does NOT require any preparation       Check-In  Time Check-In Location Estimated Length Procedure   Name        [facility]  waiting room 3-4 hours Nuclear Medicine Stress Test**     Procedure Preparations & Instructions     This is a non-invasive procedure that DOES require preparation:    - Nothing to eat for 3 hours prior to your test  - You may have clear liquids up to the time of your test  - DO NOT use alcohol, tobacco or food/beverages containing caffeine for at least 12 hours prior to your test (ie. Coffee, tea, soda, chocolate, de-caffeinated beverages, certain medications including Excedrin, Anacin, NoDoz)  - Please wear loose, two-piece clothing and comfortable, rubber soled shoes for walking  - Please inform the RN on staff if you are:  ? Pregnant or may be pregnant  ? Breast feeding  ? Diabetic  ? Have a Pacemaker or ICD (implanted defibrillator)  - Do NOT take nitrates the day of your procedure (including nitro-patches, please bring them with you instead     Follow up Appointment Information:  1. Arterial blood gases today (Pulmonary Lab today)  2. CBC with platelets and differential, CMP, iron studies, MADI, CRP & lipid profile (Before you leave [facility] today)  3. Follow up with Dr. Velasquez after all testing is complete.    We  are located on the third floor of [facility] on the [facility].  Our address is     [address] [place] [address] on [address]rd [place] [address]    Thank you for allowing us to be a part of your care here at the [facility]    If you have questions or concerns please contact us at:    Sally Rizo RN, BSN, PHN  Misael Summers (Schedule,Prior Auth)  Nurse Coordinator     Clinic   Pulmonary Hypertension   Pulmonary Hypertension  [facility]  (P)[phone]    (P) [phone]        (F) [phone]    ** Please note that you will NOT receive a reminder call regarding your scheduled testing, reminder calls are for provider appointments only.  If you are scheduled for testing within the [facility] system you may receive a call regarding pre-registration for billing purposes only.**     Remember to weigh yourself daily after voiding and before you consume any food or beverages and log the numbers.  If you have gained/lost 2 pounds overnight or 5 pounds in a week contact us immediately for medication adjustments or further instructions.   **Please call us immediately if you have any syncope, chest pain, edema, or decline in your functional status.    Support Group:  Pulmonary Hypertension Association  Https://www.phassociation.org/  **Look at the Events Tab** They even have Support Groups that you can call into    [facility] PH Support Group  Second Saturday of the Month from 1-3 PM   Location: [address] [place] [address]  Leader: Robbie Watson  Phone: [phone]  Email: [email or web address]            Follow-ups after your visit        Your next 10 appointments already scheduled     Dec 11, 2018  1:30 PM CST   Return Visit with Hansel Casillas MD, IRIS CYSTO PROC ROOM   [facility]    [id number]  [facility] [address]  [place] [address]   [phone]              Future tests that were ordered for you today     Open Future Orders        Priority Expected Expires Ordered    Blood gas arterial Routine  11/28/2019 11/28/2018    Heart Cath Right heart cath Routine 11/28/2018 11/28/2019 11/28/2018    Echo Complete Bubble Routine 11/28/2018 11/28/2019 11/28/2018    NM Lung Scan Ventilation and Perfusion Routine 11/28/2018 11/28/2019 11/28/2018    Overnight oximetry study Routine 11/28/2018 11/28/2019 11/28/2018    Blood gas arterial (PH, WY, SH, RH, UU, HI, UR) Routine 11/28/2018 11/28/2019 11/28/2018    Comprehensive metabolic panel Routine 11/28/2018 11/28/2019 11/28/2018    CBC with platelets Routine 11/28/2018 11/28/2019 11/28/2018    Anti Nuclear Liz IgG by IFA with Reflex Routine 11/28/2018 11/28/2019 11/28/2018    CRP inflammation Routine 11/28/2018 11/28/2019 11/28/2018    Iron and iron binding capacity Routine 11/28/2018 11/28/2019 11/28/2018    Soluble transferrin receptor Routine 11/28/2018 11/28/2019 11/28/2018    Ferritin Routine 11/28/2018 11/28/2019 11/28/2018    General PFT Lab (Please always keep checked) Routine 11/28/2018 11/28/2019 11/28/2018    NM Lexiscan stress test Routine 11/28/2018 11/28/2019 11/28/2018            Who to contact     If you have questions or need follow up information about today's clinic visit or your schedule please contact [facility] directly at [phone].  Normal or non-critical lab and imaging results will be communicated to you by MyChart, letter or phone within 4 business days after the clinic has received the results. If you do not hear from us within 7 days, please contact the clinic through MyChart or phone. If you have a critical or abnormal lab result, we will notify you by phone as soon as possible.  Submit refill requests through [employer] or call your pharmacy and they will forward the refill request to us. Please allow 3 business days for your  "refill to be completed.          Additional Information About Your Visit        MyChart Information     [employer] gives you secure access to your electronic health record. If you see a primary care provider, you can also send messages to your care team and make appointments. If you have questions, please call your primary care clinic.  If you do not have a primary care provider, please call [phone] and they will assist you.        Care EveryWhere ID     This is your Care EveryWhere ID. This could be used by other organizations to access your [facility] medical records  [id number]        Your Vitals Were     Pulse Height Pulse Oximetry BMI (Body Mass Index)          75 1.803 m (5' 11\") 91% 43.79 kg/m2         Blood Pressure from Last 3 Encounters:   11/28/18 152/85   11/08/18 126/70   06/27/18 (!) 158/99    Weight from Last 3 Encounters:   11/28/18 142.4 kg (314 lb)   11/08/18 139.5 kg (307 lb 9.6 oz)   06/27/18 138.3 kg (305 lb)               Primary Care Provider Office Phone # Fax #    Shabbir Agustin [phone]       [address] [place] [address]        Equal Access to Services     TILA CORONA AH: Hadii aad ku hadasho Soomaali, waaxda luqadaha, qaybta kaalmada adeegyada, waxay abhijeetin hayaguston del chadwick lajaun ah. So [facility] [phone].    ATENCIÓN: Si habla español, tiene a landaverde disposición servicios gratuitos de asistencia lingüística. Llame al [phone].    We comply with applicable federal civil rights laws and [place] laws. We do not discriminate on the basis of race, color, national origin, age, disability, sex, sexual orientation, or gender identity.            Thank you!     Thank you for choosing [facility]  for your care. Our goal is always to provide you with excellent care. Hearing back from our patients is one way we can continue to improve our services. Please take a few minutes to complete the written survey that you may receive in the mail after your visit " with us. Thank you!             Your Updated Medication List - Protect others around you: Learn how to safely use, store and throw away your medicines at www.disposemymeds.org.          This list is accurate as of 11/28/18  2:22 PM.  Always use your most recent med list.                   Brand Name Dispense Instructions for use Diagnosis    aspirin 81 MG tablet    ASA     Take  by mouth daily.    Peripheral vascular disease, unspecified (H)       ATORVASTATIN CALCIUM PO      Take 40 mg by mouth daily        diltiazem 180 MG 24 hr capsule    DILACOR XR    30 capsule    Take 1 capsule (180 mg) by mouth daily    Hypertension goal BP (blood pressure) < 140/90       fluticasone 50 MCG/ACT nasal spray    FLONASE    1 g    Spray 1-2 sprays into both nostrils daily    Nasal congestion       FUROSEMIDE PO      Take 10 mg by mouth daily        lovastatin 40 MG tablet    MEVACOR    90 tablet    Take 1 tablet (40 mg) by mouth At Bedtime    Hypertension goal BP (blood pressure) < 140/90       MELATONIN PO       Pulmonary hypertension (H), Dyspnea on exertion       montelukast 10 MG tablet    SINGULAIR     Take 10 mg by mouth At Bedtime        Multi-vitamin tablet   Generic drug:  multivitamin w/minerals      Take  by mouth daily.    Hypertension goal BP (blood pressure) < 140/90       order for DME     1 Device    Equipment being ordered: oxygen concentrator - [facility] home oxygen    Oxygen dependent       order for DME     1 Device    Equipment being ordered: oxygen concentrator   Oxygen at 3 liters while at rest is at 97 %. Oxygen at 3 liters walking is at 97 %. ? Without oxygen at rest is at 91 %. Without oxygen while walking is at 87 %.  From today's office visit    Oxygen dependent       order for DME     1 Device    Equipment being ordered: Oxygen concentrator and portability Date of order 05/10/18 Dosage of concentration - 2 liters per minute  Route of administration - Nasal cannula Frequency of use - continuously Duration  of need - lifetime  Shabbir Agustin MD is the health care provider   _________________________________________ npi [id number] 05/10/18    Oxygen dependent       VITAMIN B 12 PO       Pulmonary hypertension (H), Dyspnea on exertion

## 2018-11-28 NOTE — NURSING NOTE
Procedures and/or Testing: Patient given instructions regarding  Echocardiogram with Bubble Study,  V/Q Scan, NM Lexiscan Stress Test,. Discussed purpose, preparation, procedure and when to expect results reported back to the patient. Patient demonstrated understanding of this information and agreed to call with further questions or concerns.    Right Heart Catheterization: Patient was instructed regarding right heart catheterization, including discussion of the procedure, preparation, intra-procedural steps, and recovery at home. Patient demonstrated understanding of this information and agreed to call with further questions or concerns.    Med Reconcile: Reviewed and verified all current medications with the patient. The updated medication list was printed and given to the patient.  Patient was instructed to return for the next laboratory testing today.     Diet: Patient instructed regarding a heart healthy diet, including discussion of reduced fat and sodium intake. Patient demonstrated understanding of this information and agreed to call with further questions or concerns.    Return Appointment: Patient given instructions regarding scheduling next clinic visit. Patient demonstrated understanding of this information and agreed to call with further questions or concerns.    Patient stated he understood all health information given and agreed to call with further questions or concerns.    Patient Instructions:  1. Continue staying active and eat a heart healthy diet.    2. Please keep current list of medications with you at all times.    3. Remember to weigh yourself daily after voiding and before you consume any food or beverages and log the numbers.  If you have gained/lost 2 pounds overnight or 5 pounds in a week contact us immediately for medication adjustments or further instructions.    4. **Please call us immediately if you have any syncope (fainting or passing out), chest pain, edema (swelling or weight  gain), or decline in your functional status (general decline in how you are feeling).    Check-In  Time Check-In Location Estimated Length Procedure   Name     12/5/18   [facility]   waiting room 60 minutes VQ/Lung Perfusion Scan**   Procedure Preparations & Instructions     This is a non-invasive procedure and does NOT require any preparation         Check-In  Time Check-In Location Estimated Length Procedure   Name     12/3/18   [facility]  waiting room 60-90 minutes Right Heart Catheterization w/Exercise**     Procedure Preparations & Instructions     This is an invasive procedure that DOES require preparation:    - Nothing to eat for 6 hours   - You may have clear liquids up until the time of your procedure  - A ride should be arranged for you in the instance you are unable to drive home, however you should be able to function as you normally would after the procedure        Location Estimated Length Procedure   Name        Patient Home 12 hours Overnight Oximetry     Procedure Preparations & Instructions     This is not an invasive procedure that DOES require preparation:    - Remove nail polish on your fingers.  - No caffeine, alcohol or sedative medication for 6 hours before going to sleep.  - Sleep in a quite, comfortable environment for at least 6 hours  If you need to go to the bathroom  - DO NOT turn the machine off for any reason.  Take the probe off and go the to the bathroom, replace and go back to sleep.     Additional Instructions: ? Test Should be completed on:    CPAP with oxygen at 2 lpm         Check-In  Time Check-In Location Estimated Length Procedure   Name     12/3/18    60 minutes Echocardiogram (Echo) with Bubble study**   Procedure Preparations & Instructions     This is a non-invasive procedure and does NOT require any preparation       Check-In  Time Check-In Location Estimated Length Procedure   Name     12/5/18   [facility]  waiting room 3-4 hours Nuclear Medicine Stress Test**     Procedure  Preparations & Instructions     This is a non-invasive procedure that DOES require preparation:    - Nothing to eat for 3 hours prior to your test  - You may have clear liquids up to the time of your test  - DO NOT use alcohol, tobacco or food/beverages containing caffeine for at least 12 hours prior to your test (ie. Coffee, tea, soda, chocolate, de-caffeinated beverages, certain medications including Excedrin, Anacin, NoDoz)  - Please wear loose, two-piece clothing and comfortable, rubber soled shoes for walking  - Please inform the RN on staff if you are:  ? Pregnant or may be pregnant  ? Breast feeding  ? Diabetic  ? Have a Pacemaker or ICD (implanted defibrillator)  - Do NOT take nitrates the day of your procedure (including nitro-patches, please bring them with you instead     Follow up Appointment Information:  1. Arterial blood gases today (Pulmonary Lab today)  2. CBC with platelets and differential, CMP, iron studies, MADI, CRP & lipid profile (Before you leave [facility] today)  3. Follow up with Dr. Velasquez after all testing is complete.  **Pt/wife was instructed to go have ABG done now and all other labs prior to leaving [facility] today.  Advised him someone would call him to set up follow up appt for after all the testing.  Patient verbalized understanding, agreed with plan and denied any further questions. Sally Rizo RN on 11/28/2018 at 2:47 PM

## 2018-11-28 NOTE — PATIENT INSTRUCTIONS
Patient Instructions:  1. Continue staying active and eat a heart healthy diet.    2. Please keep current list of medications with you at all times.    3. Remember to weigh yourself daily after voiding and before you consume any food or beverages and log the numbers.  If you have gained/lost 2 pounds overnight or 5 pounds in a week contact us immediately for medication adjustments or further instructions.    4. **Please call us immediately if you have any syncope (fainting or passing out), chest pain, edema (swelling or weight gain), or decline in your functional status (general decline in how you are feeling).    Check-In  Time Check-In Location Estimated Length Procedure   Name     12/5/18   [facility]   waiting room 60 minutes VQ/Lung Perfusion Scan**   Procedure Preparations & Instructions     This is a non-invasive procedure and does NOT require any preparation         Check-In  Time Check-In Location Estimated Length Procedure   Name     12/3/18   [facility]  waiting room 60-90 minutes Right Heart Catheterization w/Exercise**     Procedure Preparations & Instructions     This is an invasive procedure that DOES require preparation:    - Nothing to eat for 6 hours   - You may have clear liquids up until the time of your procedure  - A ride should be arranged for you in the instance you are unable to drive home, however you should be able to function as you normally would after the procedure        Location Estimated Length Procedure   Name        Patient Home 12 hours Overnight Oximetry     Procedure Preparations & Instructions     This is not an invasive procedure that DOES require preparation:    - Remove nail polish on your fingers.  - No caffeine, alcohol or sedative medication for 6 hours before going to sleep.  - Sleep in a quite, comfortable environment for at least 6 hours  If you need to go to the bathroom  - DO NOT turn the machine off for any reason.  Take the probe off and go the to the bathroom, replace and  go back to sleep.     Additional Instructions: ? Test Should be completed on:    CPAP with oxygen at 2 lpm         Check-In  Time Check-In Location Estimated Length Procedure   Name     12/3/18    60 minutes Echocardiogram (Echo) with Bubble study**   Procedure Preparations & Instructions     This is a non-invasive procedure and does NOT require any preparation       Check-In  Time Check-In Location Estimated Length Procedure   Name     12/5/18   [facility]  waiting room 3-4 hours Nuclear Medicine Stress Test**     Procedure Preparations & Instructions     This is a non-invasive procedure that DOES require preparation:    - Nothing to eat for 3 hours prior to your test  - You may have clear liquids up to the time of your test  - DO NOT use alcohol, tobacco or food/beverages containing caffeine for at least 12 hours prior to your test (ie. Coffee, tea, soda, chocolate, de-caffeinated beverages, certain medications including Excedrin, Anacin, NoDoz)  - Please wear loose, two-piece clothing and comfortable, rubber soled shoes for walking  - Please inform the RN on staff if you are:  ? Pregnant or may be pregnant  ? Breast feeding  ? Diabetic  ? Have a Pacemaker or ICD (implanted defibrillator)  - Do NOT take nitrates the day of your procedure (including nitro-patches, please bring them with you instead     Follow up Appointment Information:  1. Arterial blood gases today (Pulmonary Lab today)  2. CBC with platelets and differential, CMP, iron studies, MADI, CRP & lipid profile (Before you leave [facility] today)  3. Follow up with Dr. Velasquez after all testing is complete.    We are located on the third floor [facility] [place] [facility] [place] [facility].  Our address is     [address] [place] [address] on 3rd Floor   [place] [phone]    Thank you for allowing us to be a part of your care here at the [facility]    If you have questions or concerns please contact us  at:    Sally Rizo RN, BSN, PHN  Misael Summers (Schedule,Prior Auth)  Nurse Coordinator     Clinic   Pulmonary Hypertension   Pulmonary Hypertension  [facility]  (P)[phone]    (P) [phone]        (F) [phone]    ** Please note that you will NOT receive a reminder call regarding your scheduled testing, reminder calls are for provider appointments only.  If you are scheduled for testing within the [place] system you may receive a call regarding pre-registration for billing purposes only.**     Remember to weigh yourself daily after voiding and before you consume any food or beverages and log the numbers.  If you have gained/lost 2 pounds overnight or 5 pounds in a week contact us immediately for medication adjustments or further instructions.   **Please call us immediately if you have any syncope, chest pain, edema, or decline in your functional status.    Support Group:  Pulmonary Hypertension Association  Https://www.phassociation.org/  **Look at the Events Tab** They even have Support Groups that you can call into    [facility] PH Support Group  Second Saturday of the Month from 1-3 PM   Location: [address] [place] [id number]  Leader: Robbie Watson  Phone: [phone]  Email: [email or web address]

## 2018-11-28 NOTE — LETTER
11/28/2018      RE: Boogie Clark  [address] [place] [phone]       Dear Colleague,    Thank you for the opportunity to participate in the care of your patient, Boogie Clark, at the [facility]. Please see a copy of my visit note below.    Maurilio Velasquez M.D.  Cardiovascular Medicine    I personally saw and examined this patient, discussed care with housestaff and other consultants, reviewed current laboratories and imaging studies, and conveyed impression and diagnostic/therapeutic plan to patient.    Skip Agustin M.D.    Marcos Hoffman M.D.    Problem List  1. Pulmonary hypertension  2. Hypertension  3. COLD  4. History of bladder tumor  5. COCO  6. Elevated body mass index  7. Nephrolithiasis  8. Diverticulosis  9. Hyperlipidemia  10. ASPVD with balloon stenting  11. History of ankle fracture with immolization  12. Lymphedema  13. ? History of DVT  14. Coronary calcification  15. Steroid dependence    History    Thank you very much for allowing us to see Mr. Clark.  As you know he has exertional shortness of breath of uncertain etiology.  He has known ASPVD with bilateral iliofemoral balloon angioplasty.  There is a questionable history of DVT associated with immobilization associated with ankle fracture.  He has exertional hypoxia which he says is not corrected with supplemental oxygen.  He has no history of collagen vascular disease, myeloproliferative syndrome, methamphetamine, cocaine usage.  He has multiple risk factors for CAD but no actual cardiac events.  He has COCO, uses machine and supplemental oxygen but has not had a repeat sleep study.  He has a remote history of smoking.  He has no history ETOH abuse.      Answers for HPI/ROS submitted by the patient on 11/28/2018   General Symptoms: No  HENT Symptoms: No  EYE SYMPTOMS: Yes  HEART SYMPTOMS: No  INTESTINAL SYMPTOMS: No  URINARY SYMPTOMS: No  REPRODUCTIVE SYMPTOMS: No  SKELETAL  SYMPTOMS: No  BLOOD SYMPTOMS: No  NERVOUS SYSTEM SYMPTOMS: No  MENTAL HEALTH SYMPTOMS: No    Objective  Constitutional: alert, oriented, normal gait and station, normal mentation.  Oral: moist mucous membranes  Lymph: without pathologic adenopathy  Chest: clear to ausculation and percussion  Cor: No evidence of left or right ventricular activity.  Rhythm is regular.  S1 normal, S2 split physiologically. Murmurs are not present  Abdomen: without tenderness, rebound, guarding, masses, ascites  Extremities: Edema not present  Neuro: no focal defects, normal mentation  Skin: without open lesions  Psych: oriented, verbal, mental status in tact    Wt Readings from Last 5 Encounters:   11/08/18 139.5 kg (307 lb 9.6 oz)   06/27/18 138.3 kg (305 lb)   05/30/18 141 kg (310 lb 12.8 oz)   05/01/18 141.5 kg (312 lb)   08/14/17 (!) 138.3 kg (305 lb)       Meds  Current Outpatient Prescriptions   Medication     albuterol (2.5 MG/3ML) 0.083% nebulizer solution     albuterol (VENTOLIN HFA) 108 (90 BASE) MCG/ACT inhaler     aspirin 81 MG tablet     ATORVASTATIN CALCIUM PO     budesonide-formoterol (SYMBICORT) 80-4.5 MCG/ACT Inhaler     diltiazem (DILACOR XR) 180 MG 24 hr capsule     fluticasone (FLONASE) 50 MCG/ACT nasal spray     FUROSEMIDE PO     Ginkgo Biloba (GINKOBA) 40 MG TABS     IBUPROFEN PO     lovastatin (MEVACOR) 40 MG tablet     METOPROLOL SUCCINATE ER PO     montelukast (SINGULAIR) 10 MG tablet     Multiple Vitamin (MULTI-VITAMIN) per tablet     order for DME     order for DME     order for DME     tiotropium (SPIRIVA RESPIMAT) 2.5 MCG/ACT inhalation aerosol     No current facility-administered medications for this visit.      Labs: pending  .  Imaging     CT CHEST WITHOUT CONTRAST May 8, 2018 8:33 AM      HISTORY: Chronic obstructive pulmonary disease. Oxygen dependent.     COMPARISON: 6/29/2017.     TECHNIQUE: Without intravenous contrast, helical sections were  acquired through the lungs. Thin axial reconstructions  were generated  at regular intervals through the lungs during inspiration and  expiration. Coronal reconstructions were generated. (High-resolution  protocol) Radiation dose for this scan was reduced using automated  exposure control, adjustment of the mA and/or kV according to the  patient's size, or iterative reconstruction technique.     FINDINGS: A few linear opacities scattered within the lungs likely  represent scarring. The lungs are otherwise clear. Minimal  bronchiectasis scattered within the lungs. No consolidation,  ground-glass opacities, or honeycombing. No air trapping on expiratory  images. No pleural or pericardial effusion. No enlarged lymph nodes in  the chest. A few nonspecific nonenlarged mediastinal lymph nodes are  present. Atherosclerotic calcification in the thoracic aorta and  coronary arteries. The central pulmonary arteries are mildly large in  caliber.     Scan through the upper abdomen is significant for a 1.7 cm exophytic  cyst in the upper pole of the left kidney and a subcentimeter  low-attenuation lesion in the upper pole of the right kidney that is  too small to characterize.         IMPRESSION:   1. No evidence of active pulmonary disease.  2. No convincing CT evidence of interstitial lung disease.  3. Minimal bronchiectasis scattered within the lungs.     Name: CLARITZA PAL  MRN: [id number]  : 1950  Study Date: 2017 02:55 PM  Age: 66 yrs  Gender: Male  Patient Location: [facility]  Reason For Study: secondary pulmonary hypertension  Ordering Physician: MARISA LEYVA  Referring Physician: MARISA LEYVA  Performed By: Rebecca Calixto RDCS     BSA: 2.5 m2  Height: 70 in  Weight: 313 lb  BP: 111/69 mmHg  _____________________________________________________________________________  __        Procedure  Echocardiogram with two-dimensional, color and spectral Doppler performed.  Optison (NDC #0446-7974-68) given intravenously. Patient was given 3.0 ml  mixture of 3  ml Optison and 6 ml saline. 6.0 ml wasted. IV start location L  Hand .  _____________________________________________________________________________  __        Interpretation Summary     Global and regional left ventricular function is normal with an EF of 60-65%.  Mild to moderate right ventricular dilation is present.  Global right ventricular function is normal.  Right ventricular systolic pressure is 39mmHg above the right atrial pressure.  Dilation of the inferior vena cava is present with normal respiratory  variation in diameter.  No pericardial effusion is present.  _____________________________________________________________________________  __        Left Ventricle  Global and regional left ventricular function is normal with an EF of 60-65%.  Left ventricular size is normal. Borderline concentric wall thickening  consistent with left ventricular hypertrophy is present. Normal left  ventricular filling for age. No regional wall motion abnormalities are seen.     Right Ventricle  Global right ventricular function is normal. Mild to moderate right  ventricular dilation is present.     Atria  Moderate biatrial enlargement is present.     Mitral Valve  The mitral valve is normal.        Aortic Valve  Aortic valve is normal in structure and function.     Tricuspid Valve  The tricuspid valve is normal. Trace tricuspid insufficiency is present. Right  ventricular systolic pressure is 39mmHg above the right atrial pressure.     Pulmonic Valve  The pulmonic valve is normal. Trace pulmonic insufficiency is present.     Vessels  The aorta root cannot be assessed. The pulmonary artery cannot be assessed.  Dilation of the inferior vena cava is present with normal respiratory  variation in diameter.     Pericardium  No pericardial effusion is present.     _____________________________________________________________________________  __  MMode/2D Measurements & Calculations  IVSd: 1.1 cm  LVIDd: 4.5 cm  LVIDs: 3.4  cm  LVPWd: 1.0 cm  FS: 24.9 %  EDV(Teich): 94.4 ml  ESV(Teich): 47.8 ml     LV mass(C)d: 166.3 grams  LV mass(C)dI: 65.9 grams/m2  Ao root diam: 3.4 cm  LA dimension: 3.8 cm  asc Aorta Diam: 3.6 cm  LA/Ao: 1.1  LVOT diam: 2.4 cm  LVOT area: 4.5 cm2  LA Volume (BP): 111.0 ml  LA Volume Index (BP): 44.0 ml/m2        Doppler Measurements & Calculations  MV E max juliano: 62.8 cm/sec  MV A max juliano: 70.6 cm/sec  MV E/A: 0.89  MV dec time: 0.26 sec     TR max juliano: 313.0 cm/sec  TR max P.2 mmHg  Lateral E/e': 7.6  Medial E/e': 13.1     HEMODYNAMICS:  2017  RA 7  RV 52/8  PA 50/24 (32)   PCW 13   PA sat 68.7%   Taryn CO/CI: 5.7/2.3  TD CO/CI: 6.4/2.5  PVR: 3.0     Inhaled Nitric Oxide  PA: 40/20 (27)  PCWP: 17  CO/CI (TD): 5.9/2.4  PVR: 1.7    Assessment/Plan   1. VQ lung scan  2. Right heart catherization  3. Arterial blood gases  4. Overnight recording oximetry  5. CBC with platelets and differential, CMP, iron studies, MADI, CRPinf, lipid   6. Non exercise stress test for coronary disease  7. Bubble study    The etiology of symptoms is not clear though assume in part secondary to alveolar hypoventilation imposed by weight.    He needs further evaluation to delineate his problem, before he would be satisfactory candidate for bariatric surgery with abdominal insufflation.    Previous resting right heart catherization without exercise, but should have some exercise with this study.    I walked the patient in the castillo and desaturates to 85% at heart rate of 100.      Consider pulmonary rehabilitation      Maurilio Velasquez MD

## 2018-11-29 LAB — ANA SER QL IF: NEGATIVE

## 2018-11-30 ENCOUNTER — MYC MEDICAL ADVICE (OUTPATIENT)
Dept: CARDIOLOGY | Facility: CLINIC | Age: 68
End: 2018-11-30

## 2018-11-30 LAB — STFR SERPL-SCNC: 6.8 MG/L (ref 2.2–5)

## 2018-11-30 NOTE — TELEPHONE ENCOUNTER
Patient left 2 Voicemails advising me that after he left his office visit this week it took him a few hours to get home with the snow and rush hour traffic.  We currently hav him scheduled to have a RHC at 2pm Monday 12/3/18.  Due to his experience on that drive home and the amount of anxiety it caused him, he will not come for a late appt again, it was too stressful and his wife works in the evening.  He would like a call back to discuss.  -------------------------  Sent [employer] message confirming we could reschedule appt.  Before realizing he had a morning Echo.  Called patient and asked if he would still complete Echo at 11:30; Yes.  I will cancel RHC on Monday 12/3/18 & call him back with other alternative dates.  Patient verbalized understanding, agreed with plan and denied any further questions. Sally Rizo RN on 11/30/2018 at 1:22 PM  -----------------------  Called and rescheduled RHC for 12/26/18 check in at 7am.  Patient verbalized understanding, agreed with plan and denied any further questions. Sally Rizo RN on 11/30/2018 at 1:36 PM

## 2018-12-02 DIAGNOSIS — I27.20 PULMONARY HYPERTENSION (H): Primary | ICD-10-CM

## 2018-12-03 ENCOUNTER — HOSPITAL ENCOUNTER (OUTPATIENT)
Dept: CARDIOLOGY | Facility: CLINIC | Age: 68
Discharge: HOME | End: 2018-12-03
Attending: INTERNAL MEDICINE | Admitting: INTERNAL MEDICINE
Payer: MEDICARE

## 2018-12-03 DIAGNOSIS — I27.20 PULMONARY HYPERTENSION (H): ICD-10-CM

## 2018-12-03 DIAGNOSIS — R06.09 DYSPNEA ON EXERTION: ICD-10-CM

## 2018-12-03 PROCEDURE — 93306 TTE W/DOPPLER COMPLETE: CPT

## 2018-12-03 PROCEDURE — 93306 TTE W/DOPPLER COMPLETE: CPT | Mod: 26 | Performed by: INTERNAL MEDICINE

## 2018-12-03 PROCEDURE — 40000141 ZZH STATISTIC PERIPHERAL IV START W/O US GUIDANCE

## 2018-12-03 PROCEDURE — 25500064 ZZH RX 255 OP 636: Performed by: INTERNAL MEDICINE

## 2018-12-03 RX ORDER — ACYCLOVIR 200 MG/1
12 CAPSULE ORAL ONCE
Status: DISCONTINUED | OUTPATIENT
Start: 2018-12-03 | End: 2018-12-04 | Stop reason: HOSPADM

## 2018-12-03 RX ADMIN — HUMAN ALBUMIN MICROSPHERES AND PERFLUTREN 4 ML: 10; .22 INJECTION, SOLUTION INTRAVENOUS at 12:00

## 2018-12-04 NOTE — H&P
Admitted:     2018      HISTORY OF PRESENT ILLNESS:  Shabbir:  Thank you very much for allowing us to see your patient, Claritza Clark.  I believe that you probably have seen my note by now.  I wanted to go over some of the findings with you.  Arterial blood gases show a pH of 7.46, pCO2 of 32, and a pO2 of 57.  His carbon monoxide level is slightly increased at 2.2.        He is iron deficient with a 12% saturation.  A soluble transferrin receptor is increased as is his CRP.  He will therefore  require parenteral iron replacement as well as the usual adult evaluation for iron deficiency anemia. I will leave that to you.  An echocardiogram showed no evidence of a right-to-left shunt through a PFO.  Imaging is very difficult in view of his body configuration.  A ventilation perfusion lung scan is pending.  A right heart catheterization is also pending.      Thank you very much for allowing us to see him.         GALA LOZADA MD             D: 2018   T: 2018   MT: BARBIE      Name:     CLARITZA CLARK   MRN:      [id number]        Account:      [id number]   :      1950        Admitted:     2018                   Document: [id number]       cc: Shabbir Agustin MD

## 2018-12-05 ENCOUNTER — HOSPITAL ENCOUNTER (OUTPATIENT)
Dept: NUCLEAR MEDICINE | Facility: CLINIC | Age: 68
Setting detail: NUCLEAR MEDICINE
End: 2018-12-05
Attending: INTERNAL MEDICINE
Payer: MEDICARE

## 2018-12-05 ENCOUNTER — HOSPITAL ENCOUNTER (OUTPATIENT)
Dept: GENERAL RADIOLOGY | Facility: CLINIC | Age: 68
Discharge: HOME | End: 2018-12-05
Attending: INTERNAL MEDICINE | Admitting: INTERNAL MEDICINE
Payer: MEDICARE

## 2018-12-05 DIAGNOSIS — R06.09 DYSPNEA ON EXERTION: ICD-10-CM

## 2018-12-05 DIAGNOSIS — I27.20 PULMONARY HYPERTENSION (H): ICD-10-CM

## 2018-12-05 LAB — FIO2-PRE: 21 %

## 2018-12-05 PROCEDURE — 34300033 ZZH RX 343: Performed by: INTERNAL MEDICINE

## 2018-12-05 PROCEDURE — 71046 X-RAY EXAM CHEST 2 VIEWS: CPT

## 2018-12-05 PROCEDURE — 78582 LUNG VENTILAT&PERFUS IMAGING: CPT

## 2018-12-05 PROCEDURE — 40000141 ZZH STATISTIC PERIPHERAL IV START W/O US GUIDANCE

## 2018-12-05 PROCEDURE — A9567 TECHNETIUM TC-99M AEROSOL: HCPCS | Performed by: INTERNAL MEDICINE

## 2018-12-05 PROCEDURE — A9540 TC99M MAA: HCPCS | Performed by: INTERNAL MEDICINE

## 2018-12-05 RX ADMIN — Medication 7 MCI.: at 08:12

## 2018-12-05 RX ADMIN — KIT FOR THE PREPARATION OF TECHNETIUM TC 99M PENTETATE 2 MCI.: 20 INJECTION, POWDER, LYOPHILIZED, FOR SOLUTION INTRAVENOUS; RESPIRATORY (INHALATION) at 08:11

## 2018-12-10 ENCOUNTER — HOSPITAL ENCOUNTER (OUTPATIENT)
Dept: NUCLEAR MEDICINE | Facility: CLINIC | Age: 68
Setting detail: NUCLEAR MEDICINE
End: 2018-12-10
Attending: INTERNAL MEDICINE
Payer: MEDICARE

## 2018-12-10 ENCOUNTER — HOSPITAL ENCOUNTER (OUTPATIENT)
Dept: CARDIOLOGY | Facility: CLINIC | Age: 68
Discharge: HOME | End: 2018-12-10
Attending: INTERNAL MEDICINE | Admitting: INTERNAL MEDICINE
Payer: MEDICARE

## 2018-12-10 DIAGNOSIS — I27.20 PULMONARY HYPERTENSION (H): ICD-10-CM

## 2018-12-10 DIAGNOSIS — R06.09 DYSPNEA ON EXERTION: ICD-10-CM

## 2018-12-10 PROCEDURE — 78452 HT MUSCLE IMAGE SPECT MULT: CPT | Mod: 26 | Performed by: INTERNAL MEDICINE

## 2018-12-10 PROCEDURE — 40000141 ZZH STATISTIC PERIPHERAL IV START W/O US GUIDANCE

## 2018-12-10 PROCEDURE — 78452 HT MUSCLE IMAGE SPECT MULT: CPT

## 2018-12-10 PROCEDURE — 93017 CV STRESS TEST TRACING ONLY: CPT

## 2018-12-10 PROCEDURE — 34300033 ZZH RX 343: Performed by: INTERNAL MEDICINE

## 2018-12-10 PROCEDURE — A9502 TC99M TETROFOSMIN: HCPCS | Performed by: INTERNAL MEDICINE

## 2018-12-10 PROCEDURE — 93016 CV STRESS TEST SUPVJ ONLY: CPT | Performed by: INTERNAL MEDICINE

## 2018-12-10 PROCEDURE — 93018 CV STRESS TEST I&R ONLY: CPT | Performed by: INTERNAL MEDICINE

## 2018-12-10 PROCEDURE — 25000128 H RX IP 250 OP 636: Performed by: INTERNAL MEDICINE

## 2018-12-10 RX ORDER — ALBUTEROL SULFATE 90 UG/1
2 AEROSOL, METERED RESPIRATORY (INHALATION) EVERY 5 MIN PRN
Status: DISCONTINUED | OUTPATIENT
Start: 2018-12-10 | End: 2018-12-11 | Stop reason: HOSPADM

## 2018-12-10 RX ORDER — AMINOPHYLLINE 25 MG/ML
50-100 INJECTION, SOLUTION INTRAVENOUS
Status: DISCONTINUED | OUTPATIENT
Start: 2018-12-10 | End: 2018-12-11 | Stop reason: HOSPADM

## 2018-12-10 RX ORDER — REGADENOSON 0.08 MG/ML
0.4 INJECTION, SOLUTION INTRAVENOUS ONCE
Status: COMPLETED | OUTPATIENT
Start: 2018-12-10 | End: 2018-12-10

## 2018-12-10 RX ORDER — ACYCLOVIR 200 MG/1
0-1 CAPSULE ORAL
Status: DISCONTINUED | OUTPATIENT
Start: 2018-12-10 | End: 2018-12-11 | Stop reason: HOSPADM

## 2018-12-10 RX ADMIN — TETROFOSMIN 41 MCI.: 1.38 INJECTION, POWDER, LYOPHILIZED, FOR SOLUTION INTRAVENOUS at 10:13

## 2018-12-10 RX ADMIN — TETROFOSMIN 10.1 MCI.: 1.38 INJECTION, POWDER, LYOPHILIZED, FOR SOLUTION INTRAVENOUS at 07:54

## 2018-12-10 RX ADMIN — REGADENOSON 0.4 MG: 0.08 INJECTION, SOLUTION INTRAVENOUS at 09:07

## 2018-12-10 NOTE — PROGRESS NOTES
Pt here for Lexiscan. Test, medication and side effects reviewed with patient. Lung sounds clear. Denied caffeine use. Tolerated Lexiscan dose with complaints of SOB that quickly resolved. Monitored post injection and then taken back to nuclear medicine for follow up imaging.

## 2018-12-11 ENCOUNTER — OFFICE VISIT (OUTPATIENT)
Dept: UROLOGY | Facility: CLINIC | Age: 68
End: 2018-12-11
Payer: COMMERCIAL

## 2018-12-11 DIAGNOSIS — Z85.51 PERSONAL HISTORY OF BLADDER CANCER: Primary | ICD-10-CM

## 2018-12-11 PROCEDURE — 52000 CYSTOURETHROSCOPY: CPT | Performed by: UROLOGY

## 2018-12-11 NOTE — PROGRESS NOTES
S: Boogie Clark is a 68 year old male returns for bladder cancer surveillance.    he has history of bladder cancer Grade 3 non-invasive, Stage ta, s/p turbt on 5/ 13.     He received 2 courses of BCG therapy.    Patient is draped and prepped.  Flexible cystoscopy placed under direct vision.    Cysto:  The anterior urethra is normal   The prostatic urethra showed bilateral lobe enlargement.     The length is 2cm,  the coaptation is 2 cm.     In the bladder there is no tumor/stone    Assessment/Plan:  V10.51 Personal history of malignant neoplasm of bladder  (primary encounter diagnosis)  Comment: no evidence of recurrence  Plan: BTR in  12 months

## 2018-12-18 ENCOUNTER — TELEPHONE (OUTPATIENT)
Dept: CARDIOLOGY | Facility: CLINIC | Age: 68
End: 2018-12-18

## 2018-12-18 NOTE — TELEPHONE ENCOUNTER
Left patient a VM stating I was calling regarding a note that said he was wondering why he still needed the RHC.  Message stated that the RHC is one of the original tests that Dr. Velasquez ordered and it is the only way to truly diagnose or rule out someone with PHTN. Left my direct dial number for call back if he has questions. Sally Rizo RN on 12/18/2018 at 8:32 AM

## 2018-12-18 NOTE — TELEPHONE ENCOUNTER
----- Message from Misael Summers sent at 12/14/2018 12:07 PM CST -----  Regarding: RE: F/U appt  Pt returned my call and we scheduled a follow up appt on 1/2/19. He was questioning why he needs the RHC. I advised him that this would best be answered by you and I provided him with your phone number. He said he will give you a call to discuss this.     Thanks,   Misael  ----- Message -----  From: Sally Rizo RN  Sent: 12/6/2018   2:32 PM  To: Misael Summers  Subject: F/U appt                                         Misael,    Patient is having his RHC on 12/26/18 now.  Can you please call patient and make follow up appt with Dr. Ron NUR after that.  No labs needed.    Thanks!  Sally

## 2018-12-26 ENCOUNTER — HOSPITAL ENCOUNTER (OUTPATIENT)
Facility: CLINIC | Age: 68
Discharge: HOME | End: 2018-12-26
Attending: INTERNAL MEDICINE | Admitting: INTERNAL MEDICINE
Payer: MEDICARE

## 2018-12-26 ENCOUNTER — APPOINTMENT (OUTPATIENT)
Dept: MEDSURG UNIT | Facility: CLINIC | Age: 68
End: 2018-12-26
Attending: INTERNAL MEDICINE
Payer: MEDICARE

## 2018-12-26 VITALS
SYSTOLIC BLOOD PRESSURE: 136 MMHG | TEMPERATURE: 98.3 F | OXYGEN SATURATION: 94 % | RESPIRATION RATE: 20 BRPM | DIASTOLIC BLOOD PRESSURE: 85 MMHG | HEART RATE: 75 BPM

## 2018-12-26 DIAGNOSIS — R06.09 DYSPNEA ON EXERTION: ICD-10-CM

## 2018-12-26 DIAGNOSIS — I27.20 PULMONARY HYPERTENSION (H): ICD-10-CM

## 2018-12-26 PROCEDURE — 25000128 H RX IP 250 OP 636: Performed by: INTERNAL MEDICINE

## 2018-12-26 PROCEDURE — 93463 DRUG ADMIN & HEMODYNMIC MEAS: CPT | Performed by: INTERNAL MEDICINE

## 2018-12-26 PROCEDURE — 40000166 ZZH STATISTIC PP CARE STAGE 1

## 2018-12-26 PROCEDURE — 99207 ZZC NO BILLABLE SERVICE THIS VISIT: CPT | Performed by: PHYSICIAN ASSISTANT

## 2018-12-26 PROCEDURE — 93451 RIGHT HEART CATH: CPT | Performed by: INTERNAL MEDICINE

## 2018-12-26 PROCEDURE — 25000125 ZZHC RX 250: Performed by: INTERNAL MEDICINE

## 2018-12-26 PROCEDURE — 93451 RIGHT HEART CATH: CPT | Mod: 26 | Performed by: INTERNAL MEDICINE

## 2018-12-26 PROCEDURE — 27210794 ZZH OR GENERAL SUPPLY STERILE: Performed by: INTERNAL MEDICINE

## 2018-12-26 RX ORDER — ARGATROBAN 1 MG/ML
350 INJECTION, SOLUTION INTRAVENOUS
Status: DISCONTINUED | OUTPATIENT
Start: 2018-12-26 | End: 2018-12-26 | Stop reason: HOSPADM

## 2018-12-26 RX ORDER — PROTAMINE SULFATE 10 MG/ML
25-100 INJECTION, SOLUTION INTRAVENOUS EVERY 5 MIN PRN
Status: DISCONTINUED | OUTPATIENT
Start: 2018-12-26 | End: 2018-12-26 | Stop reason: HOSPADM

## 2018-12-26 RX ORDER — POTASSIUM CHLORIDE 29.8 MG/ML
20 INJECTION INTRAVENOUS
Status: DISCONTINUED | OUTPATIENT
Start: 2018-12-26 | End: 2018-12-26 | Stop reason: HOSPADM

## 2018-12-26 RX ORDER — HYDRALAZINE HYDROCHLORIDE 20 MG/ML
10-20 INJECTION INTRAMUSCULAR; INTRAVENOUS
Status: DISCONTINUED | OUTPATIENT
Start: 2018-12-26 | End: 2018-12-26 | Stop reason: HOSPADM

## 2018-12-26 RX ORDER — NITROGLYCERIN 0.4 MG/1
0.4 TABLET SUBLINGUAL EVERY 5 MIN PRN
Status: DISCONTINUED | OUTPATIENT
Start: 2018-12-26 | End: 2018-12-26 | Stop reason: HOSPADM

## 2018-12-26 RX ORDER — EPINEPHRINE 1 MG/ML
0.3 INJECTION, SOLUTION, CONCENTRATE INTRAVENOUS
Status: DISCONTINUED | OUTPATIENT
Start: 2018-12-26 | End: 2018-12-26 | Stop reason: HOSPADM

## 2018-12-26 RX ORDER — CLOPIDOGREL BISULFATE 75 MG/1
75 TABLET ORAL
Status: DISCONTINUED | OUTPATIENT
Start: 2018-12-26 | End: 2018-12-26 | Stop reason: HOSPADM

## 2018-12-26 RX ORDER — DEXTROSE MONOHYDRATE 25 G/50ML
12.5-5 INJECTION, SOLUTION INTRAVENOUS EVERY 30 MIN PRN
Status: DISCONTINUED | OUTPATIENT
Start: 2018-12-26 | End: 2018-12-26 | Stop reason: HOSPADM

## 2018-12-26 RX ORDER — NITROGLYCERIN 5 MG/ML
100-200 VIAL (ML) INTRAVENOUS
Status: DISCONTINUED | OUTPATIENT
Start: 2018-12-26 | End: 2018-12-26 | Stop reason: HOSPADM

## 2018-12-26 RX ORDER — TIROFIBAN HYDROCHLORIDE 50 UG/ML
0.07 INJECTION INTRAVENOUS CONTINUOUS PRN
Status: DISCONTINUED | OUTPATIENT
Start: 2018-12-26 | End: 2018-12-26 | Stop reason: HOSPADM

## 2018-12-26 RX ORDER — ARGATROBAN 1 MG/ML
150 INJECTION, SOLUTION INTRAVENOUS
Status: DISCONTINUED | OUTPATIENT
Start: 2018-12-26 | End: 2018-12-26 | Stop reason: HOSPADM

## 2018-12-26 RX ORDER — METOPROLOL TARTRATE 1 MG/ML
5 INJECTION, SOLUTION INTRAVENOUS EVERY 5 MIN PRN
Status: DISCONTINUED | OUTPATIENT
Start: 2018-12-26 | End: 2018-12-26 | Stop reason: HOSPADM

## 2018-12-26 RX ORDER — NITROGLYCERIN 5 MG/ML
100-500 VIAL (ML) INTRAVENOUS
Status: DISCONTINUED | OUTPATIENT
Start: 2018-12-26 | End: 2018-12-26 | Stop reason: HOSPADM

## 2018-12-26 RX ORDER — ADENOSINE 3 MG/ML
12-12000 INJECTION, SOLUTION INTRAVENOUS
Status: DISCONTINUED | OUTPATIENT
Start: 2018-12-26 | End: 2018-12-26 | Stop reason: HOSPADM

## 2018-12-26 RX ORDER — LIDOCAINE 40 MG/G
CREAM TOPICAL
Status: COMPLETED | OUTPATIENT
Start: 2018-12-26 | End: 2018-12-26

## 2018-12-26 RX ORDER — PRASUGREL 10 MG/1
10-60 TABLET, FILM COATED ORAL
Status: DISCONTINUED | OUTPATIENT
Start: 2018-12-26 | End: 2018-12-26 | Stop reason: HOSPADM

## 2018-12-26 RX ORDER — FUROSEMIDE 10 MG/ML
20-100 INJECTION INTRAMUSCULAR; INTRAVENOUS
Status: DISCONTINUED | OUTPATIENT
Start: 2018-12-26 | End: 2018-12-26 | Stop reason: HOSPADM

## 2018-12-26 RX ORDER — VERAPAMIL HYDROCHLORIDE 2.5 MG/ML
1-5 INJECTION, SOLUTION INTRAVENOUS
Status: DISCONTINUED | OUTPATIENT
Start: 2018-12-26 | End: 2018-12-26 | Stop reason: HOSPADM

## 2018-12-26 RX ORDER — PHENYLEPHRINE HCL IN 0.9% NACL 1 MG/10 ML
20-100 SYRINGE (ML) INTRAVENOUS
Status: DISCONTINUED | OUTPATIENT
Start: 2018-12-26 | End: 2018-12-26 | Stop reason: HOSPADM

## 2018-12-26 RX ORDER — NALOXONE HYDROCHLORIDE 0.4 MG/ML
0.4 INJECTION, SOLUTION INTRAMUSCULAR; INTRAVENOUS; SUBCUTANEOUS EVERY 5 MIN PRN
Status: DISCONTINUED | OUTPATIENT
Start: 2018-12-26 | End: 2018-12-26 | Stop reason: HOSPADM

## 2018-12-26 RX ORDER — METHYLPREDNISOLONE SODIUM SUCCINATE 125 MG/2ML
125 INJECTION, POWDER, LYOPHILIZED, FOR SOLUTION INTRAMUSCULAR; INTRAVENOUS
Status: DISCONTINUED | OUTPATIENT
Start: 2018-12-26 | End: 2018-12-26 | Stop reason: HOSPADM

## 2018-12-26 RX ORDER — DIPHENHYDRAMINE HYDROCHLORIDE 50 MG/ML
25-50 INJECTION INTRAMUSCULAR; INTRAVENOUS
Status: DISCONTINUED | OUTPATIENT
Start: 2018-12-26 | End: 2018-12-26 | Stop reason: HOSPADM

## 2018-12-26 RX ORDER — NICARDIPINE HYDROCHLORIDE 2.5 MG/ML
100-300 INJECTION INTRAVENOUS
Status: DISCONTINUED | OUTPATIENT
Start: 2018-12-26 | End: 2018-12-26 | Stop reason: HOSPADM

## 2018-12-26 RX ORDER — EPTIFIBATIDE 2 MG/ML
1 INJECTION, SOLUTION INTRAVENOUS CONTINUOUS PRN
Status: DISCONTINUED | OUTPATIENT
Start: 2018-12-26 | End: 2018-12-26 | Stop reason: HOSPADM

## 2018-12-26 RX ORDER — ONDANSETRON 2 MG/ML
4 INJECTION INTRAMUSCULAR; INTRAVENOUS EVERY 4 HOURS PRN
Status: DISCONTINUED | OUTPATIENT
Start: 2018-12-26 | End: 2018-12-26 | Stop reason: HOSPADM

## 2018-12-26 RX ORDER — EPTIFIBATIDE 2 MG/ML
180 INJECTION, SOLUTION INTRAVENOUS EVERY 10 MIN PRN
Status: DISCONTINUED | OUTPATIENT
Start: 2018-12-26 | End: 2018-12-26 | Stop reason: HOSPADM

## 2018-12-26 RX ORDER — DOBUTAMINE HYDROCHLORIDE 200 MG/100ML
2-20 INJECTION INTRAVENOUS CONTINUOUS PRN
Status: DISCONTINUED | OUTPATIENT
Start: 2018-12-26 | End: 2018-12-26 | Stop reason: HOSPADM

## 2018-12-26 RX ORDER — POTASSIUM CHLORIDE 7.45 MG/ML
10 INJECTION INTRAVENOUS
Status: DISCONTINUED | OUTPATIENT
Start: 2018-12-26 | End: 2018-12-26 | Stop reason: HOSPADM

## 2018-12-26 RX ORDER — ENALAPRILAT 1.25 MG/ML
1.25-2.5 INJECTION INTRAVENOUS
Status: DISCONTINUED | OUTPATIENT
Start: 2018-12-26 | End: 2018-12-26 | Stop reason: HOSPADM

## 2018-12-26 RX ORDER — LIDOCAINE HYDROCHLORIDE 10 MG/ML
30 INJECTION, SOLUTION EPIDURAL; INFILTRATION; INTRACAUDAL; PERINEURAL
Status: DISCONTINUED | OUTPATIENT
Start: 2018-12-26 | End: 2018-12-26 | Stop reason: HOSPADM

## 2018-12-26 RX ORDER — HEPARIN SODIUM 1000 [USP'U]/ML
1000-10000 INJECTION, SOLUTION INTRAVENOUS; SUBCUTANEOUS EVERY 5 MIN PRN
Status: DISCONTINUED | OUTPATIENT
Start: 2018-12-26 | End: 2018-12-26 | Stop reason: HOSPADM

## 2018-12-26 RX ORDER — ASPIRIN 81 MG/1
81-324 TABLET, CHEWABLE ORAL
Status: DISCONTINUED | OUTPATIENT
Start: 2018-12-26 | End: 2018-12-26 | Stop reason: HOSPADM

## 2018-12-26 RX ORDER — FLUMAZENIL 0.1 MG/ML
0.2 INJECTION, SOLUTION INTRAVENOUS
Status: DISCONTINUED | OUTPATIENT
Start: 2018-12-26 | End: 2018-12-26 | Stop reason: HOSPADM

## 2018-12-26 RX ORDER — FENTANYL CITRATE 50 UG/ML
25-50 INJECTION, SOLUTION INTRAMUSCULAR; INTRAVENOUS
Status: DISCONTINUED | OUTPATIENT
Start: 2018-12-26 | End: 2018-12-26 | Stop reason: HOSPADM

## 2018-12-26 RX ORDER — SODIUM NITROPRUSSIDE 25 MG/ML
100-200 INJECTION INTRAVENOUS
Status: DISCONTINUED | OUTPATIENT
Start: 2018-12-26 | End: 2018-12-26 | Stop reason: HOSPADM

## 2018-12-26 RX ORDER — LIDOCAINE HYDROCHLORIDE 10 MG/ML
INJECTION, SOLUTION EPIDURAL; INFILTRATION; INTRACAUDAL; PERINEURAL
Status: DISCONTINUED | OUTPATIENT
Start: 2018-12-26 | End: 2018-12-26 | Stop reason: HOSPADM

## 2018-12-26 RX ORDER — LORAZEPAM 2 MG/ML
.5-2 INJECTION INTRAMUSCULAR EVERY 4 HOURS PRN
Status: DISCONTINUED | OUTPATIENT
Start: 2018-12-26 | End: 2018-12-26 | Stop reason: HOSPADM

## 2018-12-26 RX ORDER — ATROPINE SULFATE 0.1 MG/ML
.5-1 INJECTION INTRAVENOUS
Status: DISCONTINUED | OUTPATIENT
Start: 2018-12-26 | End: 2018-12-26 | Stop reason: HOSPADM

## 2018-12-26 RX ORDER — PROTAMINE SULFATE 10 MG/ML
1-5 INJECTION, SOLUTION INTRAVENOUS
Status: DISCONTINUED | OUTPATIENT
Start: 2018-12-26 | End: 2018-12-26 | Stop reason: HOSPADM

## 2018-12-26 RX ORDER — NITROGLYCERIN 20 MG/100ML
.07-1.4 INJECTION INTRAVENOUS CONTINUOUS PRN
Status: DISCONTINUED | OUTPATIENT
Start: 2018-12-26 | End: 2018-12-26 | Stop reason: HOSPADM

## 2018-12-26 RX ORDER — NIFEDIPINE 10 MG/1
10 CAPSULE ORAL
Status: DISCONTINUED | OUTPATIENT
Start: 2018-12-26 | End: 2018-12-26 | Stop reason: HOSPADM

## 2018-12-26 RX ORDER — DOPAMINE HYDROCHLORIDE 160 MG/100ML
2-20 INJECTION, SOLUTION INTRAVENOUS CONTINUOUS PRN
Status: DISCONTINUED | OUTPATIENT
Start: 2018-12-26 | End: 2018-12-26 | Stop reason: HOSPADM

## 2018-12-26 RX ORDER — CLOPIDOGREL BISULFATE 75 MG/1
300-600 TABLET ORAL
Status: DISCONTINUED | OUTPATIENT
Start: 2018-12-26 | End: 2018-12-26 | Stop reason: HOSPADM

## 2018-12-26 RX ADMIN — LIDOCAINE: 40 CREAM TOPICAL at 07:36

## 2018-12-26 RX ADMIN — SODIUM NITROPRUSSIDE 1 MCG/KG/MIN: 25 INJECTION INTRAVENOUS at 09:13

## 2018-12-26 NOTE — DISCHARGE INSTRUCTIONS
[facility]                        Interventional Cardiology  Discharge Instructions   Post Right Heart Cath      AFTER YOU GO HOME:    DO drink plenty of fluids    DO resume your regular diet and medications unless otherwise instructed by your Primary Physician    Do Not scrub the procedure site vigorously    No lotion or powder to the puncture site for 3 days    CALL YOUR PRIMARY PHYSICIAN IF: You may resume all normal activity.  Monitor neck site for bleeding, swelling, or voice changes. If you notice bleeding or swelling immediately apply pressure to the site and call number below to speak with Cardiology Fellow.  If you experience any changes in your breathing you should call your doctor immediately or come to the closest Emergency Department.  Do not drive yourself.    ADDITIONAL INSTRUCTIONS: Medications: You are to resume all home medications including anticoagulation therapy unless otherwise advised by your primary cardiologist or nurse coordinator.    Follow Up: Per your primary cardiology team    If you have any questions or concerns regarding your procedure site please call [phone] at anytime and ask for Cardiology Fellow on call.  They are available 24 hours a day.  You may also contact the Cardiology Clinic after hours number at [phone].                                                       Telephone Numbers [phone] Monday-Friday 8:00 am to 4:30 pm    [phone] After 4:30 pm Monday-Friday, Weekends & Holidays  Ask for Interventional Cardiologist on call. Someone is on call 24 hours/day   [facility] toll free number [phone] Monday-Friday 8:00 am to 4:30 pm   [facility] Emergency Dept [phone]

## 2018-12-26 NOTE — PROGRESS NOTES
Pt arrives to 2a, with spouse, for RHC. Consent needs to be signed. Discharge instructions reviewed with pt pre procedure, pt verbalizes understanding.

## 2018-12-26 NOTE — Clinical Note
Potential access sites were evaluated for patency using ultrasound.   The right jugular vein was selected.   Access was obtained under ultrasound guidance with direct visualization of needle entry.

## 2018-12-26 NOTE — PROCEDURES
CARDIAC CATH REPORT:   PROCEDURES PERFORMED:   Right Heart Catheterization    PHYSICIANS:  Attending Physician: Manish Allen MD  Cardiology Fellow: Parmjit Ramírez MD    INDICATION:  Boogie Clark is a 68 year old male with pulmonary HTN, systemic HTN, who presents for RHC.   The indication for the procedure was hemodynamic assessment.      DESCRIPTION:  1. Consent obtained with discussion of risks.  All questions were answered.  2. Sterile prep and procedure.  3. Location with Sheaths: 7 Fr Rt IJ  4. Access: Local anesthetic with lidocaine.  A standard 18 guage needle with ultrasound guidance was used to establish vascular access using a modified Seldinger technique.  5. Diagnostic Catheters: 7 Fr SGC  6. Estimated blood loss: < 5 ml    MEDICATIONS:  Heart rate, BP, respiration, oxygen saturation and patient responses were monitored throughout the procedure with the assistance of the RN under my supervision.    Procedures:    HEMODYNAMICS:  1. HR 75 bpm  2. /87/125 mmHg  3. RA 15/13/15   4. RV 65/15  5. PA 65/30/43   6. PCW 25/25/21   7. PA sat 63%   8. PCW sat 97%  9. Hgb 12 g/dL   10. Taryn CO 6.1   11. Taryn CI 2.4   12. TD CO 5.9   13. TD CI 2.3     AFTER NIPRIDE:  - PA 33/22/24  - PCWP 13/13/11  - PA Sat 66.3  - Hgb 12.6  - Taryn CO 7.7  - Taryn CI 3.0  - TD CO 5.9  - TD CI 2.3     Sheath Removal:  The Rt IJ sheath was manually removed in the cardiac catheterization laboratory.    Fluoroscopy Time: 1.5 min    COMPLICATIONS:  1. None    SUMMARY:   >> High right sided filling pressures.  >> High left sided filling pressures.  >> Moderate pulmonary artery hypertension  >> Normal cardiac output  >> After Nipride administration mean PA pressure dropped from 43 to 24, PCWP decreased from 21 to 11, and there was mild increase in CO from 6.1 to 7.7.    PLAN:   >> Bedrest per protocol.  >> Continued medical management and lifestyle modification for cardiovascular risk factor optimization.   >> Discharge today per  protocol    The attending interventional cardiologist was present and supervised all critical aspects the procedure.    Findings discussed with Dr. Velasquez.    See CVIS report for final draft.      Parmjit Ramírez MD  Cardiovascular Disease Fellow  Pager: [phone]      ATTENDING ATTESTATION: I was present and supervised the cardiology fellow throughout the procedure and reviewed the findings with the fellow at the completion of the procedure.  I agree with the documentation above.    Manish Allen MD, PhD  Interventional/Critical Care Cardiology  [phone]    December 26, 2018

## 2018-12-26 NOTE — PROGRESS NOTES
Returned from [facility] to  at 0945.  Patient tolerated recovery stage well. VSS, RIJV site clean/dry/intact, no hematoma, and denies pain. Patient tolerated PO  fluids. Teaching was done and discharge instructions were given. Patient discharged from the hospital via ambulatory to home with his family.

## 2018-12-26 NOTE — PROGRESS NOTES
Met with patient to discuss risks and benefits of planned right heart catheterization. Risks including, but not limited to, bleeding, arrhythmia, and infection were discussed. Consent form completed, signed, and awaiting physician co-signature.      Pankaj Allen PA-C  [facility] Cardiology Team

## 2018-12-26 NOTE — IP AVS SNAPSHOT
[address] [address]                                    After Visit Summary   12/26/2018    Boogie Clark    MRN: [id number]           After Visit Summary Signature Page    I have received my discharge instructions, and my questions have been answered. I have discussed any challenges I see with this plan with the nurse or doctor.    ..........................................................................................................................................  Patient/Patient Representative Signature      ..........................................................................................................................................  Patient Representative Print Name and Relationship to Patient    ..................................................               ................................................  Date                                   Time    ..........................................................................................................................................  Reviewed by Signature/Title    ...................................................              ..............................................  Date                                               Time          22EPIC Rev 08/18

## 2019-01-01 NOTE — PROGRESS NOTES
Thank you for coming to the [facility] @ [facility] [place]; please note the following instructions:    1.  We have applied a holter (heart) monitor for you to wear for 7 days.  You may remove the heart monitor on 5/22/18 at 10:00 AM.  Please see the enclosed instructions for further information, as well as instructions for removal of the heart monitor and return mailing directions.  If you should have questions regarding your monitor, please call [employer] at [phone].  The Cardiology Nurse will contact you with your results (please see result notification details at bottom of summary).    *PLEASE DO NOT SHOWER OR INDUCE EXCESSIVE SWEATING IN THE FIRST 24 HOURS OF WEARING*      2.  Dr. Portillo Can would like you to return for a cardiac follow up in 1 year  (May 2019).  We will contact you regarding your appointment when the time draws closer or you may call [phone] to arrange an appointment.  Mean while, if you should have any questions or concerns regarding your heart health, please contact us.  Thank you for choosing [facility] for your care.          If you have any questions regarding your visit please contact your care team:     Cardiology  Telephone Number   Rocio SHIN, RN  Main NORRIS, RN   Tamika ABBOTT, STEVEN HOWE, FRANCI GORDON MA   [phone]    *After hours: [phone]   For scheduling appts:     [phone] or    [phone] (select option 1)    *After hours: [phone]     For the Device Clinic (Pacemakers and ICD's)  RN's :  Germaine Hickey   During business hours: [phone]    *After business hours:  [phone] (select option 4)      Normal test result notifications will be released via [employer] or mailed within 7 business days.  All other test results, will be communicated via telephone once reviewed by your cardiologist.    If you need a medication refill please contact your pharmacy.  Please allow 3 business days for your refill  to be completed.    As always, thank you for trusting us with your health care needs!Maurilio Velasquez M.D.  Cardiovascular Medicine    I personally saw and examined Boogie Clark, discussed care with housestaff and other consultants, reviewed current laboratories and imaging studies, and conveyed impression and diagnostic/therapeutic plan to patient.    Problem List:    History:      Objective:    VITALS  There were no vitals taken for this visit.    WEIGHT  Wt Readings from Last 5 Encounters:   11/28/18 142.4 kg (314 lb)   11/08/18 139.5 kg (307 lb 9.6 oz)   06/27/18 138.3 kg (305 lb)   05/30/18 141 kg (310 lb 12.8 oz)   05/01/18 141.5 kg (312 lb)       Meds  Current Outpatient Medications   Medication Sig Dispense Refill     aspirin 81 MG tablet Take  by mouth daily.       ATORVASTATIN CALCIUM PO Take 40 mg by mouth daily       Cyanocobalamin (VITAMIN B 12 PO)        diltiazem (DILACOR XR) 180 MG 24 hr capsule Take 1 capsule (180 mg) by mouth daily 30 capsule 1     fluticasone (FLONASE) 50 MCG/ACT nasal spray Spray 1-2 sprays into both nostrils daily 1 g 11     FUROSEMIDE PO Take 10 mg by mouth daily        lovastatin (MEVACOR) 40 MG tablet Take 1 tablet (40 mg) by mouth At Bedtime 90 tablet 3     MELATONIN PO        montelukast (SINGULAIR) 10 MG tablet Take 10 mg by mouth At Bedtime       Multiple Vitamin (MULTI-VITAMIN) per tablet Take  by mouth daily.       order for DME Equipment being ordered: Oxygen concentrator and portability  Date of order 05/10/18  Dosage of concentration - 2 liters per minute   Route of administration - Nasal cannula  Frequency of use - continuously  Duration of need - lifetime   Shabbir Agustin MD is the health care provider     _________________________________________  npi [id number] 05/10/18 1 Device 0     order for DME Equipment being ordered: oxygen concentrator - [facility] home oxygen 1 Device 0     order for DME Equipment being ordered: oxygen concentrator     Oxygen at 3 liters while at  rest is at 97 %.  Oxygen at 3 liters walking is at 97 %.     Without oxygen at rest is at 91 %.  Without oxygen while walking is at 87 %.    From today's office visit 1 Device 0       LABORATORY RESULTS    CBC Results  Lab Results   Component Value Date    WBC 10.3 11/28/2018    RBC 4.42 11/28/2018    HGB 13.4 11/28/2018    HCT 40.4 11/28/2018    MCV 91 11/28/2018    MCH 30.3 11/28/2018    MCHC 33.2 11/28/2018    RDW 13.3 11/28/2018     11/28/2018       CMP RESULTS:  Lab Results   Component Value Date     11/28/2018    POTASSIUM 3.9 11/28/2018    CHLORIDE 110 (H) 11/28/2018    CO2 25 11/28/2018    ANIONGAP 8 11/28/2018     (H) 11/28/2018    BUN 12 11/28/2018    CR 0.91 11/28/2018    GFRESTIMATED 82 11/28/2018    GFRESTBLACK >90 11/28/2018    GRACIELA 8.8 11/28/2018    BILITOTAL 0.4 11/28/2018    ALBUMIN 3.3 (L) 11/28/2018    ALKPHOS 83 11/28/2018    ALT 27 11/28/2018    AST 16 11/28/2018        INR RESULTS:  No results found for: INR    MAGNESIUM RESULTS  No results found for: MAG    BNP RESULTS  No results found for: NTBNP    IMAGING    ASSESSMENT/PLAN      Sincerely yours,    Maurilio Velasquez MD dictated but not signed send out immediately

## 2019-01-01 NOTE — PROGRESS NOTES
I personally saw and examined this patient, discussed care with housestaff and other consultants, reviewed current laboratories and imaging studies, and conveyed impression and diagnostic/therapeutic plan to patient.     Skip Agustin M.D.     Marcos Hoffman M.D.     Problem List  1. Pulmonary hypertension  2. Hypertension  3. COLD  4. History of bladder tumor  5. COCO  6. Elevated body mass index  7. Nephrolithiasis  8. Diverticulosis  9. Hyperlipidemia  10. ASPVD with balloon stenting  11. History of ankle fracture with immolization  12. Lymphedema  13. ? History of DVT  14. Coronary calcification  15. Steroid dependence     History     Thank you very much for allowing us to see Mr. Pal.  As you know he has exertional shortness of breath of uncertain etiology.  He has known ASPVD with bilateral iliofemoral balloon angioplasty.  There is a questionable history of DVT associated with immobilization associated with ankle fracture.  He has exertional hypoxia which he says is not corrected with supplemental oxygen.  He has no history of collagen vascular disease, myeloproliferative syndrome, methamphetamine, cocaine usage.  He has multiple risk factors for CAD but no actual cardiac events.  He has COCO, uses machine and supplemental oxygen but has not had a repeat sleep study.  He has a remote history of smoking.  He has no history ETOH abuse.    Current Outpatient Medications   Medication     aspirin 81 MG tablet     ATORVASTATIN CALCIUM PO     Cyanocobalamin (VITAMIN B 12 PO)     diltiazem (DILACOR XR) 180 MG 24 hr capsule     fluticasone (FLONASE) 50 MCG/ACT nasal spray     FUROSEMIDE PO     lovastatin (MEVACOR) 40 MG tablet     MELATONIN PO     montelukast (SINGULAIR) 10 MG tablet     Multiple Vitamin (MULTI-VITAMIN) per tablet     order for DME     order for DME     order for DME     No current facility-administered medications for this visit.      Results for CLARITZA PAL (MRN [id number]) as of 1/2/2019 10:23    Ref. Range 7/10/2017 00:00 3/5/2018 00:00 3/5/2018 00:00 11/28/2018 15:30 11/28/2018 16:41   Sodium Latest Ref Range: 133 - 144 mmol/L     142   Potassium Latest Ref Range: 3.4 - 5.3 mmol/L  3.9   3.9   Chloride Latest Ref Range: 94 - 109 mmol/L     110 (H)   Carbon Dioxide Latest Ref Range: 20 - 32 mmol/L     25   Urea Nitrogen Latest Ref Range: 7 - 30 mg/dL     12   Creatinine Latest Ref Range: 0.66 - 1.25 mg/dL  1.18   0.91   GFR Estimate Latest Ref Range: >60 mL/min/1.7m2  63   82   GFR Estimate If Black Latest Ref Range: >60 mL/min/1.7m2  73   >90   Calcium Latest Ref Range: 8.5 - 10.1 mg/dL     8.8   Anion Gap Latest Ref Range: 3 - 14 mmol/L     8   Albumin Latest Ref Range: 3.4 - 5.0 g/dL     3.3 (L)   Protein Total Latest Ref Range: 6.8 - 8.8 g/dL     7.5   Bilirubin Total Latest Ref Range: 0.2 - 1.3 mg/dL     0.4   Alkaline Phosphatase Latest Ref Range: 40 - 150 U/L     83   ALT Latest Ref Range: 0 - 70 U/L  15   27   AST Latest Ref Range: 0 - 45 U/L  22   16   Cholesterol Latest Ref Range: <200 mg/dL  165      CRP Inflammation Latest Ref Range: 0.0 - 8.0 mg/L     20.3 (H)   Ferritin Latest Ref Range: 26 - 388 ng/mL     59   HDL Cholesterol Latest Ref Range: >39 mg/dL  41      Iron Latest Ref Range: 35 - 180 ug/dL     40   Iron Binding Cap Latest Ref Range: 240 - 430 ug/dL     329   Iron Saturation Index Latest Ref Range: 15 - 46 %     12 (L)   LDL Cholesterol Calculated Latest Ref Range: <100 mg/dL  104 (H)      Soluble Transferrin Receptor Latest Ref Range: 2.2 - 5.0 mg/L     6.8 (H)   Triglycerides Latest Ref Range: <150 mg/dL  100      Glucose Latest Ref Range: 70 - 99 mg/dL  110 (H)   106 (H)   pH Arterial Latest Ref Range: 7.35 - 7.45 pH    7.46 (H)    pCO2 Arterial Latest Ref Range: 35 - 45 mm Hg    32 (L)    PO2 Arterial Latest Ref Range: 80 - 105 mm Hg    57 (L)    Bicarbonate Arterial Latest Ref Range: 21 - 28 mmol/L    23    Base Excess Art Latest Units: mmol/L    0.0    FIO2 Unknown    21     Oxyhemoglobin Arterial Latest Ref Range: 92 - 100 %    88 (L)    Carbon Monoxide Latest Ref Range: 0 - 2 %    2.2 (H)    Methemoglobin Latest Ref Range: 0 - 3 %    0.1    WBC Latest Ref Range: 4.0 - 11.0 10e9/L     10.3   Hemoglobin Latest Ref Range: 13.3 - 17.7 g/dL    14.1 13.4   Hematocrit Latest Ref Range: 40.0 - 53.0 %     40.4   Platelet Count Latest Ref Range: 150 - 450 10e9/L     355   RBC Count Latest Ref Range: 4.4 - 5.9 10e12/L     4.42   MCV Latest Ref Range: 78 - 100 fl     91   MCH Latest Ref Range: 26.5 - 33.0 pg     30.3   MCHC Latest Ref Range: 31.5 - 36.5 g/dL     33.2   RDW Latest Ref Range: 10.0 - 15.0 %     13.3   ANTI NUCLEAR NELY IGG BY IFA WITH REFLEX Unknown     Rpt   LAB RESULT - HIM SCAN Unknown Attch  Attch          Examination:NM LUNG SCAN VENTILATION AND PERFUSION, 12/5/2018 8:36 AM      Indication:  r/o Chronic PE; Pulmonary hypertension (H); Dyspnea on  exertion   Additional Information: none     Technique:     The patient received 2 mCi of Tc-99m DTPA aerosol for ventilation and  7 mCi of Tc-99m MAA for perfusion. Multiple images were obtained of  the lungs in Anterior, posterior, SAM, RPO, LPO, and  Greek projections.     Comparison: Same-day chest radiograph and chest CT 5/8/2018     Findings:     The ventilation study demonstrates radiotracer deposition in the  central airways.  The perfusion study does not show any perfusion defect to indicate  acute pulmonary embolus. However, there is subtle nonsegmental  decreased radiotracer uptake in the right middle zones.                                                                      Impression:   1. No acute pulmonary emboli. However, subtle nonsegmental decreased  radiotracer uptake in the right lung middle zones is noted. This could  represent sequela of chronic pulmonary embolism.  2. Radiotracer deposition in the central airways, indicative of   peripheral resistive airway disease    HEMODYNAMICS:  1. HR 75 bpm  2. /87/125  mmHg  3. RA 15/13/15   4. RV 65/15  5. PA 65/30/43   6. PCW /   7. PA sat 63%   8. PCW sat 97%  9. Hgb 12 g/dL   10. Taryn CO 6.1   11. Taryn CI 2.4   12. TD CO 5.9   13. TD CI 2.3      AFTER NIPRIDE:  - PA /  - PCWP   - PA Sat 66.3  - Hgb 12.6  - Taryn CO 7.7  - Taryn CI 3.0  - TD CO 5.9  - TD CI 2.3    [facility]  Echocardiography Laboratory  [address] [place] [address]     Name: CLARITZA PAL  MRN: [id number]  : 1950  Study Date: 2018 11:07 AM  Age: 67 yrs  Gender: Male  Patient Location: [facility]  Reason For Study: Pulmonary hypertension, Dyspnea on exertion  Ordering Physician: GALA LOZADA  Referring Physician: GALA LOZADA  Performed By: Aj Prasad RDCS     BSA: 2.6 m2  Height: 71 in  Weight: 314 lb  HR: 59  BP: 152/85 mmHg  _____________________________________________________________________________  __        Procedure  Bubble Echocardiogram with two-dimensional, color and spectral Doppler  performed. Contrast Optison. Technically difficult study.Extremely poor  acoustic windows. Limited information was obtained during study. Optison (NDC  #7629-0768-89) given intravenously. IV start location L Forearm . IV start  time 11:25 . Patient was given 4 ml mixture of 3 ml Optison and 6 ml saline. 5  ml wasted.  _____________________________________________________________________________  __        Interpretation Summary  Technically difficult study.Extremely poor acoustic windows.  Limited information was obtained during study.  Global and regional left ventricular function is normal with an EF of 55-60%.  Global right ventricular function is normal.  Pulmonary artery systolic pressure cannot be assessed.  The inferior vena cava is normal.  The atrial septum is intact as assessed by agitated saline bubble study .  _____________________________________________________________________________  __        Left Ventricle  Global  and regional left ventricular function is normal with an EF of 55-60%.     Right Ventricle  Global right ventricular function is normal.     Atria  The atrial septum is intact as assessed by agitated saline bubble study .     Tricuspid Valve  Pulmonary artery systolic pressure cannot be assessed.        Vessels  The inferior vena cava is normal.     Pericardium  No pericardial effusion is present.  _____________________________________________________________________________  __     MMode/2D Measurements & Calculations  IVSd: 1.2 cm  LVIDd: 4.5 cm  LVIDs: 3.2 cm  LVPWd: 1.1 cm  FS: 29.2 %  LV mass(C)d: 184.2 grams  LV mass(C)dI: 72.1 grams/m2  asc Aorta Diam: 3.2 cm  LVOT diam: 2.1 cm  LVOT area: 3.5 cm2  LA Volume (BP): 96.5 ml     LA Volume Index (BP): 37.8 ml/m2  RWT: 0.47        Doppler Measurements & Calculations  MV E max italo: 85.4 cm/sec  MV A max italo: 89.7 cm/sec  MV E/A: 0.95  MV dec time: 0.29 sec  Ao V2 max: 123.0 cm/sec  Ao max P.0 mmHg  NANNETTE(V,D): 2.8 cm2  LV V1 max P.0 mmHg  LV V1 max: 99.4 cm/sec  AV Italo Ratio (DI): 0.81  E/E' av.4  Lateral E/e': 14.4  Medial E/e': 20.3    Plan:  1. Parenteral iron replacement  2. Colonoscopy  3. Rehabilitation  4. Continue medication titration    We discussed delaying the contemplated bariatric surgery until his physical status is improved

## 2019-01-02 ENCOUNTER — MEDICAL CORRESPONDENCE (OUTPATIENT)
Dept: HEALTH INFORMATION MANAGEMENT | Facility: CLINIC | Age: 69
End: 2019-01-02

## 2019-01-02 ENCOUNTER — OFFICE VISIT (OUTPATIENT)
Dept: CARDIOLOGY | Facility: CLINIC | Age: 69
End: 2019-01-02
Attending: INTERNAL MEDICINE
Payer: MEDICARE

## 2019-01-02 VITALS
WEIGHT: 310 LBS | HEIGHT: 71 IN | OXYGEN SATURATION: 93 % | SYSTOLIC BLOOD PRESSURE: 158 MMHG | BODY MASS INDEX: 43.4 KG/M2 | HEART RATE: 70 BPM | DIASTOLIC BLOOD PRESSURE: 74 MMHG

## 2019-01-02 DIAGNOSIS — D50.8 IRON DEFICIENCY ANEMIA SECONDARY TO INADEQUATE DIETARY IRON INTAKE: ICD-10-CM

## 2019-01-02 DIAGNOSIS — I10 BENIGN ESSENTIAL HYPERTENSION: ICD-10-CM

## 2019-01-02 DIAGNOSIS — R06.02 SOB (SHORTNESS OF BREATH): Primary | ICD-10-CM

## 2019-01-02 PROBLEM — D50.9 IRON DEFICIENCY ANEMIA: Status: ACTIVE | Noted: 2019-01-02

## 2019-01-02 PROCEDURE — 99213 OFFICE O/P EST LOW 20 MIN: CPT | Mod: ZP | Performed by: INTERNAL MEDICINE

## 2019-01-02 PROCEDURE — G0463 HOSPITAL OUTPT CLINIC VISIT: HCPCS | Mod: ZF

## 2019-01-02 RX ORDER — LOSARTAN POTASSIUM 25 MG/1
25 TABLET ORAL DAILY
Qty: 60 TABLET | Refills: 5 | Status: SHIPPED | OUTPATIENT
Start: 2019-01-02 | End: 2019-01-03

## 2019-01-02 RX ORDER — FUROSEMIDE 20 MG
40 TABLET ORAL DAILY
Qty: 60 TABLET | Refills: 11 | Status: SHIPPED | OUTPATIENT
Start: 2019-01-02 | End: 2019-01-07

## 2019-01-02 ASSESSMENT — PAIN SCALES - GENERAL: PAINLEVEL: NO PAIN (0)

## 2019-01-02 ASSESSMENT — MIFFLIN-ST. JEOR: SCORE: 2193.28

## 2019-01-02 NOTE — NURSING NOTE
Discussed purpose, preparation, procedure and when to expect results reported back to the patient. Patient demonstrated understanding of this information and agreed to call with further questions or concerns.    Med Reconcile: Reviewed and verified all current medications with the patient. The updated medication list was printed and given to the patient.    New Medication: Patient was educated regarding newly prescribed medication, including discussion of  the indication, administration, side effects, and when to report to MD or RN. Patient demonstrated understanding of this information and agreed to call with further questions or concerns.    Labs: Patient was given results of the laboratory testing obtained today. Patient demonstrated understanding of this information and agreed to call with further questions or concerns.     Diet: Patient instructed regarding a heart healthy diet, including discussion of reduced fat and sodium intake. Patient demonstrated understanding of this information and agreed to call with further questions or concerns.    Return Appointment: Patient given instructions regarding scheduling next clinic visit. Patient demonstrated understanding of this information and agreed to call with further questions or concerns.    Patient stated he understood all health information given and agreed to call with further questions or concerns.    Medication Changes:  -Increase Furosemide to 40mg once daily  -Losartan 25mg Once daily  -Iron infusions x 2 as an outpatient Injectofer 750mgs x 2 infusions one week apart. @ [facility] [phone].  -Stop diltiazem after you have been on Losartan for 3 days    Patient Instructions:  1. Continue staying active and eat a heart healthy diet.    2. Please keep current list of medications with you at all times.    3. Remember to weigh yourself daily after voiding and before you consume any food or beverages and log the numbers.  If you have  gained/lost 2 pounds overnight or 5 pounds in a week contact us immediately for medication adjustments or further instructions.    4. **Please call us immediately if you have any syncope (fainting or passing out), chest pain, edema (swelling or weight gain), or decline in your functional status (general decline in how you are feeling).    Follow up Appointment Information:  -Sleep referral to [facility] Sleep study [phone]  -follow up with your Pulmonologist  -Weight Loss    Results:  Component      Latest Ref Rng & Units 11/28/2018   Sodium      133 - 144 mmol/L 142   Potassium      3.4 - 5.3 mmol/L 3.9   Chloride      94 - 109 mmol/L 110 (H)   Carbon Dioxide      20 - 32 mmol/L 25   Anion Gap      3 - 14 mmol/L 8   Glucose      70 - 99 mg/dL 106 (H)   Urea Nitrogen      7 - 30 mg/dL 12   Creatinine      0.66 - 1.25 mg/dL 0.91   GFR Estimate      >60 mL/min/1.7m2 82   GFR Estimate If Black      >60 mL/min/1.7m2 >90   Calcium      8.5 - 10.1 mg/dL 8.8   Bilirubin Total      0.2 - 1.3 mg/dL 0.4   Albumin      3.4 - 5.0 g/dL 3.3 (L)   Protein Total      6.8 - 8.8 g/dL 7.5   Alkaline Phosphatase      40 - 150 U/L 83   ALT      0 - 70 U/L 27   AST      0 - 45 U/L 16   WBC      4.0 - 11.0 10e9/L 10.3   RBC Count      4.4 - 5.9 10e12/L 4.42   Hemoglobin      13.3 - 17.7 g/dL 13.4   Hematocrit      40.0 - 53.0 % 40.4   MCV      78 - 100 fl 91   MCH      26.5 - 33.0 pg 30.3   MCHC      31.5 - 36.5 g/dL 33.2   RDW      10.0 - 15.0 % 13.3   Platelet Count      150 - 450 10e9/L 355   Iron      35 - 180 ug/dL 40   Iron Binding Cap      240 - 430 ug/dL 329   Iron Saturation Index      15 - 46 % 12 (L)   MADI interpretation      NEG:Negative Negative   CRP Inflammation      0.0 - 8.0 mg/L 20.3 (H)   Soluble Transferrin Receptor      2.2 - 5.0 mg/L 6.8 (H)   Ferritin      26 - 388 ng/mL 59     **Orders were placed for Iron infusion so patient will call number provided for appt.

## 2019-01-02 NOTE — LETTER
1/2/2019      RE: Boogie Clark  [address] [place] [id number]       Dear Colleague,    Thank you for the opportunity to participate in the care of your patient, Boogie Clark, at the [facility] HEART CARE at [facility]. Please see a copy of my visit note below.        Please do Thank you for coming to the [facility] @ [facility]; please note the following instructions:    1.  We have applied a holter (heart) monitor for you to wear for 7 days.  You may remove the heart monitor on 5/22/18 at 10:00 AM.  Please see the enclosed instructions for further information, as well as instructions for removal of the heart monitor and return mailing directions.  If you should have questions regarding your monitor, please call [facility]. at [phone].  The Cardiology Nurse will contact you with your results (please see result notification details at bottom of summary).    *PLEASE DO NOT SHOWER OR INDUCE EXCESSIVE SWEATING IN THE FIRST 24 HOURS OF WEARING*      2.  Dr. Portillo Can would like you to return for a cardiac follow up in 1 year  (May 2019).  We will contact you regarding your appointment when the time draws closer or you may call [phone] to arrange an appointment.  Mean while, if you should have any questions or concerns regarding your heart health, please contact us.  Thank you for choosing [facility] for your care.          If you have any questions regarding your visit please contact your care team:     Cardiology  Telephone Number   Rocio SHIN, ROYA NORRIS, RN   Tamika ABBOTT, STEVEN HOWE, FRANCI GORDON MA   [phone]    *After hours: [phone]   For scheduling appts:     [phone] or    [phone] (select option 1)    *After hours: [phone]     For the Device Clinic (Pacemakers and ICD's)  RN's :  Germaine Hickey   During business hours: [phone]    *After business hours:  [phone]  (select option 4)      Normal test result notifications will be released via ODIN or mailed within 7 business days.  All other test results, will be communicated via telephone once reviewed by your cardiologist.    If you need a medication refill please contact your pharmacy.  Please allow 3 business days for your refill to be completed.    As always, thank you for trusting us with your health care needs!Maurilio Velasquez M.D.  Cardiovascular Medicine    I personally saw and examined Boogie Clark, discussed care with housestaff and other consultants, reviewed current laboratories and imaging studies, and conveyed impression and diagnostic/therapeutic plan to patient.    Problem List:    History:      Objective:    VITALS  There were no vitals taken for this visit.    WEIGHT  Wt Readings from Last 5 Encounters:   11/28/18 142.4 kg (314 lb)   11/08/18 139.5 kg (307 lb 9.6 oz)   06/27/18 138.3 kg (305 lb)   05/30/18 141 kg (310 lb 12.8 oz)   05/01/18 141.5 kg (312 lb)       Meds  Current Outpatient Medications   Medication Sig Dispense Refill     aspirin 81 MG tablet Take  by mouth daily.       ATORVASTATIN CALCIUM PO Take 40 mg by mouth daily       Cyanocobalamin (VITAMIN B 12 PO)        diltiazem (DILACOR XR) 180 MG 24 hr capsule Take 1 capsule (180 mg) by mouth daily 30 capsule 1     fluticasone (FLONASE) 50 MCG/ACT nasal spray Spray 1-2 sprays into both nostrils daily 1 g 11     FUROSEMIDE PO Take 10 mg by mouth daily        lovastatin (MEVACOR) 40 MG tablet Take 1 tablet (40 mg) by mouth At Bedtime 90 tablet 3     MELATONIN PO        montelukast (SINGULAIR) 10 MG tablet Take 10 mg by mouth At Bedtime       Multiple Vitamin (MULTI-VITAMIN) per tablet Take  by mouth daily.       order for DME Equipment being ordered: Oxygen concentrator and portability  Date of order 05/10/18  Dosage of concentration - 2 liters per minute   Route of administration - Nasal cannula  Frequency of use - continuously  Duration of need -  lifetime   Shabbir Agustin MD is the health care provider     _________________________________________  np [id number] 05/10/18 1 Device 0     order for DME Equipment being ordered: oxygen concentrator - [facility] home oxygen 1 Device 0     order for DME Equipment being ordered: oxygen concentrator     Oxygen at 3 liters while at rest is at 97 %.  Oxygen at 3 liters walking is at 97 %.     Without oxygen at rest is at 91 %.  Without oxygen while walking is at 87 %.    From today's office visit 1 Device 0       LABORATORY RESULTS    CBC Results  Lab Results   Component Value Date    WBC 10.3 11/28/2018    RBC 4.42 11/28/2018    HGB 13.4 11/28/2018    HCT 40.4 11/28/2018    MCV 91 11/28/2018    MCH 30.3 11/28/2018    MCHC 33.2 11/28/2018    RDW 13.3 11/28/2018     11/28/2018       CMP RESULTS:  Lab Results   Component Value Date     11/28/2018    POTASSIUM 3.9 11/28/2018    CHLORIDE 110 (H) 11/28/2018    CO2 25 11/28/2018    ANIONGAP 8 11/28/2018     (H) 11/28/2018    BUN 12 11/28/2018    CR 0.91 11/28/2018    GFRESTIMATED 82 11/28/2018    GFRESTBLACK >90 11/28/2018    GRACIELA 8.8 11/28/2018    BILITOTAL 0.4 11/28/2018    ALBUMIN 3.3 (L) 11/28/2018    ALKPHOS 83 11/28/2018    ALT 27 11/28/2018    AST 16 11/28/2018        INR RESULTS:  No results found for: INR    MAGNESIUM RESULTS  No results found for: MAG    BNP RESULTS  No results found for: NTBNP    IMAGING    ASSESSMENT/PLAN      Sincerely yours,    Maurilio Velasquez MD dictated but not signed send out immediately    I personally saw and examined this patient, discussed care with housestaff and other consultants, reviewed current laboratories and imaging studies, and conveyed impression and diagnostic/therapeutic plan to patient.     Skip Agustin M.D.     Marcos Hoffman M.D.     Problem List  1. Pulmonary hypertension  2. Hypertension  3. COLD  4. History of bladder tumor  5. COCO  6. Elevated body mass index  7. Nephrolithiasis  8. Diverticulosis  9.  Hyperlipidemia  10. ASPVD with balloon stenting  11. History of ankle fracture with immolization  12. Lymphedema  13. ? History of DVT  14. Coronary calcification  15. Steroid dependence     History     Thank you very much for allowing us to see Mr. Clark.  As you know he has exertional shortness of breath of uncertain etiology.  He has known ASPVD with bilateral iliofemoral balloon angioplasty.  There is a questionable history of DVT associated with immobilization associated with ankle fracture.  He has exertional hypoxia which he says is not corrected with supplemental oxygen.  He has no history of collagen vascular disease, myeloproliferative syndrome, methamphetamine, cocaine usage.  He has multiple risk factors for CAD but no actual cardiac events.  He has COCO, uses machine and supplemental oxygen but has not had a repeat sleep study.  He has a remote history of smoking.  He has no history ETOH abuse.    Current Outpatient Medications   Medication     aspirin 81 MG tablet     ATORVASTATIN CALCIUM PO     Cyanocobalamin (VITAMIN B 12 PO)     diltiazem (DILACOR XR) 180 MG 24 hr capsule     fluticasone (FLONASE) 50 MCG/ACT nasal spray     FUROSEMIDE PO     lovastatin (MEVACOR) 40 MG tablet     MELATONIN PO     montelukast (SINGULAIR) 10 MG tablet     Multiple Vitamin (MULTI-VITAMIN) per tablet     order for DME     order for DME     order for DME     No current facility-administered medications for this visit.      Results for CLARITZA CLARK (MRN [id number]) as of 1/2/2019 10:23   Ref. Range 7/10/2017 00:00 3/5/2018 00:00 3/5/2018 00:00 11/28/2018 15:30 11/28/2018 16:41   Sodium Latest Ref Range: 133 - 144 mmol/L     142   Potassium Latest Ref Range: 3.4 - 5.3 mmol/L  3.9   3.9   Chloride Latest Ref Range: 94 - 109 mmol/L     110 (H)   Carbon Dioxide Latest Ref Range: 20 - 32 mmol/L     25   Urea Nitrogen Latest Ref Range: 7 - 30 mg/dL     12   Creatinine Latest Ref Range: 0.66 - 1.25 mg/dL  1.18   0.91   GFR  Estimate Latest Ref Range: >60 mL/min/1.7m2  63   82   GFR Estimate If Black Latest Ref Range: >60 mL/min/1.7m2  73   >90   Calcium Latest Ref Range: 8.5 - 10.1 mg/dL     8.8   Anion Gap Latest Ref Range: 3 - 14 mmol/L     8   Albumin Latest Ref Range: 3.4 - 5.0 g/dL     3.3 (L)   Protein Total Latest Ref Range: 6.8 - 8.8 g/dL     7.5   Bilirubin Total Latest Ref Range: 0.2 - 1.3 mg/dL     0.4   Alkaline Phosphatase Latest Ref Range: 40 - 150 U/L     83   ALT Latest Ref Range: 0 - 70 U/L  15   27   AST Latest Ref Range: 0 - 45 U/L  22   16   Cholesterol Latest Ref Range: <200 mg/dL  165      CRP Inflammation Latest Ref Range: 0.0 - 8.0 mg/L     20.3 (H)   Ferritin Latest Ref Range: 26 - 388 ng/mL     59   HDL Cholesterol Latest Ref Range: >39 mg/dL  41      Iron Latest Ref Range: 35 - 180 ug/dL     40   Iron Binding Cap Latest Ref Range: 240 - 430 ug/dL     329   Iron Saturation Index Latest Ref Range: 15 - 46 %     12 (L)   LDL Cholesterol Calculated Latest Ref Range: <100 mg/dL  104 (H)      Soluble Transferrin Receptor Latest Ref Range: 2.2 - 5.0 mg/L     6.8 (H)   Triglycerides Latest Ref Range: <150 mg/dL  100      Glucose Latest Ref Range: 70 - 99 mg/dL  110 (H)   106 (H)   pH Arterial Latest Ref Range: 7.35 - 7.45 pH    7.46 (H)    pCO2 Arterial Latest Ref Range: 35 - 45 mm Hg    32 (L)    PO2 Arterial Latest Ref Range: 80 - 105 mm Hg    57 (L)    Bicarbonate Arterial Latest Ref Range: 21 - 28 mmol/L    23    Base Excess Art Latest Units: mmol/L    0.0    FIO2 Unknown    21    Oxyhemoglobin Arterial Latest Ref Range: 92 - 100 %    88 (L)    Carbon Monoxide Latest Ref Range: 0 - 2 %    2.2 (H)    Methemoglobin Latest Ref Range: 0 - 3 %    0.1    WBC Latest Ref Range: 4.0 - 11.0 10e9/L     10.3   Hemoglobin Latest Ref Range: 13.3 - 17.7 g/dL    14.1 13.4   Hematocrit Latest Ref Range: 40.0 - 53.0 %     40.4   Platelet Count Latest Ref Range: 150 - 450 10e9/L     355   RBC Count Latest Ref Range: 4.4 - 5.9  10e12/L     4.42   MCV Latest Ref Range: 78 - 100 fl     91   MCH Latest Ref Range: 26.5 - 33.0 pg     30.3   MCHC Latest Ref Range: 31.5 - 36.5 g/dL     33.2   RDW Latest Ref Range: 10.0 - 15.0 %     13.3   ANTI NUCLEAR NELY IGG BY IFA WITH REFLEX Unknown     Rpt   LAB RESULT - HIM SCAN Unknown Attch  Attch          Examination:NM LUNG SCAN VENTILATION AND PERFUSION, 12/5/2018 8:36 AM      Indication:  r/o Chronic PE; Pulmonary hypertension (H); Dyspnea on  exertion   Additional Information: none     Technique:     The patient received 2 mCi of Tc-99m DTPA aerosol for ventilation and  7 mCi of Tc-99m MAA for perfusion. Multiple images were obtained of  the lungs in Anterior, posterior, SAM, RPO, LPO, and  Setswana projections.     Comparison: Same-day chest radiograph and chest CT 5/8/2018     Findings:     The ventilation study demonstrates radiotracer deposition in the  central airways.  The perfusion study does not show any perfusion defect to indicate  acute pulmonary embolus. However, there is subtle nonsegmental  decreased radiotracer uptake in the right middle zones.                                                                      Impression:   1. No acute pulmonary emboli. However, subtle nonsegmental decreased  radiotracer uptake in the right lung middle zones is noted. This could  represent sequela of chronic pulmonary embolism.  2. Radiotracer deposition in the central airways, indicative of   peripheral resistive airway disease    HEMODYNAMICS:  1. HR 75 bpm  2. /87/125 mmHg  3. RA 15/13/15   4. RV 65/15  5. PA 65/30/43   6. PCW 25/25/21   7. PA sat 63%   8. PCW sat 97%  9. Hgb 12 g/dL   10. Taryn CO 6.1   11. Taryn CI 2.4   12. TD CO 5.9   13. TD CI 2.3      AFTER NIPRIDE:  - PA 33/22/24  - PCWP 13/13/11  - PA Sat 66.3  - Hgb 12.6  - Taryn CO 7.7  - Taryn CI 3.0  - TD CO 5.9  - TD CI 2.3    [facility]  [facility]  [address] [place]  [address]     Name: CLARITZA PAL  MRN: [id number]  : 1950  Study Date: 2018 11:07 AM  Age: 67 yrs  Gender: Male  Patient [facility]: [facility]  Reason For Study: Pulmonary hypertension, Dyspnea on exertion  Ordering Physician: GALA LOZADA  Referring Physician: GALA LOZADA  Performed By: Aj Prasad RDCS     BSA: 2.6 m2  Height: 71 in  Weight: 314 lb  HR: 59  BP: 152/85 mmHg  _____________________________________________________________________________  __        Procedure  Bubble Echocardiogram with two-dimensional, color and spectral Doppler  performed. Contrast Optison. Technically difficult study.Extremely poor  acoustic windows. Limited information was obtained during study. Optison (NDC  #0822-7941-70) given intravenously. IV start location L Forearm . IV start  time 11:25 . Patient was given 4 ml mixture of 3 ml Optison and 6 ml saline. 5  ml wasted.  _____________________________________________________________________________  __        Interpretation Summary  Technically difficult study.Extremely poor acoustic windows.  Limited information was obtained during study.  Global and regional left ventricular function is normal with an EF of 55-60%.  Global right ventricular function is normal.  Pulmonary artery systolic pressure cannot be assessed.  The inferior vena cava is normal.  The atrial septum is intact as assessed by agitated saline bubble study .  _____________________________________________________________________________  __        Left Ventricle  Global and regional left ventricular function is normal with an EF of 55-60%.     Right Ventricle  Global right ventricular function is normal.     Atria  The atrial septum is intact as assessed by agitated saline bubble study .     Tricuspid Valve  Pulmonary artery systolic pressure cannot be assessed.        Vessels  The inferior vena cava is normal.     Pericardium  No pericardial effusion is  present.  _____________________________________________________________________________  __     MMode/2D Measurements & Calculations  IVSd: 1.2 cm  LVIDd: 4.5 cm  LVIDs: 3.2 cm  LVPWd: 1.1 cm  FS: 29.2 %  LV mass(C)d: 184.2 grams  LV mass(C)dI: 72.1 grams/m2  asc Aorta Diam: 3.2 cm  LVOT diam: 2.1 cm  LVOT area: 3.5 cm2  LA Volume (BP): 96.5 ml     LA Volume Index (BP): 37.8 ml/m2  RWT: 0.47        Doppler Measurements & Calculations  MV E max italo: 85.4 cm/sec  MV A max italo: 89.7 cm/sec  MV E/A: 0.95  MV dec time: 0.29 sec  Ao V2 max: 123.0 cm/sec  Ao max P.0 mmHg  NANNETTE(V,D): 2.8 cm2  LV V1 max P.0 mmHg  LV V1 max: 99.4 cm/sec  AV Italo Ratio (DI): 0.81  E/E' av.4  Lateral E/e': 14.4  Medial E/e': 20.3    Plan:  1. Parenteral iron replacement  2. Colonoscopy  3. Rehabilitation  4. Continue medication titration    We discussed delaying the contemplated bariatric surgery until his physical status is improved      Sincerely,     Maurilio Velasquez MD

## 2019-01-02 NOTE — NURSING NOTE
"Overnight Oximetry orders faxed to [facility] @ [phone]. I have a note written that patient uses \"[employer]\", but I could not find any information on that company, so I sent it to [facility] instead. Sally Rizo RN on 1/2/2019 at 12:25 PM    "

## 2019-01-02 NOTE — PATIENT INSTRUCTIONS
Medication Changes:  -Increase Furosemide to 40mg once daily  -Losartan 25mg Once daily  -Iron infusions x 2 as an outpatient Injectofer 750mgs x 2 infusions one week apart. @ [facility] [phone].  -Stop diltiazem after you have been on Losartan for 3 days    Patient Instructions:  1. Continue staying active and eat a heart healthy diet.    2. Please keep current list of medications with you at all times.    3. Remember to weigh yourself daily after voiding and before you consume any food or beverages and log the numbers.  If you have gained/lost 2 pounds overnight or 5 pounds in a week contact us immediately for medication adjustments or further instructions.    4. **Please call us immediately if you have any syncope (fainting or passing out), chest pain, edema (swelling or weight gain), or decline in your functional status (general decline in how you are feeling).    Follow up Appointment Information:  -Sleep referral to [facility] Sleep study [phone]  -follow up with your Pulmonologist  -Weight Loss    Results:  Component      Latest Ref Rng & Units 11/28/2018   Sodium      133 - 144 mmol/L 142   Potassium      3.4 - 5.3 mmol/L 3.9   Chloride      94 - 109 mmol/L 110 (H)   Carbon Dioxide      20 - 32 mmol/L 25   Anion Gap      3 - 14 mmol/L 8   Glucose      70 - 99 mg/dL 106 (H)   Urea Nitrogen      7 - 30 mg/dL 12   Creatinine      0.66 - 1.25 mg/dL 0.91   GFR Estimate      >60 mL/min/1.7m2 82   GFR Estimate If Black      >60 mL/min/1.7m2 >90   Calcium      8.5 - 10.1 mg/dL 8.8   Bilirubin Total      0.2 - 1.3 mg/dL 0.4   Albumin      3.4 - 5.0 g/dL 3.3 (L)   Protein Total      6.8 - 8.8 g/dL 7.5   Alkaline Phosphatase      40 - 150 U/L 83   ALT      0 - 70 U/L 27   AST      0 - 45 U/L 16   WBC      4.0 - 11.0 10e9/L 10.3   RBC Count      4.4 - 5.9 10e12/L 4.42   Hemoglobin      13.3 - 17.7 g/dL 13.4   Hematocrit      40.0 - 53.0 % 40.4   MCV      78 - 100 fl 91   MCH       26.5 - 33.0 pg 30.3   MCHC      31.5 - 36.5 g/dL 33.2   RDW      10.0 - 15.0 % 13.3   Platelet Count      150 - 450 10e9/L 355   Iron      35 - 180 ug/dL 40   Iron Binding Cap      240 - 430 ug/dL 329   Iron Saturation Index      15 - 46 % 12 (L)   MADI interpretation      NEG:Negative Negative   CRP Inflammation      0.0 - 8.0 mg/L 20.3 (H)   Soluble Transferrin Receptor      2.2 - 5.0 mg/L 6.8 (H)   Ferritin      26 - 388 ng/mL 59     We are located on the third floor of the [facility] on the [facility].  Our address is     [address] [place] [address] on [address] [place] [address]    Thank you for allowing us to be a part of your care here at the [facility]    If you have questions or concerns please contact us at:    Sally Rizo RN, BSN, PHN  Misael Summers (Schedule,Prior Auth)  Nurse Coordinator     Clinic   Pulmonary Hypertension   Pulmonary Hypertension  [facility]  (P)[phone]    (P) [phone]        (F) [phone]    ** Please note that you will NOT receive a reminder call regarding your scheduled testing, reminder calls are for provider appointments only.  If you are scheduled for testing within the [facility] system you may receive a call regarding pre-registration for billing purposes only.**     Remember to weigh yourself daily after voiding and before you consume any food or beverages and log the numbers.  If you have gained/lost 2 pounds overnight or 5 pounds in a week contact us immediately for medication adjustments or further instructions.   **Please call us immediately if you have any syncope, chest pain, edema, or decline in your functional status.    Support Group:  Pulmonary Hypertension Association  Https://www.phassociation.org/  **Look at the Events Tab** They even have Support Groups that you can call into    [place] -  [facility] Support Group  Second Saturday of the Month from 1-3 PM   Location: [address] [place] [id number]  Leader: Robbie Watson  Phone: [phone]  Email: [email or web address]

## 2019-01-03 ENCOUNTER — TELEPHONE (OUTPATIENT)
Dept: CARDIOLOGY | Facility: CLINIC | Age: 69
End: 2019-01-03

## 2019-01-03 DIAGNOSIS — I10 BENIGN ESSENTIAL HYPERTENSION: Primary | ICD-10-CM

## 2019-01-03 RX ORDER — LOSARTAN POTASSIUM 25 MG/1
25 TABLET ORAL DAILY
Qty: 30 TABLET | Refills: 5 | Status: SHIPPED | OUTPATIENT
Start: 2019-01-03 | End: 2019-01-07

## 2019-01-03 NOTE — TELEPHONE ENCOUNTER
Patient called to say that the new prescription for Losartan we prescribed yesterday will cost him too much money at [facility].  After looking up med on [facility], it will only cost him $5.44 for a 30 day supply at [facility].  Patient asked that I send Rx to [facility].  Agreed with plan and attempted to call, but they don't open until 10am, so I sent a new Erx instead.    Patient was given step by step instructions on how to print out the coupon and bring with him to pharmacy.  Patient verbalized understanding, agreed with plan and denied any further questions. Sally Rizo RN on 1/3/2019 at 9:24 AM

## 2019-01-04 NOTE — TELEPHONE ENCOUNTER
Pt called office to request that [facility]. He states that he is able to get this month from [facility], but would [facility] to [facility] in [facility]. He has requested that the [facility]'s [facility] the [facility] at [phone].  --------------------------------------  Per patient request, paper Rx and last office visit [facility] for [facility]. Sally Rizo RN on 1/7/2019 at 3:05 PM

## 2019-01-07 ENCOUNTER — TELEPHONE (OUTPATIENT)
Dept: CARDIOLOGY | Facility: CLINIC | Age: 69
End: 2019-01-07

## 2019-01-07 DIAGNOSIS — I10 BENIGN ESSENTIAL HYPERTENSION: Primary | ICD-10-CM

## 2019-01-07 DIAGNOSIS — D50.8 IRON DEFICIENCY ANEMIA SECONDARY TO INADEQUATE DIETARY IRON INTAKE: ICD-10-CM

## 2019-01-07 PROBLEM — I27.20 PULMONARY HYPERTENSION (H): Status: ACTIVE | Noted: 2018-06-27

## 2019-01-07 PROBLEM — R60.0 BILATERAL LOWER EXTREMITY EDEMA: Status: ACTIVE | Noted: 2018-05-01

## 2019-01-07 PROBLEM — M50.30 DDD (DEGENERATIVE DISC DISEASE), CERVICAL: Status: ACTIVE | Noted: 2019-01-07

## 2019-01-07 PROBLEM — E66.01 MORBID OBESITY WITH BMI OF 45.0-49.9, ADULT (H): Chronic | Status: ACTIVE | Noted: 2018-05-30

## 2019-01-07 PROBLEM — I27.20 PULMONARY HYPERTENSION (H): Chronic | Status: ACTIVE | Noted: 2018-06-27

## 2019-01-07 PROBLEM — R79.89 LOW TESTOSTERONE: Status: ACTIVE | Noted: 2018-08-18

## 2019-01-07 PROBLEM — I49.1 PAC (PREMATURE ATRIAL CONTRACTION): Chronic | Status: ACTIVE | Noted: 2019-01-07

## 2019-01-07 RX ORDER — LOSARTAN POTASSIUM 25 MG/1
25 TABLET ORAL DAILY
Qty: 30 TABLET | Refills: 5 | Status: SHIPPED | OUTPATIENT
Start: 2019-01-07 | End: 2021-10-25

## 2019-01-07 RX ORDER — FUROSEMIDE 20 MG
40 TABLET ORAL DAILY
Qty: 60 TABLET | Refills: 11 | Status: SHIPPED | OUTPATIENT
Start: 2019-01-07 | End: 2019-01-08

## 2019-01-07 NOTE — TELEPHONE ENCOUNTER
Patient left a  that he had a question about his Infusion and Colonoscopy.  ----------------------------------  By the time I called patient he had already gotten his Coloscopy scheduled.  Patient states that he had called and gotten his first infusion scheduled, but his paperwork showed a B12 injection, not iron.  I reviewed chart and confirmed that their were orders for Injectafer placed.  Advised patient to confirm with staff prior to any administration that they are giving him IV Iron.  Patient agreed with plan and asked about when he can have the 2nd iron injection.  Advised him it can be no sooner than 7 days after the first.  Patient then asked if he could have it done in March, but I advised him it wouldn't do him as much good if he received them that far apart.  He went on to say he needed to get out of the winter and [place] to [place].  I advised patient it just cannot be sooner than 7 days.  Patient verbalized understanding, agreed with plan and denied any further questions. Sally Rizo RN on 1/7/2019 at 3:45 PM

## 2019-01-08 ENCOUNTER — OFFICE VISIT (OUTPATIENT)
Dept: CARDIOLOGY | Facility: CLINIC | Age: 69
End: 2019-01-08
Attending: NURSE PRACTITIONER
Payer: MEDICARE

## 2019-01-08 VITALS
HEIGHT: 71 IN | DIASTOLIC BLOOD PRESSURE: 74 MMHG | OXYGEN SATURATION: 92 % | BODY MASS INDEX: 43.99 KG/M2 | SYSTOLIC BLOOD PRESSURE: 119 MMHG | WEIGHT: 314.2 LBS | HEART RATE: 68 BPM

## 2019-01-08 DIAGNOSIS — I10 BENIGN ESSENTIAL HYPERTENSION: ICD-10-CM

## 2019-01-08 DIAGNOSIS — R06.02 SOB (SHORTNESS OF BREATH): ICD-10-CM

## 2019-01-08 LAB
ANION GAP SERPL CALCULATED.3IONS-SCNC: 6 MMOL/L (ref 3–14)
BUN SERPL-MCNC: 16 MG/DL (ref 7–30)
CALCIUM SERPL-MCNC: 9 MG/DL (ref 8.5–10.1)
CHLORIDE SERPL-SCNC: 112 MMOL/L (ref 94–109)
CO2 SERPL-SCNC: 26 MMOL/L (ref 20–32)
CREAT SERPL-MCNC: 0.98 MG/DL (ref 0.66–1.25)
GFR SERPL CREATININE-BSD FRML MDRD: 78 ML/MIN/{1.73_M2}
GLUCOSE SERPL-MCNC: 94 MG/DL (ref 70–99)
POTASSIUM SERPL-SCNC: 4 MMOL/L (ref 3.4–5.3)
SODIUM SERPL-SCNC: 144 MMOL/L (ref 133–144)

## 2019-01-08 PROCEDURE — 99214 OFFICE O/P EST MOD 30 MIN: CPT | Mod: ZP | Performed by: NURSE PRACTITIONER

## 2019-01-08 PROCEDURE — 80048 BASIC METABOLIC PNL TOTAL CA: CPT | Performed by: INTERNAL MEDICINE

## 2019-01-08 PROCEDURE — G0463 HOSPITAL OUTPT CLINIC VISIT: HCPCS

## 2019-01-08 PROCEDURE — 36415 COLL VENOUS BLD VENIPUNCTURE: CPT | Performed by: INTERNAL MEDICINE

## 2019-01-08 RX ORDER — FUROSEMIDE 40 MG
40 TABLET ORAL DAILY
Qty: 90 TABLET | Refills: 3 | Status: SHIPPED | OUTPATIENT
Start: 2019-01-08 | End: 2019-10-14

## 2019-01-08 ASSESSMENT — PAIN SCALES - GENERAL: PAINLEVEL: NO PAIN (0)

## 2019-01-08 ASSESSMENT — MIFFLIN-ST. JEOR: SCORE: 2212.33

## 2019-01-08 NOTE — PATIENT INSTRUCTIONS
Medication Changes:   Increase your Lasix to 40 mg Daily   This prescription was sent today to [facility].    Please take all of your medications as prescribed.    Patient Instructions:  1. Continue staying active and eat a heart healthy diet.    2. Please keep current list of medications with you at all times.    3. Remember to weigh yourself daily after voiding and before you consume any food or beverages and log the numbers.  If you have gained/lost 2 pounds overnight or 5 pounds in a week contact us immediately for medication adjustments or further instructions.    4. **Please call us immediately if you have any syncope (fainting or passing out), chest pain, edema (swelling or weight gain), or decline in your functional status (general decline in how you are feeling).    Follow up Appointment Information:  2 weeks with labs prior    Results:  Component      Latest Ref Rng & Units 1/8/2019   Sodium      133 - 144 mmol/L 144   Potassium      3.4 - 5.3 mmol/L 4.0   Chloride      94 - 109 mmol/L 112 (H)   Carbon Dioxide      20 - 32 mmol/L 26   Anion Gap      3 - 14 mmol/L 6   Glucose      70 - 99 mg/dL 94   Urea Nitrogen      7 - 30 mg/dL 16   Creatinine      0.66 - 1.25 mg/dL 0.98   GFR Estimate      >60 mL/min/1.73:m2 78   GFR Estimate If Black      >60 mL/min/1.73:m2 >90   Calcium      8.5 - 10.1 mg/dL 9.0     We are located on the third floor of the [facility] ([facility]) on the [facility].  Our address is     [address] [place] [address] on [address]rd [place] [address]      Thank you for allowing us to be a part of your care here at the [facility]    If you have questions or concerns please contact us at:    Mckenzie Kimball, RN, BSN    Misael Summers (Schedule,Prior Auth)  Nurse Coordinator     Clinic   Pulmonary Hypertension   Pulmonary Hypertension  [facility]  [phone])[phone]    (P) [phone]        (F)[phone]                ** Please note that you will NOT receive a reminder call regarding your scheduled testing, reminder calls are for provider appointments only.  If you are scheduled for testing within the [facility] [facility] system you may receive a call regarding pre-registration for billing purposes only.**     Support Group:  Pulmonary Hypertension Association  Https://www.phassociation.org/  **Look at the Events Tab** They even have Support Groups that you can call into    [facility] PH Support Group  Second Saturday of the Month from 1-3 PM   Location: [address] [place] [id number]  Leader: Robbie Watson  Phone: [phone]  Email: [email or web address]

## 2019-01-08 NOTE — NURSING NOTE
Chief Complaint   Patient presents with     Follow Up     1 week PH f/unit(s) after med changes.     Vitals were taken and medications were reconciled.     Dori Bernard MA    9:51 AM

## 2019-01-08 NOTE — PROGRESS NOTES
"January 8, 2019      Mr. Boogie Clark is a pleasant 68 year old male with a past medical history of:    Problem List  1. Pulmonary hypertension  2. Hypertension  3. COLD  4. History of bladder tumor  5. OCCO  6. Elevated body mass index  7. Nephrolithiasis  8. Diverticulosis  9. Hyperlipidemia  10. ASPVD with balloon stenting  11. History of ankle fracture with immolization  12. Lymphedema  13. ? History of DVT  14. Coronary calcification  15. Steroid dependence    Today, he is accompanied by: spouse    Current Symptoms  1. Any ER visits or hospital admissions since last appointment: No  2. Shortness of breath: Yes: will become short of breath with minimal exertion such as getting dressed, walking from room to room. Does not exert himself heavily, does not walk long distances or exercise regularly. Recent trip to [email or web address] he rode a scooter. Wears 3L oxygen and CPAP at night with 2L oxygen.    3. Dizziness or lightheadedness: No  4. Any syncopal episodes or falls: No  5. Chest pain or chest tightness: No  6. Nausea, vomiting, diarrhea, constipation: No  7. Swelling in the legs: Patient unable to determine  8. Bloating in the abdomen: Yes, at times  9. PND; Waking up at night coughing, choking or SOB: No  10. Any medication intolerances: States he urinates \"all the time on Lasix\" although patient had not yet increased the dose of 20 mg to 40 mg. Claims it was a fault of the pharmacy/RN not putting order through. Patient successfully switched off diltiazem and is taking losartan for blood pressure control.    11. Reported headaches: No  12. Jaw pain or bone/joint pain: Yes chronic body aches  13. On IV Prostacyclin: No    14. Current level of activity: sedentary    PAST MEDICAL HISTORY:  Past Medical History:   Diagnosis Date     Anal fissure 8/17/2012    Causing rectal bleeding      Cellulitis of breast of male 08/05/2014    INCISION DRAINAGE LEFT BREAST ABSCESS (1027) 8/7/2014      Kidney stone 4/17/2013     PAC " (premature atrial contraction) 2019     Pneumonia 2018    hospitalized in [place] on 2018 for bilateral pneumonia and COPD exacerbation         FAMILY HISTORY:  Family History   Problem Relation Age of Onset     Breast Cancer Mother      Vascular Disease Father         brain anurysm     Cancer Maternal Grandmother          SOCIAL HISTORY:  Social History     Tobacco Use     Smoking status: Former Smoker     Types: Cigarettes     Start date: 1964     Last attempt to quit: 10/1/2016     Years since quittin.2     Smokeless tobacco: Never Used     Tobacco comment: already did   Substance Use Topics     Alcohol use: Yes     Alcohol/week: 0.0 oz     Comment: moderate once a week couple of beers     Drug use: No         CURRENT MEDICATIONS:  Current Outpatient Medications   Medication Sig Dispense Refill     aspirin 81 MG tablet Take  by mouth daily.       Cyanocobalamin (VITAMIN B 12 PO)        fluticasone (FLONASE) 50 MCG/ACT nasal spray Spray 1-2 sprays into both nostrils daily 1 g 11     furosemide (LASIX) 20 MG tablet Take 2 tablets (40 mg) by mouth daily 60 tablet 11     losartan (COZAAR) 25 MG tablet Take 1 tablet (25 mg) by mouth daily 30 tablet 5     MELATONIN PO        Multiple Vitamin (MULTI-VITAMIN) per tablet Take  by mouth daily.       order for DME Equipment being ordered: Oxygen concentrator and portability  Date of order 05/10/18  Dosage of concentration - 2 liters per minute   Route of administration - Nasal cannula  Frequency of use - continuously  Duration of need - lifetime   Shabbir Agustin MD is the health care provider     _________________________________________  [facility] [id number] 05/10/18 1 Device 0     order for DME Equipment being ordered: oxygen concentrator - [facility] home oxygen 1 Device 0     order for DME Equipment being ordered: oxygen concentrator     Oxygen at 3 liters while at rest is at 97 %.  Oxygen at 3 liters walking is at 97 %.     Without oxygen at rest is at 91  "%.  Without oxygen while walking is at 87 %.    From today's office visit 1 Device 0     ATORVASTATIN CALCIUM PO Take 40 mg by mouth daily       diltiazem (DILACOR XR) 180 MG 24 hr capsule Take 1 capsule (180 mg) by mouth daily 30 capsule 1         ROS:   10 point ROS of systems including Constitutional, Eyes, Respiratory, Cardiovascular, Gastroenterology, Genitourinary, Integumentary, Muscularskeletal, Psychiatric were all negative except for pertinent positives noted in my HPI.    EXAM:  /74 (BP Location: Right arm, Patient Position: Chair, Cuff Size: Adult Large)   Pulse 68   Ht 1.803 m (5' 11\")   Wt 142.5 kg (314 lb 3.2 oz)   SpO2 92%   BMI 43.82 kg/m     General: appears comfortable, alert and articulate  Head: normocephalic, atraumatic  Eyes: anicteric sclera, EOMI  Neck: no adenopathy  Orophyarynx: moist mucosa, no lesions, dentition intact  Heart: regular, S1/S2, no murmur, gallop, rub, estimated JVP non-elevated, poor visibility due to neck girth  Lungs: clear, no rales or wheezing, decreased effort/shallow  Abdomen: obese, soft, non-tender, bowel sounds present, no hepatosplenomegaly  Extremities: no clubbing, cyanosis; patient using bilateral lower leg wraps for LE edema.    Neurological: normal speech and affect, no gross motor deficits      Weight  Wt Readings from Last 10 Encounters:   01/08/19 142.5 kg (314 lb 3.2 oz)   01/02/19 140.6 kg (310 lb)   11/28/18 142.4 kg (314 lb)   11/08/18 139.5 kg (307 lb 9.6 oz)   06/27/18 138.3 kg (305 lb)   05/30/18 141 kg (310 lb 12.8 oz)   05/01/18 141.5 kg (312 lb)   08/14/17 (!) 138.3 kg (305 lb)   08/02/17 (!) 138.3 kg (305 lb)   07/26/17 (!) 142 kg (313 lb)         Labs:    CBC RESULTS:  Lab Results   Component Value Date    WBC 10.3 11/28/2018    RBC 4.42 11/28/2018    HGB 13.4 11/28/2018    HCT 40.4 11/28/2018    MCV 91 11/28/2018    MCH 30.3 11/28/2018    MCHC 33.2 11/28/2018    RDW 13.3 11/28/2018     11/28/2018       CMP RESULTS:  Lab " Results   Component Value Date     11/28/2018    POTASSIUM 3.9 11/28/2018    CHLORIDE 110 (H) 11/28/2018    CO2 25 11/28/2018    ANIONGAP 8 11/28/2018     (H) 11/28/2018    BUN 12 11/28/2018    CR 0.91 11/28/2018    GFRESTIMATED 82 11/28/2018    GFRESTBLACK >90 11/28/2018    GRACIELA 8.8 11/28/2018    BILITOTAL 0.4 11/28/2018    ALBUMIN 3.3 (L) 11/28/2018    ALKPHOS 83 11/28/2018    ALT 27 11/28/2018    AST 16 11/28/2018        INR RESULTS:  No results found for: INR    BNP RESULTS:  No results found for: NTBNPI  No results found for: BNP    Recent Tests:      Echocardiogram (12/3/2018):  Interpretation Summary  Technically difficult study.Extremely poor acoustic windows.  Limited information was obtained during study.  Global and regional left ventricular function is normal with an EF of 55-60%.  Global right ventricular function is normal.  Pulmonary artery systolic pressure cannot be assessed.  The inferior vena cava is normal.  The atrial septum is intact as assessed by agitated saline bubble study .    Left Ventricle  Global and regional left ventricular function is normal with an EF of 55-60%.     Right Ventricle  Global right ventricular function is normal.     Atria  The atrial septum is intact as assessed by agitated saline bubble study .     Tricuspid Valve  Pulmonary artery systolic pressure cannot be assessed.        Vessels  The inferior vena cava is normal.     Pericardium  No pericardial effusion is present.        RHC (12/26/2018):          Assessment and Plan:   Mr. Boogie Clark is a 68 year old male with pulmonary hypertension and COLD, COCO and morbid obesity. WHO Group III, NYHA Functional Class III.    #PH Plan:  -Increase Lasix to 40 mg daily; continue all other medications as ordered  -Complete overnight oximetry, sleep referral visit and weight loss consultation  -Return to clinic in two weeks for repeat labs and visit    It was a pleasure seeing Mr. Boogie Clark at the [facility]  [place] [facility].       Sincerely,  Amina Ramires, APRN, CNP.

## 2019-01-08 NOTE — LETTER
"1/8/2019      RE: Boogie Clark  [address] [place] [id number]       Dear Colleague,    Thank you for the opportunity to participate in the care of your patient, Boogie Clark, at the [facility]. Please see a copy of my visit note below.    January 8, 2019      Mr. Boogie Clark is a pleasant 68 year old male with a past medical history of:    Problem List  1. Pulmonary hypertension  2. Hypertension  3. COLD  4. History of bladder tumor  5. COCO  6. Elevated body mass index  7. Nephrolithiasis  8. Diverticulosis  9. Hyperlipidemia  10. ASPVD with balloon stenting  11. History of ankle fracture with immolization  12. Lymphedema  13. ? History of DVT  14. Coronary calcification  15. Steroid dependence    Today, he is accompanied by: spouse    Current Symptoms  1. Any ER visits or hospital admissions since last appointment: No  2. Shortness of breath: Yes: will become short of breath with minimal exertion such as getting dressed, walking from room to room. Does not exert himself heavily, does not walk long distances or exercise regularly. Recent trip to [place] he rode a scooter. Wears 3L oxygen and CPAP at night with 2L oxygen.    3. Dizziness or lightheadedness: No  4. Any syncopal episodes or falls: No  5. Chest pain or chest tightness: No  6. Nausea, vomiting, diarrhea, constipation: No  7. Swelling in the legs: Patient unable to determine  8. Bloating in the abdomen: Yes, at times  9. PND; Waking up at night coughing, choking or SOB: No  10. Any medication intolerances: States he urinates \"all the time on Lasix\" although patient had not yet increased the dose of 20 mg to 40 mg. Claims it was a fault of the pharmacy/RN not putting order through. Patient successfully switched off diltiazem and is taking losartan for blood pressure control.    11. Reported headaches: No  12. Jaw pain or bone/joint pain: Yes chronic body aches  13. On IV " Prostacyclin: No    14. Current level of activity: sedentary    PAST MEDICAL HISTORY:  Past Medical History:   Diagnosis Date     Anal fissure 2012    Causing rectal bleeding      Cellulitis of breast of male 2014    INCISION DRAINAGE LEFT BREAST ABSCESS (1027) 2014      Kidney stone 2013     PAC (premature atrial contraction) 2019     Pneumonia 2018    hospitalized in [place] on 2018 for bilateral pneumonia and COPD exacerbation         FAMILY HISTORY:  Family History   Problem Relation Age of Onset     Breast Cancer Mother      Vascular Disease Father         brain anurysm     Cancer Maternal Grandmother          SOCIAL HISTORY:  Social History     Tobacco Use     Smoking status: Former Smoker     Types: Cigarettes     Start date: 1964     Last attempt to quit: 10/1/2016     Years since quittin.2     Smokeless tobacco: Never Used     Tobacco comment: already did   Substance Use Topics     Alcohol use: Yes     Alcohol/week: 0.0 oz     Comment: moderate once a week couple of beers     Drug use: No         CURRENT MEDICATIONS:  Current Outpatient Medications   Medication Sig Dispense Refill     aspirin 81 MG tablet Take  by mouth daily.       Cyanocobalamin (VITAMIN B 12 PO)        fluticasone (FLONASE) 50 MCG/ACT nasal spray Spray 1-2 sprays into both nostrils daily 1 g 11     furosemide (LASIX) 20 MG tablet Take 2 tablets (40 mg) by mouth daily 60 tablet 11     losartan (COZAAR) 25 MG tablet Take 1 tablet (25 mg) by mouth daily 30 tablet 5     MELATONIN PO        Multiple Vitamin (MULTI-VITAMIN) per tablet Take  by mouth daily.       order for DME Equipment being ordered: Oxygen concentrator and portability  Date of order 05/10/18  Dosage of concentration - 2 liters per minute   Route of administration - Nasal cannula  Frequency of use - continuously  Duration of need - lifetime   Shabbir Agustin MD is the health care provider     _________________________________________  npi  "[id number] 05/10/18 1 Device 0     order for DME Equipment being ordered: oxygen concentrator - [facility] home oxygen 1 Device 0     order for DME Equipment being ordered: oxygen concentrator     Oxygen at 3 liters while at rest is at 97 %.  Oxygen at 3 liters walking is at 97 %.     Without oxygen at rest is at 91 %.  Without oxygen while walking is at 87 %.    From today's office visit 1 Device 0     ATORVASTATIN CALCIUM PO Take 40 mg by mouth daily       diltiazem (DILACOR XR) 180 MG 24 hr capsule Take 1 capsule (180 mg) by mouth daily 30 capsule 1     EXAM:  /74 (BP Location: Right arm, Patient Position: Chair, Cuff Size: Adult Large)   Pulse 68   Ht 1.803 m (5' 11\")   Wt 142.5 kg (314 lb 3.2 oz)   SpO2 92%   BMI 43.82 kg/m     General: appears comfortable, alert and articulate  Head: normocephalic, atraumatic  Eyes: anicteric sclera, EOMI  Neck: no adenopathy  Orophyarynx: moist mucosa, no lesions, dentition intact  Heart: regular, S1/S2, no murmur, gallop, rub, estimated JVP non-elevated, poor visibility due to neck girth  Lungs: clear, no rales or wheezing, decreased effort/shallow  Abdomen: obese, soft, non-tender, bowel sounds present, no hepatosplenomegaly  Extremities: no clubbing, cyanosis; patient using bilateral lower leg wraps for LE edema.    Neurological: normal speech and affect, no gross motor deficits      Weight  Wt Readings from Last 10 Encounters:   01/08/19 142.5 kg (314 lb 3.2 oz)   01/02/19 140.6 kg (310 lb)   11/28/18 142.4 kg (314 lb)   11/08/18 139.5 kg (307 lb 9.6 oz)   06/27/18 138.3 kg (305 lb)   05/30/18 141 kg (310 lb 12.8 oz)   05/01/18 141.5 kg (312 lb)   08/14/17 (!) 138.3 kg (305 lb)   08/02/17 (!) 138.3 kg (305 lb)   07/26/17 (!) 142 kg (313 lb)         Labs:    CBC RESULTS:  Lab Results   Component Value Date    WBC 10.3 11/28/2018    RBC 4.42 11/28/2018    HGB 13.4 11/28/2018    HCT 40.4 11/28/2018    MCV 91 11/28/2018    MCH 30.3 11/28/2018    MCHC 33.2 11/28/2018    " RDW 13.3 11/28/2018     11/28/2018       CMP RESULTS:  Lab Results   Component Value Date     11/28/2018    POTASSIUM 3.9 11/28/2018    CHLORIDE 110 (H) 11/28/2018    CO2 25 11/28/2018    ANIONGAP 8 11/28/2018     (H) 11/28/2018    BUN 12 11/28/2018    CR 0.91 11/28/2018    GFRESTIMATED 82 11/28/2018    GFRESTBLACK >90 11/28/2018    GRACIELA 8.8 11/28/2018    BILITOTAL 0.4 11/28/2018    ALBUMIN 3.3 (L) 11/28/2018    ALKPHOS 83 11/28/2018    ALT 27 11/28/2018    AST 16 11/28/2018        INR RESULTS:  No results found for: INR    BNP RESULTS:  No results found for: NTBNPI  No results found for: BNP    Recent Tests:      Echocardiogram (12/3/2018):  Interpretation Summary  Technically difficult study.Extremely poor acoustic windows.  Limited information was obtained during study.  Global and regional left ventricular function is normal with an EF of 55-60%.  Global right ventricular function is normal.  Pulmonary artery systolic pressure cannot be assessed.  The inferior vena cava is normal.  The atrial septum is intact as assessed by agitated saline bubble study .    Left Ventricle  Global and regional left ventricular function is normal with an EF of 55-60%.     Right Ventricle  Global right ventricular function is normal.     Atria  The atrial septum is intact as assessed by agitated saline bubble study .     Tricuspid Valve  Pulmonary artery systolic pressure cannot be assessed.        Vessels  The inferior vena cava is normal.     Pericardium  No pericardial effusion is present.        RH (12/26/2018):          Assessment and Plan:   Mr. Boogie Clark is a 68 year old male with pulmonary hypertension and COLD, COCO and morbid obesity. WHO Group III, NYHA Functional Class III.    #PH Plan:  -Increase Lasix to 40 mg daily; continue all other medications as ordered  -Complete overnight oximetry, sleep referral visit and weight loss consultation  -Return to clinic in two weeks for repeat labs and  visit    It was a pleasure seeing . Boogie Clark at the [facility].       Sincerely,  Amina Ramires, APRN, CNP.

## 2019-01-09 ENCOUNTER — OFFICE VISIT (OUTPATIENT)
Dept: SLEEP MEDICINE | Facility: CLINIC | Age: 69
End: 2019-01-09
Payer: MEDICARE

## 2019-01-09 ENCOUNTER — COMMUNICATION - HEALTHEAST (OUTPATIENT)
Dept: SURGERY | Facility: CLINIC | Age: 69
End: 2019-01-09

## 2019-01-09 VITALS
SYSTOLIC BLOOD PRESSURE: 129 MMHG | HEART RATE: 79 BPM | BODY MASS INDEX: 42 KG/M2 | HEIGHT: 71 IN | WEIGHT: 300 LBS | DIASTOLIC BLOOD PRESSURE: 79 MMHG | OXYGEN SATURATION: 90 %

## 2019-01-09 DIAGNOSIS — E61.1 IRON DEFICIENCY: ICD-10-CM

## 2019-01-09 DIAGNOSIS — R06.02 SOB (SHORTNESS OF BREATH): ICD-10-CM

## 2019-01-09 DIAGNOSIS — J98.4 RESTRICTIVE LUNG DISEASE: Chronic | ICD-10-CM

## 2019-01-09 DIAGNOSIS — I10 BENIGN ESSENTIAL HYPERTENSION: ICD-10-CM

## 2019-01-09 DIAGNOSIS — G25.81 RESTLESS LEGS SYNDROME (RLS): ICD-10-CM

## 2019-01-09 DIAGNOSIS — I27.20 PULMONARY HYPERTENSION (H): Chronic | ICD-10-CM

## 2019-01-09 DIAGNOSIS — G47.33 OSA (OBSTRUCTIVE SLEEP APNEA): Primary | Chronic | ICD-10-CM

## 2019-01-09 DIAGNOSIS — Z99.81 OXYGEN DEPENDENT: ICD-10-CM

## 2019-01-09 DIAGNOSIS — R06.09 DYSPNEA ON EXERTION: ICD-10-CM

## 2019-01-09 DIAGNOSIS — G47.33 OSA (OBSTRUCTIVE SLEEP APNEA): Chronic | ICD-10-CM

## 2019-01-09 DIAGNOSIS — E66.01 MORBID OBESITY WITH BMI OF 45.0-49.9, ADULT (H): Chronic | ICD-10-CM

## 2019-01-09 PROBLEM — M50.30 DDD (DEGENERATIVE DISC DISEASE), CERVICAL: Chronic | Status: ACTIVE | Noted: 2019-01-07

## 2019-01-09 PROBLEM — R60.0 BILATERAL LOWER EXTREMITY EDEMA: Chronic | Status: ACTIVE | Noted: 2018-05-01

## 2019-01-09 PROCEDURE — 99204 OFFICE O/P NEW MOD 45 MIN: CPT | Performed by: INTERNAL MEDICINE

## 2019-01-09 ASSESSMENT — MIFFLIN-ST. JEOR: SCORE: 2147.92

## 2019-01-09 NOTE — NURSING NOTE
"Chief Complaint   Patient presents with     Sleep Problem     consult- set up for sleep study. Lupe.s request.       Initial /79   Pulse 79   Ht 1.803 m (5' 11\")   Wt 136.1 kg (300 lb)   SpO2 90%   BMI 41.84 kg/m   Estimated body mass index is 41.84 kg/m  as calculated from the following:    Height as of this encounter: 1.803 m (5' 11\").    Weight as of this encounter: 136.1 kg (300 lb).    Medication Reconciliation: complete    Neck circumference: 19.5 inches / 49.5 centimeters.      "

## 2019-01-09 NOTE — PATIENT INSTRUCTIONS
Your BMI is Body mass index is 41.84 kg/m .  Weight management is a personal decision.  If you are interested in exploring weight loss strategies, the following discussion covers the approaches that may be successful. Body mass index (BMI) is one way to tell whether you are at a healthy weight, overweight, or obese. It measures your weight in relation to your height.  A BMI of 18.5 to 24.9 is in the healthy range. A person with a BMI of 25 to 29.9 is considered overweight, and someone with a BMI of 30 or greater is considered obese. More than two-thirds of American adults are considered overweight or obese.  Being overweight or obese increases the risk for further weight gain. Excess weight may lead to heart disease and diabetes.  Creating and following plans for healthy eating and physical activity may help you improve your health.  Weight control is part of healthy lifestyle and includes exercise, emotional health, and healthy eating habits. Careful eating habits lifelong are the mainstay of weight control. Though there are significant health benefits from weight loss, long-term weight loss with diet alone may be very difficult to achieve- studies show long-term success with dietary management in less than 10% of people. Attaining a healthy weight may be especially difficult to achieve in those with severe obesity. In some cases, medications, devices and surgical management might be considered.  What can you do?  If you are overweight or obese and are interested in methods for weight loss, you should discuss this with your provider.     Consider reducing daily calorie intake by 500 calories.     Keep a food journal.     Avoiding skipping meals, consider cutting portions instead.    Diet combined with exercise helps maintain muscle while optimizing fat loss. Strength training is particularly important for building and maintaining muscle mass. Exercise helps reduce stress, increase energy, and improves fitness.  Increasing exercise without diet control, however, may not burn enough calories to loose weight.       Start walking three days a week 10-20 minutes at a time    Work towards walking thirty minutes five days a week     Eventually, increase the speed of your walking for 1-2 minutes at time    In addition, we recommend that you review healthy lifestyles and methods for weight loss available through the National Institutes of Health patient information sites:  http://win.niddk.nih.gov/publications/index.htm    And look into health and wellness programs that may be available through your health insurance provider, employer, local community center, or stevie club.    Weight management plan: Patient was referred to their PCP to discuss a diet and exercise plan.

## 2019-01-10 ENCOUNTER — APPOINTMENT (OUTPATIENT)
Dept: SLEEP MEDICINE | Facility: CLINIC | Age: 69
End: 2019-01-10
Payer: MEDICARE

## 2019-01-10 NOTE — RESULT ENCOUNTER NOTE
Overnight oximetery (1/9/19) looks good  ELIZA 2.5. Lowest O2 81% (at start), Sao2 <88% for 1.2 minutes

## 2019-01-14 ENCOUNTER — INFUSION THERAPY VISIT (OUTPATIENT)
Dept: INFUSION THERAPY | Facility: CLINIC | Age: 69
End: 2019-01-14
Payer: MEDICARE

## 2019-01-14 ENCOUNTER — COMMUNICATION - HEALTHEAST (OUTPATIENT)
Dept: SURGERY | Facility: CLINIC | Age: 69
End: 2019-01-14

## 2019-01-14 VITALS
SYSTOLIC BLOOD PRESSURE: 156 MMHG | BODY MASS INDEX: 43.45 KG/M2 | WEIGHT: 311.5 LBS | HEART RATE: 79 BPM | OXYGEN SATURATION: 92 % | TEMPERATURE: 98.2 F | DIASTOLIC BLOOD PRESSURE: 89 MMHG | RESPIRATION RATE: 18 BRPM

## 2019-01-14 DIAGNOSIS — E61.1 IRON DEFICIENCY: Primary | ICD-10-CM

## 2019-01-14 PROCEDURE — 99207 ZZC NO CHARGE LOS: CPT

## 2019-01-14 PROCEDURE — 96374 THER/PROPH/DIAG INJ IV PUSH: CPT | Performed by: NURSE PRACTITIONER

## 2019-01-14 RX ADMIN — Medication 250 ML: at 10:47

## 2019-01-14 ASSESSMENT — PAIN SCALES - GENERAL: PAINLEVEL: NO PAIN (0)

## 2019-01-14 NOTE — PROGRESS NOTES
Infusion Nursing Note:  Boogie Clark presents today for Injectafer.    Patient seen by provider today: No   present during visit today: Not Applicable.    Note: N/A.    Intravenous Access:  Peripheral IV placed.    Treatment Conditions:  Not Applicable.      Post Infusion Assessment:  Patient tolerated infusion without incident.  Patient observed for 30 minutes post infusion per protocol.  Site patent and intact, free from redness, edema or discomfort.  No evidence of extravasations.  Access discontinued per protocol.    Discharge Plan:   Patient and/or family verbalized understanding of discharge instructions and all questions answered.  Patient discharged in stable condition accompanied by: self.  Departure Mode: Ambulatory.    Judi Booth RN

## 2019-01-18 ENCOUNTER — THERAPY VISIT (OUTPATIENT)
Dept: SLEEP MEDICINE | Facility: CLINIC | Age: 69
End: 2019-01-18
Payer: MEDICARE

## 2019-01-18 DIAGNOSIS — R06.02 SOB (SHORTNESS OF BREATH): ICD-10-CM

## 2019-01-18 DIAGNOSIS — I27.20 PULMONARY HYPERTENSION (H): Chronic | ICD-10-CM

## 2019-01-18 DIAGNOSIS — J98.4 RESTRICTIVE LUNG DISEASE: Chronic | ICD-10-CM

## 2019-01-18 DIAGNOSIS — E66.01 MORBID OBESITY WITH BMI OF 45.0-49.9, ADULT (H): Chronic | ICD-10-CM

## 2019-01-18 DIAGNOSIS — I10 BENIGN ESSENTIAL HYPERTENSION: ICD-10-CM

## 2019-01-18 DIAGNOSIS — G47.33 OSA (OBSTRUCTIVE SLEEP APNEA): Chronic | ICD-10-CM

## 2019-01-18 DIAGNOSIS — Z99.81 OXYGEN DEPENDENT: ICD-10-CM

## 2019-01-18 DIAGNOSIS — G25.81 RESTLESS LEGS SYNDROME (RLS): ICD-10-CM

## 2019-01-18 PROCEDURE — 95811 POLYSOM 6/>YRS CPAP 4/> PARM: CPT | Performed by: INTERNAL MEDICINE

## 2019-01-18 NOTE — Clinical Note
Good study, would have been nice to see if higher pressures may have resolved arousals (or unmasked Cheyne-Singh)

## 2019-01-19 NOTE — PROGRESS NOTES
Completed a all night titration PSG per provider order.    Preliminary AHI 3.  A final therapeutic PAP pressure was achieved.    Supine REM was not seen on therapeutic pressure.    Patient reports feeling refreshed in AM.

## 2019-01-21 ENCOUNTER — INFUSION THERAPY VISIT (OUTPATIENT)
Dept: INFUSION THERAPY | Facility: CLINIC | Age: 69
End: 2019-01-21
Payer: MEDICARE

## 2019-01-21 VITALS
SYSTOLIC BLOOD PRESSURE: 142 MMHG | HEART RATE: 66 BPM | TEMPERATURE: 97.6 F | DIASTOLIC BLOOD PRESSURE: 85 MMHG | RESPIRATION RATE: 16 BRPM | OXYGEN SATURATION: 92 %

## 2019-01-21 DIAGNOSIS — E61.1 IRON DEFICIENCY: Primary | ICD-10-CM

## 2019-01-21 PROCEDURE — 99207 ZZC NO CHARGE LOS: CPT

## 2019-01-21 PROCEDURE — 96374 THER/PROPH/DIAG INJ IV PUSH: CPT | Performed by: NURSE PRACTITIONER

## 2019-01-21 RX ADMIN — Medication 250 ML: at 11:30

## 2019-01-21 ASSESSMENT — PAIN SCALES - GENERAL: PAINLEVEL: NO PAIN (0)

## 2019-01-21 NOTE — PROGRESS NOTES
Infusion Nursing Note:  Boogie Clark presents today for Injectafer.    Patient seen by provider today: No   present during visit today: Not Applicable.    Note: Patient observed for 30 minutes post infusion.    Intravenous Access:  Peripheral IV placed.    Treatment Conditions:  Not Applicable.      Post Infusion Assessment:  Patient tolerated infusion without incident.  Site patent and intact, free from redness, edema or discomfort.  No evidence of extravasations.  Access discontinued per protocol.    Discharge Plan:   Patient and/or family verbalized understanding of discharge instructions and all questions answered.  Patient discharged in stable condition accompanied by: self and wife.  Departure Mode: Ambulatory.    Judi Booth RN

## 2019-01-22 ENCOUNTER — OFFICE VISIT (OUTPATIENT)
Dept: CARDIOLOGY | Facility: CLINIC | Age: 69
End: 2019-01-22
Attending: NURSE PRACTITIONER
Payer: MEDICARE

## 2019-01-22 VITALS
OXYGEN SATURATION: 91 % | DIASTOLIC BLOOD PRESSURE: 73 MMHG | WEIGHT: 313.7 LBS | HEART RATE: 82 BPM | HEIGHT: 71 IN | SYSTOLIC BLOOD PRESSURE: 120 MMHG | BODY MASS INDEX: 43.92 KG/M2

## 2019-01-22 DIAGNOSIS — R06.02 SOB (SHORTNESS OF BREATH): ICD-10-CM

## 2019-01-22 DIAGNOSIS — I10 BENIGN ESSENTIAL HYPERTENSION: ICD-10-CM

## 2019-01-22 DIAGNOSIS — E87.6 HYPOKALEMIA: Primary | ICD-10-CM

## 2019-01-22 LAB
ANION GAP SERPL CALCULATED.3IONS-SCNC: 6 MMOL/L (ref 3–14)
BUN SERPL-MCNC: 14 MG/DL (ref 7–30)
CALCIUM SERPL-MCNC: 8.8 MG/DL (ref 8.5–10.1)
CHLORIDE SERPL-SCNC: 112 MMOL/L (ref 94–109)
CO2 SERPL-SCNC: 28 MMOL/L (ref 20–32)
CREAT SERPL-MCNC: 0.98 MG/DL (ref 0.66–1.25)
ERYTHROCYTE [DISTWIDTH] IN BLOOD BY AUTOMATED COUNT: 13.6 % (ref 10–15)
GFR SERPL CREATININE-BSD FRML MDRD: 79 ML/MIN/{1.73_M2}
GLUCOSE SERPL-MCNC: 98 MG/DL (ref 70–99)
HCT VFR BLD AUTO: 39.8 % (ref 40–53)
HGB BLD-MCNC: 12.9 G/DL (ref 13.3–17.7)
MCH RBC QN AUTO: 29.4 PG (ref 26.5–33)
MCHC RBC AUTO-ENTMCNC: 32.4 G/DL (ref 31.5–36.5)
MCV RBC AUTO: 91 FL (ref 78–100)
NT-PROBNP SERPL-MCNC: 300 PG/ML (ref 0–125)
PLATELET # BLD AUTO: 347 10E9/L (ref 150–450)
POTASSIUM SERPL-SCNC: 3.3 MMOL/L (ref 3.4–5.3)
RBC # BLD AUTO: 4.39 10E12/L (ref 4.4–5.9)
SODIUM SERPL-SCNC: 146 MMOL/L (ref 133–144)
WBC # BLD AUTO: 9.1 10E9/L (ref 4–11)

## 2019-01-22 PROCEDURE — G0463 HOSPITAL OUTPT CLINIC VISIT: HCPCS | Mod: ZF

## 2019-01-22 PROCEDURE — 36415 COLL VENOUS BLD VENIPUNCTURE: CPT | Performed by: NURSE PRACTITIONER

## 2019-01-22 PROCEDURE — 83880 ASSAY OF NATRIURETIC PEPTIDE: CPT | Performed by: NURSE PRACTITIONER

## 2019-01-22 PROCEDURE — G0463 HOSPITAL OUTPT CLINIC VISIT: HCPCS

## 2019-01-22 PROCEDURE — 99214 OFFICE O/P EST MOD 30 MIN: CPT | Mod: ZP | Performed by: NURSE PRACTITIONER

## 2019-01-22 PROCEDURE — 80048 BASIC METABOLIC PNL TOTAL CA: CPT | Performed by: NURSE PRACTITIONER

## 2019-01-22 PROCEDURE — 85027 COMPLETE CBC AUTOMATED: CPT | Performed by: NURSE PRACTITIONER

## 2019-01-22 RX ORDER — POTASSIUM CHLORIDE 1500 MG/1
20 TABLET, EXTENDED RELEASE ORAL DAILY
Qty: 90 TABLET | Refills: 3 | Status: SHIPPED | OUTPATIENT
Start: 2019-01-22 | End: 2020-01-22

## 2019-01-22 ASSESSMENT — PAIN SCALES - GENERAL: PAINLEVEL: NO PAIN (0)

## 2019-01-22 ASSESSMENT — MIFFLIN-ST. JEOR: SCORE: 2210.06

## 2019-01-22 NOTE — PROGRESS NOTES
January 22, 2019      Mr. Boogie Clark is a pleasant 68 year old male with a past medical history of:    Problem List  1. Pulmonary hypertension  2. Hypertension  3. COLD  4. History of bladder tumor  5. COCO  6. Elevated body mass index  7. Nephrolithiasis  8. Diverticulosis  9. Hyperlipidemia  10. ASPVD with balloon stenting  11. History of ankle fracture with immolization  12. Lymphedema  13. ? History of DVT  14. Coronary calcification  15. Steroid dependence    Today, he is accompanied by: spouse      Current Symptoms  1. Any ER visits or hospital admissions since last appointment: No. However he had a scheduled colonoscopy at [facility] in [place]; the procedure was not completed because they found his blood pressure to be high.  Patient reports they would not recheck, but sent him home saying he would need to have the procedure done in a hospital.    2. Shortness of breath: Yes: with exertion, feels unchanged. Completed 2 IV iron infusions   3. Dizziness or lightheadedness: No  4. Any syncopal episodes or falls: No  5. Chest pain or chest tightness: No  6. Nausea, vomiting, diarrhea, constipation: No  7. Swelling in the legs: No  8. Bloating in the abdomen: No  9. PND; Waking up at night coughing, choking or SOB: Yes: waking up at times from air leakage of mask.    10. Any medication intolerances: No  11. Reported headaches: Yes: at night, takes OTC medications and finds relief  12. Jaw pain or bone/joint pain: No  13. On IV Prostacyclin: No; current dose: N/A    14. Other - patient completed a sleep study last week, his CPAP was adjusted.  Patient states the sleep MD will be calling for final results.        PAST MEDICAL HISTORY:  Past Medical History:   Diagnosis Date     Anal fissure 8/17/2012    Causing rectal bleeding      Cellulitis of breast of male 08/05/2014    INCISION DRAINAGE LEFT BREAST ABSCESS (1027) 8/7/2014      Kidney stone 4/17/2013     Pneumonia 02/11/2018    hospitalized in [place] on  2018 for bilateral pneumonia and COPD exacerbation         FAMILY HISTORY:  Family History   Problem Relation Age of Onset     Breast Cancer Mother      Vascular Disease Father         brain anurysm     Cancer Maternal Grandmother      Diabetes No family hx of      Colon Cancer No family hx of          SOCIAL HISTORY:  Social History     Tobacco Use     Smoking status: Former Smoker     Types: Cigarettes     Start date: 1964     Last attempt to quit: 10/1/2016     Years since quittin.3     Smokeless tobacco: Never Used     Tobacco comment: already did   Substance Use Topics     Alcohol use: Yes     Alcohol/week: 0.0 oz     Comment: moderate once a week couple of beers     Drug use: No         CURRENT MEDICATIONS:  Current Outpatient Medications   Medication Sig Dispense Refill     aspirin 81 MG tablet Take  by mouth daily.       ATORVASTATIN CALCIUM PO Take 40 mg by mouth daily       fluticasone (FLONASE) 50 MCG/ACT nasal spray Spray 1-2 sprays into both nostrils daily 1 g 11     furosemide (LASIX) 40 MG tablet Take 1 tablet (40 mg) by mouth daily 90 tablet 3     losartan (COZAAR) 25 MG tablet Take 1 tablet (25 mg) by mouth daily 30 tablet 5     MELATONIN PO        Multiple Vitamin (MULTI-VITAMIN) per tablet Take  by mouth daily.       order for DME Equipment being ordered: Oxygen concentrator and portability  Date of order 05/10/18  Dosage of concentration - 2 liters per minute   Route of administration - Nasal cannula  Frequency of use - continuously  Duration of need - lifetime   Shabbir Agustin MD is the health care provider     _________________________________________  npi [id number] 05/10/18 1 Device 0     order for DME Equipment being ordered: oxygen concentrator - [facility] home oxygen 1 Device 0     order for DME Equipment being ordered: oxygen concentrator     Oxygen at 3 liters while at rest is at 97 %.  Oxygen at 3 liters walking is at 97 %.     Without oxygen at rest is at 91 %.  Without oxygen  "while walking is at 87 %.    From today's office visit 1 Device 0         ROS:   10 point ROS of systems including Constitutional, Eyes, Respiratory, Cardiovascular, Gastroenterology, Genitourinary, Integumentary, Muscularskeletal, Psychiatric were all negative except for pertinent positives noted in my HPI.    EXAM:  /73 (BP Location: Right arm, Patient Position: Chair, Cuff Size: Adult Large)   Pulse 82   Ht 1.803 m (5' 11\")   Wt 142.3 kg (313 lb 11.2 oz)   SpO2 91%   BMI 43.75 kg/m    General: appears comfortable, alert and articulate  Head: normocephalic, atraumatic  Eyes: anicteric sclera, EOMI  Neck: no adenopathy  Orophyarynx: moist mucosa, no lesions, dentition intact  Heart:  Distant, regular, S1/S2, no murmur, gallop, rub, estimated JVP non elevated  Lungs: clear, no rales or wheezing  Abdomen: soft, non-tender, bowel sounds present, no hepatosplenomegaly  Extremities: no clubbing, cyanosis; bilateral lower leg edema, managed with leg wraps  Neurological: normal speech and affect, no gross motor deficits      Weight  Wt Readings from Last 10 Encounters:   01/22/19 142.3 kg (313 lb 11.2 oz)   01/14/19 141.3 kg (311 lb 8 oz)   01/09/19 136.1 kg (300 lb)   01/08/19 142.5 kg (314 lb 3.2 oz)   01/02/19 140.6 kg (310 lb)   11/28/18 142.4 kg (314 lb)   11/08/18 139.5 kg (307 lb 9.6 oz)   06/27/18 138.3 kg (305 lb)   05/30/18 141 kg (310 lb 12.8 oz)   05/01/18 141.5 kg (312 lb)         Labs:    CBC RESULTS:  Lab Results   Component Value Date    WBC 9.1 01/22/2019    RBC 4.39 (L) 01/22/2019    HGB 12.9 (L) 01/22/2019    HCT 39.8 (L) 01/22/2019    MCV 91 01/22/2019    MCH 29.4 01/22/2019    MCHC 32.4 01/22/2019    RDW 13.6 01/22/2019     01/22/2019       CMP RESULTS:  Lab Results   Component Value Date     01/08/2019    POTASSIUM 4.0 01/08/2019    CHLORIDE 112 (H) 01/08/2019    CO2 26 01/08/2019    ANIONGAP 6 01/08/2019    GLC 94 01/08/2019    BUN 16 01/08/2019    CR 0.98 01/08/2019    " GFRESTIMATED 78 01/08/2019    GFRESTBLACK >90 01/08/2019    GRACIELA 9.0 01/08/2019    BILITOTAL 0.4 11/28/2018    ALBUMIN 3.3 (L) 11/28/2018    ALKPHOS 83 11/28/2018    ALT 27 11/28/2018    AST 16 11/28/2018        INR RESULTS:  No results found for: INR    BNP RESULTS:  No results found for: NTBNPI  No results found for: BNP      Recent Tests:      Echocardiogram (12/3/2018):  Interpretation Summary  Technically difficult study.Extremely poor acoustic windows.  Limited information was obtained during study.  Global and regional left ventricular function is normal with an EF of 55-60%.  Global right ventricular function is normal.  Pulmonary artery systolic pressure cannot be assessed.  The inferior vena cava is normal.  The atrial septum is intact as assessed by agitated saline bubble study .    Left Ventricle  Global and regional left ventricular function is normal with an EF of 55-60%.     Right Ventricle  Global right ventricular function is normal.     Atria  The atrial septum is intact as assessed by agitated saline bubble study .     Tricuspid Valve  Pulmonary artery systolic pressure cannot be assessed.        Vessels  The inferior vena cava is normal.     Pericardium  No pericardial effusion is present.           RHC (12/26/2018):             Assessment and Plan:   Mr. Boogie Clark is a 68 year old male with pulmonary hypertension and COLD, COCO and morbid obesity. WHO Group III, NYHA Functional Class III.     #PH Plan:  -Continue medications as scheduled. VS and weights stable, labs unremarkable.  -Physical exam shows no signs of right heart failure or fluid overload.  -Return to clinic upon returning to [place]. Patient instructed to see dermatology while down in [place] for right hand skin evaluation and possible biopsy of 19 year wound.     It was a pleasure seeing Mr. Boogie Clark at the [facility].         Sincerely,  Amina Ramires, APRN,  CNP.      Attending    I personally reviewed his chart, examined the wound on the dorsum of his hand.  His colonoscopy was canceled secondary to systemic hypertension.  Had recent re-evaluation of COCO.  Non-invasive assessment for significant CAD was negative:    12/10/18 10:14 AM [id number] [facility], [place], Nuclear Medicine       [facility], Cardiac Services    PACS Images      Show images for NM Lexiscan stress test   Study Result     INDICATION:  Dyspnea, shortness of breath     PROTOCOL:    Rest and stress myocardial perfusion imaging was performed using  Tc-99m tetrafosmin intravenously. Pharmacological stress was performed  with 0.4 mg of regadenoson intravenously. The EKG showed normal sinus  rhythm at rest. No significant abnormalities were noted with infusion  of regadenoson.     FINDINGS:  1. Overall quality of the study: Diagnostic.   2. Left ventricular cavity is normal on the rest and stress studies.  3. SPECT images demonstrate uniform radiotracer uptake of the  myocardium on both stress and rest images.  4. Left ventricular ejection fraction is 69%. Left ventricular  end-diastolic volume is 108 mL. End-systolic volume is 33 mL.                                                                      IMPRESSION:  1. Normal myocardial SPECT study without evidence of myocardial  ischemia or infarct.  2. Normal left ventricular systolic function with a left ventricular  ejection fraction of  69%.      I have personally reviewed the examination and initial interpretation  and I agree with the findings.     RAKEL LEYVA MD        Non-healing wound dorsum right hand for many years, initially related to dog scratch.  Does have extensive sun exposure.  We suggested dermatology consult with probably biopsy.  He has been utilizing cortisone cream which we suggested he discontinue.     There is no specific contraindication to the contemplated bariatric surgery.  We discussed risks including MI, CVA, renal  failure, respiratory dysfunction.          Maurilio Velasquez M.D.

## 2019-01-22 NOTE — PATIENT INSTRUCTIONS
Medication Changes:   1. No medication changes    Patient Instructions:  1. Continue staying active and eat a heart healthy diet.    2. Please keep current list of medications with you at all times.    3. Remember to weigh yourself daily after voiding and before you consume any food or beverages and log the numbers.  If you have gained/lost 2 pounds overnight or 5 pounds in a week contact us immediately for medication adjustments or further instructions.    4. **Please call us immediately if you have any syncope (fainting or passing out), chest pain, edema (swelling or weight gain), or decline in your functional status (general decline in how you are feeling).    Follow up Appointment Information:  1. Follow up with our clinic when you return to [place]  2. Follow up with dermatology while in [place]    Results:  Results for CLARITZA PAL (MRN [id number]) as of 1/22/2019 10:48   Ref. Range 1/22/2019 09:36   Sodium Latest Ref Range: 133 - 144 mmol/L 146 (H)   Potassium Latest Ref Range: 3.4 - 5.3 mmol/L 3.3 (L)   Chloride Latest Ref Range: 94 - 109 mmol/L 112 (H)   Carbon Dioxide Latest Ref Range: 20 - 32 mmol/L 28   Urea Nitrogen Latest Ref Range: 7 - 30 mg/dL 14   Creatinine Latest Ref Range: 0.66 - 1.25 mg/dL 0.98   GFR Estimate Latest Ref Range: >60 mL/min/1.73_m2 79   GFR Estimate If Black Latest Ref Range: >60 mL/min/1.73_m2 >90   Calcium Latest Ref Range: 8.5 - 10.1 mg/dL 8.8   Anion Gap Latest Ref Range: 3 - 14 mmol/L 6   N-Terminal Pro Bnp Latest Ref Range: 0 - 125 pg/mL 300 (H)   Glucose Latest Ref Range: 70 - 99 mg/dL 98   WBC Latest Ref Range: 4.0 - 11.0 10e9/L 9.1   Hemoglobin Latest Ref Range: 13.3 - 17.7 g/dL 12.9 (L)   Hematocrit Latest Ref Range: 40.0 - 53.0 % 39.8 (L)   Platelet Count Latest Ref Range: 150 - 450 10e9/L 347   RBC Count Latest Ref Range: 4.4 - 5.9 10e12/L 4.39 (L)   MCV Latest Ref Range: 78 - 100 fl 91   MCH Latest Ref Range: 26.5 - 33.0 pg 29.4   MCHC Latest Ref Range: 31.5 - 36.5  g/dL 32.4   RDW Latest Ref Range: 10.0 - 15.0 % 13.6     We are located on the third floor of [facility] on the [facility].  Our address is     [address] [place] [address] on [address] [place] [address]      Thank you for allowing us to be a part of your care here at the [facility]    If you have questions or concerns please contact us at:    Mckenzie Kimball, RN, BSN    Misael Summers (Schedule,Prior Auth)  Nurse Coordinator     Clinic   Pulmonary Hypertension   Pulmonary Hypertension  [facility]  (P)[phone]    (P) [phone]        (F)[phone]                ** Please note that you will NOT receive a reminder call regarding your scheduled testing, reminder calls are for provider appointments only.  If you are scheduled for testing within the [facility] system you may receive a call regarding pre-registration for billing purposes only.**     Support Group:  Pulmonary Hypertension Association  Https://www.phassociation.org/  **Look at the Events Tab** They even have Support Groups that you can call into    [facility] PH Support Group  Second Saturday of the Month from 1-3 PM   Location: [address] [place] [id number]  Leader: Olga Choi and Rose Mary Reyes  Phone: [phone] or [phone]  Email: [email or web address]

## 2019-01-22 NOTE — NURSING NOTE
Chief Complaint   Patient presents with     Follow Up      2 week PH f/u after med changes.     Vitals were taken and medications were reconciled.    9:54 AM Juan Luis Brannon, Student CMA

## 2019-01-22 NOTE — LETTER
1/22/2019      RE: Boogie Clark  [address] [place] [id number]       Dear Colleague,    Thank you for the opportunity to participate in the care of your patient, Boogie Clark, at the [facility] HEART [facility]. Please see a copy of my visit note below.        Palmeruary 22, 2019      Mr. Boogie Clark is a pleasant 68 year old male with a past medical history of:    Problem List  1. Pulmonary hypertension  2. Hypertension  3. COLD  4. History of bladder tumor  5. COCO  6. Elevated body mass index  7. Nephrolithiasis  8. Diverticulosis  9. Hyperlipidemia  10. ASPVD with balloon stenting  11. History of ankle fracture with immolization  12. Lymphedema  13. ? History of DVT  14. Coronary calcification  15. Steroid dependence    Today, he is accompanied by: spouse      Current Symptoms  1. Any ER visits or hospital admissions since last appointment: No. However he had a scheduled colonoscopy at [facility] in [place]; the procedure was not completed because they found his blood pressure to be high.  Patient reports they would not recheck, but sent him home saying he would need to have the procedure done in a hospital.    2. Shortness of breath: Yes: with exertion, feels unchanged. Completed 2 IV iron infusions   3. Dizziness or lightheadedness: No  4. Any syncopal episodes or falls: No  5. Chest pain or chest tightness: No  6. Nausea, vomiting, diarrhea, constipation: No  7. Swelling in the legs: No  8. Bloating in the abdomen: No  9. PND; Waking up at night coughing, choking or SOB: Yes: waking up at times from air leakage of mask.    10. Any medication intolerances: No  11. Reported headaches: Yes: at night, takes OTC medications and finds relief  12. Jaw pain or bone/joint pain: No  13. On IV Prostacyclin: No; current dose: N/A    14. Other - patient completed a sleep study last week, his CPAP was adjusted.  Patient states the sleep MD will be calling for final  results.        PAST MEDICAL HISTORY:  Past Medical History:   Diagnosis Date     Anal fissure 2012    Causing rectal bleeding      Cellulitis of breast of male 2014    INCISION DRAINAGE LEFT BREAST ABSCESS (1027) 2014      Kidney stone 2013     Pneumonia 2018    hospitalized in [place] on 2018 for bilateral pneumonia and COPD exacerbation         FAMILY HISTORY:  Family History   Problem Relation Age of Onset     Breast Cancer Mother      Vascular Disease Father         brain anurysm     Cancer Maternal Grandmother      Diabetes No family hx of      Colon Cancer No family hx of          SOCIAL HISTORY:  Social History     Tobacco Use     Smoking status: Former Smoker     Types: Cigarettes     Start date: 1964     Last attempt to quit: 10/1/2016     Years since quittin.3     Smokeless tobacco: Never Used     Tobacco comment: already did   Substance Use Topics     Alcohol use: Yes     Alcohol/week: 0.0 oz     Comment: moderate once a week couple of beers     Drug use: No         CURRENT MEDICATIONS:  Current Outpatient Medications   Medication Sig Dispense Refill     aspirin 81 MG tablet Take  by mouth daily.       ATORVASTATIN CALCIUM PO Take 40 mg by mouth daily       fluticasone (FLONASE) 50 MCG/ACT nasal spray Spray 1-2 sprays into both nostrils daily 1 g 11     furosemide (LASIX) 40 MG tablet Take 1 tablet (40 mg) by mouth daily 90 tablet 3     losartan (COZAAR) 25 MG tablet Take 1 tablet (25 mg) by mouth daily 30 tablet 5     MELATONIN PO        Multiple Vitamin (MULTI-VITAMIN) per tablet Take  by mouth daily.       order for DME Equipment being ordered: Oxygen concentrator and portability  Date of order 05/10/18  Dosage of concentration - 2 liters per minute   Route of administration - Nasal cannula  Frequency of use - continuously  Duration of need - lifetime   Shabbir Agustin MD is the health care provider     _________________________________________  npi [id number]  "05/10/18 1 Device 0     order for DME Equipment being ordered: oxygen concentrator - [facility] home oxygen 1 Device 0     order for DME Equipment being ordered: oxygen concentrator     Oxygen at 3 liters while at rest is at 97 %.  Oxygen at 3 liters walking is at 97 %.     Without oxygen at rest is at 91 %.  Without oxygen while walking is at 87 %.    From today's office visit 1 Device 0         ROS:   10 point ROS of systems including Constitutional, Eyes, Respiratory, Cardiovascular, Gastroenterology, Genitourinary, Integumentary, Muscularskeletal, Psychiatric were all negative except for pertinent positives noted in my HPI.    EXAM:  /73 (BP Location: Right arm, Patient Position: Chair, Cuff Size: Adult Large)   Pulse 82   Ht 1.803 m (5' 11\")   Wt 142.3 kg (313 lb 11.2 oz)   SpO2 91%   BMI 43.75 kg/m     General: appears comfortable, alert and articulate  Head: normocephalic, atraumatic  Eyes: anicteric sclera, EOMI  Neck: no adenopathy  Orophyarynx: moist mucosa, no lesions, dentition intact  Heart:  Distant, regular, S1/S2, no murmur, gallop, rub, estimated JVP non elevated  Lungs: clear, no rales or wheezing  Abdomen: soft, non-tender, bowel sounds present, no hepatosplenomegaly  Extremities: no clubbing, cyanosis; bilateral lower leg edema, managed with leg wraps  Neurological: normal speech and affect, no gross motor deficits      Weight  Wt Readings from Last 10 Encounters:   01/22/19 142.3 kg (313 lb 11.2 oz)   01/14/19 141.3 kg (311 lb 8 oz)   01/09/19 136.1 kg (300 lb)   01/08/19 142.5 kg (314 lb 3.2 oz)   01/02/19 140.6 kg (310 lb)   11/28/18 142.4 kg (314 lb)   11/08/18 139.5 kg (307 lb 9.6 oz)   06/27/18 138.3 kg (305 lb)   05/30/18 141 kg (310 lb 12.8 oz)   05/01/18 141.5 kg (312 lb)         Labs:    CBC RESULTS:  Lab Results   Component Value Date    WBC 9.1 01/22/2019    RBC 4.39 (L) 01/22/2019    HGB 12.9 (L) 01/22/2019    HCT 39.8 (L) 01/22/2019    MCV 91 01/22/2019    MCH 29.4 01/22/2019 "    MCHC 32.4 01/22/2019    RDW 13.6 01/22/2019     01/22/2019       CMP RESULTS:  Lab Results   Component Value Date     01/08/2019    POTASSIUM 4.0 01/08/2019    CHLORIDE 112 (H) 01/08/2019    CO2 26 01/08/2019    ANIONGAP 6 01/08/2019    GLC 94 01/08/2019    BUN 16 01/08/2019    CR 0.98 01/08/2019    GFRESTIMATED 78 01/08/2019    GFRESTBLACK >90 01/08/2019    GRACIELA 9.0 01/08/2019    BILITOTAL 0.4 11/28/2018    ALBUMIN 3.3 (L) 11/28/2018    ALKPHOS 83 11/28/2018    ALT 27 11/28/2018    AST 16 11/28/2018        INR RESULTS:  No results found for: INR    BNP RESULTS:  No results found for: NTBNPI  No results found for: BNP      Recent Tests:      Echocardiogram (12/3/2018):  Interpretation Summary  Technically difficult study.Extremely poor acoustic windows.  Limited information was obtained during study.  Global and regional left ventricular function is normal with an EF of 55-60%.  Global right ventricular function is normal.  Pulmonary artery systolic pressure cannot be assessed.  The inferior vena cava is normal.  The atrial septum is intact as assessed by agitated saline bubble study .    Left Ventricle  Global and regional left ventricular function is normal with an EF of 55-60%.     Right Ventricle  Global right ventricular function is normal.     Atria  The atrial septum is intact as assessed by agitated saline bubble study .     Tricuspid Valve  Pulmonary artery systolic pressure cannot be assessed.        Vessels  The inferior vena cava is normal.     Pericardium  No pericardial effusion is present.           [facility] (12/26/2018):             Assessment and Plan:   Mr. Boogie Clark is a 68 year old male with pulmonary hypertension and COLD, COCO and morbid obesity. WHO Group III, NYHA Functional Class III.     #PH Plan:  -Continue medications as scheduled. VS and weights stable, labs unremarkable.  -Physical exam shows no signs of right heart failure or fluid overload.  -Return to clinic upon returning  to [place]. Patient instructed to see dermatology while down in [place] for right hand skin evaluation and possible biopsy of 19 year wound.     It was a pleasure seeing Mr. Boogie Clark at the [facility].         Sincerely,  JANEE Kelley, CNP.      Attending    I personally reviewed his chart, examined the wound on the dorsum of his hand.  His colonoscopy was canceled secondary to systemic hypertension.  Had recent re-evaluation of COCO.  Non-invasive assessment for significant CAD was negative:    12/10/18 10:14 AM [id number] [facility], Nuclear Medicine       [facility], Cardiac Services    PACS Images      Show images for NM Lexiscan stress test   Study Result     INDICATION:  Dyspnea, shortness of breath     PROTOCOL:    Rest and stress myocardial perfusion imaging was performed using  Tc-99m tetrafosmin intravenously. Pharmacological stress was performed  with 0.4 mg of regadenoson intravenously. The EKG showed normal sinus  rhythm at rest. No significant abnormalities were noted with infusion  of regadenoson.     FINDINGS:  1. Overall quality of the study: Diagnostic.   2. Left ventricular cavity is normal on the rest and stress studies.  3. SPECT images demonstrate uniform radiotracer uptake of the  myocardium on both stress and rest images.  4. Left ventricular ejection fraction is 69%. Left ventricular  end-diastolic volume is 108 mL. End-systolic volume is 33 mL.                                                                      IMPRESSION:  1. Normal myocardial SPECT study without evidence of myocardial  ischemia or infarct.  2. Normal left ventricular systolic function with a left ventricular  ejection fraction of  69%.      I have personally reviewed the examination and initial interpretation  and I agree with the findings.     RAKEL LEYVA MD        Non-healing wound dorsum right hand for many years, initially related to dog scratch.   Does have extensive sun exposure.  We suggested dermatology consult with probably biopsy.  He has been utilizing cortisone cream which we suggested he discontinue.     There is no specific contraindication to the contemplated bariatric surgery.  We discussed risks including MI, CVA, renal failure, respiratory dysfunction.        Sincerely,     JANEE Cope CNP      CC:  Maurilio Velasquez M.D.

## 2019-01-23 LAB — SLPCOMP: NORMAL

## 2019-01-23 NOTE — PROCEDURES
" SLEEP STUDY INTERPRETATION  TITRATION STUDY      Patient: CLARITZA PAL  YOB: 1950  Study Date: 1/18/2019  MRN: [id number]  Referring Provider: Dr. Velasquez  Ordering Provider: Dr. Morrell    Indications for Polysomnography: The patient is a 68 y old Male who is 5' 11\" and weighs 300.0 lbs. His BMI is 42.0, Eden sleepiness scale 8.0 and neck circumference is 49.5 cm. Relevant medical history includes (co-morbidities). A polysomnogram with PAP titration was performed to correct for severe obstructive sleep apnea. With comorbid  restrictive lung disease, chronic respiratory failure on O2, pulmonary hypertension, hypertension. Tolerating PAP well with good AHI on download but symptoms of mild daytime sleepiness (ESS 8), frequent awakenings/nocturia, rare morning headaches. Contemplating bariatric surgery.    Polysomnogram Data: A full night polysomnogram recorded the standard physiologic parameters including EEG, EOG, EMG, ECG, nasal and oral airflow. Respiratory parameters of chest and abdominal movements were recorded with respiratory inductance plethysmography. Oxygen saturation was recorded by pulse oximetry. Hypopnea scoring rule used: 1B 4%.    Treatment PSG  Sleep Architecture:   The total recording time of the polysomnogram was 455.6 minutes. The total sleep time was 309.0 minutes. Sleep latency was increased at 31.4 minutes with the use of a sleep aid (melatonin). REM latency was 54.0 minutes. Arousal index 30.3 arousals per hour. Sleep efficiency was decreased at 67.8%. Wake after sleep onset was 114.5 minutes. The patient spent 14.6% of total sleep time in Stage N1, 62.3% in Stage N2, 3.1% in Stage N3, and 20.1% in REM. Time supine was 0 minutes.      Respiration: No supine sleep was seen    The patient was titrated at pressures ranging from CPAP 8 cmH2O up to CPAP 14 with O2 at 2 LPM. The final pressure achieved was CPAP 14 cmH2O with a residual AHI of 1.3 events per hour.  Lateral REM was " seen at this pressure. While on 13-14 cmH20 the patient had persistent arousals associated with RERAs, these events appeared somewhat periodic but were not classical for cheyne-parks breathing,      Respiratory rate and pattern - was notable for normal respiratory rate and pattern.    Sustained Sleep Associated Hypoventilation - Transcutaneous carbon dioxide monitoring was used, however severe  hypoventilation was not suggested with a maximum change from 37.6 to 46.0 mmHg and 0 minutes at or greater than 55 mmHg.    Sleep Associated Hypoxemia - (Greater than 5 minutes O2 sat at or below 88%) was not present. Baseline oxygen saturation was 95.9%. Lowest oxygen saturation was 78.9%. Time spent less than or equal to 88% was 0 minutes. Time spent less than or equal to 89% was 0.5 minutes.    Movement Activity:     Periodic Limb Activity - There were 22 PLMs during the entire study. The PLM index was 4.3 movements per hour. The PLM Arousal Index was 1.9 per hour.    REM EMG Activity - Excessive transient/sustained muscle activity was not present.    Nocturnal Behavior - Abnormal sleep related behaviors were not noted    Bruxism - None apparent.      Cardiac Summary:   The average pulse rate was 73.2 bpm. The minimum pulse rate was 57.7 bpm while the maximum pulse rate was 100.0 bpm. Arrhythmias were not noted.      Assessment:     While in the lateral position, sleep apnea well-managed with PAP 13-14 cmH20 without significant hypoxemia, but persistent RERAs and possible periodic breathing    Higher pressures likely to be necessary in supine position    Recommendations:    Trial of treatment of COCO with Auto?titrating PAP therapy with a range of 14 cmH2O to 18 cmH2O.     Strict lateral positioning    If symptoms persist, or supine positioning is desired could consider a trial of bilevel titration starting at 10/6 cmH20    Advice regarding the risks of drowsy driving.    Suggest optimizing sleep schedule and avoiding  sleep deprivation.    Weight management (if BMI > 30).    Pharmacologic therapy should be used for management of restless legs syndrome only if present and clinically indicated and not based on the presence of periodic limb movements alone.    Diagnostic Codes:   Obstructive Sleep Apnea G47.33    .   _____________________________________   Electronically Signed By: Librado Morrell MD 1/23/19         Range(%) Time in range (min)   0.0 - 89.0 0.5   0.0 - 88.0 -         Stage Min(mm Hg) Max(mm Hg)   Wake 37.6 45.8   NREM(1+2+3) 40.3 45.3   REM 40.6 46.0         Range(mmHg) Time in range (min)   55.0 - 100.0 -   Excluded data <20.0 & >90.0 25.2

## 2019-01-24 ENCOUNTER — MYC MEDICAL ADVICE (OUTPATIENT)
Dept: CARDIOLOGY | Facility: CLINIC | Age: 69
End: 2019-01-24

## 2019-02-14 ENCOUNTER — VIRTUAL VISIT (OUTPATIENT)
Dept: SLEEP MEDICINE | Facility: CLINIC | Age: 69
End: 2019-02-14
Payer: MEDICARE

## 2019-02-14 DIAGNOSIS — G47.33 OSA (OBSTRUCTIVE SLEEP APNEA): Primary | Chronic | ICD-10-CM

## 2019-02-14 DIAGNOSIS — G25.81 RESTLESS LEGS SYNDROME (RLS): ICD-10-CM

## 2019-02-14 DIAGNOSIS — E66.01 MORBID OBESITY WITH BMI OF 45.0-49.9, ADULT (H): Chronic | ICD-10-CM

## 2019-02-14 DIAGNOSIS — E61.1 IRON DEFICIENCY: ICD-10-CM

## 2019-02-14 PROBLEM — Z99.81 OXYGEN DEPENDENT: Chronic | Status: ACTIVE | Noted: 2018-05-01

## 2019-02-14 PROCEDURE — G2012 BRIEF CHECK IN BY MD/QHP: HCPCS | Performed by: INTERNAL MEDICINE

## 2019-02-14 NOTE — PROGRESS NOTES
"Boogie Clark is a 68 year old male who is being evaluated via a billable telephone visit.      The patient has been notified of following:     \"This telephone visit will be conducted via a call between you and your physician/provider. We have found that certain health care needs can be provided without the need for a physical exam.  This service lets us provide the care you need with a short phone conversation.  If a prescription is necessary we can send it directly to your pharmacy.  If lab work is needed we can place an order for that and you can then stop by our lab to have the test done at a later time.    If during the course of the call the physician/provider feels a telephone visit is not appropriate, you will not be charged for this service.\"     Consent has been obtained for this service by 1 care team member: yes. See the scanned image in the medical record.    "

## 2019-02-14 NOTE — Clinical Note
Will he need to be supine post-op after bariatric surgery. If so we may want to do a bipap titration and gets ome supine sleep

## 2019-02-14 NOTE — PATIENT INSTRUCTIONS
Positioning Device jose, slumber bump, tennis ball tshirt.   This example shows a pillow that straps around the waist. It may be appropriate for those whose sleep study shows milder sleep apnea that occurs primarily when lying flat on one's back. Preliminary studies have shown benefit but effectiveness at home should be verified.

## 2019-02-14 NOTE — PROGRESS NOTES
"  Sleep Study Follow-Up Visit:    Date on this visit: 2/14/2019    Boogie Clark is called today for follow-up of his sleep study done at the [facility]     He was originally diagnosed with obstructive sleep apnea in 1995 at [facility]. He does not recall any numeric details of his polysomnogram. He was prescribed CPAP by Dr. Larose, but doesn't recall the pressure setting. His original complaints of witnessed apneas, snoring and daytime sleepiness all improved with CPAP therapy.     He was seen by Dr. Mclean in 2012 and had gained 100 pounds since his original polysomnogram \"always tired\" and \"likes to sleep.\"     Sleep study [facility] 3/2/2012 (298#) - AHI 76. RDI 86, low O2 80%, PLMI 6. CPAP 12 cm recommended.    Patient was initially seen at [facility] 1/2019 tolerating autoPAP 12-20 cmH20 well, contemplating bariatric surgery, with symptoms of mild daytime sleepiness (ESS 8), frequent awakenings/nocturia, rare morning headaches with comorbid pulmonary hypertension, restrictive lung disease, and chronic respiratory failure on O2. Restless leg syndrome managed with over-the-counter medication effectively. Low iron levels may be implicated. A titration study was recommended.     Overnight oximetery (1/9/19) good  ELIZA 2.5. Lowest O2 81% (at start), Sao2 <88% for 1.2 minutes    Study Date: 1/18/2019 (300.0 lbs)  - The patient was titrated at pressures ranging from CPAP 8 cmH2O up to CPAP 14 with O2 at 2 LPM. The final pressure achieved was CPAP 14 cmH2O with a residual AHI of 1.3 events per hour. Lateral REM was seen at this pressure. While on 13-14 cmH20 the patient had persistent arousals associated with RERAs, these events appeared somewhat periodic but were not classical for cheyne-parks breathing  - Hypoventilation was not suggested with a maximum change from 37.6 to 46.0 mmHg and 0 minutes at or greater than 55 mmHg.  -Time spent less than or equal to " 89% was 0.5 minutes.  -The PLM index was 4.3 movements per hour. The PLM Arousal Index was 1.9 per hour.      Trial of treatment of COCO with Auto-titrating PAP therapy with a range of 14 cmH2O to 18 cmH2O. Strict lateral positioning. If symptoms persist, or supine positioning is desired could consider a trial of bilevel titration starting at 10/6 cmH20     These findings were reviewed with patient.     CBC RESULTS:   Recent Labs   Lab Test 01/22/19  0936   WBC 9.1   RBC 4.39*   HGB 12.9*   HCT 39.8*   MCV 91   MCH 29.4   MCHC 32.4   RDW 13.6             Past medical/surgical history, family history, social history, medications and allergies were reviewed.      Problem List:  Patient Active Problem List    Diagnosis Date Noted     Personal history of malignant neoplasm of bladder 05/20/2013     Priority: High     Bladder cancer Grade 3 non-invasive, Stage ta, s/p turbt on 5/2013. He received 2 courses of BCG therapy.       Iron deficiency 01/02/2019     Priority: Medium     Pulmonary hypertension (H) 06/27/2018     Priority: Medium     Due to COPD and COCO  RH 8/2/17: RA 7. PAW 13, RA 7, PA 50/24, RV 52/8, CO 6.4,   RHC 12/26/2018: PCWP 13, RA 15. PA 33/22, RV 65/15, CO 6.14,        Morbid obesity with BMI of 45.0-49.9, adult (H) 05/30/2018     Priority: Medium     Oxygen dependent 05/01/2018     Priority: Medium     2 LPM. He uses it at night only        Benign essential hypertension 12/03/2015     Priority: Medium     Restrictive lung disease 11/12/2015     Priority: Medium     PFTS 2017- forced vital capacity of 2.44 L which is 56% predicted, FEV1 of 2 L which is 60% predicted, ratio increased at 82, total lung capacity decreased at 64% predicted, Diffusion capacity decreased at 53% predicted,  after bronchodilator there is no significant response. Findings are consistent with moderate restrictive ventilatory defect and moderate diffusion defect not corrected for hemoglobin       COCO  (obstructive sleep apnea) 05/10/2013     Priority: Medium     Diagnosed 1995       Hyperlipidemia with target LDL less than 100 05/10/2013     Priority: Medium     Peripheral vascular disease (H) 07/19/2012     Priority: Medium     Angioplasty of bilateral superficial femoral arteries at [facility] 2008  Patent lower extremity arteries by MRA in 2017.        Restless legs syndrome (RLS) 01/09/2019     Priority: Low     DDD (degenerative disc disease), cervical 01/07/2019     Priority: Low     PAC (premature atrial contraction) 01/07/2019     Priority: Low     [facility]iop[facility] 5/2018 - PACs, PVC, SVT up to 17 beat run       Low testosterone 08/18/2018     Priority: Low     Bilateral lower extremity edema 05/01/2018     Priority: Low     History of smoking 12/08/2016     Priority: Low     Sigmoid diverticulosis 08/05/2014     Priority: Low     Due again in 2022 for colonoscopy        Advanced directives, counseling/discussion 07/19/2012     Priority: Low     Patient states has Advance Directive and will bring in a copy to clinic. 7/19/2012          Colon polyp 07/19/2012     Priority: Low     Colonoscopy at 50 yo  Repeat at 63 yo  Had at 53yo          Impression/Plan:    CPAP 13-14 cm adequate when lateral but supine worsening suspected. Patient feels he does not sleep supine at home.     Trial of treatment of COCO with Auto-titrating PAP therapy with a range of 14 cmH2O to 18 cmH2O with strict lateral positioning. If symptoms persist, or supine positioning is desired could consider a trial of bilevel titration starting at 10/6 cmH20  -  See [facility] for pressure changes (will send Rx to patient)  -  Unclear if he will need to be supine post-op after bariatric surgery. Recommend patient discuss with bariatric surgery ([facility]) and I will send copy of my note    He will otherwise follow up with me in about 2 year(s) or sooner after weight loss stabilizes    >20 minutes spent with patient care    Librado Morrell

## 2019-02-20 ENCOUNTER — TELEPHONE (OUTPATIENT)
Dept: SLEEP MEDICINE | Facility: CLINIC | Age: 69
End: 2019-02-20

## 2019-02-20 DIAGNOSIS — G25.81 RESTLESS LEGS SYNDROME (RLS): ICD-10-CM

## 2019-02-20 DIAGNOSIS — E61.1 IRON DEFICIENCY: ICD-10-CM

## 2019-02-20 DIAGNOSIS — E66.01 MORBID OBESITY WITH BMI OF 45.0-49.9, ADULT (H): ICD-10-CM

## 2019-02-20 DIAGNOSIS — G47.33 OSA (OBSTRUCTIVE SLEEP APNEA): ICD-10-CM

## 2019-02-20 NOTE — TELEPHONE ENCOUNTER
Unable to reach patient to inform him that [facility] received an order for CPAP supplies because he does not have a voicemail set up.

## 2019-06-25 ENCOUNTER — TRANSFERRED RECORDS (OUTPATIENT)
Dept: HEALTH INFORMATION MANAGEMENT | Facility: CLINIC | Age: 69
End: 2019-06-25

## 2019-06-25 ENCOUNTER — OFFICE VISIT - HEALTHEAST (OUTPATIENT)
Dept: SURGERY | Facility: CLINIC | Age: 69
End: 2019-06-25

## 2019-06-25 DIAGNOSIS — J44.9 CHRONIC OBSTRUCTIVE PULMONARY DISEASE, UNSPECIFIED COPD TYPE (H): ICD-10-CM

## 2019-06-25 DIAGNOSIS — R60.0 BILATERAL LOWER EXTREMITY EDEMA: ICD-10-CM

## 2019-06-25 DIAGNOSIS — E66.01 MORBID OBESITY WITH BMI OF 40.0-44.9, ADULT (H): ICD-10-CM

## 2019-06-25 DIAGNOSIS — Z99.81 OXYGEN DEPENDENT: ICD-10-CM

## 2019-06-25 DIAGNOSIS — I10 BENIGN ESSENTIAL HYPERTENSION: ICD-10-CM

## 2019-06-25 DIAGNOSIS — G47.33 OSA (OBSTRUCTIVE SLEEP APNEA): ICD-10-CM

## 2019-06-25 ASSESSMENT — MIFFLIN-ST. JEOR: SCORE: 2133.39

## 2019-07-22 ENCOUNTER — OFFICE VISIT - HEALTHEAST (OUTPATIENT)
Dept: VASCULAR SURGERY | Facility: CLINIC | Age: 69
End: 2019-07-22

## 2019-07-22 DIAGNOSIS — C67.9 MALIGNANT NEOPLASM OF URINARY BLADDER, UNSPECIFIED SITE (H): ICD-10-CM

## 2019-07-22 DIAGNOSIS — E66.01 MORBID OBESITY WITH BMI OF 40.0-44.9, ADULT (H): ICD-10-CM

## 2019-07-22 DIAGNOSIS — I87.2 VENOUS INSUFFICIENCY OF BOTH LOWER EXTREMITIES: ICD-10-CM

## 2019-07-22 DIAGNOSIS — R60.0 BILATERAL LOWER EXTREMITY EDEMA: ICD-10-CM

## 2019-07-22 DIAGNOSIS — I89.0 ACQUIRED LYMPHEDEMA OF LEG: ICD-10-CM

## 2019-07-22 ASSESSMENT — MIFFLIN-ST. JEOR: SCORE: 2115.24

## 2019-08-07 ENCOUNTER — AMBULATORY - HEALTHEAST (OUTPATIENT)
Dept: SURGERY | Facility: CLINIC | Age: 69
End: 2019-08-07

## 2019-08-07 DIAGNOSIS — K21.9 ACID REFLUX: ICD-10-CM

## 2019-08-07 DIAGNOSIS — Z01.818 PRE-OP TESTING: ICD-10-CM

## 2019-08-07 DIAGNOSIS — Z86.39 HISTORY OF IRON DEFICIENCY: ICD-10-CM

## 2019-08-07 DIAGNOSIS — Z98.84 S/P BARIATRIC SURGERY: ICD-10-CM

## 2019-08-07 DIAGNOSIS — R63.4 RAPID WEIGHT LOSS: ICD-10-CM

## 2019-08-07 DIAGNOSIS — Z71.89 ENCOUNTER FOR PRE-BARIATRIC SURGERY COUNSELING AND EDUCATION: ICD-10-CM

## 2019-08-07 DIAGNOSIS — Z86.2 H/O IRON DEFICIENCY ANEMIA: ICD-10-CM

## 2019-08-14 ENCOUNTER — AMBULATORY - HEALTHEAST (OUTPATIENT)
Dept: SURGERY | Facility: CLINIC | Age: 69
End: 2019-08-14

## 2019-08-14 DIAGNOSIS — Z86.14 HX MRSA INFECTION: ICD-10-CM

## 2019-08-16 LAB — BACTERIA SPEC CULT: NORMAL

## 2019-08-19 ENCOUNTER — AMBULATORY - HEALTHEAST (OUTPATIENT)
Dept: SURGERY | Facility: CLINIC | Age: 69
End: 2019-08-19

## 2019-08-19 ENCOUNTER — MYC MEDICAL ADVICE (OUTPATIENT)
Dept: FAMILY MEDICINE | Facility: CLINIC | Age: 69
End: 2019-08-19

## 2019-08-20 ENCOUNTER — COMMUNICATION - HEALTHEAST (OUTPATIENT)
Dept: SURGERY | Facility: CLINIC | Age: 69
End: 2019-08-20

## 2019-08-20 NOTE — TELEPHONE ENCOUNTER
Need to check if outside orders can be faxed for imaging to complete here.     Fernanda Figueroa RN

## 2019-08-21 ENCOUNTER — COMMUNICATION - HEALTHEAST (OUTPATIENT)
Dept: SURGERY | Facility: CLINIC | Age: 69
End: 2019-08-21

## 2019-08-26 ENCOUNTER — OFFICE VISIT (OUTPATIENT)
Dept: FAMILY MEDICINE | Facility: CLINIC | Age: 69
End: 2019-08-26
Payer: MEDICARE

## 2019-08-26 ENCOUNTER — RECORDS - HEALTHEAST (OUTPATIENT)
Dept: ADMINISTRATIVE | Facility: OTHER | Age: 69
End: 2019-08-26

## 2019-08-26 ENCOUNTER — ANCILLARY PROCEDURE (OUTPATIENT)
Dept: GENERAL RADIOLOGY | Facility: CLINIC | Age: 69
End: 2019-08-26
Attending: INTERNAL MEDICINE
Payer: MEDICARE

## 2019-08-26 ENCOUNTER — AMBULATORY - HEALTHEAST (OUTPATIENT)
Dept: MULTI SPECIALTY CLINIC | Facility: CLINIC | Age: 69
End: 2019-08-26

## 2019-08-26 ENCOUNTER — COMMUNICATION - HEALTHEAST (OUTPATIENT)
Dept: SURGERY | Facility: CLINIC | Age: 69
End: 2019-08-26

## 2019-08-26 VITALS
TEMPERATURE: 97 F | SYSTOLIC BLOOD PRESSURE: 120 MMHG | HEIGHT: 71 IN | OXYGEN SATURATION: 95 % | WEIGHT: 298 LBS | RESPIRATION RATE: 18 BRPM | BODY MASS INDEX: 41.72 KG/M2 | HEART RATE: 80 BPM | DIASTOLIC BLOOD PRESSURE: 72 MMHG

## 2019-08-26 DIAGNOSIS — Z01.818 PREOP GENERAL PHYSICAL EXAM: ICD-10-CM

## 2019-08-26 DIAGNOSIS — R73.09 ELEVATED GLUCOSE: ICD-10-CM

## 2019-08-26 DIAGNOSIS — Z01.818 PREOP GENERAL PHYSICAL EXAM: Primary | ICD-10-CM

## 2019-08-26 DIAGNOSIS — I70.8 ATHEROSCLEROSIS OF OTHER ARTERIES: ICD-10-CM

## 2019-08-26 DIAGNOSIS — I80.3 PHLEBITIS OR THROMBOPHLEBITIS OF LOWER EXTREMITY: ICD-10-CM

## 2019-08-26 LAB
ALBUMIN SERPL-MCNC: 3.5 G/DL (ref 3.4–5)
ALBUMIN UR-MCNC: NEGATIVE MG/DL
ALP SERPL-CCNC: 70 U/L (ref 40–150)
ALT SERPL W P-5'-P-CCNC: 24 U/L (ref 0–70)
ANION GAP SERPL CALCULATED.3IONS-SCNC: 10 MMOL/L (ref 3–14)
APPEARANCE UR: CLEAR
APTT PPP: 92 SEC (ref 22–37)
AST SERPL W P-5'-P-CCNC: 16 U/L (ref 0–45)
BASOPHILS # BLD AUTO: 0.1 10E9/L (ref 0–0.2)
BASOPHILS NFR BLD AUTO: 0.3 %
BILIRUB SERPL-MCNC: 0.4 MG/DL (ref 0.2–1.3)
BILIRUB UR QL STRIP: NEGATIVE
BUN SERPL-MCNC: 18 MG/DL (ref 7–30)
CALCIUM SERPL-MCNC: 8.9 MG/DL (ref 8.5–10.1)
CHLORIDE SERPL-SCNC: 111 MMOL/L (ref 94–109)
CO2 SERPL-SCNC: 24 MMOL/L (ref 20–32)
COLOR UR AUTO: YELLOW
CREAT SERPL-MCNC: 0.94 MG/DL (ref 0.66–1.25)
DIFFERENTIAL METHOD BLD: ABNORMAL
EOSINOPHIL # BLD AUTO: 0.3 10E9/L (ref 0–0.7)
EOSINOPHIL NFR BLD AUTO: 2.2 %
ERYTHROCYTE [DISTWIDTH] IN BLOOD BY AUTOMATED COUNT: 13.9 % (ref 10–15)
GFR SERPL CREATININE-BSD FRML MDRD: 82 ML/MIN/{1.73_M2}
GLUCOSE SERPL-MCNC: 99 MG/DL (ref 70–99)
GLUCOSE UR STRIP-MCNC: NEGATIVE MG/DL
HBA1C MFR BLD: 5.2 % (ref 0–5.6)
HBA1C MFR BLD: 5.2 % (ref 0–5.6)
HCT VFR BLD AUTO: 42.2 % (ref 40–53)
HGB BLD-MCNC: 14.4 G/DL (ref 13.3–17.7)
HGB UR QL STRIP: NEGATIVE
INR PPP: 0.99 (ref 0.86–1.14)
KETONES UR STRIP-MCNC: NEGATIVE MG/DL
LEUKOCYTE ESTERASE UR QL STRIP: NEGATIVE
LYMPHOCYTES # BLD AUTO: 1.9 10E9/L (ref 0.8–5.3)
LYMPHOCYTES NFR BLD AUTO: 13.4 %
MCH RBC QN AUTO: 31.4 PG (ref 26.5–33)
MCHC RBC AUTO-ENTMCNC: 34.1 G/DL (ref 31.5–36.5)
MCV RBC AUTO: 92 FL (ref 78–100)
MONOCYTES # BLD AUTO: 0.8 10E9/L (ref 0–1.3)
MONOCYTES NFR BLD AUTO: 5.8 %
NEUTROPHILS # BLD AUTO: 11.2 10E9/L (ref 1.6–8.3)
NEUTROPHILS NFR BLD AUTO: 78.3 %
NITRATE UR QL: NEGATIVE
PH UR STRIP: 6 PH (ref 5–7)
PLATELET # BLD AUTO: 315 10E9/L (ref 150–450)
POTASSIUM SERPL-SCNC: 4 MMOL/L (ref 3.4–5.3)
PROT SERPL-MCNC: 7.2 G/DL (ref 6.8–8.8)
RBC # BLD AUTO: 4.59 10E12/L (ref 4.4–5.9)
RBC #/AREA URNS AUTO: NORMAL /HPF
SODIUM SERPL-SCNC: 145 MMOL/L (ref 133–144)
SOURCE: NORMAL
SP GR UR STRIP: <=1.005 (ref 1–1.03)
UROBILINOGEN UR STRIP-ACNC: 0.2 EU/DL (ref 0.2–1)
WBC # BLD AUTO: 14.3 10E9/L (ref 4–11)
WBC #/AREA URNS AUTO: NORMAL /HPF

## 2019-08-26 PROCEDURE — 85730 THROMBOPLASTIN TIME PARTIAL: CPT | Performed by: INTERNAL MEDICINE

## 2019-08-26 PROCEDURE — 83036 HEMOGLOBIN GLYCOSYLATED A1C: CPT | Performed by: INTERNAL MEDICINE

## 2019-08-26 PROCEDURE — 80053 COMPREHEN METABOLIC PANEL: CPT | Performed by: INTERNAL MEDICINE

## 2019-08-26 PROCEDURE — 81001 URINALYSIS AUTO W/SCOPE: CPT | Performed by: INTERNAL MEDICINE

## 2019-08-26 PROCEDURE — 85025 COMPLETE CBC W/AUTO DIFF WBC: CPT | Performed by: INTERNAL MEDICINE

## 2019-08-26 PROCEDURE — 36415 COLL VENOUS BLD VENIPUNCTURE: CPT | Performed by: INTERNAL MEDICINE

## 2019-08-26 PROCEDURE — 85610 PROTHROMBIN TIME: CPT | Performed by: INTERNAL MEDICINE

## 2019-08-26 PROCEDURE — 93000 ELECTROCARDIOGRAM COMPLETE: CPT | Performed by: INTERNAL MEDICINE

## 2019-08-26 PROCEDURE — 99214 OFFICE O/P EST MOD 30 MIN: CPT | Performed by: INTERNAL MEDICINE

## 2019-08-26 PROCEDURE — 71046 X-RAY EXAM CHEST 2 VIEWS: CPT

## 2019-08-26 ASSESSMENT — MIFFLIN-ST. JEOR: SCORE: 2138.85

## 2019-08-26 NOTE — PROGRESS NOTES
[facility]  [address] [place] [id number]  [phone]  Dept: [phone]    PRE-OP EVALUATION:  Today's date: 2019    Boogie Clark (: 1950) presents for pre-operative evaluation assessment as requested by Dr. Hoffman.  He requires evaluation and anesthesia risk assessment prior to undergoing surgery/procedure for treatment of Bariatric surgery [ gastric sleeve procedure ]    Fax number for surgical facility: [phone]   Primary Physician: Shabbir Agustin  Type of Anesthesia Anticipated: to be determined    Patient has a Health Care Directive or Living Will:  NO    Preop Questions 2019   Who is doing your surgery? Dr Hoffman   What are you having done? [facility]   Date of Surgery/Procedure: 2019   Facility or Hospital where procedure/surgery will be performed: [facility]   1.  Do you have a history of Heart attack, stroke, stent, coronary bypass surgery, or other heart surgery? No   2.  Do you ever have any pain or discomfort in your chest? No   3.  Do you have a history of  Heart Failure? No   4.   Are you troubled by shortness of breath when:  walking on a level surface, or up a slight hill, or at night? YES -    5.  Do you currently have a cold, bronchitis or other respiratory infection? No   6.  Do you have a cough, shortness of breath, or wheezing? No   7.  Do you sometimes get pains in the calves of your legs when you walk? YES - history of lymphedema . History of peripheral arterial disease with bilateral angioplasties and no recurrent arterial claudication symptoms   For many years .   8. Do you or anyone in your family have previous history of blood clots? UNKNOWN -    9.  Do you or does anyone in your family have a serious bleeding problem such as prolonged bleeding following surgeries or cuts? No   10. Have you ever had problems with anemia or been told to take iron pills? UNKNOWN - see today's laboratory studies    11. Have  you had any abnormal blood loss such as black, tarry or bloody stools? No   12. Have you ever had a blood transfusion? No   13. Have you or any of your relatives ever had problems with anesthesia? No   14. Do you have sleep apnea, excessive snoring or daytime drowsiness? YES - uses CPAP [ continuous positive airway pressure] machine   15. Do you have any prosthetic heart valves? No   16. Do you have prosthetic joints? No         HPI:     HPI related to upcoming procedure: upcoming bariatric surgical procedure .      See problem list for active medical problems.  Problems all longstanding and stable, except as noted/documented.  See ROS for pertinent symptoms related to these conditions.      MEDICAL HISTORY:     Patient Active Problem List    Diagnosis Date Noted     DDD (degenerative disc disease), cervical 01/07/2019     Priority: Medium     Iron deficiency 01/02/2019     Priority: Medium     Low testosterone 08/18/2018     Priority: Medium     Pulmonary hypertension (H) 06/27/2018     Priority: Medium     Due to COPD and COCO  [facility] 8/2/17: RA 7. PAW 13, RA 7, PA 50/24, RV 52/8, CO 6.4,   [facility] 12/26/2018: PCWP 13, RA 15. PA 33/22, RV 65/15, CO 6.14,        Morbid obesity with BMI of 45.0-49.9, adult (H) 05/30/2018     Priority: Medium     Oxygen dependent 05/01/2018     Priority: Medium     2 LPM. He uses it at night only        Bilateral lower extremity edema 05/01/2018     Priority: Medium     History of smoking 12/08/2016     Priority: Medium     Benign essential hypertension 12/03/2015     Priority: Medium     Restrictive lung disease 11/12/2015     Priority: Medium     PFTS 2017- forced vital capacity of 2.44 L which is 56% predicted, FEV1 of 2 L which is 60% predicted, ratio increased at 82, total lung capacity decreased at 64% predicted, Diffusion capacity decreased at 53% predicted,  after bronchodilator there is no significant response. Findings are consistent with moderate restrictive  ventilatory defect and moderate diffusion defect not corrected for hemoglobin       Sigmoid diverticulosis 08/05/2014     Priority: Medium     Due again in 2022 for colonoscopy        Personal history of malignant neoplasm of bladder 05/20/2013     Priority: Medium     Bladder cancer Grade 3 non-invasive, Stage ta, s/p turbt on 5/2013. He received 2 courses of BCG therapy.       COCO (obstructive sleep apnea) 05/10/2013     Priority: Medium     Diagnosed 1995       Hyperlipidemia with target LDL less than 100 05/10/2013     Priority: Medium     Advanced directives, counseling/discussion 07/19/2012     Priority: Medium     Patient states has Advance Directive and will bring in a copy to clinic. 7/19/2012          Peripheral vascular disease (H) 07/19/2012     Priority: Medium     Angioplasty of bilateral superficial femoral arteries at [facility] 2008  Patent lower extremity arteries by [facility] in 2017.        Colon polyp 07/19/2012     Priority: Medium     Colonoscopy at 48 yo  Repeat at 63 yo  Had at 53yo       Restless legs syndrome (RLS) 01/09/2019     Priority: Low     PAC (premature atrial contraction) 01/07/2019     Priority: Low     [facility] 5/2018 - PACs, PVC, SVT up to 17 beat run        Past Medical History:   Diagnosis Date     Anal fissure 8/17/2012    Causing rectal bleeding      Cellulitis of breast of male 08/05/2014    INCISION DRAINAGE LEFT BREAST ABSCESS (1027) 8/7/2014      Kidney stone 4/17/2013     Pneumonia 02/11/2018    hospitalized in [place] on 2/11/2018 for bilateral pneumonia and COPD exacerbation     Past Surgical History:   Procedure Laterality Date     ANGIOPLASTY  2009    [facility]     ANKLE SURGERY  2011    fracture in [facility]     BIOPSY  june 2013     BLADDER SURGERY  05/2013    CYSTOSCOPY, TRANSURETHRAL RESECTION OF BLADDER TUMOR, INSTILLATION OF MITOMYCIN 05/13/2013      COLONOSCOPY  2013     CV RIGHT HEART CATH N/A  12/26/2018    Procedure: Right Heart Cath;  Surgeon: Manish Allen MD;  Location:  HEART CARDIAC CATH LAB     CYSTOSCOPY  2013    several times secondary to dx. of bladder cancer     ORTHOPEDIC SURGERY  2012    surgery on right ankle to repair break     TONSILLECTOMY  1955     VASCULAR SURGERY  2011    angoplasty for pad     VASECTOMY       Current Outpatient Medications   Medication Sig Dispense Refill     aspirin 81 MG tablet Take  by mouth daily.       ATORVASTATIN CALCIUM PO Take 40 mg by mouth daily       fluticasone (FLONASE) 50 MCG/ACT nasal spray Spray 1-2 sprays into both nostrils daily 1 g 11     furosemide (LASIX) 40 MG tablet Take 1 tablet (40 mg) by mouth daily 90 tablet 3     losartan (COZAAR) 25 MG tablet Take 1 tablet (25 mg) by mouth daily 30 tablet 5     MELATONIN PO        Multiple Vitamin (MULTI-VITAMIN) per tablet Take  by mouth daily.       order for DME Auto PAP 14-18 with strict lateral positioning 1 Device 0     order for DME Equipment being ordered: Oxygen concentrator and portability  Date of order 05/10/18  Dosage of concentration - 2 liters per minute   Route of administration - Nasal cannula  Frequency of use - continuously  Duration of need - lifetime   Shabbir Agustin MD is the health care provider     _________________________________________  npi [id number] 05/10/18 1 Device 0     order for DME Equipment being ordered: oxygen concentrator - [facility] home oxygen 1 Device 0     order for DME Equipment being ordered: oxygen concentrator     Oxygen at 3 liters while at rest is at 97 %.  Oxygen at 3 liters walking is at 97 %.     Without oxygen at rest is at 91 %.  Without oxygen while walking is at 87 %.    From today's office visit 1 Device 0     potassium chloride ER (K-DUR/KLOR-CON M) 20 MEQ CR tablet Take 1 tablet (20 mEq) by mouth daily 90 tablet 3     OTC products: None, except as noted above    Allergies   Allergen Reactions     Penicillins       Latex Allergy: NO    Social  "History     Tobacco Use     Smoking status: Former Smoker     Types: Cigarettes     Start date: 1964     Last attempt to quit: 10/1/2016     Years since quittin.9     Smokeless tobacco: Never Used     Tobacco comment: already did   Substance Use Topics     Alcohol use: Yes     Alcohol/week: 0.0 oz     Comment: moderate once a week couple of beers     History   Drug Use No       REVIEW OF SYSTEMS:   CONSTITUTIONAL: NEGATIVE for fever, chills, change in weight  ENT/MOUTH: NEGATIVE for ear, mouth and throat problems  RESP: NEGATIVE for significant cough or SOB  CV: NEGATIVE for chest pain, palpitations or peripheral edema  NEURO: NEGATIVE for weakness, dizziness or paresthesias  HEME/ALLERGY/IMMUNE: NEGATIVE for bleeding problems  PSYCHIATRIC: NEGATIVE for changes in mood or affect  ROS otherwise negative    EXAM:   /72   Pulse 80   Temp 97  F (36.1  C) (Oral)   Resp 18   Ht 1.803 m (5' 11\")   Wt 135.2 kg (298 lb)   SpO2 95%   BMI 41.56 kg/m    GENERAL APPEARANCE: healthy, alert and no distress, appears his stated age   HENT: ear canals and TM's normal and nose and mouth without ulcers or lesions  NECK supple, without thyromegaly   RESP: lungs clear to auscultation - no rales, rhonchi or wheezes  CV: regular rate and rhythm, normal S1 S2, no S3 or S4 and no murmur, click or rub   ABDOMEN: soft, nontender, no HSM or masses and bowel sounds normal  NEURO: Normal strength and tone, sensory exam grossly normal, mentation intact and speech normal    DIAGNOSTICS:     Orders Placed This Encounter   Procedures     XR Chest 2 Views     Comprehensive metabolic panel     CBC with platelets differential     INR     UA with Microscopic reflex to Culture     Hemoglobin A1c     Partial thromboplastin time     EKG 12-lead complete w/read - Clinics         Recent Labs   Lab Test 19  0936 19  0930 18  1641  07/10/17   HGB 12.9*  --  13.4   < >  --      --  355  --   --    * 144 142  -- "   --    POTASSIUM 3.3* 4.0 3.9   < > 3.8   CR 0.98 0.98 0.91   < > 1.0   A1C  --   --   --   --  5.9    < > = values in this interval not displayed.        IMPRESSION:   Reason for surgery/procedure: excessive weight / obesity hypoventilation syndrome   Diagnosis/reason for consult: assess and minimize preoperative risk per consulting surgeon     The proposed surgical procedure is considered INTERMEDIATE risk.    REVISED CARDIAC RISK INDEX  The patient has the following serious cardiovascular risks for perioperative complications such as (MI, PE, VFib and 3  AV Block):  No serious cardiac risks  INTERPRETATION: 0 risks: Class I (very low risk - 0.4% complication rate)    The patient has the following additional risks for perioperative complications:  Morbid obesity      ICD-10-CM    1. Preop general physical exam Z01.818        RECOMMENDATIONS:       Obstructive Sleep Apnea (or suspected sleep apnea)  Patient is to bring their home CPAP with them on the day of surgery      --Patient is to take all scheduled medications on the day of surgery EXCEPT for modifications listed below.      Stop aspirin 1 week prior to surgery   Asked by bariatric surgeon to stop Melatonin and furosemide [ Lasix ] 2 weeks   before surgery     APPROVAL GIVEN to proceed with proposed procedure, without further diagnostic evaluation       Signed Electronically by: Shabbir Agustin MD    Copy of this evaluation report is provided to requesting physician.    [facility] Preop Guidelines    Revised Cardiac Risk Index

## 2019-08-27 ENCOUNTER — COMMUNICATION - HEALTHEAST (OUTPATIENT)
Dept: SURGERY | Facility: CLINIC | Age: 69
End: 2019-08-27

## 2019-08-28 ENCOUNTER — AMBULATORY - HEALTHEAST (OUTPATIENT)
Dept: SURGERY | Facility: CLINIC | Age: 69
End: 2019-08-28

## 2019-08-28 ENCOUNTER — COMMUNICATION - HEALTHEAST (OUTPATIENT)
Dept: SURGERY | Facility: CLINIC | Age: 69
End: 2019-08-28

## 2019-08-28 DIAGNOSIS — R79.1 PROLONGED PTT: ICD-10-CM

## 2019-08-29 ENCOUNTER — AMBULATORY - HEALTHEAST (OUTPATIENT)
Dept: LAB | Facility: HOSPITAL | Age: 69
End: 2019-08-29

## 2019-08-29 DIAGNOSIS — R79.1 PROLONGED PTT: ICD-10-CM

## 2019-08-29 LAB
APTT PPP: 30 SECONDS (ref 24–37)
APTT PPP: NORMAL S

## 2019-08-31 LAB — LA PPP-IMP: POSITIVE

## 2019-09-04 ENCOUNTER — SURGERY - HEALTHEAST (OUTPATIENT)
Dept: SURGERY | Facility: CLINIC | Age: 69
End: 2019-09-04

## 2019-09-04 ENCOUNTER — ANESTHESIA - HEALTHEAST (OUTPATIENT)
Dept: SURGERY | Facility: CLINIC | Age: 69
End: 2019-09-04

## 2019-09-04 ASSESSMENT — MIFFLIN-ST. JEOR
SCORE: 2088.03
SCORE: 2117.96

## 2019-09-06 ENCOUNTER — COMMUNICATION - HEALTHEAST (OUTPATIENT)
Dept: SURGERY | Facility: CLINIC | Age: 69
End: 2019-09-06

## 2019-09-10 ENCOUNTER — AMBULATORY - HEALTHEAST (OUTPATIENT)
Dept: SURGERY | Facility: CLINIC | Age: 69
End: 2019-09-10

## 2019-09-10 ENCOUNTER — COMMUNICATION - HEALTHEAST (OUTPATIENT)
Dept: SURGERY | Facility: CLINIC | Age: 69
End: 2019-09-10

## 2019-09-10 DIAGNOSIS — Z98.84 BARIATRIC SURGERY STATUS: ICD-10-CM

## 2019-09-10 DIAGNOSIS — Z71.3 NUTRITIONAL COUNSELING: ICD-10-CM

## 2019-09-10 DIAGNOSIS — E66.01 OBESITY, MORBID, BMI 40.0-49.9 (H): ICD-10-CM

## 2019-09-17 ENCOUNTER — OFFICE VISIT - HEALTHEAST (OUTPATIENT)
Dept: SURGERY | Facility: CLINIC | Age: 69
End: 2019-09-17

## 2019-09-17 DIAGNOSIS — Z98.84 S/P LAPAROSCOPIC SLEEVE GASTRECTOMY: ICD-10-CM

## 2019-09-17 ASSESSMENT — MIFFLIN-ST. JEOR: SCORE: 2051.74

## 2019-10-14 ENCOUNTER — OFFICE VISIT (OUTPATIENT)
Dept: URGENT CARE | Facility: URGENT CARE | Age: 69
End: 2019-10-14
Payer: MEDICARE

## 2019-10-14 VITALS
HEIGHT: 71 IN | HEART RATE: 77 BPM | SYSTOLIC BLOOD PRESSURE: 152 MMHG | WEIGHT: 280 LBS | RESPIRATION RATE: 18 BRPM | OXYGEN SATURATION: 91 % | DIASTOLIC BLOOD PRESSURE: 90 MMHG | TEMPERATURE: 97.9 F | BODY MASS INDEX: 39.2 KG/M2

## 2019-10-14 DIAGNOSIS — K08.89 PAIN, DENTAL: Primary | ICD-10-CM

## 2019-10-14 PROCEDURE — 99213 OFFICE O/P EST LOW 20 MIN: CPT | Performed by: PHYSICIAN ASSISTANT

## 2019-10-14 RX ORDER — CLINDAMYCIN HCL 300 MG
300 CAPSULE ORAL 3 TIMES DAILY
Qty: 30 CAPSULE | Refills: 0 | Status: SHIPPED | OUTPATIENT
Start: 2019-10-14 | End: 2019-10-24

## 2019-10-14 RX ORDER — CLINDAMYCIN HCL 300 MG
300 CAPSULE ORAL 3 TIMES DAILY
Qty: 40 CAPSULE | Refills: 0 | Status: SHIPPED | OUTPATIENT
Start: 2019-10-14 | End: 2019-10-14

## 2019-10-14 ASSESSMENT — MIFFLIN-ST. JEOR: SCORE: 2057.2

## 2019-10-14 NOTE — PATIENT INSTRUCTIONS
"Hot packs to the left upper jaw three times per day  Take antibiotic as written  Stop antibiotic if develop diarrhea or stomach pain.  Use with yogurt and probiotic daily      Patient Education     Dental Pain    A crack or cavity in a tooth can cause tooth pain. This is because the crack or cavity exposes the sensitive inner area of the tooth. An infection in the gum or the root of the tooth can cause pain and swelling. The pain is often made worse when you drink hot or cold beverages. It can also be worse when you bite on hard foods. Pain may spread from the tooth to your ear or the area of the jaw on the same side.  Home care  Follow these tips when caring for yourself at home:    Don't have hot and cold foods and drinks. Your tooth may be sensitive to changes in temperature.    Use toothpaste made for sensitive teeth. Brush gently up and down instead of sideways. Brushing sideways can wear away root surfaces if they are exposed.    If your tooth is chipped or cracked, or if there is a large open cavity, put oil of cloves directly on the tooth to relieve pain. You can buy oil of cloves at [employer]. Some pharmacies carry an over-the-counter \"toothache kit.\" This contains a paste that you can put on the exposed tooth to make it less sensitive.    Put a cold pack on your jaw over the sore area to help reduce pain.    You may use over-the-counter medicine to ease pain, unless your doctor prescribed another medicine. If you have chronic liver or kidney disease, talk with your healthcare provider before using acetaminophen or ibuprofen. Also talk with your provider if you ve had a stomach ulcer or GI bleeding.    If you have signs of an infection, you will be given an antibiotic. Take it as directed.  Follow-up care  Follow up with your dentist, or as advised. Your pain may go away with the treatment given today. But only a dentist can fully look at and treat the cause of your pain. This will keep the pain from " coming back.  Call 911  Call 911 if any of these occur:    Unusual drowsiness    Headache or stiff neck    Weakness or fainting    Difficulty swallowing or breathing  When to seek medical advice  Call your health care provider right away if any of these occur:    Your face becomes swollen or red    Pain gets worse or spreads to your neck    Fever of 100.4  F (38.0  C) or higher, or as directed by your healthcare provider    Pus drains from the tooth  Date Last Reviewed: 10/1/2016    0222-2693 [employer]. [address] [place] [address]. All rights reserved. This information is not intended as a substitute for professional medical care. Always follow your healthcare professional's instructions.           Patient Education     Dental Abscess   A dental abscess is an infection of the tooth socket. It often starts with a crack or cavity in the tooth. A pocket of pus forms between the tooth and the bone. The infection causes pain and swelling of the gum, cheek, or jaw. The pain is often made worse by drinking hot or cold fluids, or biting on hard foods. Pain may be felt in the facial sinus or in the ear. A severe infection can cause problems with swallowing and breathing.  Causes    Cavities    Trauma    Previous dental work  Symptoms    Pain    Swelling around the tooth or face and cheek    Redness    Bad breath    Bad taste in the mouth    Fever  You will be started on an antibiotic. But, final treatment requires draining the pus. This can be done by removing the tooth or getting a root canal. An oral surgeon typically removes diseased teeth. An endodontist does a root canal. This involves drilling an opening in the tooth to get to access the canals in the root. Once these are reached, the pus can be drained. Then the canals are cleaned and shaped before filling them with a special material called zenaida percha. After the infection has healed, a crown is placed over the tooth.  Home care  The  "following guidelines will help you care for your abscess at home:    Don't have hot or cold foods and liquids. Your tooth may be sensitive to temperature changes.    If your tooth is chipped or cracked, or if there is a large open cavity, apply oil of cloves directly to the tooth to reduce pain. Oil of cloves is sold over-the-counter in pharmacies. Some pharmacies carry an over-the-counter \"toothache kit.\" This contains oil of cloves and a paste, which can be applied over the exposed tooth to decrease sensitivity.    Apply an ice pack (ice cubes in a plastic bag, wrapped in a towel) over the injured area for 10 to 20 minutes every 1 to 2 hours the first day for pain relief. Continue this 3 to 4 times a day until the pain and swelling goes away. To make an ice pack, put ice cubes in a plastic bag that seals at the top. Wrap the bag in a clean, thin towel or cloth. Never put ice or an ice pack directly on the skin.    You can take acetaminophen or ibuprofen for pain, unless you were given a different pain medicine to use. If you have chronic liver or kidney disease, have ever had a stomach ulcer or gastrointestinal bleeding, or are taking blood-thinning medicines, talk with your healthcare provider before using these medicines.    An antibiotic will be prescribed. Take it as directed until completed, even if you are feeling better sooner.  Follow-up care  Follow up as advised with an endodontist, or oral surgeon. Even though your pain may improve with the treatment given today, only a dentist, endodontist, or oral surgeon can provide full treatment for this problem.    If a culture was done, you will be told if the treatment needs to be changed. You can call in as directed for the results.    If X-rays were taken, they will be reviewed by a specialist. You will be given the results, especially if they affect treatment.  Call 911  Call 911 if any of these occur:    Trouble breathing or swallowing, or wheezing    Hoarse " voice or trouble speaking    Confusion    Extreme drowsiness or trouble awakening    Fainting or loss of consciousness    Rapid heart rate  When to seek medical advice  Call your healthcare provider right away if any of these occur:    Swollen or red face or eyelid    Pain gets worse or spreads to the neck    You have a fever of 100.4 F (38 C) or higher, or as directed by your healthcare provider    Unusual drowsiness, a headache or stiff neck, or weakness    Pus drains from the gum or tooth    You can't open your mouth wide  Date Last Reviewed: 4/1/2018 2000-2018 [employer]. [address] [place] [address]. All rights reserved. This information is not intended as a substitute for professional medical care. Always follow your healthcare professional's instructions.

## 2019-10-15 ENCOUNTER — TELEPHONE (OUTPATIENT)
Dept: FAMILY MEDICINE | Facility: CLINIC | Age: 69
End: 2019-10-15

## 2019-10-15 NOTE — TELEPHONE ENCOUNTER
Reason for Call:  Medication or medication refill:    Do you use a [facility]?  Name of the pharmacy and phone number for the current request:  [facility] [phone]    Name of the medication requested: clindamycin stated they received two scripts wondering which one they want patient to be taking     Other request:       Can we leave a detailed message on this number? YES    Phone number patient can be reached at: Other phone number:  [phone]    Best Time:     Call taken on 10/15/2019 at 8:19 AM by Emily Varghese

## 2019-10-15 NOTE — TELEPHONE ENCOUNTER
Sig - Route: Take 1 capsule (300 mg) by mouth 3 times daily for 10 days - Oral    Called and notified pharmacy. Other abx was discontinued.     Fernanda Figueroa RN

## 2019-10-21 ENCOUNTER — TELEPHONE (OUTPATIENT)
Dept: FAMILY MEDICINE | Facility: CLINIC | Age: 69
End: 2019-10-21

## 2019-10-21 NOTE — TELEPHONE ENCOUNTER
Nothing noted in chart regarding this?    Verify what they faxed us and if they have the right number    Ghislaine Foreman RN

## 2019-10-21 NOTE — TELEPHONE ENCOUNTER
Reason for Call:  Other call back    Detailed comments: [employer] calling to see if we have received a fax regarding a back brace. States sent to us 3 times. Please call to advise.     Phone Number Patient can be reached at: Other phone number:  [phone]    Best Time: Any     Can we leave a detailed message on this number? YES    Call taken on 10/21/2019 at 4:21 PM by Toña Sullivan

## 2019-10-22 ENCOUNTER — TELEPHONE (OUTPATIENT)
Dept: UROLOGY | Facility: CLINIC | Age: 69
End: 2019-10-22

## 2019-10-22 NOTE — TELEPHONE ENCOUNTER
Called and left a message for [employer] to re-fax over the fax to the [place] [facility] [phone].    The clinic has no note of receiving this fax.    Shannen Roberson,

## 2019-10-22 NOTE — TELEPHONE ENCOUNTER
Reason for call:  Other   Patient called regarding (reason for call): call back  Additional comments: Patient called to cancel his procedure for today, please call him back to reschedule    Phone number to reach patient:  Home number on file [phone] (home)    Best Time:  any    Can we leave a detailed message on this number?  YES

## 2019-10-23 NOTE — TELEPHONE ENCOUNTER
[employer] is calling back regarding back brace. Caller gave a different date of birth for patient but the name and phone number of patient does match. They would like a return call. Verified the phone number for return call is [phone].

## 2019-10-23 NOTE — TELEPHONE ENCOUNTER
DASH at number [phone], to return my call.  Message does not state it is [employer]?  ? If this is the correct phone number. Michelle Mcneal,

## 2019-10-24 NOTE — TELEPHONE ENCOUNTER
No call back received from [employer], however, a form has now been received for Dr. Agustin to complete and sign.  Phone number on the form is [phone].    Form states that the patient has contacted their DME requesting pain management solutions.  Form is for an orthotic brace.  Form had been given to Dr. Agustin to sign and he returned form stating we needed to confirm with patient.    LM for patient to return call to verify that he did indeed requested this brace from this company.  Michelle Mcneal,

## 2019-10-24 NOTE — TELEPHONE ENCOUNTER
Julieta from [employer] is calling regarding the form, please fax it back to  Fax  # [phone].  Any questions call her at [phone]

## 2019-10-24 NOTE — TELEPHONE ENCOUNTER
DASH for Julieta that form has been received, however, we are waiting to hear back from patient per Dr. Agustin to see if patient actually ordered the brace. Michelle Mcneal,

## 2019-10-25 NOTE — TELEPHONE ENCOUNTER
Spoke to patient.  He said he did order this brace, and he was told by [facility] that it would be covered by Medicare.  Patient stated that it's no big deal if he doesn't get the brace, but if Medicare pays for it he would like to try it. He stated that if he has to pay for anything, he will not order it.    Form given to Dr. Agustin to sign.  Patient informed that Dr. Agustin is out of the office until Monday, October 28th.  Michelle Mcneal,

## 2019-10-28 NOTE — PROGRESS NOTES
SUBJECTIVE:   Boogie Clark is a 68 year old male presenting with a chief complaint of dental pain for four days.  Onset of symptoms was 4 day(s) ago.  Course of illness is worsening.    Severity moderate  Current and Associated symptoms: worsening tooth pain  Treatment measures tried include Tylenol/Ibuprofen and previous course of antibiotic. He is requesting another treatment of antibiotic until seen by his dentist. He doesn't have dental insurance. He wants treatment in the interim.  Predisposing factors include previous antibiotic treatment for dental pain.    Past Medical History:   Diagnosis Date     Anal fissure 8/17/2012    Causing rectal bleeding      Cellulitis of breast of male 08/05/2014    INCISION DRAINAGE LEFT BREAST ABSCESS (1027) 8/7/2014      Kidney stone 4/17/2013     Pneumonia 02/11/2018    hospitalized in [place] on 2/11/2018 for bilateral pneumonia and COPD exacerbation     Current Outpatient Medications   Medication Sig Dispense Refill     aspirin 81 MG tablet Take  by mouth daily.       ATORVASTATIN CALCIUM PO Take 40 mg by mouth daily       losartan (COZAAR) 25 MG tablet Take 1 tablet (25 mg) by mouth daily 30 tablet 5     MELATONIN PO        Multiple Vitamin (MULTI-VITAMIN) per tablet Take  by mouth daily.       order for DME Auto PAP 14-18 with strict lateral positioning 1 Device 0     potassium chloride ER (K-DUR/KLOR-CON M) 20 MEQ CR tablet Take 1 tablet (20 mEq) by mouth daily 90 tablet 3     vitamin B-12 (CYANOCOBALAMIN) 250 MCG tablet Take 250 mcg by mouth daily       order for DME Equipment being ordered: Oxygen concentrator and portability  Date of order 05/10/18  Dosage of concentration - 2 liters per minute   Route of administration - Nasal cannula  Frequency of use - continuously  Duration of need - lifetime   Shabbir Agustin MD is the health care provider     _________________________________________  npi [id number] 05/10/18 1 Device 0     order for DME Equipment being ordered: oxygen  "concentrator - [facility] home oxygen 1 Device 0     order for DME Equipment being ordered: oxygen concentrator     Oxygen at 3 liters while at rest is at 97 %.  Oxygen at 3 liters walking is at 97 %.     Without oxygen at rest is at 91 %.  Without oxygen while walking is at 87 %.    From today's office visit 1 Device 0     Social History     Tobacco Use     Smoking status: Former Smoker     Types: Cigarettes     Start date: 1/1/1964     Last attempt to quit: 10/1/2016     Years since quitting: 3.0     Smokeless tobacco: Never Used     Tobacco comment: already did   Substance Use Topics     Alcohol use: Yes     Alcohol/week: 0.0 standard drinks     Comment: moderate once a week couple of beers       ROS:  Review of systems negative except as stated above. Denies fever chills or night sweats.    OBJECTIVE:  BP (!) 152/90   Pulse 77   Temp 97.9  F (36.6  C) (Oral)   Resp 18   Ht 1.803 m (5' 11\")   Wt 127 kg (280 lb)   SpO2 91%   BMI 39.05 kg/m    GENERAL APPEARANCE: healthy, alert and no distress  EYES: EOMI,  PERRL, conjunctiva clear  HENT: ear canals and TM's normal.  Nose and mouth without ulcers, erythema or lesions no swelling of the face or jaw  He has tenderness on the left upper jaw with percussion of the left upper molars with a tongue blade.  NECK: supple, nontender, no lymphadenopathy  SKIN: no suspicious lesions or rashes    ASSESSMENT:  1. Pain, dental        PLAN:  Orders Placed This Encounter     vitamin B-12 (CYANOCOBALAMIN) 250 MCG tablet     DISCONTD: clindamycin (CLEOCIN) 300 MG capsule     clindamycin (CLEOCIN) 300 MG capsule       Patient Instructions   Hot packs to the left upper jaw three times per day  Take antibiotic as written  Stop antibiotic if develop diarrhea or stomach pain.  Use with yogurt and probiotic daily      Patient Education     Dental Pain    A crack or cavity in a tooth can cause tooth pain. This is because the crack or cavity exposes the sensitive inner area of the tooth. " "An infection in the gum or the root of the tooth can cause pain and swelling. The pain is often made worse when you drink hot or cold beverages. It can also be worse when you bite on hard foods. Pain may spread from the tooth to your ear or the area of the jaw on the same side.  Home care  Follow these tips when caring for yourself at home:    Don't have hot and cold foods and drinks. Your tooth may be sensitive to changes in temperature.    Use toothpaste made for sensitive teeth. Brush gently up and down instead of sideways. Brushing sideways can wear away root surfaces if they are exposed.    If your tooth is chipped or cracked, or if there is a large open cavity, put oil of cloves directly on the tooth to relieve pain. You can buy oil of cloves at [employer]. Some pharmacies carry an over-the-counter \"toothache kit.\" This contains a paste that you can put on the exposed tooth to make it less sensitive.    Put a cold pack on your jaw over the sore area to help reduce pain.    You may use over-the-counter medicine to ease pain, unless your doctor prescribed another medicine. If you have chronic liver or kidney disease, talk with your healthcare provider before using acetaminophen or ibuprofen. Also talk with your provider if you ve had a stomach ulcer or GI bleeding.    If you have signs of an infection, you will be given an antibiotic. Take it as directed.  Follow-up care  Follow up with your dentist, or as advised. Your pain may go away with the treatment given today. But only a dentist can fully look at and treat the cause of your pain. This will keep the pain from coming back.  Call 911  Call 911 if any of these occur:    Unusual drowsiness    Headache or stiff neck    Weakness or fainting    Difficulty swallowing or breathing  When to seek medical advice  Call your health care provider right away if any of these occur:    Your face becomes swollen or red    Pain gets worse or spreads to your neck    Fever of " 100.4  F (38.0  C) or higher, or as directed by your healthcare provider    Pus drains from the tooth  Date Last Reviewed: 10/1/2016    1791-3137 [employer]. [address] [place] [address], [place] [address]. All rights reserved. This information is not intended as a substitute for professional medical care. Always follow your healthcare professional's instructions.           Patient Education     Dental Abscess   A dental abscess is an infection of the tooth socket. It often starts with a crack or cavity in the tooth. A pocket of pus forms between the tooth and the bone. The infection causes pain and swelling of the gum, cheek, or jaw. The pain is often made worse by drinking hot or cold fluids, or biting on hard foods. Pain may be felt in the facial sinus or in the ear. A severe infection can cause problems with swallowing and breathing.  Causes    Cavities    Trauma    Previous dental work  Symptoms    Pain    Swelling around the tooth or face and cheek    Redness    Bad breath    Bad taste in the mouth    Fever  You will be started on an antibiotic. But, final treatment requires draining the pus. This can be done by removing the tooth or getting a root canal. An oral surgeon typically removes diseased teeth. An endodontist does a root canal. This involves drilling an opening in the tooth to get to access the canals in the root. Once these are reached, the pus can be drained. Then the canals are cleaned and shaped before filling them with a special material called zenaida percha. After the infection has healed, a crown is placed over the tooth.  Home care  The following guidelines will help you care for your abscess at home:    Don't have hot or cold foods and liquids. Your tooth may be sensitive to temperature changes.    If your tooth is chipped or cracked, or if there is a large open cavity, apply oil of cloves directly to the tooth to reduce pain. Oil of cloves is sold over-the-counter in  "pharmacies. Some pharmacies carry an over-the-counter \"toothache kit.\" This contains oil of cloves and a paste, which can be applied over the exposed tooth to decrease sensitivity.    Apply an ice pack (ice cubes in a plastic bag, wrapped in a towel) over the injured area for 10 to 20 minutes every 1 to 2 hours the first day for pain relief. Continue this 3 to 4 times a day until the pain and swelling goes away. To make an ice pack, put ice cubes in a plastic bag that seals at the top. Wrap the bag in a clean, thin towel or cloth. Never put ice or an ice pack directly on the skin.    You can take acetaminophen or ibuprofen for pain, unless you were given a different pain medicine to use. If you have chronic liver or kidney disease, have ever had a stomach ulcer or gastrointestinal bleeding, or are taking blood-thinning medicines, talk with your healthcare provider before using these medicines.    An antibiotic will be prescribed. Take it as directed until completed, even if you are feeling better sooner.  Follow-up care  Follow up as advised with an endodontist, or oral surgeon. Even though your pain may improve with the treatment given today, only a dentist, endodontist, or oral surgeon can provide full treatment for this problem.    If a culture was done, you will be told if the treatment needs to be changed. You can call in as directed for the results.    If X-rays were taken, they will be reviewed by a specialist. You will be given the results, especially if they affect treatment.  Call 911  Call 911 if any of these occur:    Trouble breathing or swallowing, or wheezing    Hoarse voice or trouble speaking    Confusion    Extreme drowsiness or trouble awakening    Fainting or loss of consciousness    Rapid heart rate  When to seek medical advice  Call your healthcare provider right away if any of these occur:    Swollen or red face or eyelid    Pain gets worse or spreads to the neck    You have a fever of 100.4 F " (38 C) or higher, or as directed by your healthcare provider    Unusual drowsiness, a headache or stiff neck, or weakness    Pus drains from the gum or tooth    You can't open your mouth wide  Date Last Reviewed: 4/1/2018 2000-2018 [employer]. [address] [place] [address]. All rights reserved. This information is not intended as a substitute for professional medical care. Always follow your healthcare professional's instructions.

## 2019-11-19 ENCOUNTER — OFFICE VISIT (OUTPATIENT)
Dept: UROLOGY | Facility: CLINIC | Age: 69
End: 2019-11-19
Payer: MEDICARE

## 2019-11-19 DIAGNOSIS — Z85.51 PERSONAL HISTORY OF BLADDER CANCER: Primary | ICD-10-CM

## 2019-11-19 PROCEDURE — 52000 CYSTOURETHROSCOPY: CPT | Performed by: UROLOGY

## 2019-11-26 ENCOUNTER — TELEPHONE (OUTPATIENT)
Dept: INTERNAL MEDICINE | Facility: CLINIC | Age: 69
End: 2019-11-26

## 2019-11-26 NOTE — TELEPHONE ENCOUNTER
Panel Management Review      Patient has the following on his problem list:       IVD   ASA: Passed    Last LDL:    Lab Results   Component Value Date    CHOL 165 03/05/2018     Lab Results   Component Value Date    HDL 41 03/05/2018     Lab Results   Component Value Date     03/05/2018     Lab Results   Component Value Date    TRIG 100 03/05/2018        Lab Results   Component Value Date    CHOLHDLRATIO 3.0 09/17/2015        Is the patient on a Statin? YES   Is the patient on Aspirin? YES                  Medications     HMG CoA Reductase Inhibitors     ATORVASTATIN CALCIUM PO       Salicylates     aspirin 81 MG tablet             Last three blood pressure readings:  BP Readings from Last 3 Encounters:   10/14/19 (!) 152/90   08/26/19 120/72   01/22/19 120/73        Tobacco History:     History   Smoking Status     Former Smoker     Types: Cigarettes     Start date: 1/1/1964     Quit date: 10/1/2016   Smokeless Tobacco     Never Used     Comment: already did       Hypertension   Last three blood pressure readings:  BP Readings from Last 3 Encounters:   10/14/19 (!) 152/90   08/26/19 120/72   01/22/19 120/73     Blood pressure: FAILED    HTN Guidelines:  Less than 140/90      Composite cancer screening  Chart review shows that this patient is due/due soon for the following Colonoscopy  Summary:    Patient is due/failing the following:   hf action plan, zoster, copd action plan, pneumo, wellness, adv directive, fall risk, phq2, dtap, lipid, lung cancer screening and COLONOSCOPY    Action needed:   Patient needs office visit for wellness, blood pressure check.    Type of outreach:    Sent letter.    Questions for provider review:    None                                                                                                                                    Stormy ABBOTT CMA ([facility])        Chart routed to none .

## 2019-11-26 NOTE — LETTER
November 26, 2019          Boogie Clark,  [address] [place] [id number]        Dear Boogie Clark      Monitoring and managing your preventative and chronic health conditions are very important to us. Our records indicate that you have not scheduled for Colonoscopy, Fasting blood work, Tdap and Annual physical exam  which was recommended by Dr. Agustin.      If you have received your health care elsewhere, please call the clinic so the information can be documented in your chart.    Please call [phone] or message us through your [employer] account to schedule an appointment or provide information for your chart.     Feel free to contact us if you have any questions or concerns!    I look forward to seeing you and working with you on your health care needs.     Sincerely,       Your [facility]/Stormy ABBOTT CMA ([facility])

## 2019-12-04 ENCOUNTER — OFFICE VISIT - HEALTHEAST (OUTPATIENT)
Dept: SURGERY | Facility: CLINIC | Age: 69
End: 2019-12-04

## 2019-12-04 DIAGNOSIS — Z98.84 BARIATRIC SURGERY STATUS: ICD-10-CM

## 2019-12-04 DIAGNOSIS — Z71.3 NUTRITIONAL COUNSELING: ICD-10-CM

## 2019-12-04 DIAGNOSIS — E66.812 OBESITY, CLASS II, BMI 35-39.9, ISOLATED (SEE ACTUAL BMI): ICD-10-CM

## 2019-12-04 ASSESSMENT — MIFFLIN-ST. JEOR: SCORE: 1985.51

## 2020-02-23 ENCOUNTER — HEALTH MAINTENANCE LETTER (OUTPATIENT)
Age: 70
End: 2020-02-23

## 2020-05-08 ENCOUNTER — AMBULATORY - HEALTHEAST (OUTPATIENT)
Dept: SURGERY | Facility: CLINIC | Age: 70
End: 2020-05-08

## 2020-05-08 ENCOUNTER — COMMUNICATION - HEALTHEAST (OUTPATIENT)
Dept: SURGERY | Facility: CLINIC | Age: 70
End: 2020-05-08

## 2020-05-08 DIAGNOSIS — Z86.2 H/O IRON DEFICIENCY ANEMIA: ICD-10-CM

## 2020-05-08 DIAGNOSIS — R79.1 PROLONGED PTT: ICD-10-CM

## 2020-05-08 DIAGNOSIS — E83.19 OTHER DISORDERS OF IRON METABOLISM: ICD-10-CM

## 2020-05-08 DIAGNOSIS — K90.9 INTESTINAL MALABSORPTION: ICD-10-CM

## 2020-05-08 DIAGNOSIS — E78.5 DYSLIPIDEMIA: ICD-10-CM

## 2020-05-08 DIAGNOSIS — Z98.84 S/P LAPAROSCOPIC SLEEVE GASTRECTOMY: ICD-10-CM

## 2020-05-20 ENCOUNTER — OFFICE VISIT - HEALTHEAST (OUTPATIENT)
Dept: SURGERY | Facility: CLINIC | Age: 70
End: 2020-05-20

## 2020-05-20 ENCOUNTER — COMMUNICATION - HEALTHEAST (OUTPATIENT)
Dept: SURGERY | Facility: CLINIC | Age: 70
End: 2020-05-20

## 2020-05-20 DIAGNOSIS — G47.00 PERSISTENT INSOMNIA: ICD-10-CM

## 2020-05-20 DIAGNOSIS — K91.2 POSTOPERATIVE MALABSORPTION: ICD-10-CM

## 2020-05-20 DIAGNOSIS — K21.9 GASTROESOPHAGEAL REFLUX DISEASE WITHOUT ESOPHAGITIS: ICD-10-CM

## 2020-05-20 DIAGNOSIS — Z90.3 HISTORY OF SLEEVE GASTRECTOMY: ICD-10-CM

## 2020-05-21 ENCOUNTER — OFFICE VISIT - HEALTHEAST (OUTPATIENT)
Dept: SURGERY | Facility: CLINIC | Age: 70
End: 2020-05-21

## 2020-05-21 ENCOUNTER — TRANSFERRED RECORDS (OUTPATIENT)
Dept: HEALTH INFORMATION MANAGEMENT | Facility: CLINIC | Age: 70
End: 2020-05-21

## 2020-05-21 DIAGNOSIS — Z71.3 NUTRITIONAL COUNSELING: ICD-10-CM

## 2020-05-21 DIAGNOSIS — E66.811 OBESITY, CLASS I, BMI 30.0-34.9 (SEE ACTUAL BMI): ICD-10-CM

## 2020-05-21 DIAGNOSIS — Z98.84 BARIATRIC SURGERY STATUS: ICD-10-CM

## 2020-05-21 ASSESSMENT — MIFFLIN-ST. JEOR: SCORE: 1892.98

## 2020-05-28 ENCOUNTER — COMMUNICATION - HEALTHEAST (OUTPATIENT)
Dept: SURGERY | Facility: CLINIC | Age: 70
End: 2020-05-28

## 2020-06-15 ENCOUNTER — TELEPHONE (OUTPATIENT)
Dept: INTERNAL MEDICINE | Facility: CLINIC | Age: 70
End: 2020-06-15

## 2020-06-15 NOTE — TELEPHONE ENCOUNTER
Panel Management Review      Patient has the following on his problem list:     Hypertension   Last three blood pressure readings:  BP Readings from Last 3 Encounters:   10/14/19 (!) 152/90   08/26/19 120/72   01/22/19 120/73     Blood pressure: FAILED    HTN Guidelines:  Less than 140/90      Composite cancer screening  Chart review shows that this patient is due/due soon for the following Colonoscopy  Summary:    Patient is due/failing the following:   COLONOSCOPY, see current health m    Action needed:   Patient needs office visit for hypertension follow up.    Type of outreach:    Sent letter.    Questions for provider review:    None                                                                                                                                    Stormy ABBOTT CMA ([facility])        Chart routed to none .

## 2020-06-15 NOTE — LETTER
Jeanne 15, 2020          Boogie Clark,  [address] [place] [id number]        Dear Boogie Clark      Monitoring and managing your preventative and chronic health conditions are very important to us. Our records indicate that you have not scheduled for Appointment with your provider  which was recommended by Dr. Agustin.      If you have received your health care elsewhere, please call the clinic so the information can be documented in your chart.    Please call [phone] or message us through your [employer] account to schedule an appointment or provide information for your chart.     Feel free to contact us if you have any questions or concerns!    I look forward to seeing you and working with you on your health care needs.     Sincerely,       Your [facility]/Stormy ABBOTT CMA ([facility])

## 2020-06-18 ENCOUNTER — AMBULATORY - HEALTHEAST (OUTPATIENT)
Dept: LAB | Facility: HOSPITAL | Age: 70
End: 2020-06-18

## 2020-06-18 DIAGNOSIS — Z98.84 S/P LAPAROSCOPIC SLEEVE GASTRECTOMY: ICD-10-CM

## 2020-06-18 DIAGNOSIS — K90.9 INTESTINAL MALABSORPTION: ICD-10-CM

## 2020-06-18 DIAGNOSIS — E78.5 DYSLIPIDEMIA: ICD-10-CM

## 2020-06-18 DIAGNOSIS — E83.19 OTHER DISORDERS OF IRON METABOLISM: ICD-10-CM

## 2020-06-18 DIAGNOSIS — Z86.2 H/O IRON DEFICIENCY ANEMIA: ICD-10-CM

## 2020-06-18 DIAGNOSIS — R79.1 PROLONGED PTT: ICD-10-CM

## 2020-06-18 LAB
ALBUMIN SERPL-MCNC: 3.4 G/DL (ref 3.5–5)
ALP SERPL-CCNC: 111 U/L (ref 45–120)
ALT SERPL W P-5'-P-CCNC: 11 U/L (ref 0–45)
ANION GAP SERPL CALCULATED.3IONS-SCNC: 7 MMOL/L (ref 5–18)
AST SERPL W P-5'-P-CCNC: 16 U/L (ref 0–40)
BILIRUB SERPL-MCNC: 0.5 MG/DL (ref 0–1)
BUN SERPL-MCNC: 13 MG/DL (ref 8–28)
CALCIUM SERPL-MCNC: 9.1 MG/DL (ref 8.5–10.5)
CHLORIDE BLD-SCNC: 111 MMOL/L (ref 98–107)
CHOLEST SERPL-MCNC: 186 MG/DL
CO2 SERPL-SCNC: 25 MMOL/L (ref 22–31)
CREAT SERPL-MCNC: 0.85 MG/DL (ref 0.7–1.3)
ERYTHROCYTE [DISTWIDTH] IN BLOOD BY AUTOMATED COUNT: 13.2 % (ref 11–14.5)
FASTING STATUS PATIENT QL REPORTED: YES
FERRITIN SERPL-MCNC: 239 NG/ML (ref 27–300)
FOLATE SERPL-MCNC: 18.7 NG/ML
GFR SERPL CREATININE-BSD FRML MDRD: >60 ML/MIN/1.73M2
GLUCOSE BLD-MCNC: 85 MG/DL (ref 70–125)
HBA1C MFR BLD: 5.1 %
HCT VFR BLD AUTO: 38.7 % (ref 40–54)
HDLC SERPL-MCNC: 33 MG/DL
HGB BLD-MCNC: 13.1 G/DL (ref 14–18)
LDLC SERPL CALC-MCNC: 138 MG/DL
MCH RBC QN AUTO: 31.4 PG (ref 27–34)
MCHC RBC AUTO-ENTMCNC: 33.9 G/DL (ref 32–36)
MCV RBC AUTO: 93 FL (ref 80–100)
PLATELET # BLD AUTO: 321 THOU/UL (ref 140–440)
PMV BLD AUTO: 9.2 FL (ref 8.5–12.5)
POTASSIUM BLD-SCNC: 3.8 MMOL/L (ref 3.5–5)
PROT SERPL-MCNC: 6.9 G/DL (ref 6–8)
PTH-INTACT SERPL-MCNC: 90 PG/ML (ref 10–86)
RBC # BLD AUTO: 4.17 MILL/UL (ref 4.4–6.2)
SODIUM SERPL-SCNC: 143 MMOL/L (ref 136–145)
TRIGL SERPL-MCNC: 76 MG/DL
VIT B12 SERPL-MCNC: >2000 PG/ML (ref 213–816)
WBC: 8.5 THOU/UL (ref 4–11)

## 2020-06-19 LAB
25(OH)D3 SERPL-MCNC: 40.1 NG/ML (ref 30–80)
LA PPP-IMP: NEGATIVE

## 2020-06-20 LAB — ZINC SERPL-MCNC: 75.3 UG/DL (ref 60–120)

## 2020-06-21 LAB
ANNOTATION COMMENT IMP: NORMAL
VIT A SERPL-MCNC: 0.52 MG/L (ref 0.3–1.2)
VIT B1 PYROPHOSHATE BLD-SCNC: 109 NMOL/L (ref 70–180)
VITAMIN A (RETINYL PALMITATE): <0.02 MG/L (ref 0–0.1)

## 2020-06-25 ENCOUNTER — COMMUNICATION - HEALTHEAST (OUTPATIENT)
Dept: SURGERY | Facility: CLINIC | Age: 70
End: 2020-06-25

## 2020-07-08 ENCOUNTER — OFFICE VISIT - HEALTHEAST (OUTPATIENT)
Dept: VASCULAR SURGERY | Facility: CLINIC | Age: 70
End: 2020-07-08

## 2020-07-08 ENCOUNTER — COMMUNICATION - HEALTHEAST (OUTPATIENT)
Dept: VASCULAR SURGERY | Facility: CLINIC | Age: 70
End: 2020-07-08

## 2020-07-08 DIAGNOSIS — I87.2 VENOUS INSUFFICIENCY OF BOTH LOWER EXTREMITIES: ICD-10-CM

## 2020-07-08 DIAGNOSIS — I89.0 ACQUIRED LYMPHEDEMA OF LEG: ICD-10-CM

## 2020-07-08 DIAGNOSIS — C67.9 MALIGNANT NEOPLASM OF URINARY BLADDER, UNSPECIFIED SITE (H): ICD-10-CM

## 2020-07-08 DIAGNOSIS — R60.0 BILATERAL LOWER EXTREMITY EDEMA: ICD-10-CM

## 2020-07-08 DIAGNOSIS — I73.9 PERIPHERAL VASCULAR DISEASE (H): ICD-10-CM

## 2020-10-26 ENCOUNTER — OFFICE VISIT (OUTPATIENT)
Dept: UROLOGY | Facility: CLINIC | Age: 70
End: 2020-10-26
Payer: COMMERCIAL

## 2020-10-26 DIAGNOSIS — Z85.51 PERSONAL HISTORY OF BLADDER CANCER: Primary | ICD-10-CM

## 2020-10-26 PROCEDURE — 52000 CYSTOURETHROSCOPY: CPT | Performed by: UROLOGY

## 2020-10-26 NOTE — PROGRESS NOTES
S: Boogie Clark is a 69 year old male returns for bladder cancer surveillance.    he has history of bladder cancer Grade 3 non-invasive, Stage ta, s/p turbt on 5/ 13.     He received 2 courses of BCG therapy.    Patient is draped and prepped.  Flexible cystoscopy placed under direct vision.    Cysto:  The anterior urethra is normal   The prostatic urethra showed bilateral lobe enlargement.     The length is 2cm,  the coaptation is 2 cm.     In the bladder there is no tumor/stone    Assessment/Plan:  V10.51 Personal history of malignant neoplasm of bladder  (primary encounter diagnosis)  Comment: no evidence of recurrence  Plan: BTR in  12 months

## 2020-12-13 ENCOUNTER — HEALTH MAINTENANCE LETTER (OUTPATIENT)
Age: 70
End: 2020-12-13

## 2021-04-17 ENCOUNTER — HEALTH MAINTENANCE LETTER (OUTPATIENT)
Age: 71
End: 2021-04-17

## 2021-05-30 NOTE — PROGRESS NOTES
Date of Service: 2019     Date last seen by Dr. Kasper:  18    PCP: Shabbir Agustin MD    Impression:  1. Leg swelling-doing well  2. Venous insufficiency and hypertension of the legs bilaterally-doing well  3. Acquired lymphedema of both legs-stable  4. Morbid obesity  5. Bladder carcinoma (2013 surgery and BCG)  6. PAD     Plan:  1. Questions were answered.    2. Continue to work with bariatrics.  3. Continue exercise and compression.  New liners given and will give info on how to get online compression for liners much less expensively.    4. Patient will follow up in 1 year, or when needed.     Time spent with patient 15 minutes with greater than 50% time in consultation, education and coordination of care, excluding procedures.     ---------------------------------------------------------------------------------------------------------------------    Chief Complaint: Bilateral leg swelling     History of Present Illness:     Boogie Clark returns to the [facility] for his bilateral leg swelling, mainly on the left felt to be due to venous insufficiency with hypertension and acquired lymphedema of both legs complicated by history of bladder carcinoma, peripheral arterial disease and morbid obesity.  At his last visit he was working with Dr. Hoffman on bariatric surgery and was hoping to get it done in January.  He had lymphatic therapy with good results and was wearing velcro compression daily.  He is here for follow-up.  He had to postpone his surgery in December as he had a large family trip planned in December.  He then got ruled out for pulmonary hypertension.  His wife at repeat bariatric surgery in  and is having some complications.  He is now planning to get it done the end of August or early September.  He is working with bariatrics.  He continues to wear his compression velcro which works well and his swelling is under good control .There  has been no new numbness, tingling or weakness.  There have been no new masses, rashes, or swellings of any other joints. There has been no new decrease in appetite, unexplained weight loss, abdominal bloating and bowel or bladder changes.  He needs new liners for his Velcro compression.    Past Medical History:   Diagnosis Date     Ankle pain      Arthritis     knees     Cancer (H)     history of bladder cancer BCG treatment     DDD (degenerative disc disease), cervical      Dyslipidemia      History of kidney stones      History of MRSA infection      History of tobacco abuse     age 14 to age 66 up to 1/2 ppd (26 pack years)     Hypertension      Increased PTH level 8/18/2018     Infectious viral hepatitis      Lower leg edema     lymphedema     Metabolic syndrome      Morbid obesity with BMI of 50.0-59.9, adult (H)      Peripheral arteriosclerosis (H)     Bilateral Femoral angioplasty     Pulmonary arterial hypertension (H)      Sleep apnea     uses CPAP with oxygen 3L at night       Past Surgical History:   Procedure Laterality Date     ANGIOPLASTY       ankle      right ankle surgery ORIF Distal Fibula Right     BLADDER TUMOR EXCISION       KNEE SURGERY Bilateral     knee meniscus surgery, awhile ago     tonsillectomy       VASECTOMY           Current Outpatient Medications:      aspirin 81 MG EC tablet, Take 81 mg by mouth daily., Disp: , Rfl:      atorvastatin (LIPITOR) 40 MG tablet, Take 40 mg by mouth daily., Disp: , Rfl:      diphenhydrAMINE (RESTFULLY SLEEP) 25 mg tablet, Take 25 mg by mouth at bedtime as needed for sleep., Disp: , Rfl:      furosemide (LASIX) 20 MG tablet, Take 10 mg by mouth daily .   , Disp: , Rfl:      losartan (COZAAR) 25 MG tablet, Take 25 mg by mouth daily., Disp: , Rfl:      melatonin 10 mg Tab, Take 1 tablet by mouth at bedtime., Disp: , Rfl:      multivitamin (ONE A DAY) per tablet, Take 1 tablet by mouth daily Plus vit D.   , Disp: , Rfl:      predniSONE (DELTASONE) 20 MG  tablet, Take 10 mg by mouth daily Taking 10mg., Disp: , Rfl:     Allergies   Allergen Reactions     Penicillins Other (See Comments)     Childhood allergy, mouth and eye swelling       Social History     Socioeconomic History     Marital status:      Spouse name: Not on file     Number of children: Not on file     Years of education: Not on file     Highest education level: Not on file   Occupational History     Not on file   Social Needs     Financial resource strain: Not on file     Food insecurity:     Worry: Not on file     Inability: Not on file     Transportation needs:     Medical: Not on file     Non-medical: Not on file   Tobacco Use     Smoking status: Former Smoker     Years: 52.00     Smokeless tobacco: Never Used   Substance and Sexual Activity     Alcohol use: Yes     Alcohol/week: 0.0 - 0.6 oz     Comment: Seldom      Drug use: No     Sexual activity: Yes     Partners: Female   Lifestyle     Physical activity:     Days per week: Not on file     Minutes per session: Not on file     Stress: Not on file   Relationships     Social connections:     Talks on phone: Not on file     Gets together: Not on file     Attends Confucianist service: Not on file     Active member of club or organization: Not on file     Attends meetings of clubs or organizations: Not on file     Relationship status: Not on file     Intimate partner violence:     Fear of current or ex partner: Not on file     Emotionally abused: Not on file     Physically abused: Not on file     Forced sexual activity: Not on file   Other Topics Concern     Not on file   Social History Narrative     to Felicia Lopez.    Daughter Jeanette, Daughter Yarelis    Retired .       Family History   Adopted: Yes   Problem Relation Age of Onset     Cancer Mother      Cancer Father      Breast cancer Maternal Aunt        Review of Systems:    Boogie Clark no new numbess, tingling or weakness, redness or rashes, fevers, new masses, abdominal bloating  "or discomfort, unexplained weight loss, increased pain, new ulcers, shortness of breath and chest pain  Full 12 point review of systems was completed.    Imaging:    I personally reviewed the following imaging today and those on care everywhere, if indicated    Librado Morrell MD     1/23/2019 11:01 AM   SLEEP STUDY INTERPRETATION  TITRATION STUDY      Patient: CLARITZA PAL  YOB: 1950  Study Date: 1/18/2019  MRN: [id number]  Referring Provider: Dr. Velasquez  Ordering Provider: Dr. Morrell    Indications for Polysomnography: The patient is a 68 y old Male   who is 5' 11\" and weighs 300.0 lbs. His BMI is 42.0, [place]   sleepiness scale 8.0 and neck circumference is 49.5 cm. Relevant   medical history includes (co-morbidities). A polysomnogram with   PAP titration was performed to correct for severe obstructive   sleep apnea. With comorbid  restrictive lung disease, chronic   respiratory failure on O2, pulmonary hypertension, hypertension.   Tolerating PAP well with good AHI on download but symptoms of   mild daytime sleepiness (ESS 8), frequent awakenings/nocturia,   rare morning headaches. Contemplating bariatric surgery.    Polysomnogram Data: A full night polysomnogram recorded the   standard physiologic parameters including EEG, EOG, EMG, ECG,   nasal and oral airflow. Respiratory parameters of chest and   abdominal movements were recorded with respiratory inductance   plethysmography. Oxygen saturation was recorded by pulse   oximetry. Hypopnea scoring rule used: 1B 4%.    Treatment PSG  Sleep Architecture:   The total recording time of the polysomnogram was 455.6 minutes.   The total sleep time was 309.0 minutes. Sleep latency was   increased at 31.4 minutes with the use of a sleep aid   (melatonin). REM latency was 54.0 minutes. Arousal index 30.3   arousals per hour. Sleep efficiency was decreased at 67.8%. Wake   after sleep onset was 114.5 minutes. The patient spent 14.6% of   total sleep time in " Stage N1, 62.3% in Stage N2, 3.1% in Stage   N3, and 20.1% in REM. Time supine was 0 minutes.      Respiration: No supine sleep was seen  ? The patient was titrated at pressures ranging from CPAP 8 cmH2O   up to CPAP 14 with O2 at 2 LPM. The final pressure achieved was   CPAP 14 cmH2O with a residual AHI of 1.3 events per hour.    Lateral REM was seen at this pressure. While on 13-14 cmH20 the   patient had persistent arousals associated with RERAs, these   events appeared somewhat periodic but were not classical for   [place] breathing,    ? Respiratory rate and pattern - was notable for normal   respiratory rate and pattern.  ? Sustained Sleep Associated Hypoventilation - Transcutaneous   carbon dioxide monitoring was used, however severe    hypoventilation was not suggested with a maximum change from 37.6   to 46.0 mmHg and 0 minutes at or greater than 55 mmHg.  ? Sleep Associated Hypoxemia - (Greater than 5 minutes O2 sat at   or below 88%) was not present. Baseline oxygen saturation was   95.9%. Lowest oxygen saturation was 78.9%. Time spent less than   or equal to 88% was 0 minutes. Time spent less than or equal to   89% was 0.5 minutes.    Movement Activity:   ? Periodic Limb Activity - There were 22 PLMs during the entire   study. The PLM index was 4.3 movements per hour. The PLM Arousal   Index was 1.9 per hour.  ? REM EMG Activity - Excessive transient/sustained muscle   activity was not present.  ? Nocturnal Behavior - Abnormal sleep related behaviors were not   noted  ? Bruxism - None apparent.      Cardiac Summary:   The average pulse rate was 73.2 bpm. The minimum pulse rate was   57.7 bpm while the maximum pulse rate was 100.0 bpm. Arrhythmias   were not noted.      Assessment:   ? While in the lateral position, sleep apnea well-managed with   PAP 13-14 cmH20 without significant hypoxemia, but persistent   RERAs and possible periodic breathing  ? Higher pressures likely to be necessary in supine  position    Recommendations:  ? Trial of treatment of COCO with Auto?titrating PAP therapy with   a range of 14 cmH2O to 18 cmH2O.   ? Strict lateral positioning  ? If symptoms persist, or supine positioning is desired could   consider a trial of bilevel titration starting at 10/6 cmH20  ? Advice regarding the risks of drowsy driving.  ? Suggest optimizing sleep schedule and avoiding sleep   deprivation.  ? Weight management (if BMI > 30).  ? Pharmacologic therapy should be used for management of restless   legs syndrome only if present and clinically indicated and not   based on the presence of periodic limb movements alone.    Diagnostic Codes:   Obstructive Sleep Apnea G47.33    .   _____________________________________   Electronically Signed By: Librado Morrell MD 1/23/19       Range(%) Time in range (min)   0.0 - 89.0 0.5   0.0 - 88.0 -       Stage Min(mm Hg) Max(mm Hg)   Wake 37.6 45.8   NREM(1+2+3) 40.3 45.3   REM 40.6 46.0       Range(mmHg) Time in range (min)   55.0 - 100.0 -   Excluded data <20.0 & >90.0 25.2     [facility] OUTPATIENT  8/23/2018     EXAM: BILATERAL LOWER EXTREMITY DEEP AND SUPERFICIAL VENOUS DUPLEX ULTRASOUND WITH PHYSIOLOGIC TESTING     INDICATION: Bilateral lower extremity pain and swelling. Symptomatic varicose veins. Assess for incompetent veins.     TECHNIQUE: Supine and upright ultrasound of the deep and superficial veins with Valsalva and compression augmentation maneuvers. Duplex imaging is performed utilizing gray-scale, two-dimensional images, color-flow imaging, Doppler waveform analysis, and   spectral Doppler imaging.      INCOMPETENCY CRITERIA: Deep vein reflux reported when greater than 1,000 ms flow reversal.  Superficial vein reflux reported when greater than 500 ms flow reversal.  vein reflux reported as greater than 350 ms flow reversal.     DEEP VEIN FINDINGS:     RIGHT LEG: The common femoral, profunda femoral, femoral, popliteal, and visualized calf  veins are patent and compressible.  Incompetent right common femoral vein.     LEFT LEG:  The common femoral, profunda femoral, femoral, popliteal, and visualized calf veins are patent and compressible.   Incompetent left common femoral vein.     RIGHT SUPERFICIAL VEIN FINDINGS:  GREAT SAPHENOUS VEIN: Incompetent right great saphenous vein from the saphenofemoral junction down to the mid calf. The right great saphenous vein measures 9 mm at the saphenofemoral junction, 7 mm proximal thigh, 6 mm knee and 4 mm mid calf.     SMALL SAPHENOUS VEIN: Competent from the saphenopopliteal junction to the mid calf.     No incompetent perforating veins or abnormal accessory veins identified.     LEFT SUPERFICIAL VEIN FINDINGS:  GREAT SAPHENOUS VEIN: Segmental incompetency of the left great saphenous vein at the saphenofemoral junction, proximal thigh and knee. The left great saphenous vein measures 8 mm at the saphenofemoral junction, 8 mm proximal thigh, 5 mm knee and 3 mm mid   calf.     SMALL SAPHENOUS VEIN: Competent from the saphenopopliteal junction to the mid calf.     No incompetent perforating veins or abnormal accessory veins identified.     Incidental note of 4.4 x 2.2 x 1.7 cm left popliteal cyst.     IMPRESSION:   CONCLUSION:   1.  No deep venous thrombosis of either lower extremity. Incompetent bilateral common femoral veins.  2.  RIGHT LEG: Incompetent right great saphenous vein from the saphenofemoral junction all away down to the mid calf.  3.  LEFT LEG: Segmental incompetency of the left great saphenous vein from the saphenofemoral junction all way down to the knee.    Labs:    I personally reviewed the following labs today and those on care everywhere, if indicated    No results found for: SEDRATE      No results found for: CRP        Lab Results   Component Value Date    CREATININE 1.08 08/03/2018      Lab Results   Component Value Date    HGBA1C 5.9 08/03/2018           Lab Results   Component Value Date     BUN 27 (H) 08/03/2018              Lab Results   Component Value Date    ALBUMIN 3.5 08/03/2018       Vitamin D, Total (25-Hydroxy)   Date Value Ref Range Status   08/03/2018 31.3 30.0 - 80.0 ng/mL Final       Lab Results   Component Value Date    TSH 0.31 08/03/2018     Lab Results   Component Value Date    WBC 13.8 (H) 08/03/2018    HGB 14.5 08/03/2018    HCT 43.2 08/03/2018    MCV 89 08/03/2018     08/03/2018 1/22/2019 BUN 14 creatinine 0.98 GFR 79 glucose 98 hemoglobin 12.9 platelets 347    Physical Exam:  Vitals:    07/22/19 1036   BP: 144/80   Pulse: 64   Resp: 20   Temp: 97.7  F (36.5  C)      .14 (decreased)  Weight 295 pounds    Circumferential measures:    Vasc Edema 8/20/2018 11/19/2018 7/22/2019   Right just above MTP 26.6 26.5 25.5   Right Ankle 29 28.5 26.5   Right Widest Calf 46.5 43.5 44.7   Right Thigh Up 10cm 64 57 58   Left - just above MTP 27.8 26.8 25.5   Left Ankle 30.2 27.5 27.4   Left Widest Calf 43.8 40.5 42.7   Left Thigh Up 10cm 62.2 53 58.6     Measures stable.    General:  68 y.o. male in no apparent distress.      Psych: Alert and oriented x 3.  Cooperative. Affect normal.    HEENT: Atraumatic and normocephalic.    Musculoskeletal:  Normal range of motion in knees and ankles bilaterally throughout.  There is no active joint synovitis, erythema, swelling or joint laxity.     Neurological:  Sensation is decreased to pin prick and light touch in a stocking distribution. Stable. Strength testing is normal in hip flexion, knee flexion, knee extension, ankle dorsiflexion and great toe extension bilaterally.       Vascular: Dorsalis pedis and posterior tibialis pulses are strong and equal. There are slight telangietasias, medial ankle venous flares, venous varicosities and spider veins.      Integumentary: Skin of the legs is uniformly warm and dry, and hyperpigmentated, with hyperkeratosis and keratoderma and with positive Stemmer's Sign in both legs. Legs are soft.  Nails  are normal.      Jenni Kasper MD, ABWMS, FACCWS, [facility]  Medical Director Wound Care and Lymphedema  [facility]  [phone]

## 2021-05-30 NOTE — PROGRESS NOTES
"HPI: I saw Boogie back in November 2018 for consideration of a sleeve gastrectomy.  At that time there was concern that he might have some pulmonary hypertension.  He has seen cardiology who did a echocardiogram last January which shows an ejection fraction of 69% and no significant RV or LV dysfunction.  He is been seen by sleep specialist who feel that he needs to continue with his CPAP.  It is felt that he does not have significant pulmonary disease and that is probably more his severe intra-abdominal pressure that is creating his symptomatic shortness of breath.  Has been recommended that he proceed with weight loss surgery to try to alleviate some of the intra-abdominal pressure and resultant hypoventilation.  It should be noted he does use oxygen in the evening with his CPAP.  Not use it during the day.  There is been no other significant changes in his medical history since I seen him last November.  See my note from that visit.    CURRENT MEDS:  Current Outpatient Medications   Medication Sig Dispense Refill     aspirin 81 MG EC tablet Take 81 mg by mouth daily.       atorvastatin (LIPITOR) 40 MG tablet Take 40 mg by mouth daily.       diphenhydrAMINE (RESTFULLY SLEEP) 25 mg tablet Take 25 mg by mouth at bedtime as needed for sleep.       furosemide (LASIX) 20 MG tablet Take 10 mg by mouth daily .             losartan (COZAAR) 25 MG tablet Take 25 mg by mouth daily.       melatonin 10 mg Tab Take 1 tablet by mouth at bedtime.       multivitamin (ONE A DAY) per tablet Take 1 tablet by mouth daily Plus vit D.             predniSONE (DELTASONE) 20 MG tablet Take 10 mg by mouth daily Taking 10mg.       No current facility-administered medications for this visit.          /74   Pulse 70   Resp 18   Ht 5' 11\" (1.803 m)   Wt (!) 299 lb (135.6 kg)   SpO2 92%   BMI 41.70 kg/m    Wt Readings from Last 1 Encounters:   06/25/19 (!) 299 lb (135.6 kg)     Body mass index is 41.7 kg/m .    EXAM:  GENERAL: Appears " well, he is in no distress.    Assessment/Plan: Patient with severe central morbid obesity and probably resultant pulmonary hypoventilation secondary to increased intra-abdominal pressure.  He also has COPD, nocturnal oxygen dependency.  He has had a cardiac echo which shows a decent ejection fraction of 69% and good LV and RV function.  I think given the above we should proceed with surgery so that he can lose weight and improve his pulmonary status.  I discussed the sleeve gastrectomy in detail with both him and his wife.  I went over the surgery and its mechanism for eating and weight loss.  I went over some of the long-term potential side effects including reflux, dysphagia and nutritional deficits.  I discussed with him the risks of the operation itself.  This included were not limited to DVT, pulmonary embolism, pneumonia, bleeding, wound infection, staple line leak and possible death.  Patient understands and wishes to proceed.  We will go ahead and get the final prior authorization and move forward.  I spent 30 minutes with the patient outside of the exam today and counseling.  Counseling was majority this visit.     Marcos Hoffman MD  [facility] Department of Surgery

## 2021-05-30 NOTE — PATIENT INSTRUCTIONS - HE
"Swelling in the legs can be caused by many reasons. No matter what the reason, treatment usually includes some type of compression. You should wear your compression socks as much as you can. Your compression should be put on first thing in the morning. Take the compression off at night. It is especially important to wear them with long periods of sitting/standing, long car rides or if you will be flying. Going without compression for even brief periods of time can be damaging to your legs and your health.  Compression socks should get replaced usually every 4-6 months. They do not need to be worn at night while in bed. If we have seen you in the last year, we can refill your sock prescription, otherwise your primary care provider is able to refill them for you. Call us with any problems or questions.   Compression Velcro may need to be replaced every 9-12 months.  Often the liners and socks need to be replaced every three or four months.  The neoprene velcro may last up to 2 years.  If the velcro becomes worn a tailor may be able to repair it for you inexpensively.    If you do a lot of standing, it is good to do calf raises to help keep the blood pumping. If you sit a lot at work, it is good to get up periodically to walk around. Elevation of the foot of your bed 4-6\" helps the blood return back to where it is needed.   Please call us if you have any questions [phone]    [facility].  "

## 2021-05-30 NOTE — PROGRESS NOTES
Pt continues to wear velcro with dermafit as inner liner daily. He denied any discomfort. Pt needs new velcro, but he can not afford to get new .

## 2021-05-31 NOTE — PROGRESS NOTES
Pt was unable to make it to class today and rescheduled for 2 weeks.    Latha Rosales RN, N  [facility]  P [phone]  F [phone]

## 2021-05-31 NOTE — PATIENT INSTRUCTIONS - HE
Your surgery is scheduled for 9/4/2019 at 9:00 am.  Please arrive at 7:00 am.    Medication Instructions:    Aspirin   Stop 7 days before date of surgery.  Max of 81mg enteric coated aspirin per day after surgery is ok. Usually may restart 48 hours after surgery.  Swallow enteric coated aspirin whole.    Atorvastatin (Brand Name: Lipitor)  Ok to cut/crush. Ok to take the morning of surgery.    Diphenhydramine (Brand Name: Benadryl)  Ok to cut/crush tablet.  Capsules can be difficult to open, so would try using tablets if possible.    Furosemide (Brand Name: Lasix)   Stop taking 2 weeks before surgery. You will likely not go home on this medication after surgery for risk of dehydration.  Please be in touch with us if you notice side effects of being off this medication.    Losartan (Brand Name: Cozaar)   Ok to cut/crush. Ask primary doctor how often to monitor your blood pressure after surgery, and if you need to change your dosage.  Do not take the morning of surgery.    Melatonin   Stop taking 2 weeks before surgery.  Usually can start taking 48 hours after surgery if able to cut or crush the tablet, or may be able to open capsule.    Multivitamin with Iron   If not already taking, start chewable MVI with at least 18mg of iron and 10-15 mg of zinc twice a day at least 2 weeks prior to surgery and resume the day after surgery for life. Do not take the morning of surgery.    Omeprazole (Brand Name: Prilosec)   If not already taking, you will get a prescription to start when you get home from the hospital.  Tablets cannot be cut or crushed.  If a capsule, entire capsule contents may be sprinkled on a spoonful of applesauce and taken immediately without chewing.  If only on a full liquid diet, dump capsule contents into a spoonful of liquid and take without chewing.  May swallow whole again starting 6 weeks after surgery but can try swallowing sooner if having issues with opening into food.  Continue after surgery for  at least 3 months.    Prednisone   Ok to cut/crush if needed.    Ursodiol (Brand name: Actigall)  Start two weeks after surgery.  Swallow whole with warm water, do not cut, crush, or open capsule up.  This is a medication that will help to prevent gallstones from forming during the first 6 months post-op of rapid weight loss.  Prescription was sent to your pharmacy.    Vitamin B12   If not already taking, start sublingual (under the tongue) vitamin B12 1,000 mcg at least 2 weeks prior to surgery and resume the day after surgery for life. Do not take the morning of surgery.    [facility]   Laparoscopic Hola-en-Y Gastric Bypass, Gastric Sleeve & Single Anastomosis Duodenal Switch  Home Discharge Instructions      Coughing and Deep Breathing   Use your incentive spirometer frequently after you get home. Continued coughing and deep breathing help prevent complications such as fevers and pneumonia. If you are fever free after one week, stop using it, and discard it.    Incision and Dressing   All incisional coverings can get wet so you may continue to shower at home.  If you have Band-Aids, they should come off while in the hospital.  Remove all steri-strips one week from your surgery date unless told otherwise by the surgeon.  If you have gauze covered by a clear dressing, remove 2-3 days after surgery as directed by the surgeon.     Notify the clinic or your surgeon if:  You develop a fever orally of 101.5 or greater, see any unusual bright red or green infection-like drainage; see redness or swelling around the incision; have left shoulder pain or pain that does not go away with your narcotic; increasing anxiety or feeling that something is just  not right;  a persistent rapid heart rate (greater than or equal to 120 beats per minute) lasting longer than 15 minutes; progressive rapid breathing; increasing shortness of breath; continuous (non-stop) hiccups lasting longer than 15 minutes; persistent nausea; if  you are unable to keep liquids down; have frothy vomiting; difficulty swallowing; excessive bloating; swelling, warmth, discoloration or pain in your lower legs; dark, bright red, black, or tarry bowel movements; or anything you are concerned might be an urgent problem or need reassurance about.    Dehydration/Nausea/Vomiting  Vomiting is not normal after surgery and can be caused by drinking with meals, eating large portions, not chewing thoroughly and drinking large sips.  If you continue to have nausea and vomiting despite following our recommendations, call the clinic.  Nausea can be caused by dehydration so make sure you are drinking the suggested amount of fluids.  Symptoms of dehydration include dark colored urine, lack of energy, nausea, dizziness, headache and a bad taste in your mouth.  If you notice any of these symptoms, please don t delay in calling the clinic.  Early identification gives us more options for treatment.    Bowel Movements  Consider that your stools might be loose since you are currently only taking in fluids. Diarrhea may be caused by dumping syndrome if you had Gastric Bypass, so make sure you aren t drinking liquids containing excess sugar.  Otherwise, call the clinic if you have 3 or more diarrhea stools per day. If constipation is a problem, it is ok to use a Dulcolax  rectal suppository, Miralax or Milk of Magnesia . Other helpful ways to avoid constipation include: increasing your fluids; stop taking narcotic medications; and increasing your activity. Adding Benefiber  daily to your liquids once you have had a stool may help keep you regular until there is more natural fiber in your diet. You may also have foul smelling gas.    Risk for Blood Clots  For the next 6 weeks, if you are in any vehicle longer than 30 minutes, stop every 30 minutes, and walk for at least 3 minutes before continuing your journey. Traveling and/or flying are not recommended for 6 weeks.  If leaving the  country within the first 3-6 months after surgery, check with your surgeon first.    Activity  Do not lift greater than 20 lbs. for 2 weeks unless told differently by your surgeon. After two weeks, let your body be your guide. You may drive only after you have been completely off of narcotics for a minimum of 24 hours. Continue to shower as you have in the hospital. NO BATHING IN THE BATHTUB, SWIMMING, etc. for 2-4 weeks.  (You need to be sure your incisions are well healed to prevent infection.)    Pain Control  You will go home with a prescription for pain medication. If your pain does not go away with this medication, please notify your surgeon. If the pain requires medication, but not something as strong as the prescription, you may try Tylenol  (acetaminophen) cut  <    inch or crushed.   DO NOT USE PRESCRIBED OR OVER THE COUNTER NONSTEROIDAL ANTI-  INFLAMMATORY MEDICATION LIKE MOTRIN, ADVIL, IBUPROFEN OR ASPIRIN.    Acid Reducer and Gallbladder Medication  It is important to reduce the amount of acid in your new stomach for a couple months after surgery while it is healing.  We will prescribe an acid reducer that you should take daily.  It is important for you to let us know if you have any symptoms of heartburn or reflux.      For those of you who still have a gallbladder, we will also prescribe a medication called Actigall (ursodiol) and we recommend taking it twice a day for 6 months.  It is only effective if you take it twice a day.  You will start taking this two weeks after surgery.    ER Needs  If you need to go to the Emergency Room, we prefer you go to the [facility] ER.  Be sure to inform the ER staff that you are a bariatric surgery patient, and ask them to notify your surgeon that you are there.    Remember to refer to your bariatric program handbook and keep follow up appointments for long-term success. You will need regular labs to check your nutritional status and it is very important  to take ALL your supplements and protein religiously,    For urgent concerns on evenings, weekends & holidays, call the clinic and the message will direct you to the surgeon on call.    [facility]: [phone]  Bariatric Nurse Line: [phone]

## 2021-05-31 NOTE — PATIENT INSTRUCTIONS - HE
Your surgery is scheduled for 9/4/2019 at 9:00 am.  Please arrive at 7:00 am.    Medication Instructions:    Aspirin   Stop 7 days before date of surgery.  Max of 81mg enteric coated aspirin per day after surgery is ok. Usually may restart 48 hours after surgery.  Swallow enteric coated aspirin whole.    Atorvastatin (Brand Name: Lipitor)  Ok to cut/crush. Ok to take the morning of surgery.    Diphenhydramine (Brand Name: Benadryl)  Ok to cut/crush tablet.  Capsules can be difficult to open, so would try using tablets if possible.    Furosemide (Brand Name: Lasix)   Stop taking 2 weeks before surgery. You will likely not go home on this medication after surgery for risk of dehydration.  Please be in touch with us if you notice side effects of being off this medication.    Losartan (Brand Name: Cozaar)   Ok to cut/crush. Ask primary doctor how often to monitor your blood pressure after surgery, and if you need to change your dosage.  Do not take the morning of surgery.    Melatonin   Stop taking 2 weeks before surgery.  Usually can start taking 48 hours after surgery if able to cut or crush the tablet, or may be able to open capsule.    Multivitamin with Iron   If not already taking, start chewable MVI with at least 18mg of iron and 10-15 mg of zinc twice a day at least 2 weeks prior to surgery and resume the day after surgery for life. Do not take the morning of surgery.    Omeprazole (Brand Name: Prilosec)   If not already taking, you will get a prescription to start when you get home from the hospital.  Tablets cannot be cut or crushed.  If a capsule, entire capsule contents may be sprinkled on a spoonful of applesauce and taken immediately without chewing.  If only on a full liquid diet, dump capsule contents into a spoonful of liquid and take without chewing.  May swallow whole again starting 6 weeks after surgery but can try swallowing sooner if having issues with opening into food.  Continue after surgery for  at least 3 months.    Prednisone   Ok to cut/crush if needed.    Ursodiol (Brand name: Actigall)  Start two weeks after surgery.  Swallow whole with warm water, do not cut, crush, or open capsule up.  This is a medication that will help to prevent gallstones from forming during the first 6 months post-op of rapid weight loss.  Prescription was sent to your pharmacy.    Vitamin B12   If not already taking, start sublingual (under the tongue) vitamin B12 1,000 mcg at least 2 weeks prior to surgery and resume the day after surgery for life. Do not take the morning of surgery.    [facility]   Laparoscopic Hola-en-Y Gastric Bypass, Gastric Sleeve & Single Anastomosis Duodenal Switch  Home Discharge Instructions      Coughing and Deep Breathing   Use your incentive spirometer frequently after you get home. Continued coughing and deep breathing help prevent complications such as fevers and pneumonia. If you are fever free after one week, stop using it, and discard it.    Incision and Dressing   All incisional coverings can get wet so you may continue to shower at home.  If you have Band-Aids, they should come off while in the hospital.  Remove all steri-strips one week from your surgery date unless told otherwise by the surgeon.  If you have gauze covered by a clear dressing, remove 2-3 days after surgery as directed by the surgeon.     Notify the clinic or your surgeon if:  You develop a fever orally of 101.5 or greater, see any unusual bright red or green infection-like drainage; see redness or swelling around the incision; have left shoulder pain or pain that does not go away with your narcotic; increasing anxiety or feeling that something is just  not right;  a persistent rapid heart rate (greater than or equal to 120 beats per minute) lasting longer than 15 minutes; progressive rapid breathing; increasing shortness of breath; continuous (non-stop) hiccups lasting longer than 15 minutes; persistent nausea; if  you are unable to keep liquids down; have frothy vomiting; difficulty swallowing; excessive bloating; swelling, warmth, discoloration or pain in your lower legs; dark, bright red, black, or tarry bowel movements; or anything you are concerned might be an urgent problem or need reassurance about.    Dehydration/Nausea/Vomiting  Vomiting is not normal after surgery and can be caused by drinking with meals, eating large portions, not chewing thoroughly and drinking large sips.  If you continue to have nausea and vomiting despite following our recommendations, call the clinic.  Nausea can be caused by dehydration so make sure you are drinking the suggested amount of fluids.  Symptoms of dehydration include dark colored urine, lack of energy, nausea, dizziness, headache and a bad taste in your mouth.  If you notice any of these symptoms, please don t delay in calling the clinic.  Early identification gives us more options for treatment.    Bowel Movements  Consider that your stools might be loose since you are currently only taking in fluids. Diarrhea may be caused by dumping syndrome if you had Gastric Bypass, so make sure you aren t drinking liquids containing excess sugar.  Otherwise, call the clinic if you have 3 or more diarrhea stools per day. If constipation is a problem, it is ok to use a Dulcolax  rectal suppository, Miralax or Milk of Magnesia . Other helpful ways to avoid constipation include: increasing your fluids; stop taking narcotic medications; and increasing your activity. Adding Benefiber  daily to your liquids once you have had a stool may help keep you regular until there is more natural fiber in your diet. You may also have foul smelling gas.    Risk for Blood Clots  For the next 6 weeks, if you are in any vehicle longer than 30 minutes, stop every 30 minutes, and walk for at least 3 minutes before continuing your journey. Traveling and/or flying are not recommended for 6 weeks.  If leaving the  country within the first 3-6 months after surgery, check with your surgeon first.    Activity  Do not lift greater than 20 lbs. for 2 weeks unless told differently by your surgeon. After two weeks, let your body be your guide. You may drive only after you have been completely off of narcotics for a minimum of 24 hours. Continue to shower as you have in the hospital. NO BATHING IN THE BATHTUB, SWIMMING, etc. for 2-4 weeks.  (You need to be sure your incisions are well healed to prevent infection.)    Pain Control  You will go home with a prescription for pain medication. If your pain does not go away with this medication, please notify your surgeon. If the pain requires medication, but not something as strong as the prescription, you may try Tylenol  (acetaminophen) cut  <    inch or crushed.   DO NOT USE PRESCRIBED OR OVER THE COUNTER NONSTEROIDAL ANTI-  INFLAMMATORY MEDICATION LIKE MOTRIN, ADVIL, IBUPROFEN OR ASPIRIN.    Acid Reducer and Gallbladder Medication  It is important to reduce the amount of acid in your new stomach for a couple months after surgery while it is healing.  We will prescribe an acid reducer that you should take daily.  It is important for you to let us know if you have any symptoms of heartburn or reflux.      For those of you who still have a gallbladder, we will also prescribe a medication called Actigall (ursodiol) and we recommend taking it twice a day for 6 months.  It is only effective if you take it twice a day.  You will start taking this two weeks after surgery.    ER Needs  If you need to go to the Emergency Room, we prefer you go to the [facility] ER.  Be sure to inform the ER staff that you are a bariatric surgery patient, and ask them to notify your surgeon that you are there.    Remember to refer to your bariatric program handbook and keep follow up appointments for long-term success. You will need regular labs to check your nutritional status and it is very important  to take ALL your supplements and protein religiously,    For urgent concerns on evenings, weekends & holidays, call the clinic and the message will direct you to the surgeon on call.    [facility] and Bariatric Care: [phone]  Bariatric Nurse Line: [phone]

## 2021-05-31 NOTE — PROGRESS NOTES
Patient attended group class to review pre-op instructions and dietary plan for upcoming surgery.    Pt will begin 2-week pre-op liquid diet 8/21/2019, will do clear liquids the day before surgery. Pt is scheduled for Gastric Sleeve on 9/4/2019, and will then follow 2 weeks post-op liquid diet. Pt will f/u with RD 1-week post-op for further diet advancement.    Educated pt on 2-week pre-op and 1-week post-op liquid diets. Discussed appropriate liquids and demonstrated portions for each of the food groupings during each diet phase. Reviewed appropriate calories/protein/fluid goals during 2-week pre-op liquid diet. Educated on correct vitamins/minerals to take after surgery in correct dosage and frequency. Provided grocery list and sample menu plan for each diet stage, as well as unflavored protein powder samples.       Discussed admission process and hospital course.  Pharmacy information packet given and explained. Patient was given exercises to work on post-op for maintaining muscle mass and strengthening that was created by [employer] [facility].  Bariatric quiz given to patient for a review on their own.  Discharge instructions, information card and follow up appointments given and reviewed with pt at this time and patient verbalized understanding. He will be scheduling his H&P at [facility] and do his  pre-op testing during that visit.  Orders sent with the patient to bring to his appointment for the testing.  Prescriptions for Omeprazole and Actigall given to the patient to take to his pharmacy to be started after surgery.      Eva Gomez, RN, CBN

## 2021-06-01 VITALS — HEIGHT: 69 IN | BODY MASS INDEX: 46.65 KG/M2 | WEIGHT: 315 LBS

## 2021-06-01 VITALS — WEIGHT: 315 LBS | HEIGHT: 69 IN | BODY MASS INDEX: 46.65 KG/M2

## 2021-06-01 VITALS — WEIGHT: 309.6 LBS | BODY MASS INDEX: 43.34 KG/M2 | HEIGHT: 71 IN

## 2021-06-01 NOTE — ANESTHESIA CARE TRANSFER NOTE
Last vitals:   Vitals:    09/04/19 1053   BP:    Pulse: 85   Resp: 20   Temp: 36.4  C (97.6  F)   SpO2: 95%     Patient's level of consciousness is drowsy  Spontaneous respirations: yes  Maintains airway independently: yes  Dentition unchanged: yes  Oropharynx: oropharynx clear of all foreign objects    QCDR Measures:  ASA# 20 - Surgical Safety Checklist: WHO surgical safety checklist completed prior to induction    PQRS# 430 - Adult PONV Prevention: 4558F - Pt received => 2 anti-emetic agents (different classes) preop & intraop  ASA# 8 - Peds PONV Prevention: NA - Not pediatric patient, not GA or 2 or more risk factors NOT present  PQRS# 424 - [place]-op Temp Management: 4559F - At least one body temp DOCUMENTED => 35.5C or 95.9F within required timeframe  PQRS# 426 - PACU Transfer Protocol: - Transfer of care checklist used  ASA# 14 - Acute Post-op Pain: ASA14B - Patient did NOT experience pain >= 7 out of 10

## 2021-06-01 NOTE — TELEPHONE ENCOUNTER
Post-Op Phone Call  [facility]    Surgeon: Marcos Hoffman M.D.  Date of Surgery: 9/4/2019  Discharge Date: 9/5/2019    Date/Time Called:   Date: 9/6/2019 Time: 4:31 PM   Attempt: First    Patient unavailable, message left to call [facility] Bariat[facility] with questions/problems? Yes , pt is sleeping.  Talked with his wife and let her know that we will call again on Monday.  I asked her to encourage pt to push fluids, take his vitamins and continue with the scheduled pain meds.    Pain Control:  Intensity: Pt has a lot of incisional pain but is sleeping so cannot have him rate it.  The pain is from coughing when he uses the IS and goes away when he doesn't use it.  Duration/Location/Explain: Incisional pain when he coughs.    What makes it better/worse? Coughing makes it worse.  Encouraged deep breaths, abdominal binder and splinting his cough with a pillow on his abdomen.      Medications:  Narcotic Use - No  Drug type: Gabapentin  Frequency: Every 8 hours.    Non-prescription pain control: Tylenol every 6 hours.    Other medications currently taking: see med list.     Complete Multivitamin + Iron BID? Yes    Vitamin B12? sublingual daily    Call completed by:   Latha Rosales RN, CBN  [facility]  P [phone]  F [phone]

## 2021-06-01 NOTE — PROGRESS NOTES
"HPI: Pt is here for follow up of a laparoscopic sleeve gastrecomy. He is doing well. Taking po well though he did have some nausea this morning . No vomiting. No fevers or chills. Ambulating without problems. He is concerned that he may be constipated.Having a bowel movement about every 2-3 days.     Current Outpatient Medications   Medication Sig Dispense Refill     aspirin 81 MG EC tablet Take 1 tablet (81 mg total) by mouth daily.  0     atorvastatin (LIPITOR) 40 MG tablet Take 40 mg by mouth daily.       cholecalciferol, vitamin D3, (CHOLECALCIFEROL) 1,000 unit tablet Take 0.5 tablets (500 Units total) by mouth 2 (two) times a day.  0     cyanocobalamin, vitamin B-12, 1,000 mcg Subl Place 1,000 mcg under the tongue daily.       diphenhydrAMINE (RESTFULLY SLEEP) 25 mg tablet Take 25 mg by mouth at bedtime as needed for sleep.       gabapentin (NEURONTIN) 250 mg/5 mL solution Take 5 mL (250 mg total) by mouth every 8 (eight) hours as needed. 60 mL 0     losartan (COZAAR) 25 MG tablet Take 25 mg by mouth daily.              melatonin 10 mg Tab Take 10 mg by mouth at bedtime.              omeprazole (PRILOSEC) 20 MG capsule Take 1 capsule (20 mg total) by mouth daily. For the first 6 weeks, open capsule & sprinkle contents into liquids or onto food. 90 capsule 0     pediatric multivitamin (FLINTSTONES) Chew chewable tablet Chew 1 tablet 2 (two) times a day.       predniSONE (DELTASONE) 10 mg tablet Take 5 mg by mouth daily .       [START ON 9/18/2019] ursodiol (ACTIGALL) 300 mg capsule Take 1 capsule (300 mg total) by mouth 2 (two) times a day. Start 2 wks after surgery. Do not cut, crush or open. Take with warm liquids. 180 capsule 1     No current facility-administered medications for this visit.          /72   Pulse 65   Temp 98.6  F (37  C) (Oral)   Resp 16   Ht 5' 11\" (1.803 m)   Wt (!) 281 lb (127.5 kg)   SpO2 98%   BMI 39.19 kg/m    Wt Readings from Last 3 Encounters:   09/17/19 (!) 281 lb (127.5 " kg)   09/04/19 (!) 295 lb 9.6 oz (134.1 kg)   08/14/19 (!) 286 lb 9.6 oz (130 kg)     Body mass index is 39.19 kg/m .    EXAM:  GENERAL:Appears well  ABDOMEN: Incisions healing well      Assessment/Plan: Pt s/p sleeve gastrectomy. Doing well. Diet and activity discussed. I did recommend Miralax.  He will f[facility] in 3 months.    Marcos Hoffman MD  [facility] Department of Surgery

## 2021-06-01 NOTE — ANESTHESIA PREPROCEDURE EVALUATION
Anesthesia Evaluation      Patient summary reviewed   No history of anesthetic complications     Airway   Mallampati: III  Neck ROM: full   Pulmonary    (+) pneumonia, COPD moderate, shortness of breath, sleep apnea on CPAP, , wheezes, a smoker                         Cardiovascular - normal exam  Exercise tolerance: > or = 4 METS  (+) hypertension, dysrhythmias, , hypercholesterolemia, PVD       ROS comment: Pulmonary hypertension       Neuro/Psych    (+) neuromuscular disease,  chronic pain    Endo/Other    (+) arthritis, obesity (morbid),      GI/Hepatic/Renal    (+)   chronic renal disease,           Dental - normal exam                        Anesthesia Plan  Planned anesthetic: general endotracheal and ITN  Intrathecal morphine for post op pain control per surgeon request.  50 mg ketamine IV on induction.  Albuterol neb pre-op.  4 grams magnesium IV for pain control.  Suggamadex at end of surgery.  ASA 4   Induction: intravenous   Anesthetic plan and risks discussed with: patient  Anesthesia plan special considerations: video-assisted, antiemetics,   Post-op plan: routine recovery

## 2021-06-01 NOTE — ANESTHESIA POSTPROCEDURE EVALUATION
Patient: Boogie Clark  LAPAROSCOPIC SLEEVE GASTRECTOMY  Anesthesia type: general    Patient location: PACU  Last vitals:   Vitals Value Taken Time   /77 9/4/2019 11:34 AM   Temp 36.4  C (97.6  F) 9/4/2019 10:53 AM   Pulse 81 9/4/2019 11:35 AM   Resp 20 9/4/2019 11:35 AM   SpO2 94 % 9/4/2019 11:35 AM   Vitals shown include unvalidated device data.  Post vital signs: stable  Level of consciousness: awake and responds to simple questions  Post-anesthesia pain: pain controlled  Post-anesthesia nausea and vomiting: no  Pulmonary: unassisted, face mask  Cardiovascular: stable and blood pressure at baseline  Hydration: adequate  Anesthetic events: no    QCDR Measures:  ASA# 11 - Selam-op Cardiac Arrest: ASA11B - Patient did NOT experience unanticipated cardiac arrest  ASA# 12 - Selam-op Mortality Rate: ASA12B - Patient did NOT die  ASA# 13 - PACU Re-Intubation Rate: ASA13B - Patient did NOT require a new airway mgmt  ASA# 10 - Composite Anes Safety: ASA10A - No serious adverse event    Additional Notes:

## 2021-06-01 NOTE — PATIENT INSTRUCTIONS - HE
Doing well s/p sleeve gastrectomy. Diet and activity discussed  Probably some constipation. Try using Mirilax once a day.OK to use stool softener as well.

## 2021-06-01 NOTE — PROGRESS NOTES
Time in: 10:00a   /Time out: 10:30am   BMI: There is no height or weight on file to calculate BMI.   Pt presents for 1 week post op dietitian follow up. Reports tolerating full liquids well. Denies n/v, constipation/diarrhea, or significant pain. Taking MVI and SL B12 appropriately. Taking 40 oz fluid/day. Educated pt on pureed and soft to bariatric regular diets. Provided grocery list and sample meal plan for each diet stage.  Pt will begin pureed diet on 9/18 and advance as tolerated to softs/bariatric regular on 10/9. Instructed pt to begin 500 mg calcium citrate BID and 5000IU Vitamin D3 on 10/9. Pt to follow up with RD at 3 months post op.

## 2021-06-01 NOTE — TELEPHONE ENCOUNTER
Post-Op Phone Call  [facility]      Date/Time Called:   Date: 9/9/2019 Time: 10:13 AM   Attempt: Second    Patient unavailable, message left to call [facility]ariat[facility] with questions/problems? Yes    Call completed by:   Latha Rosales RN, CBN  [facility]  P [phone]  F [phone]

## 2021-06-01 NOTE — ANESTHESIA PROCEDURE NOTES
Spinal Block    Patient location during procedure: pre-op  Start time: 9/4/2019 8:17 AM  End time: 9/4/2019 8:23 AM  Reason for block: at surgeon's request and post-op pain management    Staffing:  Performing  Anesthesiologist: Manuel De MD    Preanesthetic Checklist  Completed: patient identified, risks, benefits, and alternatives discussed, timeout performed, consent obtained, at patient's request, airway assessed, oxygen available, suction available, emergency drugs available and hand hygiene performed  Spinal Block  Patient position: sitting  Prep: ChloraPrep  Patient monitoring: heart rate, cardiac monitor, continuous pulse ox and blood pressure  Approach: midline  Location: L3-4  Injection technique: single-shot  Needle type: pencil-tip   Needle gauge: 24 G

## 2021-06-02 VITALS — WEIGHT: 312 LBS | HEIGHT: 69 IN | BODY MASS INDEX: 46.21 KG/M2

## 2021-06-02 VITALS — BODY MASS INDEX: 46.36 KG/M2 | HEIGHT: 69 IN | WEIGHT: 313 LBS

## 2021-06-02 VITALS — WEIGHT: 293.5 LBS | BODY MASS INDEX: 41.09 KG/M2 | HEIGHT: 71 IN

## 2021-06-03 VITALS — HEIGHT: 71 IN | BODY MASS INDEX: 41.38 KG/M2 | WEIGHT: 295.6 LBS

## 2021-06-03 VITALS
BODY MASS INDEX: 39.34 KG/M2 | RESPIRATION RATE: 16 BRPM | HEART RATE: 65 BPM | HEIGHT: 71 IN | SYSTOLIC BLOOD PRESSURE: 166 MMHG | OXYGEN SATURATION: 98 % | DIASTOLIC BLOOD PRESSURE: 72 MMHG | WEIGHT: 281 LBS | TEMPERATURE: 98.6 F

## 2021-06-03 VITALS — HEIGHT: 71 IN | WEIGHT: 299 LBS | BODY MASS INDEX: 41.86 KG/M2

## 2021-06-03 VITALS — WEIGHT: 286.6 LBS | BODY MASS INDEX: 39.97 KG/M2

## 2021-06-03 VITALS — WEIGHT: 295 LBS | HEIGHT: 71 IN | BODY MASS INDEX: 41.3 KG/M2

## 2021-06-04 VITALS — HEIGHT: 71 IN | WEIGHT: 246 LBS | BODY MASS INDEX: 34.44 KG/M2

## 2021-06-04 VITALS — WEIGHT: 266.4 LBS | BODY MASS INDEX: 37.3 KG/M2 | HEIGHT: 71 IN

## 2021-06-04 VITALS — BODY MASS INDEX: 34.31 KG/M2 | WEIGHT: 246 LBS

## 2021-06-04 NOTE — PROGRESS NOTES
"Post-op Surgical Weight Loss Diet Evaluation     Assessment:  Pt presents for 3 months post-op RD visit, s/p LSG on 9-4-19 with Dr. Hoffman. Today we reviewed current eating habits and level of physical activity, and instructed on the changes that are required for successful bariatric outcomes.    Patient Progress: patient is struggling with exercise due to breathing and back pain    Pt's Initial Weight: 325 lbs  Weight: (!) 266 lb 6.4 oz (120.8 kg)  Weight loss from initial: 58.6  % Weight loss: 18.03 %      Body mass index is 37.16 kg/m .     Concerns: eating higher fat and higher sugar foods- not being mindful and chewing well, inadequate water intake    Vitamins   Multi Vit with Iron: no  Calcium Citrate: yes  B12: yes  D3: yes    Do you experience hunger? no  Do you experience any reflux or discomfort with eating? yes  -Are you still taking omeprazole? yes  Nausea: no  Vomiting: no  Diarrhea: no  Constipation: yes- takes stool softener daily  Hair loss:no    Diet Recall/Time:   Breakfast: 8:30a- 1 egg with cheese (10g) and 1/2 mini bagel  Am Snack: sometimes- popcorn  Lunch: sometimes hungry and sometimes not- piece of cheese and a couple crackers (8g)  Pm snack: 2 pieces of shrimp (7g)  Dinner: piece of pizza (8g)  HS Snack: 8pm- popcorn- small handful  +protein shake    Proteins/Veg/Fruits/CHO (NOT well tolerated): sesame seed chicken wings, hawaiian betsey rolls    Estimated protein intake: 63 grams    Estimated portion size per meals:1/4 cup/meal    Incorporation of vegetables, fruits, carbohydrates into diet/meals using   Bariatric \"Plate Method\"   The patient and I discussed the importance of including lean/low fat protein at each meal, including a vegetable/fruit, and limiting carbohydrate intake to less than 25% of plate volume. Always keeping within approved perimeters of post op meal portion sizes according to 3 months post op guidelines.    Meal Duration:15-25 min  Encouraged slowing meal times down, " "20-30 minutes, chewing to applesauce consistency.     Fluid-meal separation:  Fluids are  30min before and 30 minutes after meals.  The patient and I reviewed the anatomy of the bypass and why  fluids from a meal is so important.    Fluid Intake  Water: 16oz  Caffeine: at least 2 per day- black  Alcohol: none  Carbonation: none    Discussed the importance of adequate hydration after surgery and the goal of 64+ oz of fluid daily.   The patient understands the importance of avoiding all carbonated, caffeinated, and sweetened drinks; and instead choosing 64oz plain water.    Exercise  Type: limited- can walk using support the best    Pt's understands that 30-60 minutes of daily activity is an important part of bariatric surgery success.   Encouraged pt to incorporate strength training exercise along with cardiovascular exercise as well, most days of the week.      PES statement:    NB-1.7) Undesirable food choices related to Failure to adjust for lifestyle changes as evidenced by low protein intake at meals; large portions; skipping meals; frequent grazing;  mindless eating ; drinking with meals, not chewing each bite to applesauce consistency; consuming caffeine/carbonation daily, frequent restaurant intake; and no structured activity regimen    Intervention    Discussion  1. Discussed 3 months  Post-Op Nutritional Guidelines for LSG  2. Recommended to consume 15-20gm protein at 3 meals daily, along with protein supplement/\"planned protein containing snack\" of 15-30gm protein, to reach goal of 60-80 gm protein daily.  3. Educated on post-op vitamin regimen: Multi Vit + iron 2x/day, calcium citrate 400-600 mg 2x/day, 9651-5733 mcg of Sublingual B-12 daily, and 5000 IU Vitamin D3 daily (MVI and calcium can be taken at the same time BID)  4. Reviewed lean protein sources  5. Bariatric Plate Method-  including lean/low fat protein at each meal, including a vegetable/fruit, and limiting carbohydrate " "intake to less than 25% of plate volume. Approved perimeters of post op meal portion sizes according to 3 months post op guidelines.  Instructions  1. Include 15-20gm protein at each meal, along with protein supplement/\"planned protein containing snack\" of 15-30gm protein, to reach goal of 60-80 gm protein daily.  2. Increase fluid intake to 64oz daily: choose plain or calorie/carbonation-free beverages.  3. Incorporate daily structured activity, 30-60 minutes most days of the week  4. Recommended pt to start taking: Multi Vit + iron 2x/day, calcium citrate 400-600 mg 2x/day, 8192-6396 mcg of Sublingual B-12 daily, and 5000 IU Vitamin D3 daily. (MVI and calcium can be taken at the same time)  5. Read food labels more consistently: keeping total fat grams <10, total sugar grams <10, fiber >3gm per serving.  6. Increase vegetable/fruit intake, by having a vegetable or fruit with each meal daily.  7. Practice plate method: 1/2 plate lean/low fat protein source, vegetable/fruit, <25% of plate complex carbohydrates.  8. Separate fluids 30 minutes before/after meal times.  9. Practice eating off of smaller plates/bowls, chewing to applesauce consistency, taking 20-30 minutes to eat in a calm/relaxed environment without distractions of tv/email/cell phone.    Handouts provided:  3 months  Post-Op Nutritional Guidelines for LSG    Monitor/Evaluation    Pt to follow up for 6 months  post-op visit with bariatrician       Time In: 10:30a  Time Out: 11:10a      ABN signed: Yes    "

## 2021-06-08 NOTE — PATIENT INSTRUCTIONS - HE
[facility]  Nutritional Guidelines  Gastric Sleeve 9 Months Post Op    General Guidelines and Helpful Hints:    Eat 3 meals per day + protein supplement(s). No snacks between meals.  o Do not skip meals.  This can cause overeating at the next meal and will prevent adequate protein and nutritional intake.    Aim for 60-80 grams of protein per day.  o Always eat your protein first. This assists with optimal nutrition and helps you stay full longer.  o Depending on your portion size, you may need to drink approved protein supplement between meals to achieve protein goals. Follow recommendations of your Dietitian.     Follow appropriate portion size: Use measuring cups to be accurate.    Months Post Op Portion Size per Meal   9 months 3/4 cup   10-12 months 3/4 - 1 cup   12 months and beyond 1 cup maximum       Continue to use saucer/salad plates, infant/toddler silverware to keep portion sizes small and take small bites.    Eat S-L-O-W-L-Y to make each meal last 20-30 minutes. Always stop eating when satisfied.    Continue to use caution with foods containing skins, peels or membranes. Chew well!    Aim for 64 oz. of calorie-free fluids daily.  o Continue to avoid caffeine, carbonation and alcohol.  o Remember to avoid drinking during meals, 15-30 minutes before and 30 minutes after.    Aim for 30-60 minutes of physical activity most days of the week.    If having trouble tolerating meat, try using a crock-pot, tinfoil tent, steamer or other moist cooking method to create tender meats. Add broth or low-fat gravy to help meat stay moist.     Avoid high sugar and high fat foods to prevent high calorie intake. This will reduce your rate of weight loss and can cause weight regain.   o Check nutrition labels for less than 10 grams of sugar and less than 10 grams of fat per serving.    Continue Taking Vitamins/Minerals:  o 3578-2312 mcg of Sublingual B-12 daily  o 1 Multivitamin with Iron twice daily (chewable  or swallow tabs)  o 500-600 mg Calcium Citrate twice daily (chewable or swallow tabs)  o 5000 IU Vitamin D3 daily    Sample Grocery List    Protein:    Fat free Greek or light yogurt (less than 10 grams sugar)    Fat free or low-fat cottage cheese    String cheese or reduced fat cheese slices    Tuna, salmon, crab, egg, or chicken salad made with light or fat free mayonnaise    Egg or Egg Substitute    Lean/extra lean turkey, beef, bison, venison (ground, sirloin, round, flank)    Pork loin or tenderloin (grilled, baked, broiled)    Fish such as salmon, tuna, trout, tilapia, etc. (grilled, baked, broiled)    Tender cuts of lean (skinless) turkey or chicken    Lean deli meats: turkey, lean ham, chicken, lean roast beef    Beans such as kidney, garbanzo, black, oneill, or low-fat/fat free refried beans    Peanut butter (natural preferred). Limit to 1 Tbsp. per day.    Low-fat meatloaf (made with lean ground beef or turkey)    Sloppy Joes made with low-sugar ketchup and lean ground beef or turkey    Soy or vegetable protein (i.e. vegan crumbles, soy/veggie burger, tofu)    Hummus    Vegetables:    Fresh: cooked or raw (as tolerated)    Frozen vegetables    Canned vegetables (low sodium or no salt added, rinse before cooking/eating)    (Ok to have skins/peels/membranes/seeds - just chew well)    Fruits:    Fresh fruit    Frozen fruit (no sugar added)    Canned fruit (packed in its own juice, NOT syrup)    (Ok to have skins/peels/membranes/seeds - just chew well)    Starch:    Unsweetened whole-grain hot cereal (or high fiber cold cereal, dry)    Toasted whole wheat bread or [place] Thins    Whole grain crackers    Baked/boiled/mashed potato/sweet potato    Cooked whole grain pasta, brown rice, or other cooked whole grains    Starchy vegetables: corn, peas, winter squash    Protein Supplement:     Ready to drink protein shake with:  o 15-30 grams protein per serving  o Less than 10 grams total carbohydrate per serving      Protein powder mixed with:  o  Skim or 1% milk  o Low fat or fat free Lactaid milk, plain or no sugar added soymilk  o Water   Fats: (use in moderation)    1 teaspoon of soft tub margarine    1 teaspoon olive oil, canola oil, or peanut oil    1 tablespoon of low-fat marrero or salad dressing    Sample Menu for 9 months after Gastric Sleeve    You do NOT need to eat/drink the full portion sizes listed below  Always stop when you are satisfied    Breakfast 3  diameter whole-wheat waffle with 1 Tbsp. of peanut butter  1 string cheese    Lunch   cup low-fat tuna salad (made with light mayonnaise)  3-4 small strips red pepper   Supplement Approved Protein Supplement  (Have between meals throughout the day)   Dinner   cup low-fat chicken stew      Breakfast   cup egg substitute, scrambled     cup sautéed vegetables in olive oil   Lunch   cup low fat or fat free cottage cheese  4-5  whole wheat crackers   Supplement Approved Protein Supplement   (Have between meals throughout the day)   Dinner 2 oz  grilled, broiled, or baked lemon pepper salmon    cup grilled asparagus     Breakfast   cup diced lean ham    cup mandarin oranges (canned in light juice)    Lunch 2 oz lean turkey lunchmeat    cup fresh tomatoes   Dinner 2 oz pork loin made in a crock pot seasoned with a spice rub    cup unsweetened applesauce   Supplement Approved Protein Supplement  (Have between meals throughout the day)   Breakfast   cup breakfast casserole made with egg substitute & turkey sausage    cup seedless melon   Lunch 2 oz  skinless chicken sautéed in 1 tsp. olive oil and herb seasonings    cup sliced zucchini   Dinner   cup chili made with ground turkey/sirloin or veggie crumbles and beans   3 whole-wheat saltine crackers   Supplement Approved Protein Supplement  (Have between meals throughout the day)     Breakfast 2 Tbsp. whole-grain cereal with   cup plain Greek yogurt   2 Tbsp. fresh berries   Lunch   cup crab mixed with 1-2 tsp. low-fat  mayonnaise  4-5 Wheat Thins   Supplement Approved Protein Supplement   (Have between meals throughout the day)   Dinner 2 oz. tender turkey breast (made in crock pot for extra moisture)  2 Tbsp. green beans  2 Tbsp. sweet potato     Breakfast   whole wheat English muffin, toasted  1 Tbsp. natural peanut butter    cup flavored light Greek yogurt   Lunch 3/4 cup black bean soup   Dinner 2 oz low-fat turkey meat loaf   2 Tbsp. mashed potato   2 Tbsp. cooked carrots   Supplement Approved Protein Supplement   (Have between meals throughout the day)     Breakfast   cup egg substitute  2 Tbsp. chopped bell pepper or mushrooms  2 Tbsp. kiwi   Lunch   cup canned chicken (in water) mixed with light mayonnaise  4-5 Wheat Thins    Supplement Approved Protein Supplement   (Have between meals throughout the day)   Dinner 6 medium shrimp  1 Tbsp. cocktail sauce  3 Tbsp. green beans

## 2021-06-08 NOTE — PROGRESS NOTES
"Boogie Clark is a 69 y.o. male who is being evaluated via a billable telephone visit.      The patient has been notified of following:     \"This telephone visit will be conducted via a call between you and your physician/provider. We have found that certain health care needs can be provided without the need for a physical exam.  This service lets us provide the care you need with a short phone conversation.  If a prescription is necessary we can send it directly to your pharmacy.  If lab work is needed we can place an order for that and you can then stop by our lab to have the test done at a later time.    Telephone visits are billed at different rates depending on your insurance coverage. During this emergency period, for some insurers they may be billed the same as an in-person visit.  Please reach out to your insurance provider with any questions.    If during the course of the call the physician/provider feels a telephone visit is not appropriate, you will not be charged for this service.\"    Patient has given verbal consent to a Telephone visit? Yes    What phone number would you like to be contacted at? [phone]    Patient would like to receive their AVS by AVS Preference: Nini.    Additional provider notes:   Boogie Clark is a 69 y.o. male who is being evaluated via a billable telephone visit.      The patient has been notified of following:     \"This telephone visit will be conducted via a call between you and your physician/provider. We have found that certain health care needs can be provided without the need for a physical exam.  This service lets us provide the care you need with a short phone conversation.  If a prescription is necessary we can send it directly to your pharmacy.  If lab work is needed we can place an order for that and you can then stop by our lab to have the test done at a later time.    If during the course of the call the physician/provider feels a telephone visit is not " "appropriate, you will not be charged for this service.\"     Boogie Clark complains of  postoperative malabsorption.    I have reviewed and updated the patient's Past Medical History, Social History, Family History and Medication List.    ALLERGIES  Penicillins      Assessment/Plan:    1. Postoperative malabsorption     2. Gastroesophageal reflux disease without esophagitis  omeprazole (PRILOSEC) 40 MG capsule   3. History of sleeve gastrectomy  omeprazole (PRILOSEC) 40 MG capsule   4. Persistent insomnia  gabapentin (NEURONTIN) 300 MG capsule          I have reviewed the note as documented above.  This accurately captures the substance of my conversation with the patient.    Phone call contact time 30 min.    Call Started at: 10:30  Call Ended at: 11:00      Signature:  Kinza Espinoza MD, FAAFP      Bariatric Follow Up Visit with a History of Previous Bariatric Surgery     Date of visit: 5/20/2020  Physician: Kinza Espinoza MD  Primary Care Provider:  Shabbir Agustin MD John A Lawson   69 y.o.  male    Date of Surgery: 9/4/2019  Initial Weight: 385#  Initial BMI: 57.69  Today's Weight:   Wt Readings from Last 1 Encounters:   05/20/20 (!) 246 lb (111.6 kg)     Body mass index is 34.31 kg/m .      Assessment and Plan     Assessment: Boogie is a 69 y.o. year old male who is 8 months s/p  Sleeve Gastrectomy with Dr. Yasmin Clark feels as if he has achieved the goals he hoped to accomplish through bariatric surgery and weight loss.    Encounter Diagnoses   Name Primary?     Postoperative malabsorption Yes     Gastroesophageal reflux disease without esophagitis      History of sleeve gastrectomy      Persistent insomnia          Current Outpatient Medications:      aspirin 81 MG EC tablet, Take 1 tablet (81 mg total) by mouth daily., Disp: , Rfl: 0     atorvastatin (LIPITOR) 40 MG tablet, Take 40 mg by mouth daily., Disp: , Rfl:      cyanocobalamin, vitamin B-12, 1,000 mcg Subl, Place 1,000 mcg under " the tongue daily., Disp: , Rfl:      losartan (COZAAR) 25 MG tablet, Take 25 mg by mouth daily.    , Disp: , Rfl:      omeprazole (PRILOSEC) 40 MG capsule, Take 1 capsule (40 mg total) by mouth daily before breakfast., Disp: 90 capsule, Rfl: prn     pediatric multivitamin (FLINTSTONES) Chew chewable tablet, Chew 1 tablet 2 (two) times a day., Disp: , Rfl:      gabapentin (NEURONTIN) 300 MG capsule, Take 1 capsule (300 mg total) by mouth at bedtime., Disp: 90 capsule, Rfl: prn    Plan: Labs. Recommend restart vitamin D (he has 2,000U at home, PTH was high pre op, spends 6 months in [place]) and consider 2 MVIs. F/U with sleep clinic.   Gabapentin to [facility], Omeprazole 40mg to [facility]  Return in about 3 months (around 8/20/2020).    Bariatric Surgery Review     Interim History/LifeChanges: 140# down from initial weight. Feeling well.     Patient Concerns: constipation  Appetite (1-10): 7  GERD: yes takine PPI    Medication changes: one MVI, no D (is in MVI) no Ca++ started omeprazole bought in [place]    Vitamin Intake:   B-12   SL   MVI  one senior   Vitamin D  none   Calcium   none     Other  none              LABS: ordered  Encouraged labs and restarting D  Nausea no  Vomiting no  Constipation yes-manages  Diarrhea no  Rashes yes  Hair Loss no  Calf tenderness no  Breathing difficulty no  Reactive Hypoglycemia no but cannot eat pork  Light Headedness no   Moods stable    12 point ROS as above and otherwise negative      Habits:  Alcohol: little  Tobacco: no  Caffeine 2/d  NSAIDS 81mg daily then 3 ASA at night  Exercise Routine: walks the stores a few times a day  3 meals/day 2 or 3 sometimes misses lunch  Protein first egg in am will start with other meals  ?grams/day  Water Separate from meals yes  Calorie Containing Beverages 7up once in a while  Restaurant eating/wk [employer] Taco Salad 1-2X/wk  Sleeping OK with gabapentin and 3 aspirin  Stress moderate  CPAP: using  Contraception: NA  DEXA:at 2 yrs post  op    Social History     Social History     Socioeconomic History     Marital status:      Spouse name: Not on file     Number of children: Not on file     Years of education: Not on file     Highest education level: Not on file   Occupational History     Not on file   Social Needs     Financial resource strain: Not on file     Food insecurity     Worry: Not on file     Inability: Not on file     Transportation needs     Medical: Not on file     Non-medical: Not on file   Tobacco Use     Smoking status: Former Smoker     Years: 52.00     Last attempt to quit: 2019     Years since quittin.3     Smokeless tobacco: Never Used   Substance and Sexual Activity     Alcohol use: Not Currently     Drug use: No     Sexual activity: Yes     Partners: Female   Lifestyle     Physical activity     Days per week: Not on file     Minutes per session: Not on file     Stress: Not on file   Relationships     Social connections     Talks on phone: Not on file     Gets together: Not on file     Attends Voodoo service: Not on file     Active member of club or organization: Not on file     Attends meetings of clubs or organizations: Not on file     Relationship status: Not on file     Intimate partner violence     Fear of current or ex partner: Not on file     Emotionally abused: Not on file     Physically abused: Not on file     Forced sexual activity: Not on file   Other Topics Concern     Not on file   Social History Narrative     to Felicia Lopez.    Daughter Jeanette, Daughter Yarelis    Retired .       Past Medical History     Past Medical History:   Diagnosis Date     Anal fissure 2012     Ankle pain      Arthritis     knees     Cancer (H)     history of bladder cancer BCG treatment     Cellulitis of breast of male      Colon polyp 2012     DDD (degenerative disc disease), cervical      Degenerative disc disease, cervical 2019     Dependence on nocturnal oxygen therapy      Dyslipidemia       History of kidney stones      History of MRSA infection      approx. 2014 Nipple pimple-healed. Negative MRSA 8/3/18 and 8/14/19 (8/30/2019- no contact precautions needed per ID)     History of tobacco abuse     age 14 to age 66 up to 1/2 ppd (26 pack years)     Hyperlipidemia 05/10/2013     Hypertension      Increased PTH level 8/18/2018     Infectious viral hepatitis      Kidney stone      Low testosterone      Lower leg edema     lymphedema     Lymphedema      Malignant neoplasm of bladder (H) 05/20/2013     Metabolic syndrome      Morbid obesity with BMI of 50.0-59.9, adult (H)      Obesity      COCO (obstructive sleep apnea)      Oxygen dependent      PAC (premature atrial contraction) 01/07/2019     Peripheral arteriosclerosis (H)     Bilateral Femoral angioplasty     Peripheral vascular disease (H) 07/19/2012     Pneumonia      Pulmonary arterial hypertension (H)     Cleared per [facility] 's 01/2019     PVD (peripheral vascular disease) (H) 07/19/2012     Restless legs syndrome (RLS) 01/09/2019     Restrictive lung disease 11/12/2015     Shortness of breath      Sigmoid diverticulitis 08/06/2014     Sleep apnea     uses CPAP with oxygen 3L at night     Problem List     Patient Active Problem List   Diagnosis     Benign essential hypertension     Bilateral lower extremity edema     Advanced directives, counseling/discussion     Anal fissure     Bladder tumor     Chronic obstructive pulmonary disease, unspecified COPD type (H)     Colon polyp     History of smoking     Urothelial carcinoma (H)     Hyperlipidemia     Kidney stone     Malignant neoplasm of bladder (H)     COCO (obstructive sleep apnea)     Pain in limb     Pain in joint     Sigmoid diverticulosis     Pulmonary hypertension (H)     Personal history of bladder cancer     Peripheral vascular disease (H)     Sleep apnea     Arthritis     History of kidney stones     Lower leg edema     Peripheral arteriosclerosis (H)     History of tobacco abuse      "Dyslipidemia     Hypertension     Metabolic syndrome     Knee pain     DDD (degenerative disc disease), cervical     Ankle pain     History of MRSA infection     Increased PTH level     Low testosterone     Leg swelling     Acquired lymphedema of leg     Morbid obesity with BMI of 40.0-44.9, adult (H)     Venous insufficiency of both lower extremities     Iron deficiency     Oxygen dependent     PAC (premature atrial contraction)     Restless legs syndrome (RLS)     Restrictive lung disease     S/P laparoscopic sleeve gastrectomy     Abnormal laboratory test result lupus anticiagulant. Need repeat follow up after Nov 2019     Medications     Current Outpatient Medications   Medication Sig     aspirin 81 MG EC tablet Take 1 tablet (81 mg total) by mouth daily.     atorvastatin (LIPITOR) 40 MG tablet Take 40 mg by mouth daily.     cyanocobalamin, vitamin B-12, 1,000 mcg Subl Place 1,000 mcg under the tongue daily.     losartan (COZAAR) 25 MG tablet Take 25 mg by mouth daily.            omeprazole (PRILOSEC) 40 MG capsule Take 1 capsule (40 mg total) by mouth daily before breakfast.     pediatric multivitamin (FLINTSTONES) Chew chewable tablet Chew 1 tablet 2 (two) times a day.     gabapentin (NEURONTIN) 300 MG capsule Take 1 capsule (300 mg total) by mouth at bedtime.     Surgical History     Past Surgical History  He has a past surgical history that includes Vasectomy; Angioplasty; Bladder tumor excision; tonsillectomy; ankle; Knee surgery (Bilateral); Colonoscopy; orthopedic surgery; Cystoscopy; Closed manipulation shoulder; and pr lap, benitez restrict proc, longitudinal gastrectomy (N/A, 9/4/2019).    Objective-Exam     Constitutional:  Wt (!) 246 lb (111.6 kg)   BMI 34.31 kg/m    Height: 5' 11\" (1.803 m) (12/4/2019 10:00 AM)  Initial Weight: 325 lbs (12/4/2019 10:00 AM)  Weight: (!) 246 lb (111.6 kg) (5/20/2020 10:13 AM)  Weight loss from initial: 58.6 (12/4/2019 10:00 AM)  % Weight loss: 18.03 % (12/4/2019 10:00 " AM)  BMI (Calculated): 37.2 (12/4/2019 10:00 AM)  SpO2: 98 % (9/17/2019  9:53 AM)    General:  Pleasant and in no acute distress     Psychiatric: alert and oriented X3, mood and affect normal    Counseling     We reviewed the important post op bariatric recommendations:  -eating 3 meals daily  -eating protein first, getting >60gm protein daily  -eating slowly, chewing food well  -avoiding/limiting calorie containing beverages  -drinking water 15-30 minutes before or after meals  -choosing wheat, not white with breads, crackers, pastas, radha, bagels, tortillas, rice  -limiting restaurant or cafeteria eating to twice a week or less    We discussed the importance of restorative sleep and stress management in maintaining a healthy weight.  We discussed the National Weight Control Registry healthy weight maintenance strategies and ways to optimize metabolism.  We discussed the importance of physical activity including cardiovascular and strength training in maintaining a healthier weight.    We discussed the importance of life-long vitamin supplementation and life-long  follow-up.    Boogie was reminded that, to avoid marginal ulcers he should avoid tobacco at all, alcohol in excess, caffeine in excess, and NSAIDS (unless indicated for cardioprotection or othewise and opposed by a PPI). He is taking a PPI    Kinza Espinoza MD, [facility].  5/20/2020  10:33 AM          Phone call duration: 30 minutes    Kinza Espinoza MD

## 2021-06-08 NOTE — PROGRESS NOTES
"Boogie Clark is a 69 y.o. male who is being evaluated via a billable telephone visit.      The patient has been notified of following:     \"This telephone visit will be conducted via a call between you and your physician/provider. We have found that certain health care needs can be provided without the need for a physical exam.  This service lets us provide the care you need with a short phone conversation.  If a prescription is necessary we can send it directly to your pharmacy.  If lab work is needed we can place an order for that and you can then stop by our lab to have the test done at a later time.    If during the course of the call the physician/provider feels a telephone visit is not appropriate, you will not be charged for this service.\"     Boogie Clark complains of    Chief Complaint   Patient presents with     Nutrition Counseling       I have reviewed and updated the patient's Past Medical History, Social History, Family History and Medication List.    ALLERGIES  Penicillins    Additional provider notes:     Post-op Surgical Weight Loss Diet Evaluation     Assessment:  Pt presents for 9 month post-op RD visit, s/p LSG on 9-4-19 with Dr. Hoffman. Today we reviewed current eating habits and level of physical activity, and instructed on the changes that are required for successful bariatric outcomes.    Patient Progress: patient states he is doing well, feels good about his weight loss progress    Pt's Initial Weight: 325 lbs  Weight: (!) 246 lb (111.6 kg)  Weight loss from initial: 79  % Weight loss: 24.31 %      Body mass index is 34.31 kg/m .     Concerns: inadequate protein and water intake     Vitamins   Multi Vit with Iron: yes  Calcium Citrate: yes  B12: yes  D3: yes    Do you experience hunger? no  Do you have \"dumping\" syndrome?no   Do you experience any reflux or discomfort with eating? yes  -Are you still taking omeprazole? Yes, when needed  Nausea: no  Vomiting: no  Diarrhea: no  Constipation: " "yes- is able to go daily    Diet Recall/Time:   Breakfast: 1 egg and 1/2 english muffin  Am Snack: none  Lunch: 6 in sub- able to eat whole thing- was really   Pm snack:sometimes- different all the time  Dinner: 1/2 hot turkey sandwich- only ate 1/2 of this   HS Snack: 9pm popcorn    Proteins/Veg/Fruits/CHO (NOT well tolerated): pork, chicken with the skin    Estimated protein intake: 40-50 grams    Estimated portion size per meals:3/4-1 cup/meal    Meals per week away from home: 2-3 times per week     Meal Duration:15 minutes    Fluid-meal separation:  Fluids are  30min before and 30 minutes after meals.    Fluid Intake  Water: 1-2 bottles  Caffeine: couple cups of coffee  Alcohol: very seldom- mixed cocktail socially  Carbonation: 7 up with certain foods- very limited    Exercise: ADLs- walking when able, working in the garage and yard work      PES statement:      (NC-1.4) Altered GI Function related to Alteration in gastrointestinal tract structure and/or function/ Decreased functional length of the GI tract as evidenced by Weight loss of 24.31% initial body weight; Gastric bypass surgery; sleeve gastrectomy  (NI-5.7.1) Inadequate protein intake related to Gastric bypass causing increased nutrient needs due to malabsorption/ Decreased ability to consume sufficient protein as evidenced by Edema, poor musculature, dull skin, thin and fragile hair; and Estimated intake of protein insufficient to meet requirements    Intervention    Discussion  1. Discussed 9 month Post-Op Nutritional Guidelines for LSG  2. Recommended to consume 15-20gm protein at 3 meals daily, along with protein supplement/\"planned protein containing snack\" of 15-30gm protein, to reach goal of 60-80 gm protein daily.  3. Educated on post-op vitamin regimen: Multi Vit + iron 2x/day, calcium citrate 400-600 mg 2x/day, 2444-5597 mcg of Sublingual B-12 daily, and 5000 IU Vitamin D3 daily (MVI and calcium can be taken at the same time " "BID)  4. Reviewed lean protein sources  5. Bariatric Plate Method-  including lean/low fat protein at each meal, including a vegetable/fruit, and limiting carbohydrate intake to less than 25% of plate volume.  Instructions  1. Include 15-20gm protein at each meal, along with protein supplement/\"planned protein containing snack\" of 15-30gm protein, to reach goal of 60-80 gm protein daily.  2. Increase fluid intake to 64oz daily: choose plain or calorie/carbonation-free beverages.  3. Incorporate daily structured activity, 30-60 minutes most days of the week  4. Recommended pt to start taking: Multi Vit + iron 2x/day, calcium citrate 400-600 mg 2x/day, 5800-8642 mcg of Sublingual B-12 daily, and 5000 IU Vitamin D3 daily. (MVI and calcium can be taken at the same time)  5. Read food labels more consistently: keeping total fat grams <10, total sugar grams <10, fiber >3gm per serving.  6. Increase vegetable/fruit intake, by having a vegetable or fruit with each meal daily.  7. Practice plate method: 1/2 plate lean/low fat protein source, vegetable/fruit, <25% of plate complex carbohydrates.  8. Separate fluids 30 minutes before/after meal times.  9. Practice eating off of smaller plates/bowls, chewing to applesauce consistency, taking 20-30 minutes to eat in a calm/relaxed environment without distractions of tv/email/cell phone.    Personal Goals:  1. Drink at least 1 bottle water per day  2. Add in protein shake daily    Handouts provided:  9 month Post-Op Nutritional Guidelines for LSG    Assessment/Plan:    Pt to follow up for 1 year  post-op visit with bariatrician   Labs in house Yes (for 3 and 9 month visit only)    Phone call duration: 20 minutes    [place] RD    "

## 2021-06-08 NOTE — TELEPHONE ENCOUNTER
Spoke to the patient about the 9 month recommendations that Dr. Espinoza sent to him via [employer] and encouraged him to check them out.  Pedro verbalized understanding.  Eva Gomez RN

## 2021-06-08 NOTE — PROGRESS NOTES
6 months post-op Sleeve  lab orders placed for patient and will be done at a HE lab  in preparation for appointment with Kinza Espinoza MD on 5/20/2020.    Eva Gomez RN, N  [facility]

## 2021-06-09 NOTE — PROGRESS NOTES
Left message for patient that instructions will be put in the AVS on mychart as requested, and that he should call the clinic back with any questions, or to schedule his 6 month follow up appointment.

## 2021-06-09 NOTE — PATIENT INSTRUCTIONS - HE
"Continue to work with bariatrics.  Continue exercise and compression.  Follow up with Dr. Kasper in 6 months or when needed.      You may purchase compression stockings over the counter (such as online or at a medical supply store or through the [employer] catalog.)    You may choose any brand or style with the following criteria:    Compression needed:  15-20 mmHg and 20-30 mmHg    Length:  knee high    Toe Piece:  closed toe    Use sizing chart specific to the brand you choose.        Swelling in the legs can be caused by many reasons. No matter what the reason, treatment usually includes some type of compression. You should wear your compression socks as much as you can. Your compression should be put on first thing in the morning. Take the compression off at night. It is especially important to wear them with long periods of sitting/standing, long car rides or if you will be flying. Going without compression for even brief periods of time can be damaging to your legs and your health.  Compression socks should get replaced usually every 4-6 months. They do not need to be worn at night while in bed. If we have seen you in the last year, we can refill your sock prescription, otherwise your primary care provider is able to refill them for you. Call us with any problems or questions.   Compression Velcro may need to be replaced every 9-12 months.  Often the liners and socks need to be replaced every three or four months.  The neoprene velcro may last up to 2 years.  If the velcro becomes worn a tailor may be able to repair it for you inexpensively.    If you do a lot of standing, it is good to do calf raises to help keep the blood pumping. If you sit a lot at work, it is good to get up periodically to walk around. Elevation of the foot of your bed 4-6\" helps the blood return back to where it is needed.   Please call us if you have any questions [phone]    Thank you for choosing [email or web address].        "

## 2021-06-09 NOTE — PROGRESS NOTES
Type of service:  Video Visit       Video Start and End Time : 11:24 am - 11:45 am    Originating Location (pt. Location):  Home       Distant Location (provider location):  [facility] [place]         Mode of Communication:  Doximity     Date of Service: 2020     Date last seen by Dr. Kasper:  19    PCP: Shabbir Agustin MD    Impression:  1. Leg swelling-significantly decreased  2. Venous insufficiency and hypertension of the legs bilaterally-improved  3. Acquired lymphedema of both legs-decreased  4. Bladder carcinoma (2013 surgery and BCG)  5. PAD     Plan:  1. Questions were answered.    2. Continue to work with bariatrics.  3. Continue exercise and compression.  4. Info given on how to get OTC compression less expensively.  Nursing will review with him how to measure and order.    5. Discussed importance of and how to exercise on a regular basis.  Modifications reviewed with recommendations on using the internet and cable for additional options.  6. Patient will follow up in 6 months, or when needed.     ---------------------------------------------------------------------------------------------------------------------    Chief Complaint: Bilateral leg swelling     History of Present Illness:     Boogie Clark returns to the [facility], as a video visit due to COVID-19 pandemic,  for bilateral leg swelling, mainly on the left,  due to venous insufficiency with hypertension and acquired lymphedema of both legs complicated by history of bladder carcinoma, peripheral arterial disease and morbid obesity.  He had lymphatic therapy with good results and was wearing velcro compression daily.  He is here for follow-up.  He had laparoscopic sleeve gastrectomy per Dr. Marcos Hoffman on 2019.  Today he reports he is doing extremely well.  He has lost over 140 pounds.  He says the swelling is way down.  He has not been using compression regularly.   He does get swelling sometimes at the end of the day especially with the heat.  There has been no new numbness, tingling or weakness.  There have been no new masses, rashes, or swellings of any other joints. There has been no new decrease in appetite, unexplained weight loss, abdominal bloating and bowel or bladder changes.      Past Medical History:   Diagnosis Date     Anal fissure 08/17/2012     Ankle pain      Arthritis     knees     Cancer (H)     history of bladder cancer BCG treatment     Cellulitis of breast of male      Colon polyp 07/19/2012     DDD (degenerative disc disease), cervical      Degenerative disc disease, cervical 01/07/2019     Dependence on nocturnal oxygen therapy      Dyslipidemia      History of kidney stones      History of MRSA infection      approx. 2014 Nipple pimple-healed. Negative MRSA 8/3/18 and 8/14/19 (8/30/2019- no contact precautions needed per ID)     History of tobacco abuse     age 14 to age 66 up to 1/2 ppd (26 pack years)     Hyperlipidemia 05/10/2013     Hypertension      Increased PTH level 8/18/2018     Infectious viral hepatitis      Kidney stone      Low testosterone      Lower leg edema     lymphedema     Lymphedema      Malignant neoplasm of bladder (H) 05/20/2013     Metabolic syndrome      Morbid obesity with BMI of 50.0-59.9, adult (H)      Obesity      COCO (obstructive sleep apnea)      Oxygen dependent      PAC (premature atrial contraction) 01/07/2019     Peripheral arteriosclerosis (H)     Bilateral Femoral angioplasty     Peripheral vascular disease (H) 07/19/2012     Pneumonia      Pulmonary arterial hypertension (H)     Cleared per [facility] 's 01/2019     PVD (peripheral vascular disease) (H) 07/19/2012     Restless legs syndrome (RLS) 01/09/2019     Restrictive lung disease 11/12/2015     Shortness of breath      Sigmoid diverticulitis 08/06/2014     Sleep apnea     uses CPAP with oxygen 3L at night       Past Surgical History:   Procedure Laterality  Date     ANGIOPLASTY       ankle      right ankle surgery ORIF Distal Fibula Right     BLADDER TUMOR EXCISION       CLOSED MANIPULATION SHOULDER       COLONOSCOPY       CYSTOSCOPY       KNEE SURGERY Bilateral     knee meniscus surgery, awhile ago     ORTHOPEDIC SURGERY       [place] LAP, LIAM RESTRICT PROC, LONGITUDINAL GASTRECTOMY N/A 9/4/2019    Procedure: LAPAROSCOPIC SLEEVE GASTRECTOMY;  Surgeon: Marcos Hoffman MD;  Location: [facility];  Service: General     tonsillectomy       VASECTOMY           Current Outpatient Medications:      aspirin 81 MG EC tablet, Take 1 tablet (81 mg total) by mouth daily., Disp: , Rfl: 0     cyanocobalamin, vitamin B-12, 1,000 mcg Subl, Place 1,000 mcg under the tongue daily., Disp: , Rfl:      gabapentin (NEURONTIN) 300 MG capsule, Take 1 capsule (300 mg total) by mouth at bedtime., Disp: 90 capsule, Rfl: prn     losartan (COZAAR) 25 MG tablet, Take 25 mg by mouth daily.    , Disp: , Rfl:      omeprazole (PRILOSEC) 40 MG capsule, Take 1 capsule (40 mg total) by mouth daily before breakfast., Disp: 90 capsule, Rfl: prn     pediatric multivitamin (FLINTSTONES) Chew chewable tablet, Chew 1 tablet 2 (two) times a day., Disp: , Rfl:      atorvastatin (LIPITOR) 40 MG tablet, Take 40 mg by mouth daily., Disp: , Rfl:     Allergies   Allergen Reactions     Penicillins Other (See Comments)     Childhood allergy, mouth and eye swelling       Social History     Socioeconomic History     Marital status:      Spouse name: Not on file     Number of children: Not on file     Years of education: Not on file     Highest education level: Not on file   Occupational History     Not on file   Social Needs     Financial resource strain: Not on file     Food insecurity     Worry: Not on file     Inability: Not on file     Transportation needs     Medical: Not on file     Non-medical: Not on file   Tobacco Use     Smoking status: Former Smoker     Years: 52.00     Last attempt to quit:  "2019     Years since quittin.5     Smokeless tobacco: Never Used   Substance and Sexual Activity     Alcohol use: Not Currently     Drug use: No     Sexual activity: Yes     Partners: Female   Lifestyle     Physical activity     Days per week: Not on file     Minutes per session: Not on file     Stress: Not on file   Relationships     Social connections     Talks on phone: Not on file     Gets together: Not on file     Attends Orthodoxy service: Not on file     Active member of club or organization: Not on file     Attends meetings of clubs or organizations: Not on file     Relationship status: Not on file     Intimate partner violence     Fear of current or ex partner: Not on file     Emotionally abused: Not on file     Physically abused: Not on file     Forced sexual activity: Not on file   Other Topics Concern     Not on file   Social History Narrative     to Clara.    Daughter Jeanette, Daughter Yarelis    Retired .       Family History   Adopted: Yes   Problem Relation Age of Onset     Cancer Mother      Cancer Father      Breast cancer Maternal Aunt        Review of Systems:    See HPI    Imaging:    I personally reviewed the following imaging results today and those on care everywhere, if indicated    Librado Morrell MD     2019 11:01 AM   SLEEP STUDY INTERPRETATION  TITRATION STUDY      Patient: CLARITZA PAL  YOB: 1950  Study Date: 2019  MRN: [id number]  Referring Provider: Dr. Velasquez  Ordering Provider: Dr. Morrell    Indications for Polysomnography: The patient is a 68 y old Male   who is 5' 11\" and weighs 300.0 lbs. His BMI is 42.0, [place]   sleepiness scale 8.0 and neck circumference is 49.5 cm. Relevant   medical history includes (co-morbidities). A polysomnogram with   PAP titration was performed to correct for severe obstructive   sleep apnea. With comorbid  restrictive lung disease, chronic   respiratory failure on O2, pulmonary hypertension, hypertension. "   Tolerating PAP well with good AHI on download but symptoms of   mild daytime sleepiness (ESS 8), frequent awakenings/nocturia,   rare morning headaches. Contemplating bariatric surgery.    Polysomnogram Data: A full night polysomnogram recorded the   standard physiologic parameters including EEG, EOG, EMG, ECG,   nasal and oral airflow. Respiratory parameters of chest and   abdominal movements were recorded with respiratory inductance   plethysmography. Oxygen saturation was recorded by pulse   oximetry. Hypopnea scoring rule used: 1B 4%.    Treatment PSG  Sleep Architecture:   The total recording time of the polysomnogram was 455.6 minutes.   The total sleep time was 309.0 minutes. Sleep latency was   increased at 31.4 minutes with the use of a sleep aid   (melatonin). REM latency was 54.0 minutes. Arousal index 30.3   arousals per hour. Sleep efficiency was decreased at 67.8%. Wake   after sleep onset was 114.5 minutes. The patient spent 14.6% of   total sleep time in Stage N1, 62.3% in Stage N2, 3.1% in Stage   N3, and 20.1% in REM. Time supine was 0 minutes.      Respiration: No supine sleep was seen  ? The patient was titrated at pressures ranging from CPAP 8 cmH2O   up to CPAP 14 with O2 at 2 LPM. The final pressure achieved was   CPAP 14 cmH2O with a residual AHI of 1.3 events per hour.    Lateral REM was seen at this pressure. While on 13-14 cmH20 the   patient had persistent arousals associated with RERAs, these   events appeared somewhat periodic but were not classical for   [place] breathing,    ? Respiratory rate and pattern - was notable for normal   respiratory rate and pattern.  ? Sustained Sleep Associated Hypoventilation - Transcutaneous   carbon dioxide monitoring was used, however severe    hypoventilation was not suggested with a maximum change from 37.6   to 46.0 mmHg and 0 minutes at or greater than 55 mmHg.  ? Sleep Associated Hypoxemia - (Greater than 5 minutes O2 sat at   or below  88%) was not present. Baseline oxygen saturation was   95.9%. Lowest oxygen saturation was 78.9%. Time spent less than   or equal to 88% was 0 minutes. Time spent less than or equal to   89% was 0.5 minutes.    Movement Activity:   ? Periodic Limb Activity - There were 22 PLMs during the entire   study. The PLM index was 4.3 movements per hour. The PLM Arousal   Index was 1.9 per hour.  ? REM EMG Activity - Excessive transient/sustained muscle   activity was not present.  ? Nocturnal Behavior - Abnormal sleep related behaviors were not   noted  ? Bruxism - None apparent.      Cardiac Summary:   The average pulse rate was 73.2 bpm. The minimum pulse rate was   57.7 bpm while the maximum pulse rate was 100.0 bpm. Arrhythmias   were not noted.      Assessment:   ? While in the lateral position, sleep apnea well-managed with   PAP 13-14 cmH20 without significant hypoxemia, but persistent   RERAs and possible periodic breathing  ? Higher pressures likely to be necessary in supine position    Recommendations:  ? Trial of treatment of COCO with Auto?titrating PAP therapy with   a range of 14 cmH2O to 18 cmH2O.   ? Strict lateral positioning  ? If symptoms persist, or supine positioning is desired could   consider a trial of bilevel titration starting at 10/6 cmH20  ? Advice regarding the risks of drowsy driving.  ? Suggest optimizing sleep schedule and avoiding sleep   deprivation.  ? Weight management (if BMI > 30).  ? Pharmacologic therapy should be used for management of restless   legs syndrome only if present and clinically indicated and not   based on the presence of periodic limb movements alone.    Diagnostic Codes:   Obstructive Sleep Apnea G47.33    .   _____________________________________   Electronically Signed By: Librado Morrell MD 1/23/19       Range(%) Time in range (min)   0.0 - 89.0 0.5   0.0 - 88.0 -       Stage Min(mm Hg) Max(mm Hg)   Wake 37.6 45.8   NREM(1+2+3) 40.3 45.3   REM 40.6 46.0       Range(mmHg)  Time in range (min)   55.0 - 100.0 -   Excluded data <20.0 & >90.0 25.2     [facility] OUT[facility]  8/23/2018     EXAM: BILATERAL LOWER EXTREMITY DEEP AND SUPERFICIAL VENOUS DUPLEX ULTRASOUND WITH PHYSIOLOGIC TESTING     INDICATION: Bilateral lower extremity pain and swelling. Symptomatic varicose veins. Assess for incompetent veins.     TECHNIQUE: Supine and upright ultrasound of the deep and superficial veins with Valsalva and compression augmentation maneuvers. Duplex imaging is performed utilizing gray-scale, two-dimensional images, color-flow imaging, Doppler waveform analysis, and   spectral Doppler imaging.      INCOMPETENCY CRITERIA: Deep vein reflux reported when greater than 1,000 ms flow reversal.  Superficial vein reflux reported when greater than 500 ms flow reversal.  vein reflux reported as greater than 350 ms flow reversal.     DEEP VEIN FINDINGS:     RIGHT LEG: The common femoral, profunda femoral, femoral, popliteal, and visualized calf veins are patent and compressible.  Incompetent right common femoral vein.     LEFT LEG:  The common femoral, profunda femoral, femoral, popliteal, and visualized calf veins are patent and compressible.   Incompetent left common femoral vein.     RIGHT SUPERFICIAL VEIN FINDINGS:  GREAT SAPHENOUS VEIN: Incompetent right great saphenous vein from the saphenofemoral junction down to the mid calf. The right great saphenous vein measures 9 mm at the saphenofemoral junction, 7 mm proximal thigh, 6 mm knee and 4 mm mid calf.     SMALL SAPHENOUS VEIN: Competent from the saphenopopliteal junction to the mid calf.     No incompetent perforating veins or abnormal accessory veins identified.     LEFT SUPERFICIAL VEIN FINDINGS:  GREAT SAPHENOUS VEIN: Segmental incompetency of the left great saphenous vein at the saphenofemoral junction, proximal thigh and knee. The left great saphenous vein measures 8 mm at the saphenofemoral junction, 8 mm proximal thigh, 5 mm  knee and 3 mm mid   calf.     SMALL SAPHENOUS VEIN: Competent from the saphenopopliteal junction to the mid calf.     No incompetent perforating veins or abnormal accessory veins identified.     Incidental note of 4.4 x 2.2 x 1.7 cm left popliteal cyst.     IMPRESSION:   CONCLUSION:   1.  No deep venous thrombosis of either lower extremity. Incompetent bilateral common femoral veins.  2.  RIGHT LEG: Incompetent right great saphenous vein from the saphenofemoral junction all away down to the mid calf.  3.  LEFT LEG: Segmental incompetency of the left great saphenous vein from the saphenofemoral junction all way down to the knee.    Nothing new review.     Labs:    I personally reviewed the following lab results today and those on care everywhere, if indicated    No results found for: SEDRATE      No results found for: CRP        Lab Results   Component Value Date    CREATININE 0.85 06/18/2020      Lab Results   Component Value Date    HGBA1C 5.1 06/18/2020           Lab Results   Component Value Date    BUN 13 06/18/2020              Lab Results   Component Value Date    ALBUMIN 3.4 (L) 06/18/2020       Vitamin D, Total (25-Hydroxy)   Date Value Ref Range Status   06/18/2020 40.1 30.0 - 80.0 ng/mL Final       Lab Results   Component Value Date    TSH 0.31 08/03/2018     Lab Results   Component Value Date    WBC 8.5 06/18/2020    HGB 13.1 (L) 06/18/2020    HCT 38.7 (L) 06/18/2020    MCV 93 06/18/2020     06/18/2020 1/22/2019 BUN 14 creatinine 0.98 GFR 79 glucose 98 hemoglobin 12.9 platelets 347    Physical Exam:    There were no vitals filed for this visit.   Per patient no fever.    BMI 34.30 (decreased)  Weight 246 pounds (5/21/2020)    Circumferential measures:    Vasc Edema 8/20/2018 11/19/2018 7/22/2019   Right just above MTP 26.6 26.5 25.5   Right Ankle 29 28.5 26.5   Right Widest Calf 46.5 43.5 44.7   Right Thigh Up 10cm 64 57 58   Left - just above MTP 27.8 26.8 25.5   Left Ankle 30.2 27.5 27.4    Left Widest Calf 43.8 40.5 42.7   Left Thigh Up 10cm 62.2 53 58.6     Measures previously.    General:  69 y.o. male in no apparent distress.      Psych: Alert and oriented x 3.  Cooperative. Affect normal.    HEENT: Atraumatic and normocephalic.      Vascular: There are slight telangietasias, medial ankle venous flares, venous varicosities and spider veins.      Integumentary: Skin of the legs is uniformly warm and dry, and hyperpigmentated, with hyperkeratosis and keratoderma in both legs. There are no ulcerations.   Nails are normal.    Jenni Kasper MD, Diplomate ABWMS, FACCWS, FAAPMR  Medical Director Wound Care and Lymphedema  [facility]  [phone]

## 2021-06-09 NOTE — PROGRESS NOTES
"Boogie Clark is a 69 y.o. male who is being evaluated via a billable video visit.      The patient has been notified of following:     \"This video visit will be conducted via a call between you and your physician/provider. We have found that certain health care needs can be provided without the need for an in-person physical exam.  This service lets us provide the care you need with a video conversation.  If a prescription is necessary we can send it directly to your pharmacy.  If lab work is needed we can place an order for that and you can then stop by our lab to have the test done at a later time.    Video visits are billed at different rates depending on your insurance coverage. Please reach out to your insurance provider with any questions.    If during the course of the call the physician/provider feels a video visit is not appropriate, you will not be charged for this service.\"    Patient has given verbal consent to a Video visit? Yes  How would you like to obtain your AVS? AVS Preference: MyChart.  Patient would like the video invitation sent by: Text to cell phone: .  Will anyone else be joining your video visit? No        Patient doing great, no longer wears compression. Lost 140lbs after surgery.  "

## 2021-06-19 NOTE — PROGRESS NOTES
Health and Behavior Assessment with Intervention, Initial (60 minutes): Met with patient 1:1 to obtain demographics and background information, reasons for surgery, typical eating pattern/fluid intake as well as psychiatric history.  Boogie is a 67-year-old ,  male who  would like to follow through with vertical sleeve gastrectomy for health reasons.  He has struggled with his weight for much of his life and now is concerned about various comorbidities.  He has a history of boredom eating.  He has good knowledge of the surgery and good support.  He will follow-up and complete psychological testing.  Diagnoses: F 54; E 66.01

## 2021-06-19 NOTE — PROGRESS NOTES
Pt has been wearing compression for a year; states that they do not help. Pt walks as much he can. shortness of breath with activities; dropped O2 85%. Use concentrated 2 L oxygen at HS. He uses CPAP. Current take prednisone which help with leg pain; states pain.

## 2021-06-19 NOTE — PROGRESS NOTES
Referred By: Shabbir Agustin MD    Date Of Service: 08/20/2018    Chief Complaint: Bilateral leg swelling    History of Present Illness:    Boogie Clark presents to the [facility] as a new consult to evaluate Bilateral leg swelling, mainly on the left.  The swelling began about 2-3 years ago.  They tried elevation and compression socks.  They stopped wearing compression socks because they were uncomfortable and tight.  There was no associated trauma, medication change or major illness.  He saw Dr. Brooks in the past who felt the weight was the problem.  A Flexitouch was ordered, before any trial of therapy. The patient and wife report it never made a difference. He has had significant aching in the legs and started to take steroids in July with significant decreased pain and increased movement with them.  His swelling, however, has not changed.  He has a history of bladder cancer in 2013 treatment with surgery and BCG.  His follow up visits have been stable.   He see Dr. Casillas and saw him last October.  He also has a plate in the right ankle after fracture many years ago.  o The swelling been stable since onset.  He attributes this to the use of diuretics.  Pain is rated a 0 on a 10 point scale.  The swelling is improved with elevation.  The patient sleeps in a bed.  History is complicated by known Morbid obesity, bladder ca and COCO.   There has been no new numbness, tingling or weakness.  There have been no new masses, rashes, or swellings of any other joints. There has been no new decrease in appetite, unexplained weight loss, abdominal bloating and bowel or bladder changes    Past Medical History:   Diagnosis Date     Ankle pain      Arthritis     knees     Cancer (H)     history of bladder cancer BCG treatment     DDD (degenerative disc disease), cervical      Dyslipidemia      History of kidney stones      History of MRSA infection      History of tobacco abuse      age 14 to age 66 up to 1/2 ppd (26 pack years)     Hypertension      Increased PTH level 8/18/2018     Infectious viral hepatitis      Lower leg edema     lymphedema     Metabolic syndrome      Morbid obesity with BMI of 50.0-59.9, adult (H)      Peripheral arteriosclerosis (H)     Bilateral Femoral angioplasty     Pulmonary arterial hypertension      Sleep apnea     uses CPAP with oxygen 3L at night       Past Surgical History:   Procedure Laterality Date     ANGIOPLASTY       ankle      right ankle surgery ORIF Distal Fibula Right     BLADDER TUMOR EXCISION       KNEE SURGERY Bilateral     knee meniscus surgery, awhile ago     tonsillectomy       VASECTOMY           Current Outpatient Prescriptions:      aspirin 81 MG EC tablet, Take 81 mg by mouth daily., Disp: , Rfl:      budesonide-formoterol (SYMBICORT) 80-4.5 mcg/actuation inhaler, Inhale 2 puffs as needed., Disp: , Rfl:      diltiazem (DILACOR XR) 180 MG 24 hr capsule, Take 180 mg by mouth daily., Disp: , Rfl:      diphenhydrAMINE (RESTFULLY SLEEP) 25 mg tablet, Take 25 mg by mouth at bedtime as needed for sleep., Disp: , Rfl:      furosemide (LASIX) 20 MG tablet, Take 20 mg by mouth daily., Disp: , Rfl:      melatonin 10 mg Tab, Take 1 tablet by mouth at bedtime., Disp: , Rfl:      metoprolol succinate (TOPROL-XL) 25 MG, Take 100 mg by mouth daily., Disp: , Rfl:      montelukast (SINGULAIR) 10 mg tablet, Take 10 mg by mouth daily., Disp: , Rfl:      multivitamin (ONE A DAY) per tablet, Take 1 tablet by mouth daily., Disp: , Rfl:      predniSONE (DELTASONE) 20 MG tablet, Take 20 mg by mouth daily., Disp: , Rfl:      atorvastatin (LIPITOR) 40 MG tablet, Take 40 mg by mouth daily., Disp: , Rfl:      tiotropium bromide 2.5 mcg/actuation Mist, Inhale 1 puff as needed., Disp: , Rfl:     Allergies   Allergen Reactions     Penicillins Other (See Comments)     Childhood allergy, mouth and eye swelling       Social History     Social History     Marital status:       Spouse name: N/A     Number of children: N/A     Years of education: N/A     Occupational History     Not on file.     Social History Main Topics     Smoking status: Former Smoker     Years: 52.00     Smokeless tobacco: Never Used     Alcohol use 0.0 - 0.6 oz/week     0 - 1 Shots of liquor per week      Comment: Seldom      Drug use: No     Sexual activity: Yes     Partners: Female     Other Topics Concern     Not on file     Social History Narrative     to Felicia Lopez.    Daughter Jeanette, Daughter Yarelis    Retired .           Family History   Problem Relation Age of Onset     Adopted: Yes     Cancer Mother      Cancer Father      Breast cancer Maternal Aunt        Review of Systems:  Review of systems is otherwise negative, except as noted above.  Full 12 point review of systems was completed.    Radiology/Diagnostic Studies:    I personally reviewed the following imaging today and those on care everywhere, if indicated     MRA Leg run off (Abdominal Aorta & LE Run off Evaluation)8/2/2017  [place]  Result Impression   Impression:  1. Abdominal aorta: No abdominal aortic aneurysm.  2. Right leg: Aneurysmal dilatation of the right common iliac artery  measuring up to 18 mm. Patent three-vessel runoff.  3. Left leg: Patent three-vessel runoff.  4. Subcutaneous edema is present in the bilateral lower ankles.    I have personally reviewed the examination and initial interpretation  and I agree with the findings.    RADHA TALAMANTES MD   Result Narrative   MRA of the abdomen, pelvis and peripheral vessels dated 8/2/2017 10:54  AM    Clinical information: Peripheral vascular disease, unspecified    Technique: Arterial 2D TOF images were obtained through the pelvis and  lower extremities. Mask images were obtained at the pelvis, thighs,  knees, and legs. Post gadolinium rapid acquisitions were obtained at  each level. A second injection and multiple rapid FLASH acquisitions,  subtracted and  "non-subtracted were obtained separately in the lower  abdomen and pelvis. Source images were reviewed as well as 3D and  multi-planar reconstructions.     Findings:  Aorta: The maximal diameter of the infrarenal abdominal aorta is 2.3  cm, nonaneurysmal. The origins of the celiac, superior mesenteric, and  inferior mesenteric arteries are patent. Both renal arteries are  patent.    Right lower extremity:  MARILEE: There is aneurysmal dilatation of the right common iliac artery  measuring up to 18 mm in diameter.  EIA: Patent  CFA: Patent   DFA: Patent  SFA: Patent   Popliteal artery: Patent   Common trunk: Patent  EDITH: Patent to the level of the ankle  PTA: Patent to the level of the foot  Peroneal: Patent to the level of the distal calf    Left lower extremity:  MARILEE: Patent  EIA: Patent  CFA: Patent   DFA: Patent  SFA: Patent   Popliteal artery: Patent   Common trunk: Patent  EDITH: Patent level of the ankle  PTA: Patent to the level of the foot  Peroneal: Patent the level of the distal calf    Nonenhancing foci in the kidneys are not fully evaluated on this exam,  but correspond to simple cysts on CT of the abdomen and pelvis  4/26/2013. No dilated loops of bowel. No abnormal marrow signal  identified in the pelvis or spine. No evidence of acute osseous  abnormality in either of the lower extremities. Subcutaneous edema is  present in the bilateral ankles.     Addendum by Katya Mo MD on 07/20/2017  3:12 PM  CLARITZA PAL  Accession # [id number]    The original report on this patient was dictated by me. The first  impression should read, \"Heterogeneous thyroid parenchyma suggesting a  medical thyroid disease such as Hashimoto's thyroiditis.\"    KATYA MO MD (Date of Addendum: 7/20/2017 10:35 AM)        KATYA MO MD   Result Impression   IMPRESSION:   1. Heterogeneous thyroid parenchyma suggesting a medical thyroid  disease such as Hashimoto's cerebritis.  2. Overall enlargement of the thyroid gland.  3. " "Left lower pole thyroid nodule, nonspecific as to etiology.    STEVE MO MD       Laboratory Studies:  I personally reviewed the following labs today and those on care everywhere, if indicated    Addendum by Steve Mo MD on 07/20/2017  3:12 PM  CLARITZA PAL  Accession # [id number]    The original report on this patient was dictated by me. The first  impression should read, \"Heterogeneous thyroid parenchyma suggesting a  medical thyroid disease such as Hashimoto's thyroiditis.\"    STEVE MO MD (Date of Addendum: 7/20/2017 10:35 AM)        STEVE MO MD   Result Impression   IMPRESSION:   1. Heterogeneous thyroid parenchyma suggesting a medical thyroid  disease such as Hashimoto's cerebritis.  2. Overall enlargement of the thyroid gland.  3. Left lower pole thyroid nodule, nonspecific as to etiology.    STEVE MO MD         No results found for: SEDRATE      No results found for: CRP        Lab Results   Component Value Date    CREATININE 1.08 08/03/2018      Lab Results   Component Value Date    HGBA1C 5.9 08/03/2018           Lab Results   Component Value Date    BUN 27 (H) 08/03/2018              Lab Results   Component Value Date    ALBUMIN 3.5 08/03/2018       Vitamin D, Total (25-Hydroxy)   Date Value Ref Range Status   08/03/2018 31.3 30.0 - 80.0 ng/mL Final       Lab Results   Component Value Date    TSH 0.31 08/03/2018     Lab Results   Component Value Date    WBC 13.8 (H) 08/03/2018    HGB 14.5 08/03/2018    HCT 43.2 08/03/2018    MCV 89 08/03/2018     08/03/2018       Physical Exam:  Vitals:    08/20/18 1230   BP: 122/80   Pulse: (!) 56   Resp: 24   Temp: 99.5  F (37.5  C)   SpO2: 94%     BMI 43.18    See flow chart for circumferential measures.    Vasc Edema 8/20/2018   Right just above MTP 26.6   Right Ankle 29   Right Widest Calf 46.5   Right Thigh Up 10cm 64   Left - just above MTP 27.8   Left Ankle 30.2   Left Widest Calf 43.8   Left Thigh Up 10cm 62.2       General:  67 " y.o. male in no apparent distress.      Psych: Alert and oriented x 3.  Cooperative. Affect normal.    Respiratory:  Lungs are clear to auscultation throughout with full inspiration    Cardiovascular: Normal S1, S2 without murmur, gallop or rub.  No audible carotid bruits. Regular rhythm.     Abdominal: Normal bowel sounds without any pain, guarding or rigidity. No inguinal lymphadenopathy palpated.    Musculoskeletal:  Normal range of motion in hips, knees and ankles bilaterally throughout .  There is no active joint synovitis, erythema, swelling or joint laxity.     Neurological:  Sensation is decreased to pin prick and light touch in a stocking distribution.  Strength testing is normal in hip flexion, knee flexion, knee extension, ankle dorsiflexion and great toe extension bilaterally. Deep tendon reflexes, knee jerks and ankle jerks, are normal bilaterally.      Vascular: Dorsalis pedis and posterior tibialis pulses are strong and equal bilaterally and reveal audible biphasic waves with a hand held doppler bilaterally. There are slight telangietasias, medial ankle venous flares, venous varicosities  and spider veins .     Integumentary: Skin of the legs is uniformly warm and dry, and hyperpigmentated, with hyperkeratosis and keratoderma and with positive Stemmer's Sign in both legs.  Nails are normal      Impression:  1.  Leg swelling  2.  Venous insufficiency and hypertension of the legs bilaterally  3.  Acquired lymphedema of both legs  4.  Morbid obesity  5,  Bladder carcinoma  6.  PAD    Plan:  1.  Type of swelling was reviewed in detail with the patient and wife.  Questions were answered and education was completed.  2.  Venous insufficiency study to check venous status  3.  Continue to work with bariatrics.  4.  Continue to follow with Dr. Casillas  5.  Therapy to better control swelling then appropriate compression.  Written for.  6.  Patient will follow up in 3 months or when needed.     Thank you very much  for referring Boogie Clark.  If you have any questions please feel free to contact me at [phone].    Time spent with patient 60 minutes with greater than 50% time in consultation, education and coordination of care, excluding procedures.     Jenni Kasper MD, ABWMS, FACCWS, [facility]  Medical Director Wound Care and Lymphedema  [facility]  [phone]

## 2021-06-19 NOTE — PROGRESS NOTES
Attended support group. Workflow updated.    Maria D Cason [facility]  P: [phone]  F: [phone]

## 2021-06-20 NOTE — PROGRESS NOTES
Initial Structured Weight Loss Supervised Diet Evaluation     Assessment:  Pt. is a 67 y.o. male is being seen today for initial RD nutritional evaluation. Pt. has been unsuccessful with non-surgical weight loss methods and is interested in bariatric surgery. Today we reviewed current eating habits and level of physical activity, and instructed on the changes that are required for successful bariatric outcomes.    Workflow review: Pt has completed labs, attended support group and is working with psych   ++Wife having surgery as well and going through process  ++pt was part of initial appt with wife: pt and wife are starting to practice nutrition habit changes for surgery   Weight goal: At or below initial weight     Pt Active Problem List Diagnosis:     Patient Active Problem List   Diagnosis     Benign essential hypertension     Bilateral lower extremity edema     Advanced directives, counseling/discussion     Anal fissure     Bladder tumor     Chronic obstructive pulmonary disease, unspecified COPD type (H)     Colon polyp     History of smoking     Urothelial carcinoma (H)     Hyperlipidemia     Kidney stone     Malignant neoplasm of bladder (H)     COCO (obstructive sleep apnea)     Pain in limb     Pain in joint     Sigmoid diverticulosis     Pulmonary hypertension     Personal history of bladder cancer     Peripheral vascular disease (H)     Sleep apnea     Arthritis     History of kidney stones     Lower leg edema     Peripheral arteriosclerosis (H)     History of tobacco abuse     Dyslipidemia     Hypertension     Metabolic syndrome     Knee pain     DDD (degenerative disc disease), cervical     Ankle pain     History of MRSA infection     Increased PTH level     Low testosterone     Leg swelling     Acquired lymphedema of leg     Morbid obesity with BMI of 40.0-44.9, adult (H)     Venous insufficiency of both lower extremities     Pt's Initial Weight: 385 lbs  Weight: 385 lb (174.6 kg)  Weight loss from  initial: 0  % Weight loss: 0 %  BMI: Body mass index is 57.69 kg/(m^2).    Estimated RMR (Bethel-St Jeor equation): 2504 calories    Food allergies, intolerances, and Holiness customs: none    Vitamins   Educated on post-op vitamin regimen: Multi Vit + iron 2x/day, calcium citrate 500-600 mg 2x/day, 9713-6325 mcg of Sublingual B-12 daily, and 5000 IU Vitamin D3 daily.    Biggest struggle with weight loss: lack of exercise - can not breathe; boredom eating     Who does the grocery shopping for your household? wife  Who prepares your meals at home? Wife  -lives with wife    Diet Recall/Time:   Breakfast: 2egg and 1/2 english muffin - new edition   Am Snack: none  Lunch: sandwich w/ protein OR salad w/ cheese   Pm snack:none  Dinner: Pro/Veg/CHO OR pizza   HS Snack: occasional crackers    Typical Snacks: cheese; animal crackers    Fried Foods: 1-2 times per week    Meals per week away from home: 1-2X/week    Recommended limiting eating out to no more than 2x/week.  Patient and I reviewed the importance of eating three consistent meals per day; as well as meal timing to be spaced 4-5 hours apart.  Snack choices: 100-150 calories (1-2x/day if physically hungry), incorporating a fruit/vegetable w/ protein source.    Portion Sizes problematic? Yes per patient/diet recall  Encouraged slowing meal times down, 20-30 minutes, chewing to applesauce consistency.   To aid in proper portion control and slow meal time down discussed consuming meals off smaller plates, use toddler/children utensils and set utensils down after each bite.    Protein, vegetables/fruits, carbohydrates:   Reviewed lean protein sources today. Recommended consuming 15-20gm protein at 3 meals daily    Beverages (Type/Oz. per day)  Water: 80oz  Coffee: 2 cups in AM   Tea: none  Milk: 1 cup daily   Regular soda: 1X/week   Diet soda: none  Juice: none  Siva-Aid/lemonade/etc: none  Alcohol: cocktails weekly    Discussed the importance of adequate hydration  after surgery and the goal of 64+ oz. of fluid daily.   The patient understands the importance of avoiding all carbonated, caffeinated and sweetened drinks; instead choosing 64 oz. plain water.    Fluid-meal separation:  The patient and I reviewed the anatomy of the bypass and why  fluids from a meal is so important.    Exercise  ADL - breathing issues   Will walk occasional (pulmonary regimen)     Pt. s understands that 30-60 minutes of daily activity is an important part of bariatric surgery success.   Encouraged pt. to incorporate upper body strength training exercise, even if its lifting soup cans while watching TV at night, doing pushups/sit-ups, and abdominal work.    PES statement:   1. (NI-1.3)Excessive energy intake related to Food and nutrition related knowledge deficit concerning excessive energy/oral intake as evidenced by Intake of high caloric density foods/beverages (soda, alcohol) at meals and/or snacks; large portions; frequent grazing; Estimated intake that exceeds estimated daily energy intake; Frequent fast food or restaurant intake; and BMI 57.69     2. (NC-3.3.5) Obese, class III, BMI ?40 related to physical inactivity as evidenced by Infrequent, low-duration and or low intensity physical activity; and Large amounts of sedentary activities, no structured exercise regimen.     Intervention  Discussion:    1. Educated pt on the [place] to Bariatric Success handout.  2. Reviewed lean protein sources today. Recommended consuming 15-20gm protein at 3 meals daily  3. Gave food journal homework, to be completed for f/u appointment.  4. Bariatric Plate: The patient and I discussed the importance of including lean/low  fat protein at each meal and limiting carbohydrate intake to less  than 25% of plate volume.    Instructions/Goals:     1. Include 15-20gm lean protein at each meal.  2. Increase vegetable/fruit intake, by having a vegetable or fruit with each meal daily. Recommended pt to increase  vegetable/fruit intake to 4-5 servings daily.  3. Increase fluid intake to 64oz daily: choose plain or calorie/carbonation-free beverages.  4. Incorporate daily structured activity, 30-60 minutes most days of the week  5. Fill out food journal and bring to next month's visit.  6. Practice plate method: 1/2 plate lean/low fat protein source, vegetable/fruit, <25% of plate complex carbohydrates.  7. Read food labels more consistently: keeping total fat grams <10, total sugar grams <10, fiber >3gm per serving.  8. Separate fluids 30 minutes before/after meal times.  9. Practice eating off of smaller plates/bowls, chewing to applesauce consistency, taking 20-30 minutes to eat in a calm/relaxed environment without distractions of tv/email/cell phone.    Handouts Provided:  Pt. [facility] Bariatric Care Patient Handbook  Bariatric Plate  Food journal  Protein supplement list    Monitor/Evaluation:  Pt. s target weight: no gain from initial visit, pt. verbalized understanding.     Has realistic expectations for weight loss: Yes  Verbalizes understanding of dietary changes post procedure: Yes  Verbalizes understanding of supplement needs: No  Verbalizes willingness to participate in physical activity: Yes  Motivation for change: low  RD s prediction for client success and compliance on a scale of 1 (low) to 10 (high):  6/7    Plan for next visit:   Review Bariatric plate and food journal homework.  Educate on dumping syndrome and reading food labels.  (Final Supervised Diet visit with RD) pre/post-op  diet progression, give review of surgery process.  Review [place] to Bariatric Success  Check Understanding Quiz    Time In: 9:30a  Time Out: 10:00a    ABN signed: Yes

## 2021-06-20 NOTE — PROGRESS NOTES
ASAD@  Delmar Denise Sr. is a 58 y.o. male.   Chief Complaint   Patient presents with   • URI      URI    This is a new problem. The problem has been rapidly worsening. There has been no fever. Associated symptoms include congestion (nasal), coughing (from PND), headaches, a plugged ear sensation, rhinorrhea, sinus pain, sneezing, a sore throat (scratchy) and swollen glands. Pertinent negatives include no abdominal pain, ear pain, nausea, neck pain, rash, vomiting or wheezing. He has tried nothing for the symptoms.     Has PMH of HTN treating with diet and exercise at present.    The following portions of the patient's history were reviewed and updated as appropriate: allergies, current medications, past family history, past medical history, past social history, past surgical history and problem list.    Current Outpatient Medications:   •  MELOXICAM PO, Take  by mouth., Disp: , Rfl:   •  amoxicillin-clavulanate (AUGMENTIN) 875-125 MG per tablet, Take 1 tablet by mouth Every 12 (Twelve) Hours for 10 days., Disp: 20 tablet, Rfl: 0  •  fluticasone (FLONASE) 50 MCG/ACT nasal spray, 2 sprays into the nostril(s) as directed by provider Daily., Disp: 1 bottle, Rfl: 0  •  gabapentin (NEURONTIN) 300 MG capsule, Take 300 mg by mouth Every Night., Disp: , Rfl:     No Known Allergies    Review of Systems   Constitutional: Negative for activity change, appetite change and chills.   HENT: Positive for congestion (nasal), facial swelling, postnasal drip, rhinorrhea, sinus pressure, sinus pain, sneezing and sore throat (scratchy). Negative for ear discharge and ear pain.    Eyes: Negative for itching.   Respiratory: Positive for cough (from PND). Negative for wheezing.    Gastrointestinal: Negative for abdominal pain, nausea and vomiting.   Musculoskeletal: Negative for myalgias, neck pain and neck stiffness.   Skin: Negative for color change, pallor and rash.   Neurological: Positive for headaches. Negative for  [facility] Daily Progress     Patient Name: Boogie Clark  Date: 10/10/2018  Visit #: 8/12  PTA visit #:  1      Date of evaluation: 8/29/2018    Referral Diagnosis: Peripheral vascular disease (H)  I73.9 (ICD-10-CM) - Peripheral vascular disease (H)   M79.89 (ICD-10-CM) - Leg swelling   I89.0 (ICD-10-CM) - Acquired lymphedema of leg   I87.2 (ICD-10-CM) - Venous insufficiency of both lower extremities   E66.01, Z68.41 (ICD-10-CM) - Morbid obesity with BMI of 40.0-44.9, adult (H)   C67.9 (ICD-10-CM) - Malignant neoplasm of bladder (H)   Z87.891 (ICD-10-CM) - History of tobacco abuse   G47.30 (ICD-10-CM) - Sleep apnea         Referring provider: Jenni Kasper    Visit Diagnosis:     ICD-10-CM    1. Acquired lymphedema of leg I89.0    2. Leg swelling M79.89    3. Venous insufficiency of both lower extremities I87.2        Diagnoses and all orders for this visit:    Acquired lymphedema of leg    Leg swelling    Venous insufficiency of both lower extremities             Assessment:    Volumes significantly up today.  Stockings not fitting properly.  Patient to return his 30-40 mmHg longs and get regular length.  He was shown how to adapt the 20-30 mm Hg longs so they don't constrict at the top and to add some G tube over the top for added compression.  He is working on getting his velcro garments. Discussed how he will probably need to wrap periodically to manage the swelling.  Pt would benefit from additional PT visits to get home program figured out to manage the swelling.         Goal Status:  Patient will have a decreased volume in : bilateral;LE;by 5%;Progressing toward  Patient will have decreased fibrosis, scar tissue for improved lymphatic mobilitiy : in 4 weeks;Met  Patient will perform, verbalize self-management of: skin care;self-monitoring;exercise;massage;self-compression;compression wear;compression care;infection prevention;in 4 weeks;Progressing toward    Plan / Patient Education:   Pt  "to exchange stockings and work on getting his velcro garments.  Continue MLT/ wraps until stockings/velcro received       Subjective:   History of Present Illness:    Boogie is a 67 y.o. male who presents to therapy today with complaints of bilateral leg swelling usually left> right.  Swelling is better in the morning.  He is on Lasix.  Swelling does not go away as often now. Having trouble with breathing and swelling since 2016; no known cause.   Date of onset/duration of symptoms is  or before. Onset was gradual. Symptoms are constant. He denies history of similar symptoms. He describes their previous level of function as not limited. Has a Flexi-touch machine but did not get good results. Currently on prednisone which has reduced the pain.  He is going to try bariatric surgery.    Today:   Bought stockings at [facility] but they don't work. \" too long\".  Wore them on the airplane and legs got worse.  Pain Ratin/10         Objective:     Caregiver present: No    Observation of swelling: lower extremities  is worse.     Volume measurements taken:  Yes  Lower Extremity Lymphedema  2018 2018 10/10/2018   R Big Toe (cm) 8.4 8.3 8.5   R 10cm from tip of second toe (cm) 27.2 26.4 27.2   R 20cm from tip of second toe (cm) 28.5 25.3 28.5   R 30cm from tip of second toe (cm) 33.1 30.9 35.9   R 40cm from tip of second toe (cm) 43.5 42 43.6   R 50cm from tip of second toe (cm) 45.5 44.6 46.1   R 60cm from tip of second toe (cm) 50 49.1 50.8   Right Lower Extremity Total Estimated Volume (cm3) 5939.99 5468.15 6177.96   Right LE change of volume from last visit (%) - -7.94 12.98   Right LE change of volume from initial (%) - -7.94 4.01   L Big Toe (cm) 8.4 8.4 8.7   L 10cm from tip of second toe (cm) 27.9 25.5 27   L 20cm from tip of second toe (cm) 29 25.1 29.3   L 30cm from tip of second toe (cm) 33.4 30.2 34.9   L 40cm from tip of second toe (cm) 41.8 39.2 41.5   L 50cm from tip of second toe (cm) 43.6 42.4 " dizziness.   Psychiatric/Behavioral: Positive for sleep disturbance.     Objective     Visit Vitals  /88   Pulse 74   Temp 98.2 °F (36.8 °C)   Resp 18   Wt 129 kg (284 lb 12.8 oz)   SpO2 99%   BMI 37.57 kg/m²       Physical Exam   Constitutional: He is oriented to person, place, and time. He appears well-developed and well-nourished.   HENT:   Head: Normocephalic.   Right Ear: Tympanic membrane is bulging. Tympanic membrane is not injected and not erythematous. Tympanic membrane mobility is normal.   Left Ear: Tympanic membrane is bulging. Tympanic membrane is not injected and not erythematous. Tympanic membrane mobility is normal.   Nose: Mucosal edema and rhinorrhea present. Right sinus exhibits maxillary sinus tenderness. Left sinus exhibits maxillary sinus tenderness.   Mouth/Throat: Uvula is midline and mucous membranes are normal. Posterior oropharyngeal erythema (mild) present. No oropharyngeal exudate or tonsillar abscesses. No tonsillar exudate.   Eyes: Conjunctivae are normal. Pupils are equal, round, and reactive to light.   Cardiovascular: Normal rate and regular rhythm.   Pulmonary/Chest: Effort normal and breath sounds normal.   Abdominal: Soft.   Lymphadenopathy:     He has no cervical adenopathy.   Neurological: He is alert and oriented to person, place, and time.   Skin: Skin is warm and dry.   Psychiatric: He has a normal mood and affect. His behavior is normal.     Lab Results (last 24 hours)     ** No results found for the last 24 hours. **        Assessment/Plan   Delmar was seen today for uri.    Diagnoses and all orders for this visit:    Acute maxillary sinusitis, recurrence not specified  -     amoxicillin-clavulanate (AUGMENTIN) 875-125 MG per tablet; Take 1 tablet by mouth Every 12 (Twelve) Hours for 10 days.  -     fluticasone (FLONASE) 50 MCG/ACT nasal spray; 2 sprays into the nostril(s) as directed by provider Daily.    Hypertension, unspecified type               Discussed  43.1   L 60cm from tip of second toe (cm) 48.3 47.2 49.1   Left Lower Extremity Total Estimated Volume (cm3) 5682.62 5007.09 5734.72   Left LE change of volume from last visit (%) - -11.89 14.53   Left LE change of volume from initial (%) - -11.89 0.92   Swelling Description Right>Left Right>Left Right>Left   Lower Extremity Swelling Comparison (%) 4.53 9.21 7.73           Skin condition is:  unchanged. Skin intact    Compression:none; off last night    Medication for infection:  No    Treatment Today            TREATMENT MINUTES COMMENTS   Evaluation     Self-care/ Home management 25 Worked on how to use the 20-30 mmHg longs without constriction at he top.  Issued tube G to use on top of stockings.    Medical compression bandaging to bilateral lower extremities from forefoot to below the kne:   Applied  Eucerin skin lotion,  Srockinette, cotton roll ankle to knee  comprilan 01 (10x10) rolls.  Size G tube on top  Instructed to remove them if uncomfortable.   Manual therapy 30 Measured volumes.  Manual therapy: manual lymph drainage for bilateral lower extremities lymphedema  Deep abdominal breath, neck effleurage,  bilateral axilla, bilateral lateral trunk, bilateral inguinal, anterior inguinal to axilla anastomosis on right side and left side, left lower extremity evacuation, right lower extremity evacuation, abdomen.     Neuromuscular Re-education     Therapeutic Activity     Therapeutic Exercises      Gait training     Modality__________________                Total 55    Blank areas are intentional and mean the treatment did not include these items.       Liat Arana PTA, CLT  10/10/2018   impression and treatment plan. Recommend f/u with PCP due to elevated B/P.  AVS reviewed with patient, understanding verbalized and agrees with treatment plan.  Follow up with primary care provider if no improvement within next 7-10 days. Go to [facility] or ER if condition worsens.

## 2021-06-20 NOTE — PROGRESS NOTES
[facility] Daily Progress     Patient Name: Boogie Clark  Date: 9/19/2018  Visit #: 6/12  PTA visit #:  1      Date of evaluation: 8/29/2018  Referral Diagnosis: Peripheral vascular disease (H)  I73.9 (ICD-10-CM) - Peripheral vascular disease (H)   M79.89 (ICD-10-CM) - Leg swelling   I89.0 (ICD-10-CM) - Acquired lymphedema of leg   I87.2 (ICD-10-CM) - Venous insufficiency of both lower extremities   E66.01, Z68.41 (ICD-10-CM) - Morbid obesity with BMI of 40.0-44.9, adult (H)   C67.9 (ICD-10-CM) - Malignant neoplasm of bladder (H)   Z87.891 (ICD-10-CM) - History of tobacco abuse   G47.30 (ICD-10-CM) - Sleep apnea         Referring provider: Jenni Kasper    Visit Diagnosis:     ICD-10-CM    1. Acquired lymphedema of leg I89.0    2. Leg swelling M79.89    3. Venous insufficiency of both lower extremities I87.2        Diagnoses and all orders for this visit:    Acquired lymphedema of leg    Leg swelling    Venous insufficiency of both lower extremities             Assessment:   Pt working on getting compression garments.  Volumes down 8% on right and 12% on left.  Patient demonstrates understanding/independence with home program.  Patient is benefitting from skilled physical therapy and is making steady progress toward functional goals.  Patient is appropriate to continue with skilled physical therapy intervention, as indicated by initial plan of care.    Goal Status:  Patient will have a decreased volume in : bilateral;LE;by 5%;Met  Patient will have decreased fibrosis, scar tissue for improved lymphatic mobilitiy : in 4 weeks;Met  Patient will perform, verbalize self-management of: skin care;self-monitoring;exercise;massage;self-compression;compression wear;compression care;infection prevention;in 4 weeks;Progressing toward    Plan / Patient Education:   Pt to get compression  Continue MLT/ wraps until stockings/velcro received  Check exercises            Subjective:   History of Present Illness:     Boogie is a 67 y.o. male who presents to therapy today with complaints of bilateral leg swelling usually left> right.  Swelling is better in the morning.  He is on Lasix.  Swelling does not go away as often now. Having trouble with breathing and swelling since ; no known cause.   Date of onset/duration of symptoms is  or before. Onset was gradual. Symptoms are constant. He denies history of similar symptoms. He describes their previous level of function as not limited. Has a Flexi-touch machine but did not get good results. Currently on prednisone which has reduced the pain.  He is going to try bariatric surgery.    Today:   Bandages got soaked in the rain on Monday.  Had to put them in the dryer and wife rewrapped.  Can't get velcro; too expensive.  [facility].  Pt states his legs are feeling good.   Pt has been doing his exercises..  Pain Ratin/10         Objective:     Caregiver present: No    Observation of swelling: lower extremities  is better.     Volume measurements taken:  Yes  Lower Extremity Lymphedema  2018   R Big Toe (cm) 8.4 8.3   R 10cm from tip of second toe (cm) 27.2 26.4   R 20cm from tip of second toe (cm) 28.5 25.3   R 30cm from tip of second toe (cm) 33.1 30.9   R 40cm from tip of second toe (cm) 43.5 42   R 50cm from tip of second toe (cm) 45.5 44.6   R 60cm from tip of second toe (cm) 50 49.1   Right Lower Extremity Total Estimated Volume (cm3) 5939.99 5468.15   Right LE change of volume from last visit (%) - -7.94   Right LE change of volume from initial (%) - -7.94   L Big Toe (cm) 8.4 8.4   L 10cm from tip of second toe (cm) 27.9 25.5   L 20cm from tip of second toe (cm) 29 25.1   L 30cm from tip of second toe (cm) 33.4 30.2   L 40cm from tip of second toe (cm) 41.8 39.2   L 50cm from tip of second toe (cm) 43.6 42.4   L 60cm from tip of second toe (cm) 48.3 47.2   Left Lower Extremity Total Estimated Volume (cm3) 5682.62 5007.09   Left LE change of  volume from last visit (%) - -11.89   Left LE change of volume from initial (%) - -11.89   Swelling Description Right>Left Right>Left   Lower Extremity Swelling Comparison (%) 4.53 9.21         Skin condition is:  unchanged. Skin intact    Compression:none.    Medication for infection:  No    Treatment Today            TREATMENT MINUTES COMMENTS   Evaluation     Self-care/ Home management 30 Helped pt with sizing for stockings and velcro online.  Medical compression bandaging to bilateral lower extremities from forefoot to below the kne:   Applied  Eucerin skin lotion,  Srockinette,   comprilan 01 (10x10) rolls.  Size G tube on top  Instructed to remove them if uncomfortable.   Manual therapy 30 Manual therapy: manual lymph drainage for bilateral lower extremities lymphedema  Deep abdominal breath, neck effleurage,  bilateral axilla, bilateral lateral trunk, bilateral inguinal, anterior inguinal to axilla anastomosis on right side and left side, left lower extremity evacuation, right lower extremity evacuation, abdomen.     Neuromuscular Re-education     Therapeutic Activity     Therapeutic Exercises   AROM with MLT   Gait training     Modality__________________                Total 55    Blank areas are intentional and mean the treatment did not include these items.       Liat Arana PTA, CLT  9/19/2018

## 2021-06-20 NOTE — PROGRESS NOTES
[facility] Daily Progress     Patient Name: Boogie Clark  Date: 9/5/2018  Visit #: 3/12  PTA visit #:  0      Date of evaluation: 8/29/2018  Referral Diagnosis: Peripheral vascular disease (H)  I73.9 (ICD-10-CM) - Peripheral vascular disease (H)   M79.89 (ICD-10-CM) - Leg swelling   I89.0 (ICD-10-CM) - Acquired lymphedema of leg   I87.2 (ICD-10-CM) - Venous insufficiency of both lower extremities   E66.01, Z68.41 (ICD-10-CM) - Morbid obesity with BMI of 40.0-44.9, adult (H)   C67.9 (ICD-10-CM) - Malignant neoplasm of bladder (H)   Z87.891 (ICD-10-CM) - History of tobacco abuse   G47.30 (ICD-10-CM) - Sleep apnea         Referring provider: Jenni Kasper    Visit Diagnosis:     ICD-10-CM    1. Acquired lymphedema of leg I89.0    2. Leg swelling M79.89    3. Venous insufficiency of both lower extremities I87.2    4. Morbid obesity with BMI of 40.0-44.9, adult (H) E66.01     Z68.41        Diagnoses and all orders for this visit:    Acquired lymphedema of leg    Leg swelling    Venous insufficiency of both lower extremities    Morbid obesity with BMI of 40.0-44.9, adult (H)             Assessment:   Legs look good.  Pt's wife was present to observe wrapping.  Pt shown velcro garments and he felt he could be more independent with them. Pt given order for stockings and they are checking with insurance and with fitter for appointments.  Order for velcro garments requested from Dr. Kasper.  Good tolerance to wraps.  Patient demonstrates understanding/independence with home program.  Patient is benefitting from skilled physical therapy and is making steady progress toward functional goals.  Patient is appropriate to continue with skilled physical therapy intervention, as indicated by initial plan of care.    Goal Status:  Patient will have a decreased volume in : bilateral;LE;by 5%;Progressing toward  Patient will have decreased fibrosis, scar tissue for improved lymphatic mobilitiy : in 4  "weeks;Progressing toward  Patient will perform, verbalize self-management of: skin care;self-monitoring;exercise;massage;self-compression;compression wear;compression care;infection prevention;in 4 weeks;Progressing toward    Plan / Patient Education:   Requested order for velcro garments (check and give to pt if received)  Continue MLT  Check exercises  Continue COmpression wraps until pt in garments.         Subjective:   History of Present Illness:    Boogie is a 67 y.o. male who presents to therapy today with complaints of bilateral leg swelling usually left> right.  Swelling is better in the morning.  He is on Lasix.  Swelling does not go away as often now. Having trouble with breathing and swelling since ; no known cause.   Date of onset/duration of symptoms is  or before. Onset was gradual. Symptoms are constant. He denies history of similar symptoms. He describes their previous level of function as not limited. Has a Flexi-touch machine but did not get good results. Currently on prednisone which has reduced the pain.  He is going to try bariatric surgery.    Today:  Wraps bothered after a few days. Off on  and re-wrapped with just tube, foam, tube.  Went to fair.  Wraps off since Monday.  Legs still feel good.  Pain Ratin/10         Objective:     Caregiver present: YES    Observation of swelling: lower extremities  is better.     Volume measurements taken:  No      Skin condition is:  unchanged. Skin intact    Compression:none.    Medication for infection:  No    Treatment Today            TREATMENT MINUTES COMMENTS   Evaluation     Self-care/ Home management 25 Medical compression bandaging to bilateral lower extremities from forefoot to below the kne:   Applied  Eucerin skin lotion,  Tube G,  rosidal soft 01 roll,   comprilan 01 (10x10) rolls.  Used a small piece of  size \"J\" on top to protect bandages.  Instructed to remove them if uncomfortable.   Manual therapy 30 Manual therapy: manual " lymph drainage for bilateral lower extremities lymphedema  Deep abdominal breath, neck effleurage, short neck, shoulder collectors, bilateral axilla, bilateral inguinal, anterior inguinal to axilla anastomosis on right side and left side, left lower extremity evacuation, right lower extremity evacuation, abdomen, neck effleurage.     Neuromuscular Re-education     Therapeutic Activity     Therapeutic Exercises   AROM with MLT   Gait training     Modality__________________                Total 55    Blank areas are intentional and mean the treatment did not include these items.       Liat Arana PT, CLT  9/5/2018

## 2021-06-20 NOTE — PROGRESS NOTES
[facility] Daily Progress     Patient Name: Boogie Clark  Date: 9/10/2018  Visit #: 4/12  PTA visit #:  1      Date of evaluation: 8/29/2018  Referral Diagnosis: Peripheral vascular disease (H)  I73.9 (ICD-10-CM) - Peripheral vascular disease (H)   M79.89 (ICD-10-CM) - Leg swelling   I89.0 (ICD-10-CM) - Acquired lymphedema of leg   I87.2 (ICD-10-CM) - Venous insufficiency of both lower extremities   E66.01, Z68.41 (ICD-10-CM) - Morbid obesity with BMI of 40.0-44.9, adult (H)   C67.9 (ICD-10-CM) - Malignant neoplasm of bladder (H)   Z87.891 (ICD-10-CM) - History of tobacco abuse   G47.30 (ICD-10-CM) - Sleep apnea         Referring provider: Jenni Kasper    Visit Diagnosis:     ICD-10-CM    1. Acquired lymphedema of leg I89.0    2. Leg swelling M79.89    3. Venous insufficiency of both lower extremities I87.2    4. Morbid obesity with BMI of 40.0-44.9, adult (H) E66.01     Z68.41    5. Lymphedema I89.0        Diagnoses and all orders for this visit:    Acquired lymphedema of leg    Leg swelling    Venous insufficiency of both lower extremities    Morbid obesity with BMI of 40.0-44.9, adult (H)    Lymphedema           Assessment:     Patient demonstrates understanding/independence with home program.  Patient is benefitting from skilled physical therapy and is making steady progress toward functional goals.  Patient is appropriate to continue with skilled physical therapy intervention, as indicated by initial plan of care.    Goal Status:  Patient will have a decreased volume in : bilateral;LE;by 5%;Progressing toward  Patient will have decreased fibrosis, scar tissue for improved lymphatic mobilitiy : in 4 weeks;Progressing toward  Patient will perform, verbalize self-management of: skin care;self-monitoring;exercise;massage;self-compression;compression wear;compression care;infection prevention;in 4 weeks;Progressing toward    Plan / Patient Education:     Continue MLT  Check exercises  Continue  "COmpression wraps until pt in garments.  [facility].  Pt was given new orders for garment.         Subjective:   History of Present Illness:    Boogie is a 67 y.o. male who presents to therapy today with complaints of bilateral leg swelling usually left> right.  Swelling is better in the morning.  He is on Lasix.  Swelling does not go away as often now. Having trouble with breathing and swelling since 2016; no known cause.   Date of onset/duration of symptoms is  or before. Onset was gradual. Symptoms are constant. He denies history of similar symptoms. He describes their previous level of function as not limited. Has a Flexi-touch machine but did not get good results. Currently on prednisone which has reduced the pain.  He is going to try bariatric surgery.    Today:  Pt reports he kept the bandages on until this morning from his last visit.  Pt states his legs are feeling good.   Pt has been doing his exercises \"when I can.\"  Pt may get compression stockings to try first vs velcro garment due to the expense.   Pt was given new order for garments. [facility] to see if they will [facility] the garments.  Pain Ratin/10         Objective:     Caregiver present: No    Observation of swelling: lower extremities  is better.     Volume measurements taken:  No      Skin condition is:  unchanged. Skin intact    Compression:none.    Medication for infection:  No    Treatment Today            TREATMENT MINUTES COMMENTS   Evaluation     Self-care/ Home management 25 Medical compression bandaging to bilateral lower extremities from forefoot to below the kne:   Applied  Eucerin skin lotion,  Tube G,  rosidal soft 01 roll,   comprilan 01 (10x10) rolls.  Used a small piece of  size \"J\" on top to protect bandages.  Instructed to remove them if uncomfortable.   Manual therapy 30 Manual therapy: manual lymph drainage for bilateral lower extremities lymphedema  Deep " abdominal breath, neck effleurage, short neck, shoulder collectors, bilateral axilla, bilateral inguinal, anterior inguinal to axilla anastomosis on right side and left side, left lower extremity evacuation, right lower extremity evacuation, abdomen, neck effleurage.     Neuromuscular Re-education     Therapeutic Activity     Therapeutic Exercises   AROM with MLT   Gait training     Modality__________________                Total 55    Blank areas are intentional and mean the treatment did not include these items.       Ninfa Miner PTA, CLT  9/10/2018

## 2021-06-20 NOTE — PROGRESS NOTES
Follow Up Surgical Weight Loss Supervised Diet Evaluation  Assessment:  Pt presents for follow up supervised diet visit with BEREKET.    This patient is a 67 y.o.  He is being seen today for follow-up nutritional evaluation. Boogie Clark has been unsuccessful with non-surgical weight loss methods and is interested in bariatric surgery. Today we reviewed the patients current eating habits and level of physical activity, and instructed on the changes that are required for successful bariatric outcomes.    Workflow review: Pt has completed labs, attended support group and has been cleared by psych    Weight goal: At or below initial weight     Pt Active Problem List Diagnosis:       Patient Active Problem List   Diagnosis     Benign essential hypertension     Bilateral lower extremity edema     Advanced directives, counseling/discussion     Anal fissure     Bladder tumor     Chronic obstructive pulmonary disease, unspecified COPD type (H)     Colon polyp     History of smoking     Urothelial carcinoma (H)     Hyperlipidemia     Kidney stone     Malignant neoplasm of bladder (H)     COCO (obstructive sleep apnea)     Pain in limb     Pain in joint     Sigmoid diverticulosis     Pulmonary hypertension     Personal history of bladder cancer     Peripheral vascular disease (H)     Sleep apnea     Arthritis     History of kidney stones     Lower leg edema     Peripheral arteriosclerosis (H)     History of tobacco abuse     Dyslipidemia     Hypertension     Metabolic syndrome     Knee pain     DDD (degenerative disc disease), cervical     Ankle pain     History of MRSA infection     Increased PTH level     Low testosterone     Leg swelling     Acquired lymphedema of leg     Morbid obesity with BMI of 40.0-44.9, adult (H)     Venous insufficiency of both lower extremities     Pt's Initial Weight: 325 lbs  Weight: 313 lb (142 kg)  Weight loss from initial: 12  % Weight loss: 3.69 %  Body mass index is 46.9 kg/(m^2).    Calculated  RMR (Allamuchy- Emanuel equation): 2504 calories    Progress made since last visit: Pt has been eating protein first with 3 meals/daily and including enough protein throughout the day. Pt is reading food labels and has eliminated soda   Concerns: Pt needs to focus on  fluids from meals and eliminate coffee - pt will focus on these two things over the next month     Diet Recall/Time:   Breakfast: 1 egg and cheese on english muffin (15g)   Am Snack: none  Lunch: Pro/Veg/CHO (21g)   Pm snack: none  Dinner: Pro/Veg/CHO (21g)   HS Snack: cashews (10g) and/or fruits    Protein: 70g    Typical Snacks or snack times: above    Recommended limiting eating out to no more than 2x/week.  Patient and I reviewed the importance of eating three consistent meals per day; as well as meal timing to be spaced 4-5 hours apart.  Snack choices: 100-150 calories (1-2x/day if physically hungry), incorporating a fruit/vegetable w/ protein source.    Meal Duration:15 minutes - 20minutes  Encouraged slowing meal times down, 20-30 minutes, chewing to applesauce consistency.     Portion Sizes problematic? No Per patient/diet recall   To aid in proper portion control and slow meal time down discussed consuming meals off smaller plates, use toddler/children utensils and set utensils down after each bite.    Protein, vegetables/fruits, carbohydrates:   The patient and I discussed the importance of including lean/low fat protein at each meal and limiting carbohydrate intake to less than 25% of plate volume.     Vitamins   Post-op vitamin regimen: Multi Vit + iron 2x/day, calcium citrate 500-600 mg 2x/day, 7748-1287 mcg of Sublingual B-12 daily, and 5000 IU Vitamin D3 daily.    Beverages (Type/Oz. per day)  Water: 60-80oz   Coffee: 2 cups daily   Tea: none  Milk: none  Regular soda: eliminated   Diet soda: none  Juice: none  Siva-Aid/lemonade/etc: none  Alcohol: 2-3 drinks/ week    Discussed the importance of adequate hydration after surgery  and the goal of 64+ oz of fluid daily.   The patient understands the importance of avoiding all carbonated, caffeinated and sweetened drinks; and instead choose 64 oz plain water.    Fluid-meal separation:   Pt is working on  fluids 30min before and 30 minutes after meals.  Fluids are  30min before and 30 minutes after meals. - 30%     Exercise  ADL - o2 issues     Pt's understands that 30-60 minutes of daily activity is an important part of bariatric surgery success.   Encouraged pt to incorporate upper body strength training exercise, even if its lifting soup cans while watching TV at night, doing pushups/sit-ups, and abdominal work.    PES statement:   1. (NC-3.3.5) Obese, class III, BMI ?40 related to physical inactivity as evidenced by Infrequent, low-duration and or low intensity physical activity; and Large amounts of sedentary activities; no structured physical activity regimen    Intervention:  Discussion:   1. Reviewed the Keys to Bariatric Success handout.  2. Reviewed lean protein sources and recommended to consume 15-20gm protein at 3 meals daily.  3. Gave pt Check Your Understanding Quiz, and assessed readiness for change.  4. Educated on pre/post-op diet progression, post-op vitamin regimen, gave review of surgery process.  5. Dumping syndrome: choosing foods with less than 5-10gm fat/sugar per serving to avoid dumping syndrome for RNY pts.  6. Reviewed Bariatric plate and food journal homework.    Instructions/Goals:   1. Include 15-20gm protein at each meal.  2. Increase vegetable/fruit intake, by having a vegetable or fruit with each meal daily. Recommended pt to increase vegetable/fruit intake to 4-5 servings daily.  3. Increase fluid intake to 64oz daily: choose plain or calorie/carbonation-free beverages.  4. Incorporate daily structured activity, 30-60 minutes most days of the week  5. Practice plate method: 1/2 plate lean/low fat protein source, vegetable/fruit, <25% of  plate complex carbohydrates.  6. Read food labels more consistently: keeping total fat grams <10, total sugar grams <10, fiber >3gm per serving.  7. Separate fluids 30 minutes before/after meal times.  8. Practice eating off of smaller plates/bowls, chewing to applesauce consistency, taking 20-30 minutes to eat in a calm/relaxed environment without distractions of tv/email/cell phone.    Handouts provided:  Pt. [facility] Bariatric [facility] Patient Handbook    Monitor/Evaluation:     Pt understands the importance of not gaining any weight from initial recorded weight.    Has realistic expectations for weight loss: Yes  Verbalizes understanding of dietary changes post procedure: Yes  Verbalizes understanding of supplement needs: Yes  Verbalizes willingness to participate in physical activity: Yes  Motivation for change: average  Client s predicted compliance on a scale of 1 (low) to 10 (high): 8  RD s prediction for client success and compliance on a scale of 1 (low) to 10 (high):  7/8    Pt has made the necessary changes, and is knowledgeable and well-informed of the dietary and physical activity requirements that are necessary for successful bariatric outcomes. This Pt is an appropriate candidate for surgery from a nutrition standpoint at this time. The patient understands that surgery is a tool, and not a cure, and our aftercare program must be followed.    Time In: 11:00a  Time Out: 11:30a    ABN signed: Yes

## 2021-06-20 NOTE — PROGRESS NOTES
[facility]   Lymphedema Initial Evaluation      Patient Name: Boogie Clark  Date of evaluation: 8/29/2018  Referral Diagnosis: Peripheral vascular disease (H)  I73.9 (ICD-10-CM) - Peripheral vascular disease (H)   M79.89 (ICD-10-CM) - Leg swelling   I89.0 (ICD-10-CM) - Acquired lymphedema of leg   I87.2 (ICD-10-CM) - Venous insufficiency of both lower extremities   E66.01, Z68.41 (ICD-10-CM) - Morbid obesity with BMI of 40.0-44.9, adult (H)   C67.9 (ICD-10-CM) - Malignant neoplasm of bladder (H)   Z87.891 (ICD-10-CM) - History of tobacco abuse   G47.30 (ICD-10-CM) - Sleep apnea       Referring provider: Jenni Kasper, *  Visit Diagnosis:     ICD-10-CM    1. Lymphedema I89.0        Assessment:    This 67 year old male presents with bilateral leg swelling with pitting edema which started about 2 years ago for no known reason. Symptoms and history consistent with venous insufficiency and lymphedema.  Impairments in  pain, swelling  Prognosis to achieve goals is  good   Pt. is appropriate for skilled PT intervention as outlined in the Plan of Care (POC).  Pt. would be a good candidate for edema management and to develop a home management program.    Goals:     Patient will have a decreased volume in : bilateral;LE;by 5%  Patient will have decreased fibrosis, scar tissue for improved lymphatic mobilitiy : in 4 weeks  Patient will perform, verbalize self-management of: skin care;self-monitoring;exercise;massage;self-compression;compression wear;compression care;infection prevention;in 4 weeks    Patient's expectations/goals are realistic.    Barriers to Learning or Achieving Goals:  No Barriers.    Lymphedema Category:  IV - Stage 1 with Mod-High Functional Demand       Plan / Patient Instructions:      Plan of Care:   Communication with: Referral Source  Patient Related Instruction: Nature of Condition;Treatment plan and rationale;Self Care instruction;Basis of treatment;Body  mechanics;Posture;Precautions;Next steps;Expected outcome  Times per Week: 2-3  Number of Visits: 12  Discharge Planning: to home program  Precautions / Restrictions : PVD; obesity, Bladder CA, smoker, sleep apnea  Therapeutic Exercise: Lymphedema  Neuromuscular Reeducation: posture;TNE  Manual Therapy: lymphatic drainage massage  Functional Training (ADL's): skin care;self care;lymphedema precautions;bandage/garment wear and care  Equipment: theraband;compression bandages    Plan for next visit:   Start MLT  Continue bandaging (add comprilan)  Check exercises     Subjective:         Social information:   Living Situation:single family home and has assistance Yes   Occupation:retired   Work Status:NA   Equipment Available: None    History of Present Illness:    Boogie is a 67 y.o. male who presents to therapy today with complaints of bilateral leg swelling usually left> right.  Swelling is better in the morning.  He is on Lasix.  Swelling does not go away as often now. Having trouble with breathing and swelling since ; no known cause.   Date of onset/duration of symptoms is  or before. Onset was gradual. Symptoms are constant. He denies history of similar symptoms. He describes their previous level of function as not limited. Has a Flexi-touch machine but did not get good results. Currently on prednisone which has reduced the pain.  He is going to try bariatric surgery.    Pain Ratin  Pain rating at best: 0  Pain rating at worst: 8  Pain description: not much, due to prednisone.    Functional limitations are described as occurring with:   ascending and descending stairs or curbs  transitional movements sit to stand and sit to supine    Patient reports benefit from:  prednisone       Objective:      Patient Outcome Measures :    Lymphedema Life Impact Score (%): 24   Scores range from %, where a score of 20% represents the least impact on the individual's life (minimal physical, psychosocial and  functional concerns).     Volumes:   Lower Extremity Lymphedema  8/29/2018   R Big Toe (cm) 8.4   R 10cm from tip of second toe (cm) 27.2   R 20cm from tip of second toe (cm) 28.5   R 30cm from tip of second toe (cm) 33.1   R 40cm from tip of second toe (cm) 43.5   R 50cm from tip of second toe (cm) 45.5   R 60cm from tip of second toe (cm) 50   Right Lower Extremity Total Estimated Volume (cm3) 5939.99   L Big Toe (cm) 8.4   L 10cm from tip of second toe (cm) 27.9   L 20cm from tip of second toe (cm) 29   L 30cm from tip of second toe (cm) 33.4   L 40cm from tip of second toe (cm) 41.8   L 50cm from tip of second toe (cm) 43.6   L 60cm from tip of second toe (cm) 48.3   Left Lower Extremity Total Estimated Volume (cm3) 5682.62   Swelling Description Right>Left   Lower Extremity Swelling Comparison (%) 4.53         Involved side: Bilateral  Posture Observation:      General sitting posture is  poor.  General standing posture is poor.    Surgery: bladder surgery  Treatments: Chemotherapy    Involved area: Bilateral Leg  Edema: Pitting at grade and 2+  Induration: Yes  Fibrosis: mild  Temperature: Normal  Sensation: Normal  Skin color: Normal  Skin texture: Dry    Wounds: Absent  Muscle tone: Normal  Gait: WNL  Tests:  Hip ROM limited         Treatment Today      TREATMENT MINUTES COMMENTS   Evaluation 30 Low   Self-care/ Home management     Manual therapy 20 Discussed nature of condition, basis of treatment, POC, precautions.Literature given  Bilateral:  Lotion  Tube G (comperm) from toes to knee, folded back at toes.   Neuromuscular Re-education     Therapeutic Activity     Therapeutic Exercises 8 Pt instructed in Modified lymph exercises.   Gait training     Modality__________________                Total 58    Blank areas are intentional and mean the treatment did not include these items.     PT Evaluation Code: (Please list factors)  Patient History/Comorbidities: 5  Examination: 1  Clinical  Presentation:stable  Clinical Decision Making: low    Patient History/  Comorbidities Examination  (body structures and functions, activity limitations, and/or participation restrictions) Clinical Presentation Clinical Decision Making (Complexity)   No documented Comorbidities or personal factors 1-2 Elements Stable and/or uncomplicated Low   1-2 documented comorbidities or personal factor 3 Elements Evolving clinical presentation with changing characteristics Moderate   3-4 documented comorbidities or personal factors 4 or more Unstable and unpredictable High            Liat Arana  8/29/2018  8:58 AM

## 2021-06-20 NOTE — PROGRESS NOTES
[facility] Daily Progress     Patient Name: Boogie Clark  Date: 9/26/2018  Visit #: 7/12  PTA visit #:  1      Date of evaluation: 8/29/2018  Referral Diagnosis: Peripheral vascular disease (H)  I73.9 (ICD-10-CM) - Peripheral vascular disease (H)   M79.89 (ICD-10-CM) - Leg swelling   I89.0 (ICD-10-CM) - Acquired lymphedema of leg   I87.2 (ICD-10-CM) - Venous insufficiency of both lower extremities   E66.01, Z68.41 (ICD-10-CM) - Morbid obesity with BMI of 40.0-44.9, adult (H)   C67.9 (ICD-10-CM) - Malignant neoplasm of bladder (H)   Z87.891 (ICD-10-CM) - History of tobacco abuse   G47.30 (ICD-10-CM) - Sleep apnea         Referring provider: Jenni Kasper    Visit Diagnosis:     ICD-10-CM    1. Acquired lymphedema of leg I89.0    2. Leg swelling M79.89    3. Venous insufficiency of both lower extremities I87.2    4. Morbid obesity with BMI of 40.0-44.9, adult (H) E66.01     Z68.41        Diagnoses and all orders for this visit:    Acquired lymphedema of leg    Leg swelling    Venous insufficiency of both lower extremities    Morbid obesity with BMI of 40.0-44.9, adult (H)             Assessment:   Pt working on getting compression garments.  Legs are looking good.    Patient demonstrates understanding/independence with home program.  Patient is benefitting from skilled physical therapy and is making steady progress toward functional goals.  Patient is appropriate to continue with skilled physical therapy intervention, as indicated by initial plan of care.    Goal Status:  Patient will have a decreased volume in : bilateral;LE;by 5%;Met  Patient will have decreased fibrosis, scar tissue for improved lymphatic mobilitiy : in 4 weeks;Met  Patient will perform, verbalize self-management of: skin care;self-monitoring;exercise;massage;self-compression;compression wear;compression care;infection prevention;in 4 weeks;Progressing toward    Plan / Patient Education:   Pt to get compression  Continue MLT/  wraps until stockings/velcro received  Check exercises            Subjective:   History of Present Illness:    Boogie is a 67 y.o. male who presents to therapy today with complaints of bilateral leg swelling usually left> right.  Swelling is better in the morning.  He is on Lasix.  Swelling does not go away as often now. Having trouble with breathing and swelling since 2016; no known cause.   Date of onset/duration of symptoms is  or before. Onset was gradual. Symptoms are constant. He denies history of similar symptoms. He describes their previous level of function as not limited. Has a Flexi-touch machine but did not get good results. Currently on prednisone which has reduced the pain.  He is going to try bariatric surgery.    Today:   Pt will be ordering Sigvaris compreflex lite.   Pt has been doing his exercises..  Pain Ratin/10         Objective:     Caregiver present: No    Observation of swelling: lower extremities  is better.     Volume measurements taken:  No  Volumes:  Lower Extremity Lymphedema  2018   R Big Toe (cm) 8.4 8.3   R 10cm from tip of second toe (cm) 27.2 26.4   R 20cm from tip of second toe (cm) 28.5 25.3   R 30cm from tip of second toe (cm) 33.1 30.9   R 40cm from tip of second toe (cm) 43.5 42   R 50cm from tip of second toe (cm) 45.5 44.6   R 60cm from tip of second toe (cm) 50 49.1   Right Lower Extremity Total Estimated Volume (cm3) 5939.99 5468.15   Right LE change of volume from last visit (%) - -7.94   Right LE change of volume from initial (%) - -7.94   L Big Toe (cm) 8.4 8.4   L 10cm from tip of second toe (cm) 27.9 25.5   L 20cm from tip of second toe (cm) 29 25.1   L 30cm from tip of second toe (cm) 33.4 30.2   L 40cm from tip of second toe (cm) 41.8 39.2   L 50cm from tip of second toe (cm) 43.6 42.4   L 60cm from tip of second toe (cm) 48.3 47.2   Left Lower Extremity Total Estimated Volume (cm3) 5682.62 5007.09   Left LE change of volume from last visit (%)  - -11.89   Left LE change of volume from initial (%) - -11.89   Swelling Description Right>Left Right>Left   Lower Extremity Swelling Comparison (%) 4.53 9.21         Skin condition is:  unchanged. Skin intact    Compression:none; off last night    Medication for infection:  No    Treatment Today            TREATMENT MINUTES COMMENTS   Evaluation     Self-care/ Home management 25 Helped pt again with sizing for stockings and velcro online.  Medical compression bandaging to bilateral lower extremities from forefoot to below the kne:   Applied  Eucerin skin lotion,  [place],   comprilan 01 (10x10) rolls.  Size G tube on top  Instructed to remove them if uncomfortable.   Manual therapy 30 Manual therapy: manual lymph drainage for bilateral lower extremities lymphedema  Deep abdominal breath, neck effleurage,  bilateral axilla, bilateral lateral trunk, bilateral inguinal, anterior inguinal to axilla anastomosis on right side and left side, left lower extremity evacuation, right lower extremity evacuation, abdomen.     Neuromuscular Re-education     Therapeutic Activity     Therapeutic Exercises   AROM with MLT   Gait training     Modality__________________                Total 55    Blank areas are intentional and mean the treatment did not include these items.       Liat Arana PTA, CLT  9/26/2018

## 2021-06-20 NOTE — PROGRESS NOTES
"  Health and Behavior Assessment with Intervention for  Bariatric Surgery Candidates    Name: Boogie Clark   YOB: 1950  Dates of Service: 2018, 2018  Psychological Testin2018  Report Date: 2018    Height: 5 feet 8-1/2 inches Reported Weight: 320 pounds  BMI: 47.9   Anticipated Weight: 200 pounds    Identifying Data: This is a 67 y.o. ,  father of 2 children (43 and 40). He was referred by Marcos Hoffman MD at [facility] to determine readiness for bariatric surgery from a psychosocial perspective. He learned about the surgery by attending the informational meeting and seeing his daughter through the process.    Reason for Pursuing Surgery: He would like to follow through with bariatric surgery for health reasons.  He noted \"I cannot breathe and nobody knows why.\"  In fact he is on oxygen at night.    Diagnostic Impressions:  Principal Diagnosis: F 53 psychological factors affecting medical condition  Secondary diagnoses: Morbid obesity, high cholesterol, high blood pressure, edema, shortness of breath, sleep apnea on CPAP, lymphedema, cancer, degenerative arthritis and pain in his back    Conclusions    Recommendations: Based on the information gathered from this evaluation, this patient is now ready to follow through with bariatric surgery as soon as possible. The final decision to follow through with bariatric surgery should be done in collaboration with [facility] clinical staff.    Current Treatment Plan and Aftercare Plan: This patient agrees to continue to prepare for surgery and to participate in aftercare as directed by [facility] clinical staff. This includes following up with this clinician 3-6 months post-operatively, attending the Conne[facility] support group meeting and continuing to make the lifestyle and eating changes such as documenting his eating, measuring his food, " "planning out his meals and increasing activity level.    Special Needs or Precautions: Monitor this patient's ability to avoid mindless eating.    Compliance, Motivation and Expectations (Change in Behavior): This patient has made significant changes in his eating and lifestyle. He is highly motivated to continue to make these changes post-operatively. He has realistic expectations about the surgery.     Self-Perception of Readiness for Surgery: This patient rated her readiness for surgery at a 7, where 10 means \"extremely well prepared.\"    The following history supports the above conclusions and treatment recommendations.    History of Presenting Illness: This patient has struggled with his weight all of his life.  In the [employer] he was between 145 and 190 pounds.  He reached 300 pounds one year ago and his highest weight was 320 pounds. He believes morbid obesity has affected his daily life through feeling self-conscious and embarrassed, having difficulty getting in and out of a chair, getting up from the floor, sitting in a christensen at a restaurant, getting in and out of a small car as well as low endurance and shortness of breath.    Previous Attempts at Disease Management: This patient tried weight watchers and at most lost 45 pounds.  He believes he gained weight over time because of decreased activity in which he broke his ankle.    Self-Care Behaviors  Day and Night Routine: This patient goes to sleep at 10 PM and wakes up at 7 AM.  He works on motorcycles and watches television.    Boundaries and Limit Setting: This patient denied any difficulty setting limits with others.    Self-Care and Treatment Adherence: This patient believes he can do a better job of taking care of himself.  His hygiene is good, he complies with medical advice given to him and gets regular physical and dental exams.    Stress Management and Coping Mechanisms: This patient rupinder with stress by getting loud and getting frustrated with " himself.  He does have a history of boredom eating.    Other Impulsive and Compulsive Tendencies  Gambling: Denied  Compulsive Spending: Denied  Credit Card Debt: Denied  Bankruptcy: Denied    Legal History: Denied     Physical and Leisure Activities: this patient works on motorcycles and walks on occasion.  He cannot walk much because of his oxygen levels.    Substance Use  OTC and Prescription Medications: This patient denied a history of medication abuse and reportedly takes his medications as directed.  Nicotine: This patient started smoking cigarettes at age 12 or 13, at most was up to a half pack per day and quit 18 months ago.    Alcohol: This patient started drinking alcohol at age 17 and now may have 1 drink per week.  He denied a history of alcohol-related problems and understands the increased risk of intoxication following a bariatric surgical procedure.  Illicit Substances: This patient tried cannabis in college on weekends and last did so 25 years ago.      Typical Eating Pattern and Fluid Intake  Eating Disorder-Related Behavior: This patient denied a history of misusing diuretics or laxatives, of making himself vomit to lose weight, of starving himself, of over-exercising, of taking illegal substances or of smoking cigarettes for weight control purposes.  He has a history of overeating, grazing and boredom eating.  Historically he tended to eat his first meal between 8 and 8:30 AM consisting of egg and half English muffin with butter.  Lunch was at 1 PM consisting of a ham sandwich, Burger Domingo whopper or quesadilla, steak and ribs at a restaurant.  Later he would have almonds.  Dinner was between 5 and 6 PM consisting of chicken, veggie pizza, fish, hamburger, pork chop and vegetables.  Later he would have chips and crackers.  He was having 2 cups of coffee, soda pop once per month, 48 ounces of water plus crystallite and 8 ounces of 2% milk on a daily basis.      Since starting the health and  "behavior assessment he was eating breakfast between 830 and 9 AM consisting of egg and half English muffin with butter.  Lunch was between 12 and 2 PM consisting of a sandwich and salad.  Later he would have animal crackers.  Dinner was between 5 and 6 PM consisting of meat fish, vegetables, pork chop, potatoes and later on a rare occasion would have chips, crackers, 2 tablespoons of almonds and an apple.  He was having 2 cups of coffee, 8 ounces of 2% milk, 1 can of soda pop per 2 weeks and between 36 and 48 ounces of water plus crystal lite on a daily basis.  He tended to lose control of his eating when he was bored and at a party and his comfort food included cheese and crackers.    Psychiatric History  Traumatic Life Events and Abuse/Neglect History: This patient denied a history of trauma as well as physical, emotional or sexual abuse.  He stated \"I notice I am overweight.\"  He has been put down and teased because of his physical condition all of his life.  He denied depressive symptoms.  When working as a  he built this along chores and felt anxious about it.  He denied panic or obsessive-compulsive symptoms.  He denied being in psychotherapy or on a psychotropic medication trial.    Medical History (by patient report and without consulting with his primary care physician). This patient is followed medically by Shabbir Agustin MD at [facility] who reportedly supports the patient's candidacy for bariatric surgery.    Allergies/Sensitivities: Penicillin  Prenatal Complications, Birth Trauma, Unusual Birth Weight: His birth mother  soon after this patient was born.  Head Trauma, Concussion, Extremely High Fevers, Seizures: Denied  Thyroid Function: Reportedly normal.  Hospitalizations and Surgeries: Bladder, ankle and 2 angioplasties  Current Medications:   Current Outpatient Prescriptions:      aspirin 81 MG EC tablet, Take 81 mg by mouth daily., Disp: , Rfl:      atorvastatin (LIPITOR) 40 MG " "tablet, Take 40 mg by mouth daily., Disp: , Rfl:      budesonide-formoterol (SYMBICORT) 80-4.5 mcg/actuation inhaler, Inhale 2 puffs as needed., Disp: , Rfl:      diltiazem (DILACOR XR) 180 MG 24 hr capsule, Take 180 mg by mouth daily., Disp: , Rfl:      diphenhydrAMINE (RESTFULLY SLEEP) 25 mg tablet, Take 25 mg by mouth at bedtime as needed for sleep., Disp: , Rfl:      furosemide (LASIX) 20 MG tablet, Take 20 mg by mouth daily., Disp: , Rfl:      melatonin 10 mg Tab, Take 1 tablet by mouth at bedtime., Disp: , Rfl:      metoprolol succinate (TOPROL-XL) 25 MG, Take 100 mg by mouth daily., Disp: , Rfl:      montelukast (SINGULAIR) 10 mg tablet, Take 10 mg by mouth daily., Disp: , Rfl:      multivitamin (ONE A DAY) per tablet, Take 1 tablet by mouth daily., Disp: , Rfl:      predniSONE (DELTASONE) 20 MG tablet, Take 20 mg by mouth daily., Disp: , Rfl:      tiotropium bromide 2.5 mcg/actuation Mist, Inhale 1 puff as needed., Disp: , Rfl:   Vitamins/Supplements: Multivitamin  Activities of Daily Living (ADLs): This patient has difficulty tying his shoes.  Attitudes, Fears, or Concerns Regarding this Surgery: This patient has no concerns about the surgery and understands the risks.    Family History  This patient has a family history that is positive for obesity, high cholesterol, heart disease, stroke, diabetes mellitus, cancer and alcoholism.    Social and Developmental History:  This patient was born and raised in [place], [place] and [place], [place].  His mother  after giving birth to this patient and his father could not take care of him and his brother.  His brother was in an orphanage and his aunt and uncle adopted this patient.  He has a 69-year-old brother who lives in [place] and they talk on occasion.  During his upbringing he found that he \"loved it and I had the best life I could have.\"  He stated that his aunt and uncle were quite [place] and strict in a fair way.    Educational History: " This patient graduated from [employer] in 1968.  He went to [employer].    Employment: This patient was a  for over 40 years and his last job was a .  Current Living Situation, Partner, and Children: This patient has been  for 45 years and is in a happy and supportive relationship.  He has 2 daughters (43 and 40).  He does live with his wife.  His support includes his wife, daughter and friends fiancé.  Presybeterian Orientation/Spiritual Support: Rastafari   History: [employer], honorably discharged and final Rank [employer]  Two Year Goals:  this patient would like to be down 100 pounds and walk 1 mile without a problem.    Psychological Testing: The Alcohol Use Disorders Identification Test (AUDIT) is a measure to determine how alcohol affects overall functioning and treatment. His score was   2 which is at a subclinical level suggesting that alcohol likely will not affect his functioning in the least.    The [place] Multiphasic Personality Gmrxpaing-6-Ipomiejmentg Form (MMPI-2-RF) was responded to in an open and honest manner and the profile is valid and interpretable.  Individuals with profiles similar to his tend to be under some stress and feel that there physical condition has negatively affected her functioning.  They may be stye or introverted in 10 to avoid certain social situations.    He scored a 0 on the adverse childhood experiences (ACE) questionnaire suggesting that he did not experience any history of abuse or trauma.    On the eating disorder examination questionnaire he scored at a clinically significant level for weight, eating and body shape concerns.    He did not score at [place] clinically significant level for night eating on the night eating questionnaire.  He also did not score any clinically significant level for binge eating on the binge eating scale.    Mental Status: This patient came to the evaluation setting dressed casually,  although appropriately. He was generally cooperative and responded appropriately to this clinician's questions. His affect was generally bright and His mood was consist with his affect. There is no evidence of obsessions, compulsions, suicidal ideation or homicidal ideation. There is no evidence of hallucinations, delusions, paranoid ideation, grossly inappropriate affect or other carlo manifestation of psychotic disorder. He was oriented to person, place and time, and there is no evidence of any problems with impulse control.

## 2021-06-20 NOTE — PROGRESS NOTES
[facility] Daily Progress     Patient Name: Boogie Clark  Date: 8/30/2018  Visit #: 2/12  PTA visit #:  -      Date of evaluation: 8/29/2018  Referral Diagnosis: Peripheral vascular disease (H)  I73.9 (ICD-10-CM) - Peripheral vascular disease (H)   M79.89 (ICD-10-CM) - Leg swelling   I89.0 (ICD-10-CM) - Acquired lymphedema of leg   I87.2 (ICD-10-CM) - Venous insufficiency of both lower extremities   E66.01, Z68.41 (ICD-10-CM) - Morbid obesity with BMI of 40.0-44.9, adult (H)   C67.9 (ICD-10-CM) - Malignant neoplasm of bladder (H)   Z87.891 (ICD-10-CM) - History of tobacco abuse   G47.30 (ICD-10-CM) - Sleep apnea         Referring provider: Jenni Kasper    Visit Diagnosis:     ICD-10-CM    1. Acquired lymphedema of leg I89.0    2. Leg swelling M79.89    3. Venous insufficiency of both lower extremities I87.2    4. Morbid obesity with BMI of 40.0-44.9, adult (H) E66.01     Z68.41        Diagnoses and all orders for this visit:    Acquired lymphedema of leg    Leg swelling    Venous insufficiency of both lower extremities    Morbid obesity with BMI of 40.0-44.9, adult (H)             Assessment:     Good tolerance to tubes.  Bilateral lower extremities edema.Skin is intact.  Patient demonstrates understanding/independence with home program.  Patient is benefitting from skilled physical therapy and is making steady progress toward functional goals.  Patient is appropriate to continue with skilled physical therapy intervention, as indicated by initial plan of care.    Goal Status:  Patient will have a decreased volume in : bilateral;LE;by 5%;Progressing toward  Patient will have decreased fibrosis, scar tissue for improved lymphatic mobilitiy : in 4 weeks;Progressing toward  Patient will perform, verbalize self-management of: skin care;self-monitoring;exercise;massage;self-compression;compression wear;compression care;infection prevention;in 4 weeks;Progressing toward    Plan / Patient Education:  "    Continue with initial plan of care. Continue with manual lymph drainage, medical compression bandages, exercises, skin care, and patient education.Coordinate compression garment fitting.    Subjective:     Pain Ratin  \"my legs feel good\".      Objective:     Caregiver present: No    Observation of swelling: lower extremities  is unchanged.     Volume measurements taken:  No      Skin condition is:  unchanged    Compression:  Tubes.    Medication for infection:  No    Treatment Today     Patient signed ABN for the bandaging supplies: yes.   Provided 2 rolls of comprilan (10x10), 01 rolls of artiflex. 01 set per limb.  New compression garment: has orders.  Need to  coordinate appointment for fitting      TREATMENT MINUTES COMMENTS   Evaluation     Self-care/ Home management 25 Medical compression bandaging to bilateral lower extremities from forefoot to below the kne: Applied  Eucerin skin lotion, stockinet, ,rosidal soft 01 roll, comprilan 01 (10x10) rolls.Used a small piece of comperm LF size \"G\" on top to protect bandages.  Instructed to remove them if uncomfortable.   Manual therapy 30 Manual therapy: manual lymph drainage for bilateral lower extremities lymphedema  Deep abdominal breath, neck effleurage, short neck, shoulder collectors, bilateral axilla, bilateral inguinal, anterior inguinal to axilla anastomosis on right side and left side, left lower extremity evacuation, right lower extremity evacuation, abdomen, neck effleurage.     Neuromuscular Re-education     Therapeutic Activity     Therapeutic Exercises 5 Instructed and performed lymphedema exercises in a seated position and standing.   Gait training     Modality__________________                Total 60    Blank areas are intentional and mean the treatment did not include these items.       Belkis Gresham PT, VAISHALI-GLENIS  2018    "

## 2021-06-20 NOTE — PROGRESS NOTES
[facility] Daily Progress     Patient Name: Boogie Clark  Date: 9/12/2018  Visit #: 5/12  PTA visit #:  1      Date of evaluation: 8/29/2018  Referral Diagnosis: Peripheral vascular disease (H)  I73.9 (ICD-10-CM) - Peripheral vascular disease (H)   M79.89 (ICD-10-CM) - Leg swelling   I89.0 (ICD-10-CM) - Acquired lymphedema of leg   I87.2 (ICD-10-CM) - Venous insufficiency of both lower extremities   E66.01, Z68.41 (ICD-10-CM) - Morbid obesity with BMI of 40.0-44.9, adult (H)   C67.9 (ICD-10-CM) - Malignant neoplasm of bladder (H)   Z87.891 (ICD-10-CM) - History of tobacco abuse   G47.30 (ICD-10-CM) - Sleep apnea         Referring provider: Jenni Kasper    Visit Diagnosis:     ICD-10-CM    1. Acquired lymphedema of leg I89.0    2. Leg swelling M79.89    3. Venous insufficiency of both lower extremities I87.2        Diagnoses and all orders for this visit:    Acquired lymphedema of leg    Leg swelling    Venous insufficiency of both lower extremities             Assessment:     Patient demonstrates understanding/independence with home program.  Patient is benefitting from skilled physical therapy and is making steady progress toward functional goals.  Patient is appropriate to continue with skilled physical therapy intervention, as indicated by initial plan of care.    Goal Status:  Patient will have a decreased volume in : bilateral;LE;by 5%;Progressing toward  Patient will have decreased fibrosis, scar tissue for improved lymphatic mobilitiy : in 4 weeks;Progressing toward  Patient will perform, verbalize self-management of: skin care;self-monitoring;exercise;massage;self-compression;compression wear;compression care;infection prevention;in 4 weeks;Progressing toward    Plan / Patient Education:   Pt to get compression  Re-measure  Continue MLT/ wraps until stockings/velcro received  Check exercises            Subjective:   History of Present Illness:    Boogie is a 67 y.o. male who presents to  "therapy today with complaints of bilateral leg swelling usually left> right.  Swelling is better in the morning.  He is on Lasix.  Swelling does not go away as often now. Having trouble with breathing and swelling since 2016; no known cause.   Date of onset/duration of symptoms is  or before. Onset was gradual. Symptoms are constant. He denies history of similar symptoms. He describes their previous level of function as not limited. Has a Flexi-touch machine but did not get good results. Currently on prednisone which has reduced the pain.  He is going to try bariatric surgery.    Today:  Bandages off last night.   Wants the Velcro garments.  [facility]  Pt states his legs are feeling good.   Pt has been doing his exercises..  Pain Ratin/10         Objective:     Caregiver present: No    Observation of swelling: lower extremities  is better.     Volume measurements taken:  No      Skin condition is:  unchanged. Skin intact    Compression:none.    Medication for infection:  No    Treatment Today            TREATMENT MINUTES COMMENTS   Evaluation     Self-care/ Home management 25 Medical compression bandaging to bilateral lower extremities from forefoot to below the kne:   Applied  Eucerin skin lotion,  Tube G,  rosidal soft 01 roll,   comprilan 01 (10x10) rolls.  Used a small piece of  size \"J\" on top to protect bandages.  Instructed to remove them if uncomfortable.   Manual therapy 30 Manual therapy: manual lymph drainage for bilateral lower extremities lymphedema  Deep abdominal breath, neck effleurage, short neck, shoulder collectors, bilateral axilla, bilateral inguinal, anterior inguinal to axilla anastomosis on right side and left side, left lower extremity evacuation, right lower extremity evacuation, abdomen, neck effleurage.     Neuromuscular Re-education     Therapeutic Activity     Therapeutic Exercises   AROM with MLT   Gait training     Modality__________________   "              Total 55    Blank areas are intentional and mean the treatment did not include these items.       Liat Arana PTA, CLT  9/12/2018

## 2021-06-21 NOTE — PROGRESS NOTES
[facility] Daily Progress     Patient Name: Boogie Clark  Date: 10/24/2018  Visit #: 10/12  PTA visit #:  2      Date of evaluation: 8/29/2018    Referral Diagnosis: Peripheral vascular disease (H)  I73.9 (ICD-10-CM) - Peripheral vascular disease (H)   M79.89 (ICD-10-CM) - Leg swelling   I89.0 (ICD-10-CM) - Acquired lymphedema of leg   I87.2 (ICD-10-CM) - Venous insufficiency of both lower extremities   E66.01, Z68.41 (ICD-10-CM) - Morbid obesity with BMI of 40.0-44.9, adult (H)   C67.9 (ICD-10-CM) - Malignant neoplasm of bladder (H)   Z87.891 (ICD-10-CM) - History of tobacco abuse   G47.30 (ICD-10-CM) - Sleep apnea         Referring provider: Jenni Kasper    Visit Diagnosis:     ICD-10-CM    1. Acquired lymphedema of leg I89.0    2. Leg swelling M79.89    3. Venous insufficiency of both lower extremities I87.2    4. Morbid obesity with BMI of 40.0-44.9, adult (H) E66.01     Z68.41    5. Lymphedema I89.0        Diagnoses and all orders for this visit:    Acquired lymphedema of leg    Leg swelling    Venous insufficiency of both lower extremities    Morbid obesity with BMI of 40.0-44.9, adult (H)    Lymphedema           Assessment:      Pt with bruising around his R knee and thigh.  Subjective report of falling in his home. He tripped over his shoes.   Tender R medial knee.    Skin was intact, decreased low legs edema.         Goal Status:  Patient will have a decreased volume in : bilateral;LE;by 5%;Progressing toward  Patient will have decreased fibrosis, scar tissue for improved lymphatic mobilitiy : in 4 weeks;Met  Patient will perform, verbalize self-management of: skin care;self-monitoring;exercise;massage;self-compression;compression wear;compression care;infection prevention;in 4 weeks;Progressing toward    Plan / Patient Education:     Patient is going to try the compression stockings 20-30 during the day time  And off at night.  Pt will order velcro garments.  Pt would like to borrow  garment for another week until he gets his garment.  Pt instructed to ice his R knee 15' 2x/day.         Subjective:   History of Present Illness:    Boogie is a 67 y.o. male who presents to therapy today with complaints of bilateral leg swelling usually left> right.  Swelling is better in the morning.  He is on Lasix.  Swelling does not go away as often now. Having trouble with breathing and swelling since 2016; no known cause.   Date of onset/duration of symptoms is  or before. Onset was gradual. Symptoms are constant. He denies history of similar symptoms. He describes their previous level of function as not limited. Has a Flexi-touch machine but did not get good results. Currently on prednisone which has reduced the pain.  He is going to try bariatric surgery.    Pt liked th Sigvaris velcro garment. Pt reports he liked it and it helped.  Pt will purchase the garment on line when he gets his credit for the stockings. Pt is going through a website called [employer] vs [employer]. Pt states the price is better through [employer].  Pt has been compliant with his HEP and walking some.    Pain Ratin/10         Objective:     Caregiver present: No    Observation of swelling: lower extremities  is better.     Volume measurements taken:  no  Lower Extremity Lymphedema  2018 2018 10/10/2018   R Big Toe (cm) 8.4 8.3 8.5   R 10cm from tip of second toe (cm) 27.2 26.4 27.2   R 20cm from tip of second toe (cm) 28.5 25.3 28.5   R 30cm from tip of second toe (cm) 33.1 30.9 35.9   R 40cm from tip of second toe (cm) 43.5 42 43.6   R 50cm from tip of second toe (cm) 45.5 44.6 46.1   R 60cm from tip of second toe (cm) 50 49.1 50.8   Right Lower Extremity Total Estimated Volume (cm3) 5939.99 5468.15 6177.96   Right LE change of volume from last visit (%) - -7.94 12.98   Right LE change of volume from initial (%) - -7.94 4.01   L Big Toe (cm) 8.4 8.4 8.7   L 10cm from tip of second toe (cm) 27.9 25.5 27   L  20cm from tip of second toe (cm) 29 25.1 29.3   L 30cm from tip of second toe (cm) 33.4 30.2 34.9   L 40cm from tip of second toe (cm) 41.8 39.2 41.5   L 50cm from tip of second toe (cm) 43.6 42.4 43.1   L 60cm from tip of second toe (cm) 48.3 47.2 49.1   Left Lower Extremity Total Estimated Volume (cm3) 5682.62 5007.09 5734.72   Left LE change of volume from last visit (%) - -11.89 14.53   Left LE change of volume from initial (%) - -11.89 0.92   Swelling Description Right>Left Right>Left Right>Left   Lower Extremity Swelling Comparison (%) 4.53 9.21 7.73           Skin condition is:   Skin intact    Compression: comprilan on R, Sigvarfis garment on L    Medication for infection:  No    Treatment Today            TREATMENT MINUTES COMMENTS   Evaluation     Self-care/ Home management 30  Velcro garment R LE.Medical compression bandaging to L  lower extremity from forefoot to below the knee:   Applied  Eucerin skin lotion,  Srockinette, cotton roll ankle to knee  comprilan 01 (10x10) rolls.  Size G tube on top  Instructed to remove them if uncomfortable.   Manual therapy 30   Manual therapy: manual lymph drainage for bilateral lower extremities lymphedema  Deep abdominal breath, neck effleurage,  bilateral axilla, bilateral lateral trunk, bilateral inguinal, anterior inguinal to axilla anastomosis on right side and left side, left lower extremity evacuation, right lower extremity evacuation, abdomen.     Neuromuscular Re-education     Therapeutic Activity     Therapeutic Exercises      Gait training     Modality__________________                Total 60    Blank areas are intentional and mean the treatment did not include these items.       Ninfa Miner PTA,CLT  10/24/2018

## 2021-06-21 NOTE — PROGRESS NOTES
I have submitted a prior authorization on behalf of this patient to [employer] to be approved for a LSG with Dr. Marcos Hoffman.

## 2021-06-21 NOTE — PROGRESS NOTES
3 month check-- bilateral leg lymphedema, compression, bariatric.Did therpy,wearing velcro compression Q day, Working on bairatric surgery with Dr Hoffman. May schedule January. Down 15lbs. Has trip to [place] planned in Dec 16th. Swelling has improved 20% >.

## 2021-06-21 NOTE — PROGRESS NOTES
Date of Service: 2018     Date last seen by Dr. Kasper:  2018    PCP: Shabbir Agustin MD    Impression:  1. Leg swelling-much improved  2. Venous insufficiency and hypertension of the legs bilaterally-much improved  3. Acquired lymphedema of both legs-much improved  4. Morbid obesity  5. Bladder carcinoma (2013 surgery and BCG)  6. PAD     Plan:  1. Questions were answered.    2. Venous insufficiency study to check venous status-reviewed  3. Continue to work with bariatrics.  4. Continue to follow with Dr. Casillas  5. Continue exercise and compression.  6. Patient will follow up in 8 months or when needed.     Time spent with patient 15 minutes with greater than 50% time in consultation, education and coordination of care, excluding procedures.     ---------------------------------------------------------------------------------------------------------------------    Chief Complaint: Bilateral leg swelling     History of Present Illness:     Boogie Clark returns to the [facility] for his bilateral leg swelling, mainly on the left felt to be due to venous insufficiency with hypertension and acquired lymphedema of both legs complicated by history of bladder carcinoma, peripheral arterial disease and morbid obesity.  He has been working with Dr. Hoffman on bariatric surgery and will hopefully be scheduled for January.  He is working on his weight and is down 15 pounds.  He had lymphatic therapy and is now wearing his compression stockings daily.  He is here for follow-up.  He says the swelling is much better,  He is pleased.  He wears his velcro compression.  He is very compliant and likes it.   There has been no new numbness, tingling or weakness.  There have been no new masses, rashes, or swellings of any other joints. There has been no new decrease in appetite, unexplained weight loss, abdominal bloating and bowel or bladder changes.      Past Medical  History:   Diagnosis Date     Ankle pain      Arthritis     knees     Cancer (H)     history of bladder cancer BCG treatment     DDD (degenerative disc disease), cervical      Dyslipidemia      History of kidney stones      History of MRSA infection      History of tobacco abuse     age 14 to age 66 up to 1/2 ppd (26 pack years)     Hypertension      Increased PTH level 8/18/2018     Infectious viral hepatitis      Lower leg edema     lymphedema     Metabolic syndrome      Morbid obesity with BMI of 50.0-59.9, adult (H)      Peripheral arteriosclerosis (H)     Bilateral Femoral angioplasty     Pulmonary arterial hypertension (H)      Sleep apnea     uses CPAP with oxygen 3L at night       Past Surgical History:   Procedure Laterality Date     ANGIOPLASTY       ankle      right ankle surgery ORIF Distal Fibula Right     BLADDER TUMOR EXCISION       KNEE SURGERY Bilateral     knee meniscus surgery, awhile ago     tonsillectomy       VASECTOMY           Current Outpatient Medications:      aspirin 81 MG EC tablet, Take 81 mg by mouth daily., Disp: , Rfl:      atorvastatin (LIPITOR) 40 MG tablet, Take 40 mg by mouth daily., Disp: , Rfl:      budesonide-formoterol (SYMBICORT) 80-4.5 mcg/actuation inhaler, Inhale 2 puffs as needed., Disp: , Rfl:      diltiazem (DILACOR XR) 180 MG 24 hr capsule, Take 180 mg by mouth daily., Disp: , Rfl:      diphenhydrAMINE (RESTFULLY SLEEP) 25 mg tablet, Take 25 mg by mouth at bedtime as needed for sleep., Disp: , Rfl:      furosemide (LASIX) 20 MG tablet, Take 10 mg by mouth daily .   , Disp: , Rfl:      melatonin 10 mg Tab, Take 1 tablet by mouth at bedtime., Disp: , Rfl:      multivitamin (ONE A DAY) per tablet, Take 1 tablet by mouth daily Plus vit D.   , Disp: , Rfl:      predniSONE (DELTASONE) 20 MG tablet, Take 10 mg by mouth daily Taking 10mg., Disp: , Rfl:      metoprolol succinate (TOPROL-XL) 25 MG, Take 100 mg by mouth daily., Disp: , Rfl:      montelukast (SINGULAIR) 10 mg  tablet, Take 10 mg by mouth daily., Disp: , Rfl:      tiotropium bromide 2.5 mcg/actuation Mist, Inhale 1 puff as needed., Disp: , Rfl:     Allergies   Allergen Reactions     Penicillins Other (See Comments)     Childhood allergy, mouth and eye swelling       Social History     Socioeconomic History     Marital status:      Spouse name: Not on file     Number of children: Not on file     Years of education: Not on file     Highest education level: Not on file   Social Needs     Financial resource strain: Not on file     Food insecurity - worry: Not on file     Food insecurity - inability: Not on file     Transportation needs - medical: Not on file     Transportation needs - non-medical: Not on file   Occupational History     Not on file   Tobacco Use     Smoking status: Former Smoker     Years: 52.00     Smokeless tobacco: Never Used   Substance and Sexual Activity     Alcohol use: Yes     Alcohol/week: 0.0 - 0.6 oz     Comment: Seldom      Drug use: No     Sexual activity: Yes     Partners: Female   Other Topics Concern     Not on file   Social History Narrative     to Felicia Lopez.    Daughter Jeanette, Daughter Yarelis    Retired .       Family History   Adopted: Yes   Problem Relation Age of Onset     Cancer Mother      Cancer Father      Breast cancer Maternal Aunt        Review of Systems:    Boogie Clark no new numbess, tingling or weakness, redness or rashes, fevers, new masses, abdominal bloating or discomfort, unexplained weight loss, increased pain, new ulcers, shortness of breath and chest pain  Full 12 point review of systems was completed.    Imaging:    I personally reviewed the following imaging today and those on care everywhere, if indicated    [facility] OUTPATIENT  8/23/2018     EXAM: BILATERAL LOWER EXTREMITY DEEP AND SUPERFICIAL VENOUS DUPLEX ULTRASOUND WITH PHYSIOLOGIC TESTING     INDICATION: Bilateral lower extremity pain and swelling. Symptomatic varicose veins.  Assess for incompetent veins.     TECHNIQUE: Supine and upright ultrasound of the deep and superficial veins with Valsalva and compression augmentation maneuvers. Duplex imaging is performed utilizing gray-scale, two-dimensional images, color-flow imaging, Doppler waveform analysis, and   spectral Doppler imaging.      INCOMPETENCY CRITERIA: Deep vein reflux reported when greater than 1,000 ms flow reversal.  Superficial vein reflux reported when greater than 500 ms flow reversal.  vein reflux reported as greater than 350 ms flow reversal.     DEEP VEIN FINDINGS:     RIGHT LEG: The common femoral, profunda femoral, femoral, popliteal, and visualized calf veins are patent and compressible.  Incompetent right common femoral vein.     LEFT LEG:  The common femoral, profunda femoral, femoral, popliteal, and visualized calf veins are patent and compressible.   Incompetent left common femoral vein.     RIGHT SUPERFICIAL VEIN FINDINGS:  GREAT SAPHENOUS VEIN: Incompetent right great saphenous vein from the saphenofemoral junction down to the mid calf. The right great saphenous vein measures 9 mm at the saphenofemoral junction, 7 mm proximal thigh, 6 mm knee and 4 mm mid calf.     SMALL SAPHENOUS VEIN: Competent from the saphenopopliteal junction to the mid calf.     No incompetent perforating veins or abnormal accessory veins identified.     LEFT SUPERFICIAL VEIN FINDINGS:  GREAT SAPHENOUS VEIN: Segmental incompetency of the left great saphenous vein at the saphenofemoral junction, proximal thigh and knee. The left great saphenous vein measures 8 mm at the saphenofemoral junction, 8 mm proximal thigh, 5 mm knee and 3 mm mid   calf.     SMALL SAPHENOUS VEIN: Competent from the saphenopopliteal junction to the mid calf.     No incompetent perforating veins or abnormal accessory veins identified.     Incidental note of 4.4 x 2.2 x 1.7 cm left popliteal cyst.     IMPRESSION:   CONCLUSION:   1.  No deep venous  thrombosis of either lower extremity. Incompetent bilateral common femoral veins.  2.  RIGHT LEG: Incompetent right great saphenous vein from the saphenofemoral junction all away down to the mid calf.  3.  LEFT LEG: Segmental incompetency of the left great saphenous vein from the saphenofemoral junction all way down to the knee.    Labs:    I personally reviewed the following labs today and those on care everywhere, if indicated    No results found for: SEDRATE      No results found for: CRP        Lab Results   Component Value Date    CREATININE 1.08 08/03/2018      Lab Results   Component Value Date    HGBA1C 5.9 08/03/2018           Lab Results   Component Value Date    BUN 27 (H) 08/03/2018              Lab Results   Component Value Date    ALBUMIN 3.5 08/03/2018       Vitamin D, Total (25-Hydroxy)   Date Value Ref Range Status   08/03/2018 31.3 30.0 - 80.0 ng/mL Final       Lab Results   Component Value Date    TSH 0.31 08/03/2018     Lab Results   Component Value Date    WBC 13.8 (H) 08/03/2018    HGB 14.5 08/03/2018    HCT 43.2 08/03/2018    MCV 89 08/03/2018     08/03/2018         Physical Exam:  Vitals:    11/19/18 0920   BP: 120/80   Pulse: 80   Resp: 12   Temp: 98.2  F (36.8  C)      BMI 40.93    Circumferential measures:    Vasc Edema 8/20/2018 11/19/2018   Right just above MTP 26.6 26.5   Right Ankle 29 28.5   Right Widest Calf 46.5 43.5   Right Thigh Up 10cm 64 57   Left - just above MTP 27.8 26.8   Left Ankle 30.2 27.5   Left Widest Calf 43.8 40.5   Left Thigh Up 10cm 62.2 53     Measures significantly improved.    General:  67 y.o. male in no apparent distress.      Psych: Alert and oriented x 3.  Cooperative. Affect normal.    Musculoskeletal:  Normal range of motion in knees and ankles bilaterally throughout.  There is no active joint synovitis, erythema, swelling or joint laxity.     Neurological:  Sensation is decreased to pin prick and light touch in a stocking distribution.  Strength  testing is normal in ankle dorsiflexion and great toe extension bilaterally.       Vascular: Dorsalis pedis and posterior tibialis pulses are strong and equal. There are slight telangietasias, medial ankle venous flares, venous varicosities  and spider veins .      Integumentary: Skin of the legs is uniformly warm and dry, and hyperpigmentated, with hyperkeratosis and keratoderma and with positive Stemmer's Sign in both legs. Legs are much softer.  Nails are normal      Jenni Kasper MD, ABWMS, FACCWS, [facility]  Medical Director Wound Care and Lymphedema  [facility]  [phone]

## 2021-06-21 NOTE — PROGRESS NOTES
HPI: Boogie Clark is a 67 y.o. male here today for consideration of metabolic and bariatric surgery. He is referred by Dr. Agustin.  He has struggled with obesity for most of his life.  He has had some success in the past with weight watchers.  At one time he is able to lose 60 pounds but unfortunately regained most of that.  His biggest concern is around being able to breathe.  He has significant shortness of breath with minimal exertion.  He has been worked up for this in the past.  He states that he has been diagnosed as having pulmonary hypertension probably secondary to his weight.  He told me that he does not have COPD though he is a former smoker.  He does use inhalers on occasion but does not take them regularly.  He cannot climb 1 flight of stairs due to shortness of breath.  He also suffers from significant chronic lymphedema for which he sees Dr. Liza Kasper at the [facility] vascular wound clinic.  His legs have been much better since he is gotten special wraps for his legs.  He does use Lasix for this.  Also has chronic pain in his legs particularly in his thighs.  He was started on prednisone for this which seems to help.  It was a little unclear to me in discussions with him today what exactly what the etiology of that pain is but it seems to be exacerbated by his weight.  He also has a history of hypertension which is controlled with medication.  He states he has had heart studies which have been unremarkable.      Allergies:Penicillins    Past Medical History:   Diagnosis Date     Ankle pain      Arthritis     knees     Cancer (H)     history of bladder cancer BCG treatment     DDD (degenerative disc disease), cervical      Dyslipidemia      History of kidney stones      History of MRSA infection      History of tobacco abuse     age 14 to age 66 up to 1/2 ppd (26 pack years)     Hypertension      Increased PTH level 8/18/2018     Infectious viral hepatitis      Lower leg edema     lymphedema      Metabolic syndrome      Morbid obesity with BMI of 50.0-59.9, adult (H)      Peripheral arteriosclerosis (H)     Bilateral Femoral angioplasty     Pulmonary arterial hypertension (H)      Sleep apnea     uses CPAP with oxygen 3L at night       Past Surgical History:   Procedure Laterality Date     ANGIOPLASTY       ankle      right ankle surgery ORIF Distal Fibula Right     BLADDER TUMOR EXCISION       KNEE SURGERY Bilateral     knee meniscus surgery, awhile ago     tonsillectomy       VASECTOMY         CURRENT MEDS:  Current Outpatient Prescriptions   Medication Sig Dispense Refill     aspirin 81 MG EC tablet Take 81 mg by mouth daily.       atorvastatin (LIPITOR) 40 MG tablet Take 40 mg by mouth daily.       budesonide-formoterol (SYMBICORT) 80-4.5 mcg/actuation inhaler Inhale 2 puffs as needed.       diltiazem (DILACOR XR) 180 MG 24 hr capsule Take 180 mg by mouth daily.       diphenhydrAMINE (RESTFULLY SLEEP) 25 mg tablet Take 25 mg by mouth at bedtime as needed for sleep.       furosemide (LASIX) 20 MG tablet Take 20 mg by mouth daily.       melatonin 10 mg Tab Take 1 tablet by mouth at bedtime.       metoprolol succinate (TOPROL-XL) 25 MG Take 100 mg by mouth daily.       montelukast (SINGULAIR) 10 mg tablet Take 10 mg by mouth daily.       multivitamin (ONE A DAY) per tablet Take 1 tablet by mouth daily.       predniSONE (DELTASONE) 20 MG tablet Take 20 mg by mouth daily.       tiotropium bromide 2.5 mcg/actuation Mist Inhale 1 puff as needed.       No current facility-administered medications for this visit.          Family History   Problem Relation Age of Onset     Adopted: Yes     Cancer Mother      Cancer Father      Breast cancer Maternal Aunt         reports that he has quit smoking. He quit after 52.00 years of use. He has never used smokeless tobacco. He reports that he drinks alcohol. He reports that he does not use illicit drugs.    Review of Systems -  A 12 point ROS was reviewed and except for  "what is listed in the HPI above, all others are negative  PSYCHIATRIC: He has undergone a lifestyle assessment and has been deemed a good candidate for bariatric surgery by the psychologist.    /81  Pulse 74  Resp 18  Ht 5' 8.5\" (1.74 m)  Wt (!) 312 lb (141.5 kg)  SpO2 93%  BMI 46.75 kg/m2  Wt Readings from Last 3 Encounters:   11/06/18 (!) 312 lb (141.5 kg)   09/21/18 (!) 313 lb (142 kg)   08/31/18 (!) 385 lb (174.6 kg)     Body mass index is 46.75 kg/(m^2).    EXAM:  GENERAL: This is a well-developed 67 y.o. male who appears his stated age  HEAD & NECK: Grossly normal.  No palpable thyroid lesions  CARDIAC: RRR without murmur  CHEST/LUNG:  Clear to auscultation  ABDOMEN: Very centrally obese.  Nontender.  No hernias or masses appreciated.  LYMPHATIC:  No significant adenopathy appreciated.    EXTREMITIES: He has compression garments in place.  These were not removed today.  NEUROLOGIC: Focally intact  INTEGUMENT: No open lesions or ulcers  PSYCHIATRIC: Normal affect. He has a good grasp on the nature of his obesity and the treatment options.    LABS:  Lab Results   Component Value Date    WBC 13.8 (H) 08/03/2018    HGB 14.5 08/03/2018    HCT 43.2 08/03/2018    MCV 89 08/03/2018     08/03/2018     INR/Prothrombin Time      Lab Results   Component Value Date    HGBA1C 5.9 08/03/2018     Lab Results   Component Value Date    ALT 27 08/03/2018    AST 17 08/03/2018    ALKPHOS 81 08/03/2018    BILITOT 0.5 08/03/2018       Assessment/Plan: 67 y.o. male who is an excellent candidate for bariatric and metabolic surgery.  After a careful conversation with the patient it was decided that a laparoscopic sleeve gastrectomy would be his best option.     I did mention to him that given his central obesity the surgery could be rather challenging.  I would like him to lose weight before the surgery and had our dietitian visit with him today.    I went over the surgery in detail with him.  I went over the nature " of the operation and some of the potential consequences of the surgery.  I went over the expected hospital course and discussed laparoscopic versus open surgery, understanding that we will plan on doing this laparoscopically with the possibility of having to convert to an open operation.  I went over some of the risks and complications of the operation including, but not limited to, DVT, pulmonary emboli, pneumonia, postoperative bleeding, wound infection, staple line leak, intra-abdominal sepsis, and possible death.  I also went over some of the potential nutritional concerns such as vitamin B-12, iron, vitamin D, vitamin A, calcium and protein deficiencies.  I will also went over the need for lifelong nutritional surveillance.  The patient understands and wants to proceed with surgery.  We will submit for prior authorization.      Marcos Hoffman MD  [facility] Department of Surgery

## 2021-06-21 NOTE — PROGRESS NOTES
[facility] Daily Progress     Patient Name: Boogie Clark  Date: 11/14/2018  Visit #: 12/12  PTA visit #:  2      Date of evaluation: 8/29/2018    Referral Diagnosis: Peripheral vascular disease (H)  I73.9 (ICD-10-CM) - Peripheral vascular disease (H)   M79.89 (ICD-10-CM) - Leg swelling   I89.0 (ICD-10-CM) - Acquired lymphedema of leg   I87.2 (ICD-10-CM) - Venous insufficiency of both lower extremities   E66.01, Z68.41 (ICD-10-CM) - Morbid obesity with BMI of 40.0-44.9, adult (H)   C67.9 (ICD-10-CM) - Malignant neoplasm of bladder (H)   Z87.891 (ICD-10-CM) - History of tobacco abuse   G47.30 (ICD-10-CM) - Sleep apnea         Referring provider: Jenni Kasper    Visit Diagnosis:     ICD-10-CM    1. Acquired lymphedema of leg I89.0    2. Leg swelling M79.89    3. Venous insufficiency of both lower extremities I87.2        Diagnoses and all orders for this visit:    Acquired lymphedema of leg    Leg swelling    Venous insufficiency of both lower extremities             Assessment:   Legs looking good.  Volumes down 5% on the right and 12% on the left with 13% difference right> left.  Skin was intact, decreased lower legs edema.  Pt doing well with home management.  Good fit of garments.  REady for d/c.  Goal Status:  Patient will have a decreased volume in : bilateral;LE;by 5%;Partially met  Patient will have decreased fibrosis, scar tissue for improved lymphatic mobilitiy : in 4 weeks;Met  Patient will perform, verbalize self-management of: skin care;self-monitoring;exercise;massage;self-compression;compression wear;compression care;infection prevention;in 4 weeks;Met      Plan / Patient Education:      Hold PT       Subjective:   History of Present Illness:    Boogie is a 67 y.o. male who presents to therapy today with complaints of bilateral leg swelling usually left> right.  Swelling is better in the morning.  He is on Lasix.  Swelling does not go away as often now. Having trouble with breathing  and swelling since 2016; no known cause.   Date of onset/duration of symptoms is  or before. Onset was gradual. Symptoms are constant. He denies history of similar symptoms. He describes their previous level of function as not limited. Has a Flexi-touch machine but did not get good results. Currently on prednisone which has reduced the pain.  He is going to try bariatric surgery.     Today:  Got his velcro garments. Velcro is hard to get on due to weight.  He loves having them on.  Wearing them every day, nothing at night.  Pain Ratin/10         Objective:     Caregiver present: No    Observation of swelling: lower extremities  is better.     Volume measurements taken:  Yes  Lower Extremity Lymphedema  2018 2018 10/10/2018 10/31/2018 2018   R Big Toe (cm) 8.4 8.3 8.5 8.1 8.2   R 10cm from tip of second toe (cm) 27.2 26.4 27.2 27.1 26.5   R 20cm from tip of second toe (cm) 28.5 25.3 28.5 26.1 25.6   R 30cm from tip of second toe (cm) 33.1 30.9 35.9 33.5 32.5   R 40cm from tip of second toe (cm) 43.5 42 43.6 43.1 41.8   R 50cm from tip of second toe (cm) 45.5 44.6 46.1 45.2 45.5   R 60cm from tip of second toe (cm) 50 49.1 50.8 50.1 50.4   Right Lower Extremity Total Estimated Volume (cm3) 5939.99 5468.15 6177.96 5806.71 5667.14   Right LE change of volume from last visit (%) - -7.94 12.98 -6.01 -2.4   Right LE change of volume from initial (%) - -7.94 4.01 -2.24 -4.59   L Big Toe (cm) 8.4 8.4 8.7 8.2 8.5   L 10cm from tip of second toe (cm) 27.9 25.5 27 26.2 26.4   L 20cm from tip of second toe (cm) 29 25.1 29.3 26.4 25.7   L 30cm from tip of second toe (cm) 33.4 30.2 34.9 33 31.1   L 40cm from tip of second toe (cm) 41.8 39.2 41.5 40.8 39.2   L 50cm from tip of second toe (cm) 43.6 42.4 43.1 42.6 41.7   L 60cm from tip of second toe (cm) 48.3 47.2 49.1 48.6 46.2   Left Lower Extremity Total Estimated Volume (cm3) 5682.62 5007.09 5734.72 5383.9 5012.29   Left LE change of volume from last  visit (%) - -11.89 14.53 -6.12 -6.9   Left LE change of volume from initial (%) - -11.89 0.92 -5.26 -11.8   Swelling Description Right>Left Right>Left Right>Left Right>Left Right>Left   Lower Extremity Swelling Comparison (%) 4.53 9.21 7.73 7.85 13.06               Skin condition is:   Skin intact    Compression: velcro garments    Medication for infection:  No    Treatment Today            TREATMENT MINUTES COMMENTS   Evaluation     Self-care/ Home management 15  Reviewed home program; checked fit of velcro. Reviewed wear, care and donning.   Manual therapy 15 Pt re measured.          Neuromuscular Re-education     Therapeutic Activity     Therapeutic Exercises      Gait training     Modality__________________                Total 30    Blank areas are intentional and mean the treatment did not include these items.       Liat Arana PT,CLT  11/14/2018

## 2021-06-21 NOTE — PROGRESS NOTES
[facility] Daily Progress     Patient Name: Boogie Clark  Date: 10/31/2018  Visit #: 11/12  PTA visit #:  2      Date of evaluation: 8/29/2018    Referral Diagnosis: Peripheral vascular disease (H)  I73.9 (ICD-10-CM) - Peripheral vascular disease (H)   M79.89 (ICD-10-CM) - Leg swelling   I89.0 (ICD-10-CM) - Acquired lymphedema of leg   I87.2 (ICD-10-CM) - Venous insufficiency of both lower extremities   E66.01, Z68.41 (ICD-10-CM) - Morbid obesity with BMI of 40.0-44.9, adult (H)   C67.9 (ICD-10-CM) - Malignant neoplasm of bladder (H)   Z87.891 (ICD-10-CM) - History of tobacco abuse   G47.30 (ICD-10-CM) - Sleep apnea         Referring provider: Jenni Kasper    Visit Diagnosis:     ICD-10-CM    1. Acquired lymphedema of leg I89.0    2. Leg swelling M79.89    3. Venous insufficiency of both lower extremities I87.2        Diagnoses and all orders for this visit:    Acquired lymphedema of leg    Leg swelling    Venous insufficiency of both lower extremities             Assessment:   Legs looking good.  Volumes down 2% on the right and 5% on the left with 8% difference right> left.  Skin was intact, decreased lower legs edema.  Pt doing well with home management.  Will do one  More follow up after he gets his velcro garments.     Goal Status:  Patient will have a decreased volume in : bilateral;LE;by 5%;Partially met  Patient will have decreased fibrosis, scar tissue for improved lymphatic mobilitiy : in 4 weeks;Met  Patient will perform, verbalize self-management of: skin care;self-monitoring;exercise;massage;self-compression;compression wear;compression care;infection prevention;in 4 weeks;Met    Plan / Patient Education:     Ordered velcro garments.   Follow up in two weeks       Subjective:   History of Present Illness:    Boogie is a 67 y.o. male who presents to therapy today with complaints of bilateral leg swelling usually left> right.  Swelling is better in the morning.  He is on Lasix.   Swelling does not go away as often now. Having trouble with breathing and swelling since 2016; no known cause.   Date of onset/duration of symptoms is  or before. Onset was gradual. Symptoms are constant. He denies history of similar symptoms. He describes their previous level of function as not limited. Has a Flexi-touch machine but did not get good results. Currently on prednisone which has reduced the pain.  He is going to try bariatric surgery.    Ordered Sigvaris velcro garments yesterday through [employer].  Pain Ratin/10         Objective:     Caregiver present: No    Observation of swelling: lower extremities  is better.     Volume measurements taken:  Yes  Lower Extremity Lymphedema  2018 2018 10/10/2018 10/31/2018   R Big Toe (cm) 8.4 8.3 8.5 8.1   R 10cm from tip of second toe (cm) 27.2 26.4 27.2 27.1   R 20cm from tip of second toe (cm) 28.5 25.3 28.5 26.1   R 30cm from tip of second toe (cm) 33.1 30.9 35.9 33.5   R 40cm from tip of second toe (cm) 43.5 42 43.6 43.1   R 50cm from tip of second toe (cm) 45.5 44.6 46.1 45.2   R 60cm from tip of second toe (cm) 50 49.1 50.8 50.1   Right Lower Extremity Total Estimated Volume (cm3) 5939.99 5468.15 6177.96 5806.71   Right LE change of volume from last visit (%) - -7.94 12.98 -6.01   Right LE change of volume from initial (%) - -7.94 4.01 -2.24   L Big Toe (cm) 8.4 8.4 8.7 8.2   L 10cm from tip of second toe (cm) 27.9 25.5 27 26.2   L 20cm from tip of second toe (cm) 29 25.1 29.3 26.4   L 30cm from tip of second toe (cm) 33.4 30.2 34.9 33   L 40cm from tip of second toe (cm) 41.8 39.2 41.5 40.8   L 50cm from tip of second toe (cm) 43.6 42.4 43.1 42.6   L 60cm from tip of second toe (cm) 48.3 47.2 49.1 48.6   Left Lower Extremity Total Estimated Volume (cm3) 5682.62 5007.09 5734.72 5383.9   Left LE change of volume from last visit (%) - -11.89 14.53 -6.12   Left LE change of volume from initial (%) - -11.89 0.92 -5.26   Swelling  Description Right>Left Right>Left Right>Left Right>Left   Lower Extremity Swelling Comparison (%) 4.53 9.21 7.73 7.85             Skin condition is:   Skin intact    Compression: comprilan on R, Sigvarfis garment on L    Medication for infection:  No    Treatment Today            TREATMENT MINUTES COMMENTS   Evaluation     Self-care/ Home management 30  Velcro garment R LE.Medical compression bandaging to L  lower extremity from forefoot to below the knee:   Applied  Eucerin skin lotion,  Stockinette,    comprilan 01 (10x10) rolls.  Size G tube on top  Instructed to remove them if uncomfortable.   Manual therapy 30   Manual therapy: manual lymph drainage for bilateral lower extremities lymphedema  Deep abdominal breath, neck effleurage,  bilateral axilla, bilateral lateral trunk, bilateral inguinal, anterior inguinal to axilla anastomosis on right side and left side, left lower extremity evacuation, right lower extremity evacuation, abdomen.     Neuromuscular Re-education     Therapeutic Activity     Therapeutic Exercises      Gait training     Modality__________________                Total 60    Blank areas are intentional and mean the treatment did not include these items.       Liat Arana PT,CLT  10/31/2018

## 2021-06-21 NOTE — PROGRESS NOTES
[facility] Daily Progress     Patient Name: Boogie Clark  Date: 10/17/2018  Visit #: 9/12  PTA visit #:  1      Date of evaluation: 8/29/2018    Referral Diagnosis: Peripheral vascular disease (H)  I73.9 (ICD-10-CM) - Peripheral vascular disease (H)   M79.89 (ICD-10-CM) - Leg swelling   I89.0 (ICD-10-CM) - Acquired lymphedema of leg   I87.2 (ICD-10-CM) - Venous insufficiency of both lower extremities   E66.01, Z68.41 (ICD-10-CM) - Morbid obesity with BMI of 40.0-44.9, adult (H)   C67.9 (ICD-10-CM) - Malignant neoplasm of bladder (H)   Z87.891 (ICD-10-CM) - History of tobacco abuse   G47.30 (ICD-10-CM) - Sleep apnea         Referring provider: Jenni Kasper    Visit Diagnosis:     ICD-10-CM    1. Acquired lymphedema of leg I89.0    2. Leg swelling M79.89    3. Venous insufficiency of both lower extremities I87.2    4. Morbid obesity with BMI of 40.0-44.9, adult (H) E66.01     Z68.41    5. Lymphedema I89.0        Diagnoses and all orders for this visit:    Acquired lymphedema of leg    Leg swelling    Venous insufficiency of both lower extremities    Morbid obesity with BMI of 40.0-44.9, adult (H)    Lymphedema             Assessment:      Patient very frustrated with the compression stockings due they don't fit, he reported was wearing the compression stockings day and night and by the 3rd day he had to remove them.  Instructed to patient he needs to wear the compression stockings during the day time only and off at night,le to verbalize instructions.  Patient is going to exchange the tall large stockings for regular large compression stockings.  Loaned a low leg velcro sigvaris size large for trail before he purchase his own.    Skin was intact, decreased low legs edema.         Goal Status:  Patient will have a decreased volume in : bilateral;LE;by 5%;Progressing toward  Patient will have decreased fibrosis, scar tissue for improved lymphatic mobilitiy : in 4 weeks;Met  Patient will perform,  verbalize self-management of: skin care;self-monitoring;exercise;massage;self-compression;compression wear;compression care;infection prevention;in 4 weeks;Progressing toward    Plan / Patient Education:     Patient is going to try the compression stockings 20-30 during the day time  And off at night.  Also loaned a velcro large Sigvaris low leg garment for trial before the patient purchase his own.Patient will return the velcro on his next visit.         Subjective:   History of Present Illness:    Boogie is a 67 y.o. male who presents to therapy today with complaints of bilateral leg swelling usually left> right.  Swelling is better in the morning.  He is on Lasix.  Swelling does not go away as often now. Having trouble with breathing and swelling since ; no known cause.   Date of onset/duration of symptoms is  or before. Onset was gradual. Symptoms are constant. He denies history of similar symptoms. He describes their previous level of function as not limited. Has a Flexi-touch machine but did not get good results. Currently on prednisone which has reduced the pain.  He is going to try bariatric surgery.      Pain Ratin/10         Objective:     Caregiver present: No    Observation of swelling: lower extremities  is better.     Volume measurements taken:  no  Lower Extremity Lymphedema  2018 2018 10/10/2018   R Big Toe (cm) 8.4 8.3 8.5   R 10cm from tip of second toe (cm) 27.2 26.4 27.2   R 20cm from tip of second toe (cm) 28.5 25.3 28.5   R 30cm from tip of second toe (cm) 33.1 30.9 35.9   R 40cm from tip of second toe (cm) 43.5 42 43.6   R 50cm from tip of second toe (cm) 45.5 44.6 46.1   R 60cm from tip of second toe (cm) 50 49.1 50.8   Right Lower Extremity Total Estimated Volume (cm3) 5939.99 5468.15 6177.96   Right LE change of volume from last visit (%) - -7.94 12.98   Right LE change of volume from initial (%) - -7.94 4.01   L Big Toe (cm) 8.4 8.4 8.7   L 10cm from tip of second toe  (cm) 27.9 25.5 27   L 20cm from tip of second toe (cm) 29 25.1 29.3   L 30cm from tip of second toe (cm) 33.4 30.2 34.9   L 40cm from tip of second toe (cm) 41.8 39.2 41.5   L 50cm from tip of second toe (cm) 43.6 42.4 43.1   L 60cm from tip of second toe (cm) 48.3 47.2 49.1   Left Lower Extremity Total Estimated Volume (cm3) 5682.62 5007.09 5734.72   Left LE change of volume from last visit (%) - -11.89 14.53   Left LE change of volume from initial (%) - -11.89 0.92   Swelling Description Right>Left Right>Left Right>Left   Lower Extremity Swelling Comparison (%) 4.53 9.21 7.73           Skin condition is:   Skin intact    Compression: comprilan.    Medication for infection:  No    Treatment Today            TREATMENT MINUTES COMMENTS   Evaluation     Self-care/ Home management 30 Reviewed compression options, provided prism dubose site to compare prices.Medical compression bandaging to bilateral lower extremities from forefoot to below the kne:   Applied  Eucerin skin lotion,  Srockinette, cotton roll ankle to knee  comprilan 01 (10x10) rolls.  Size G tube on top  Instructed to remove them if uncomfortable.   Manual therapy 30 Measured volumes.  Manual therapy: manual lymph drainage for bilateral lower extremities lymphedema  Deep abdominal breath, neck effleurage,  bilateral axilla, bilateral lateral trunk, bilateral inguinal, anterior inguinal to axilla anastomosis on right side and left side, left lower extremity evacuation, right lower extremity evacuation, abdomen.     Neuromuscular Re-education     Therapeutic Activity     Therapeutic Exercises      Gait training     Modality__________________                Total 60    Blank areas are intentional and mean the treatment did not include these items.       Belkis Gresham PT, CLL[facility]EN[facility]  10/17/2018

## 2021-06-22 NOTE — TELEPHONE ENCOUNTER
I have left Beronica message returning his call to schedule surgery.  I have let him know that I have sent a message for Dr. Hoffman to look over his pulmonary and cardiology reports and I will contact him when I hear back from Dr. Hoffman that he's good to schedule

## 2021-06-22 NOTE — PROGRESS NOTES
[facility] Discharge Summary  Patient Name: Boogie Clark  Date: 12/17/2018  PTA visit #:  2        Date of evaluation: 8/29/2018     Referral Diagnosis: Peripheral vascular disease (H)  I73.9 (ICD-10-CM) - Peripheral vascular disease (H)   M79.89 (ICD-10-CM) - Leg swelling   I89.0 (ICD-10-CM) - Acquired lymphedema of leg   I87.2 (ICD-10-CM) - Venous insufficiency of both lower extremities   E66.01, Z68.41 (ICD-10-CM) - Morbid obesity with BMI of 40.0-44.9, adult (H)   C67.9 (ICD-10-CM) - Malignant neoplasm of bladder (H)   Z87.891 (ICD-10-CM) - History of tobacco abuse   G47.30 (ICD-10-CM) - Sleep apnea         Referring provider: Jenni Kasper       Visit Diagnosis:   1. Acquired lymphedema of leg     2. Leg swelling     3. Venous insufficiency of both lower extremities         Goals:    Patient will have a decreased volume in : bilateral;LE;by 5%;Partially met  Patient will have decreased fibrosis, scar tissue for improved lymphatic mobilitiy : in 4 weeks;Met  Patient will perform, verbalize self-management of: skin care;self-monitoring;exercise;massage;self-compression;compression wear;compression care;infection prevention;in 4 weeks;Met      Patient was seen for 12/12 visits from 8/29/2018 to 11/14/2018 with 0 missed appointments.  The patient met goals and has demonstrated understanding of and independence in the home program for self-care, and progression to next steps.  He will initiate contact if questions or concerns arise.  Patient received a home program    No further therapy is required at this time.    Therapy will be discontinued at this time.  The patient will need a new referral to resume.    Thank you for your referral.  Liat Arana  12/17/2018  3:15 PM

## 2021-06-23 NOTE — TELEPHONE ENCOUNTER
I spoke with Boogie this afternoon and he is currently having iron infusions.  He will wait on having surgery now until after May 1st when he and his wife Hernán come back for the [facility].  He will reach out to me in May to set up an appointment with Dr. Hoffman

## 2021-06-26 NOTE — PROGRESS NOTES
"Progress Notes by Kinza Son MD at 8/3/2018 10:30 AM     Author: Kinza Son MD Service: -- Author Type: Physician    Filed: 8/3/2018 11:47 AM Encounter Date: 8/3/2018 Status: Signed    : Kinza Son MD (Physician)       Outpatient Bariatric Medicine Consultation   Indication: Medical Bariatric Consultation to Precede Bariatric Surgery  Primary Provider: Shabbir Agustin MD  Requesting Physician: Dr. Hoffman  Type of Bariatric Surgery: Sleeve Gastrectomy      Impression    Boogie Clark is a 67 y.o. year old male with  has a past medical history of Ankle pain; Arthritis; Cancer (H); DDD (degenerative disc disease), cervical; Dyslipidemia; History of kidney stones; History of MRSA infection; History of tobacco abuse; Hypertension; Infectious viral hepatitis; Lower leg edema; Metabolic syndrome; Morbid obesity with BMI of 50.0-59.9, adult (H); Peripheral arteriosclerosis (H); and Sleep apnea.,   poor functional capacity and musculoskeletal disability due to morbid obesity which satisfies NIH criteria for bariatric surgery. His  Body mass index is 57.69 kg/(m^2)..  Boogie Clark hopes to achieve less pain, better breathing, \"back to my normal life\" following surgery and significant weight loss. Recently put on prednisone 20 mg daily for breathing. Does not help his breathing but helped his leg pain.    Bariatric Recommendations   Bariatric therapy is indicated for Boogie as a means of modifying his obesity related co-morbidities.  Therapeutic lifestyle changes have not lead to significant and or durable weight loss.  Surgical bariatric therapy is most likely to induce significant weight loss, promote long-term weight maintenance and lead to clinical improvement and/or resolution of his weight related co-morbidities.     Bariatric Surgery Requirements   Medical Nutrition Therapy including comprehensive evaluation, guidance and clearance is required.  Bariatric Psychological Assessment and " clearance is required.  Bariatric Laboratory studies are indicated and were ordered today.  Routine Healthcare Maintenance must be current prior to bariatric surgery.  Colonoscopy: 2012 benign polyp  Mammogram: NA  Pap: NA  Support Group Attendance one time is required prior to surgery, encouraged thereafter.  Lifelong vitamin supplementation is required.  Lifelong follow up is indicated.  Physical Activity Plan is indicated, was discussed and will be reinforced each visit.  Non-Smoking status must be achieved a minimum of 60 days prior to surgery and he should remain a non-smoker indefinitely following bariatric surgery.     Perioperative Recommendations   CARDIAC: Cardiac consultation and clearance will be required of patients with significant cardiac disease and/or multiple cardiac risk factors. Work up has been done  PULMONARY: Pre-operative therapy with CPAP/BIPAP is indicated for a minimum of one month for patients with sleep apnea. Complete tobacco abstinence for two months pre-operatively and indefinitely thereafter is required. He quit 1.5 yrs ago.  RENAL: Diuretics will be discontinued 2 weeks before surgery at the time of liquid diet if the patient is on them at that time. He is on lasix currently.  ENDOCRINE: Optimizing perioperative glycemic control is indicated. Our goal is an AIC of 8 or less at the time of surgery for optimal healing.   GASTROINTESTINAL: Evaluation of the esophagus and stomach by EGD and/or UGI series will be considered in patients with severe GERD, previous weight loss surgery, or other indication.  GYNECOLOGIC: For patients on Estrogen- Estrogen will be discontinued 4 weeks prior to surgery and resumed 4 weeks after surgery unless otherwise advised. Reliable contraception is required post-operatively for 1 year for women of childbearing age. DEPOT PROVERA is contraindicated due to its association with weight gain. Post-operative birth control plan is  NA  MUSCULOSKELETAL/RHEUMATOLOGIC: NSAIDS are contraindicated following surgery and lifelong abstinence is indicated. When indicated for cardioprotection or otherwise, patients should use an enteric coated ASA and concomitant proton pump inhibitor.We would like to see Mr. Clark off of his prednisone well in advance of surgery  HEMATOLOGIC: Risks of deep venous thromboembolism have been assessed. Patients with history of DVT/PE or current anti-coagulation will be placed on the High Risk DVT Prophylaxis Protocol. Objections (if any) to receiving blood products if necessary have been documented.Standard protocol  DENTAL: Reasonable and functional dentition is indicated in order to chew food to applesauce consistency post-operatively.  NUTRITIONAL: Pre and post-operative nutritional and lifestyle modification guidance is indicated. Pre-operative weight reduction can reduce liver volume, improving technical aspects of surgery.    History Surrounding Consultation   Struggles with weight started at childhood  His weight at age 17 was 180#  He has had several past supervised and unsupervised weight loss attempts  The most weight lost was: in the [employer] he had pneumonia and lost 110#  Unfortunately there was not durable weight maintenance.  History of bulimia, anorexia, or binge eating disorder? no  If Present has eating disorder been in remission at least 3 years? NA  Night time eating? no    Dietary History   Meals per day: 3 (sometimes)  Snacks: at night  Typical Snack: chips, crackers watermelon  Who does the grocery shopping? His wife Felicia Lopez  Who does the cooking? Felicia Lopez  A typical meal includes: B: egg, onion, cheese, english muffin L: sandwich on WW D: BK whopper  Regular Pop: seldom  Juice: no  Caffeine: 2-3  Amount of restaurant eating per week: Monday Fried Chicken, Tues $10 Pizza, Friday out and once on wknds Bfast Flores  Eating a the table with the TV off? yes    Physical Activity Pattern   Current physical  "activity routine includes: [place] 2X/wk 20 min cardio (bike and treadmill)    Limitations from being physically active on a regular basis includes: nothing. He used to walk his dog [place] 2 miles at 4am up to 4 yrs ago    He describes his general health as: fair    Past Medical History     Past Medical History:   Diagnosis Date   ? Ankle pain    ? Arthritis     knees   ? Cancer (H)     history of bladder cancer BCG treatment   ? DDD (degenerative disc disease), cervical    ? Dyslipidemia    ? History of kidney stones    ? History of MRSA infection    ? History of tobacco abuse     age 14 to age 66 up to 1/2 ppd (26 pack years)   ? Hypertension    ? Infectious viral hepatitis    ? Lower leg edema     lymphedema   ? Metabolic syndrome    ? Morbid obesity with BMI of 50.0-59.9, adult (H)    ? Peripheral arteriosclerosis (H)     Bilateral Femoral angioplasty   ? Sleep apnea     uses CPAP with oxygen 3L at night     Dyslipidemia: yes  Obesity Hypoventilation: obesity related breathing problems  DM2: no DM1: NA DX: NA Most recent AIC: NA  Neuropathy: cervical on and off  Nephropathy: no  Retinopathy: no  Glaucoma:no  IFG or \"pre-DM\": no  MI: no  CVA:no  CHF: no  Heart Valves: no  Previous cardiac testing includes: full work up  Cancers: bladder  Kidney Disease: no  Colitis: no  Crohn's: no  PUD: no  HX UGI/EGD:no  Asthma: no  Back Pain:yes  DDD: yes  Gout: no  Severe Headaches: no  Seizures: no If so, last seizure: no  Pseudotumor: no  PCOS: NA  Menstrual Irregularity: NA  Menorrhagia: NA  Infertility: NA  Thyroid problems: no  Thyroid medications: no  Glaucoma: no  HIV positive: no  MRSA/VRE history: yes  History of Blood transfusion: no  Anemia: no    Medications     Current Outpatient Prescriptions   Medication Sig Dispense Refill   ? aspirin 81 MG EC tablet Take 81 mg by mouth daily.     ? atorvastatin (LIPITOR) 40 MG tablet Take 40 mg by mouth daily.     ? budesonide-formoterol (SYMBICORT) 80-4.5 mcg/actuation " inhaler Inhale 2 puffs as needed.     ? diltiazem (DILACOR XR) 180 MG 24 hr capsule Take 180 mg by mouth daily.     ? diphenhydrAMINE (RESTFULLY SLEEP) 25 mg tablet Take 25 mg by mouth at bedtime as needed for sleep.     ? furosemide (LASIX) 20 MG tablet Take 20 mg by mouth daily.     ? melatonin 10 mg Tab Take 1 tablet by mouth at bedtime.     ? metoprolol succinate (TOPROL-XL) 25 MG Take 100 mg by mouth daily.     ? montelukast (SINGULAIR) 10 mg tablet Take 10 mg by mouth daily.     ? multivitamin (ONE A DAY) per tablet Take 1 tablet by mouth daily.     ? predniSONE (DELTASONE) 20 MG tablet Take 20 mg by mouth daily.     ? tiotropium bromide 2.5 mcg/actuation Mist Inhale 1 puff as needed.       No current facility-administered medications for this visit.        Allergies    Penicillins    Past Surgical History     Past Surgical History:   Procedure Laterality Date   ? ANGIOPLASTY     ? ankle      right ankle surgery ORIF Distal Fibula Right   ? BLADDER TUMOR EXCISION     ? tonsillectomy     ? VASECTOMY       History of problems with anesthesia: no  History of Malignant Hyperthermia: NO    Gynecologic History       Family History     family history includes Breast cancer in his maternal aunt. He was adopted.    Social History     Social History     Social History   ? Marital status:      Spouse name: N/A   ? Number of children: N/A   ? Years of education: N/A     Occupational History   ? Not on file.     Social History Main Topics   ? Smoking status: Former Smoker     Years: 52.00   ? Smokeless tobacco: Never Used   ? Alcohol use 0.0 - 0.6 oz/week     0 - 1 Shots of liquor per week      Comment: Seldom    ? Drug use: No   ? Sexual activity: Yes     Partners: Female     Other Topics Concern   ? Not on file     Social History Narrative     to Felicia Lopez.    Daughter Jeanette, Daughter Yarelis    Retired .           Psychiatric History   Diagnoses: no  Treated by: no  Psychiatric Hospitalizations:  "no  Suicide attempts: no  ECT: no  Panic attacks: no  History of Abuse: no    Palliative Medicine History   Involvement in a pain clinic: no    Dental History   Missing teeth: yes  Pending Dental Work: no  Regular Dental Visits: yes  Dentures/Partials: no    Review of Systems   Snoring: COCO  Witnessed Apneas COCO  PND COCO  [place] Score: COCO  STOP BANG: COCO  General  Fatigue: yes  Sleep Quality:poor  HEENT  Visual changes: presbyopia  Cardiovascular  Murmur: no  Elevated BP: no  Chest Pain with Exertion: no  Dyspnea with Exertion: yes  Palpitations: no  Lower Extremity Edema: yes  Syncope: no  Pulmonary  Shortness of breath at rest: no  Wheezing: yes  CPAP use: yes  Gastrointestinal  Trouble swallowing:no  Heartburn: no  Abdominal pain: no  Hematochezia: no  Urologic  Hesitancy: no  Urgency: yes  USI yes  Genitourinary  ED:   Menorrhagia: NA  Dysmenorrhea: NA  Neurologic  Severe headache:no  Paresthesias: yes known cervical DDD  Psychiatric  Moods Stable: yes  Hallucinations: no  Rheumatologic  Myalgias: yes  Arthralgias: yes  Endocrine  Polydipsia: yes  Polyuria: yes  Galactorrhea: no  Heat intolerance: no  Hirsutism: no  Musculoskeletal  Joint pain:yes  Falls: no  Use of cane, crutch or motorized scooter: yes  Hematologic  Abnormal Bleeding or Clotting: no  Dermatologic  Skin Tags: no  Striae: yes  Furuncless: no  Acne: no  Intertrigo: yes  Lower Leg ulcers: no    Physical Exam   Vitals: /74  Pulse 67  Resp 18  Ht 5' 8.5\" (1.74 m)  Wt (!) 385 lb (174.6 kg)  SpO2 94%  BMI 57.69 kg/m2  Height:   Ht Readings from Last 1 Encounters:   08/03/18 5' 8.5\" (1.74 m)     Weight:   Wt Readings from Last 1 Encounters:   08/03/18 (!) 385 lb (174.6 kg)     Height: 5' 8.5\" (1.74 m) (8/3/2018 10:09 AM)  Initial Weight: 385 lbs (8/3/2018 10:09 AM)  Weight: 385 lb (174.6 kg) (8/3/2018 10:09 AM)  Weight loss from initial: 0 (8/3/2018 10:09 AM)  % Weight loss: 0 % (8/3/2018 10:09 AM)  BMI (Calculated): 57.7 (8/3/2018 10:09 " "AM)  SpO2: 94 % (8/3/2018 10:09 AM)  Waist Circumference (In): 56 Inches (8/3/2018 10:09 AM)  Hip Circumference (In): 57 Inches (8/3/2018 10:09 AM)  Neck Circumference (In): 20 Inches (8/3/2018 10:09 AM)  Body mass index is 57.69 kg/(m^2).    General Appearance  No acute distress. Obesity: central  Alert: yes  Sleepy: no  Ambulatory without a device: yes  LIAN FONSECA Melampati Score: 4+  Neck  Stout: 20\" No carotid bruits  Cardiovascular  Rhythm regular Rate Regular  Murmur: no  Pulmonary  Austin Score: COCO  Lungs clear to ascultation  Abdomen  Soft, NT/ND No rebound, no guarding  No rashes.   Post surgical Scars: no  Extremities:  Pitting edema: bilateral lymphedema  Palpable distal pulses: yes  Varicose veins: superficial varicosities  Neurologic  Tremors: no  Psychiatric  Thought Content Organized  Mood appears stable  Endocrine  Moon Facies: NO  Dorsal Thoracic Prominence: NO  Skin tags: yes  Acanthosis nigricans: no  Purple Striae: no  Dermatologic  Intertrigo: yes/calm    Counseling/Education   We reviewed the important post op bariatric recommendations:  -eating 3 meals daily  -eating protein first, getting >60gm protein daily 80gm if duodenal switch  -eating slowly, chewing food well  -avoiding/limiting calorie containing beverages  -drinking water 15-30 minutes before or after meals  -choosing wheat, not white with breads, crackers, pastas, radha, bagels, tortillas, rice  -limiting restaurant or cafeteria eating to twice a week or less    We discussed the importance of restorative sleep and stress management in maintaining a healthy weight.  We discussed the National Weight Control Registry healthy weight maintenance strategies and ways to optimize metabolism.  We discussed the importance of physical activity including cardiovascular and strength training in maintaining a healthier weight.  We discussed the importance of lifelong vitamin supplementation and lifelong follow-up.    Boogie Clark was " reminded that, postoperatively,  to avoid marginal ulcers he should avoid tobacco at all, alcohol in excess, caffeine in excess, and NSAIDS (unless indicated for cardioprotection or othewise and opposed by a PPI).     He was reminded that after bariatric surgery alcohol will affect him differently and he should not drive after consuming even one alcoholic beverage.  Thank you for the opportunity to participate in the care of your patient.  Kinza Espinoza MD, FAAFP              60 minutes spent with patient. >50% in counseling.

## 2021-07-03 NOTE — ADDENDUM NOTE
Addendum Note by Barbara Kingston at 8/29/2019 11:15 AM     Author: Barbara Kingston Service: -- Author Type:     Filed: 9/24/2019 11:40 AM Encounter Date: 8/29/2019 Status: Signed    : Barbara Kingston ()    Addended by: BARBARA KINGSTON on: 9/24/2019 11:40 AM        Modules accepted: Orders

## 2021-08-03 PROBLEM — E66.01 MORBID OBESITY WITH BMI OF 45.0-49.9, ADULT (H): Status: RESOLVED | Noted: 2018-05-30 | Resolved: 2018-08-20

## 2021-08-06 DIAGNOSIS — M50.30 DDD (DEGENERATIVE DISC DISEASE), CERVICAL: Primary | ICD-10-CM

## 2021-08-09 RX ORDER — GABAPENTIN 300 MG/1
CAPSULE ORAL
Qty: 90 CAPSULE | Refills: 0 | Status: SHIPPED | OUTPATIENT
Start: 2021-08-09 | End: 2021-10-19

## 2021-09-26 ENCOUNTER — HEALTH MAINTENANCE LETTER (OUTPATIENT)
Age: 71
End: 2021-09-26

## 2021-09-28 ENCOUNTER — TELEPHONE (OUTPATIENT)
Dept: SURGERY | Facility: CLINIC | Age: 71
End: 2021-09-28

## 2021-10-18 DIAGNOSIS — M50.30 DDD (DEGENERATIVE DISC DISEASE), CERVICAL: ICD-10-CM

## 2021-10-19 RX ORDER — GABAPENTIN 300 MG/1
CAPSULE ORAL
Qty: 90 CAPSULE | Refills: 0 | Status: SHIPPED | OUTPATIENT
Start: 2021-10-19 | End: 2022-01-14

## 2021-10-25 ENCOUNTER — OFFICE VISIT (OUTPATIENT)
Dept: UROLOGY | Facility: CLINIC | Age: 71
End: 2021-10-25
Payer: COMMERCIAL

## 2021-10-25 DIAGNOSIS — C67.9 MALIGNANT NEOPLASM OF URINARY BLADDER, UNSPECIFIED SITE (H): Primary | ICD-10-CM

## 2021-10-25 PROBLEM — E66.01 MORBID OBESITY WITH BMI OF 45.0-49.9, ADULT (H): Chronic | Status: RESOLVED | Noted: 2018-05-30 | Resolved: 2021-10-25

## 2021-10-25 PROCEDURE — 52000 CYSTOURETHROSCOPY: CPT | Performed by: UROLOGY

## 2021-10-25 NOTE — PROGRESS NOTES
S: Boogie Clark is a 70 year old male returns for bladder cancer surveillance.    he has history of bladder cancer Grade 3 non-invasive, Stage ta, s/p turbt on 5/ 13.     He received 2 courses of BCG therapy.    Patient is draped and prepped.  Flexible cystoscopy placed under direct vision.    Cysto:  The anterior urethra is normal   The prostatic urethra showed bilateral lobe enlargement.     The length is 2cm,  the coaptation is 2 cm.     In the bladder there is no tumor/stone    Assessment/Plan:  V10.51 Personal history of malignant neoplasm of bladder  (primary encounter diagnosis)  Comment: no evidence of recurrence  Plan: BTR in  12 months

## 2022-01-27 DIAGNOSIS — M50.30 DDD (DEGENERATIVE DISC DISEASE), CERVICAL: ICD-10-CM

## 2022-01-27 RX ORDER — GABAPENTIN 300 MG/1
CAPSULE ORAL
Qty: 90 CAPSULE | Refills: 1 | Status: SHIPPED | OUTPATIENT
Start: 2022-01-27 | End: 2022-08-22

## 2022-05-08 ENCOUNTER — HEALTH MAINTENANCE LETTER (OUTPATIENT)
Age: 72
End: 2022-05-08

## 2022-08-22 DIAGNOSIS — M50.30 DDD (DEGENERATIVE DISC DISEASE), CERVICAL: ICD-10-CM

## 2022-08-22 RX ORDER — GABAPENTIN 300 MG/1
CAPSULE ORAL
Qty: 90 CAPSULE | Refills: 1 | Status: SHIPPED | OUTPATIENT
Start: 2022-08-22

## 2022-08-22 NOTE — TELEPHONE ENCOUNTER
Patient calling for a request of his gabapentin refill to help with his sleep at night.  He gets restless limbs if he isn't taking it.  Eva Gomez RN

## 2022-10-24 ENCOUNTER — OFFICE VISIT (OUTPATIENT)
Dept: UROLOGY | Facility: CLINIC | Age: 72
End: 2022-10-24
Payer: COMMERCIAL

## 2022-10-24 DIAGNOSIS — Z85.51 PERSONAL HISTORY OF BLADDER CANCER: Primary | ICD-10-CM

## 2022-10-24 PROCEDURE — 88112 CYTOPATH CELL ENHANCE TECH: CPT | Performed by: PATHOLOGY

## 2022-10-24 PROCEDURE — 52000 CYSTOURETHROSCOPY: CPT | Performed by: UROLOGY

## 2022-10-26 LAB
PATH REPORT.COMMENTS IMP SPEC: NORMAL
PATH REPORT.FINAL DX SPEC: NORMAL
PATH REPORT.GROSS SPEC: NORMAL
PATH REPORT.MICROSCOPIC SPEC OTHER STN: NORMAL
PATH REPORT.RELEVANT HX SPEC: NORMAL

## 2023-01-08 ENCOUNTER — HEALTH MAINTENANCE LETTER (OUTPATIENT)
Age: 73
End: 2023-01-08

## 2023-01-30 NOTE — TELEPHONE ENCOUNTER
Orders that were miranda'd up and then signed by Dr. Agustin were all set to local print with notes that patient was to have test done and see allerg[facility]  They were not miranda'd up to be sent to [facility]    Will need to redo referrals if patient agrees to have done thru [facility]    Detailed message left on patient's VM with note as written below.  Requested that he call back and let DR. Agustin know where he would like to go for the spirometry test, and to f/u with allergy and/or pulmonary      Ghislaine Foreman RN       Sent

## 2023-03-09 NOTE — TELEPHONE ENCOUNTER
Spoke to patient in regards to message below. Patient states understanding and provided him with phone number to call and schedule appointment.      Dania Salvador, CMA   Body Location Override (Optional - Billing Will Still Be Based On Selected Body Map Location If Applicable): R posterior thigh

## 2023-05-02 NOTE — TELEPHONE ENCOUNTER
DIAGNOSIS: Left wrist fracture    APPOINTMENT DATE: 06/02/2023   NOTES STATUS DETAILS   OFFICE NOTE from referring provider N/A    OFFICE NOTE from other specialist N/A    DISCHARGE SUMMARY from hospital N/A    DISCHARGE REPORT from the ER N/A    OPERATIVE REPORT N/A    MEDICATION LIST N/A    EMG (for Spine) N/A    IMPLANT RECORD/STICKER N/A    LABS     CBC/DIFF N/A    CULTURES N/A    INJECTIONS DONE IN RADIOLOGY N/A    MRI N/A    CT SCAN N/A    XRAYS (IMAGES & REPORTS) N/A    TUMOR     PATHOLOGY  Slides & report N/A      Records received, in provider RF, waiting for images  Morena Tay on 5/11/2023 at 2:43 PM

## 2023-06-02 ENCOUNTER — HEALTH MAINTENANCE LETTER (OUTPATIENT)
Age: 73
End: 2023-06-02

## 2023-06-02 ENCOUNTER — PRE VISIT (OUTPATIENT)
Dept: ORTHOPEDICS | Facility: CLINIC | Age: 73
End: 2023-06-02

## 2023-10-24 ENCOUNTER — OFFICE VISIT (OUTPATIENT)
Dept: UROLOGY | Facility: CLINIC | Age: 73
End: 2023-10-24
Payer: COMMERCIAL

## 2023-10-24 VITALS — HEART RATE: 73 BPM | SYSTOLIC BLOOD PRESSURE: 138 MMHG | OXYGEN SATURATION: 90 % | DIASTOLIC BLOOD PRESSURE: 76 MMHG

## 2023-10-24 DIAGNOSIS — Z85.51 PERSONAL HISTORY OF BLADDER CANCER: Primary | ICD-10-CM

## 2023-10-24 PROCEDURE — 88112 CYTOPATH CELL ENHANCE TECH: CPT | Performed by: PATHOLOGY

## 2023-10-24 PROCEDURE — 52000 CYSTOURETHROSCOPY: CPT | Performed by: UROLOGY

## 2023-10-24 NOTE — PROGRESS NOTES
Patient presents to the clinic today for BTR yearly. Patient relays the following information: No new symptoms.    Urine cytology obtained.    Dori MCGILL RN Urology 10/24/2023 8:41 AM

## 2023-10-24 NOTE — PROGRESS NOTES
S: Boogie Clark is a 72 year old male returns for bladder cancer surveillance.    he has history of bladder cancer Grade 3 non-invasive, Stage ta, s/p turbt on 5/ 13.     He received 2 courses of BCG therapy.    Patient is draped and prepped.  Flexible cystoscopy placed under direct vision.    Cysto:  The anterior urethra is normal   The prostatic urethra showed bilateral lobe enlargement.     The length is 2cm,  the coaptation is 2 cm.     In the bladder there is no tumor/stone    Assessment/Plan:  V10.51 Personal history of malignant neoplasm of bladder  (primary encounter diagnosis)  Comment: no evidence of recurrence  Plan: BTR in  12 months

## 2024-03-18 NOTE — TELEPHONE ENCOUNTER
Form for [employer] for lumbar orthosis signed by Dr. Agustin and faxed back to them at [phone].  Michelle Mcneal,      [de-identified] : LEFT SHOULDER  Inspection: skin intact, swelling.   Palpation: noTenderness is noted at the midshaft clavicle, improved compared to prior examiantion and AC joint  Range of motion: full  Strength: There is no pain and discomfort with strength testing.   Neurological testing: motor and sensor intact distally.  Ligament Stability and Special Tests:   Shoulder apprehension: neg Shoulder relocation: neg  Obriens test: neg Cross Body Adduction: neg

## 2024-06-30 ENCOUNTER — HEALTH MAINTENANCE LETTER (OUTPATIENT)
Age: 74
End: 2024-06-30

## 2024-09-06 NOTE — PROGRESS NOTES
1120  Prep is complete for procedure.  H&P is current.  Labs: Last are from 2015 here, however he states [facility] and that [facility] them into [facility].  Awaiting consent.  Denies pain now.  States his main problem is SOB at rest and with activity.  Denies any SOB at this time.  He has home O2, but states that it really doesn't help him feel less SOB.  He is here for RHC with sedation.  This is his first RHC.  Will call wife for ride  Home when ready for DC.  CTRN     None

## 2024-10-22 ENCOUNTER — OFFICE VISIT (OUTPATIENT)
Dept: UROLOGY | Facility: CLINIC | Age: 74
End: 2024-10-22
Payer: COMMERCIAL

## 2024-10-22 VITALS
DIASTOLIC BLOOD PRESSURE: 78 MMHG | SYSTOLIC BLOOD PRESSURE: 138 MMHG | HEART RATE: 76 BPM | OXYGEN SATURATION: 96 % | WEIGHT: 217.2 LBS | BODY MASS INDEX: 30.29 KG/M2

## 2024-10-22 DIAGNOSIS — Z85.51 PERSONAL HISTORY OF BLADDER CANCER: Primary | ICD-10-CM

## 2024-10-22 PROCEDURE — 52000 CYSTOURETHROSCOPY: CPT | Performed by: UROLOGY

## 2024-10-22 PROCEDURE — 88112 CYTOPATH CELL ENHANCE TECH: CPT | Performed by: PATHOLOGY

## 2024-10-22 RX ORDER — LISINOPRIL AND HYDROCHLOROTHIAZIDE 10; 12.5 MG/1; MG/1
TABLET ORAL
COMMUNITY

## 2024-10-22 RX ORDER — AMLODIPINE BESYLATE 10 MG/1
1 TABLET ORAL DAILY
COMMUNITY
Start: 2023-08-11

## 2024-10-22 NOTE — PROGRESS NOTES
S: Boogie Clark is a 73 year old male returns for bladder cancer surveillance.    he has history of bladder cancer Grade 3 non-invasive, Stage ta, s/p turbt on 5/ 13.     He received 2 courses of BCG therapy.    Patient is draped and prepped.  Flexible cystoscopy placed under direct vision.    Cysto:  The anterior urethra is normal   The prostatic urethra showed bilateral lobe enlargement.     The length is 2cm,  the coaptation is 2 cm.     In the bladder there is no tumor/stone    Assessment/Plan:  V10.51 Personal history of malignant neoplasm of bladder  (primary encounter diagnosis)  Comment: no evidence of recurrence  Plan: BTR in  12 months

## 2024-10-23 LAB
PATH REPORT.COMMENTS IMP SPEC: NORMAL
PATH REPORT.FINAL DX SPEC: NORMAL
PATH REPORT.GROSS SPEC: NORMAL
PATH REPORT.MICROSCOPIC SPEC OTHER STN: NORMAL

## 2025-07-13 ENCOUNTER — HEALTH MAINTENANCE LETTER (OUTPATIENT)
Age: 75
End: 2025-07-13

## (undated) DEVICE — PACK HEART RIGHT CUSTOM SAN32RHF18

## (undated) DEVICE — Device

## (undated) DEVICE — KIT RIGHT HEART CATH H965601307191

## (undated) DEVICE — INTRO SHEATH 7FRX10CM PINNACLE RSS702

## (undated) RX ORDER — LIDOCAINE 40 MG/G
CREAM TOPICAL
Status: DISPENSED
Start: 2018-12-26

## (undated) RX ORDER — LIDOCAINE 40 MG/G
CREAM TOPICAL
Status: DISPENSED
Start: 2017-08-02

## (undated) RX ORDER — REGADENOSON 0.08 MG/ML
INJECTION, SOLUTION INTRAVENOUS
Status: DISPENSED
Start: 2018-12-10

## (undated) RX ORDER — SODIUM CHLORIDE 9 MG/ML
INJECTION, SOLUTION INTRAVENOUS
Status: DISPENSED
Start: 2017-08-02

## (undated) RX ORDER — SODIUM NITROPRUSSIDE 25 MG/ML
INJECTION INTRAVENOUS
Status: DISPENSED
Start: 2018-12-26